# Patient Record
Sex: MALE | Race: WHITE | Employment: OTHER | URBAN - METROPOLITAN AREA
[De-identification: names, ages, dates, MRNs, and addresses within clinical notes are randomized per-mention and may not be internally consistent; named-entity substitution may affect disease eponyms.]

---

## 2017-01-04 ENCOUNTER — TELEPHONE (OUTPATIENT)
Dept: CARDIOLOGY CLINIC | Age: 77
End: 2017-01-04

## 2017-01-10 ENCOUNTER — OFFICE VISIT (OUTPATIENT)
Dept: ORTHOPEDIC SURGERY | Age: 77
End: 2017-01-10

## 2017-01-10 DIAGNOSIS — M17.12 PRIMARY OSTEOARTHRITIS OF LEFT KNEE: Primary | ICD-10-CM

## 2017-01-10 PROCEDURE — 99024 POSTOP FOLLOW-UP VISIT: CPT | Performed by: ORTHOPAEDIC SURGERY

## 2017-01-10 PROCEDURE — 20610 DRAIN/INJ JOINT/BURSA W/O US: CPT | Performed by: ORTHOPAEDIC SURGERY

## 2017-01-13 RX ORDER — CANAGLIFLOZIN 100 MG/1
TABLET, FILM COATED ORAL
Qty: 90 TABLET | Refills: 0 | Status: SHIPPED | OUTPATIENT
Start: 2017-01-13 | End: 2017-07-03 | Stop reason: SDUPTHER

## 2017-01-13 RX ORDER — HYDROCHLOROTHIAZIDE 12.5 MG/1
TABLET ORAL
Qty: 90 TABLET | Refills: 0 | Status: SHIPPED | OUTPATIENT
Start: 2017-01-13 | End: 2017-02-16 | Stop reason: DRUGHIGH

## 2017-01-13 RX ORDER — INSULIN GLARGINE 100 [IU]/ML
INJECTION, SOLUTION SUBCUTANEOUS
Qty: 50 ML | Refills: 0 | Status: SHIPPED | OUTPATIENT
Start: 2017-01-13 | End: 2017-04-14 | Stop reason: SDUPTHER

## 2017-01-17 ENCOUNTER — HOSPITAL ENCOUNTER (OUTPATIENT)
Dept: CARDIOLOGY | Facility: CLINIC | Age: 77
Discharge: HOME | End: 2017-01-17
Attending: INTERNAL MEDICINE | Admitting: INTERNAL MEDICINE

## 2017-01-17 LAB
LV EF: 55 %
LVEF MODALITY: NORMAL

## 2017-01-23 ENCOUNTER — HOSPITAL ENCOUNTER (OUTPATIENT)
Dept: OTHER | Age: 77
Discharge: HOME | End: 2017-01-23
Attending: INTERNAL MEDICINE | Admitting: INTERNAL MEDICINE

## 2017-01-24 ENCOUNTER — HOSPITAL ENCOUNTER (OUTPATIENT)
Dept: CT IMAGING | Age: 77
Discharge: HOME | End: 2017-01-24
Attending: INTERNAL MEDICINE | Admitting: INTERNAL MEDICINE

## 2017-01-24 ENCOUNTER — OFFICE VISIT (OUTPATIENT)
Dept: ORTHOPEDIC SURGERY | Age: 77
End: 2017-01-24

## 2017-01-24 DIAGNOSIS — B19.20 VIRAL HEPATITIS C WITHOUT HEPATIC COMA: ICD-10-CM

## 2017-01-24 DIAGNOSIS — B19.20 UNSPECIFIED VIRAL HEPATITIS C WITHOUT HEPATIC COMA: ICD-10-CM

## 2017-01-24 DIAGNOSIS — M17.12 PRIMARY OSTEOARTHRITIS OF LEFT KNEE: Primary | ICD-10-CM

## 2017-01-24 LAB
A/G RATIO: 0.7 (ref 1.1–2.2)
ALBUMIN SERPL-MCNC: 3.4 G/DL (ref 3.4–5)
ALP BLD-CCNC: 86 U/L (ref 40–129)
ALT SERPL-CCNC: 19 U/L (ref 10–40)
ANION GAP SERPL CALCULATED.3IONS-SCNC: 15 MMOL/L (ref 3–16)
AST SERPL-CCNC: 24 U/L (ref 15–37)
BASOPHILS ABSOLUTE: 0 K/UL (ref 0–0.2)
BASOPHILS RELATIVE PERCENT: 0.4 %
BILIRUB SERPL-MCNC: 0.3 MG/DL (ref 0–1)
BUN BLDV-MCNC: 28 MG/DL (ref 7–20)
CALCIUM SERPL-MCNC: 9.7 MG/DL (ref 8.3–10.6)
CHLORIDE BLD-SCNC: 96 MMOL/L (ref 99–110)
CO2: 29 MMOL/L (ref 21–32)
CREAT SERPL-MCNC: 1.4 MG/DL (ref 0.8–1.3)
EOSINOPHILS ABSOLUTE: 0.5 K/UL (ref 0–0.6)
EOSINOPHILS RELATIVE PERCENT: 6 %
GFR AFRICAN AMERICAN: 60
GFR NON-AFRICAN AMERICAN: 50
GLOBULIN: 4.6 G/DL
GLUCOSE BLD-MCNC: 155 MG/DL (ref 70–99)
HCT VFR BLD CALC: 41.9 % (ref 40.5–52.5)
HEMOGLOBIN: 13.4 G/DL (ref 13.5–17.5)
LYMPHOCYTES ABSOLUTE: 2.6 K/UL (ref 1–5.1)
LYMPHOCYTES RELATIVE PERCENT: 33.6 %
MCH RBC QN AUTO: 28.4 PG (ref 26–34)
MCHC RBC AUTO-ENTMCNC: 32.1 G/DL (ref 31–36)
MCV RBC AUTO: 88.7 FL (ref 80–100)
MONOCYTES ABSOLUTE: 0.6 K/UL (ref 0–1.3)
MONOCYTES RELATIVE PERCENT: 8 %
NEUTROPHILS ABSOLUTE: 4 K/UL (ref 1.7–7.7)
NEUTROPHILS RELATIVE PERCENT: 52 %
PDW BLD-RTO: 14.2 % (ref 12.4–15.4)
PLATELET # BLD: 330 K/UL (ref 135–450)
PMV BLD AUTO: 7.5 FL (ref 5–10.5)
POTASSIUM SERPL-SCNC: 4.2 MMOL/L (ref 3.5–5.1)
RBC # BLD: 4.73 M/UL (ref 4.2–5.9)
SODIUM BLD-SCNC: 140 MMOL/L (ref 136–145)
TOTAL PROTEIN: 8 G/DL (ref 6.4–8.2)
WBC # BLD: 7.8 K/UL (ref 4–11)

## 2017-01-24 PROCEDURE — 1036F TOBACCO NON-USER: CPT | Performed by: ORTHOPAEDIC SURGERY

## 2017-01-24 PROCEDURE — 20610 DRAIN/INJ JOINT/BURSA W/O US: CPT | Performed by: ORTHOPAEDIC SURGERY

## 2017-01-25 LAB — AFP-TUMOR MARKER: 2 NG/ML (ref 0–9)

## 2017-01-26 ENCOUNTER — TELEPHONE (OUTPATIENT)
Dept: CARDIOLOGY CLINIC | Age: 77
End: 2017-01-26

## 2017-01-27 DIAGNOSIS — N18.30 TYPE 2 DIABETES MELLITUS WITH STAGE 3 CHRONIC KIDNEY DISEASE, WITHOUT LONG-TERM CURRENT USE OF INSULIN (HCC): Primary | ICD-10-CM

## 2017-01-27 DIAGNOSIS — E11.22 TYPE 2 DIABETES MELLITUS WITH STAGE 3 CHRONIC KIDNEY DISEASE, WITHOUT LONG-TERM CURRENT USE OF INSULIN (HCC): Primary | ICD-10-CM

## 2017-01-31 ENCOUNTER — OFFICE VISIT (OUTPATIENT)
Dept: ORTHOPEDIC SURGERY | Age: 77
End: 2017-01-31

## 2017-01-31 DIAGNOSIS — M17.12 PRIMARY OSTEOARTHRITIS OF LEFT KNEE: Primary | ICD-10-CM

## 2017-01-31 PROCEDURE — 20610 DRAIN/INJ JOINT/BURSA W/O US: CPT | Performed by: ORTHOPAEDIC SURGERY

## 2017-01-31 PROCEDURE — 1036F TOBACCO NON-USER: CPT | Performed by: ORTHOPAEDIC SURGERY

## 2017-01-31 RX ORDER — HYDROCODONE BITARTRATE AND ACETAMINOPHEN 5; 325 MG/1; MG/1
1 TABLET ORAL EVERY 4 HOURS PRN
Qty: 40 TABLET | Refills: 0 | Status: CANCELLED | OUTPATIENT
Start: 2017-01-31 | End: 2017-02-07

## 2017-01-31 RX ORDER — HYDROCODONE BITARTRATE AND ACETAMINOPHEN 5; 325 MG/1; MG/1
1 TABLET ORAL EVERY 4 HOURS PRN
Qty: 40 TABLET | Refills: 0 | Status: SHIPPED | OUTPATIENT
Start: 2017-01-31 | End: 2017-07-07 | Stop reason: ALTCHOICE

## 2017-02-15 ENCOUNTER — TELEPHONE (OUTPATIENT)
Dept: INTERNAL MEDICINE CLINIC | Age: 77
End: 2017-02-15

## 2017-02-16 ENCOUNTER — OFFICE VISIT (OUTPATIENT)
Dept: INTERNAL MEDICINE CLINIC | Age: 77
End: 2017-02-16

## 2017-02-16 VITALS
BODY MASS INDEX: 30.8 KG/M2 | SYSTOLIC BLOOD PRESSURE: 120 MMHG | HEART RATE: 84 BPM | DIASTOLIC BLOOD PRESSURE: 70 MMHG | HEIGHT: 71 IN | WEIGHT: 220 LBS | RESPIRATION RATE: 14 BRPM

## 2017-02-16 DIAGNOSIS — R42 DIZZINESS: Primary | ICD-10-CM

## 2017-02-16 PROCEDURE — 99213 OFFICE O/P EST LOW 20 MIN: CPT | Performed by: NURSE PRACTITIONER

## 2017-02-16 PROCEDURE — 1036F TOBACCO NON-USER: CPT | Performed by: NURSE PRACTITIONER

## 2017-02-16 PROCEDURE — 4040F PNEUMOC VAC/ADMIN/RCVD: CPT | Performed by: NURSE PRACTITIONER

## 2017-02-16 PROCEDURE — 1123F ACP DISCUSS/DSCN MKR DOCD: CPT | Performed by: NURSE PRACTITIONER

## 2017-02-16 PROCEDURE — 3017F COLORECTAL CA SCREEN DOC REV: CPT | Performed by: NURSE PRACTITIONER

## 2017-02-16 PROCEDURE — G8417 CALC BMI ABV UP PARAM F/U: HCPCS | Performed by: NURSE PRACTITIONER

## 2017-02-16 PROCEDURE — G8484 FLU IMMUNIZE NO ADMIN: HCPCS | Performed by: NURSE PRACTITIONER

## 2017-02-16 PROCEDURE — G8427 DOCREV CUR MEDS BY ELIG CLIN: HCPCS | Performed by: NURSE PRACTITIONER

## 2017-02-16 ASSESSMENT — ENCOUNTER SYMPTOMS
VOMITING: 0
VISUAL CHANGE: 0
NAUSEA: 1
ABDOMINAL PAIN: 0

## 2017-02-28 ENCOUNTER — HOSPITAL ENCOUNTER (OUTPATIENT)
Dept: ULTRASOUND IMAGING | Age: 77
Discharge: HOME | End: 2017-02-28
Attending: INTERNAL MEDICINE | Admitting: INTERNAL MEDICINE

## 2017-02-28 DIAGNOSIS — N18.30 CHRONIC KIDNEY DISEASE, STAGE III (MODERATE) (HCC): ICD-10-CM

## 2017-02-28 LAB
A/G RATIO: 0.9 (ref 1.1–2.2)
ALBUMIN SERPL-MCNC: 3.8 G/DL (ref 3.4–5)
ALP BLD-CCNC: 101 U/L (ref 40–129)
ALT SERPL-CCNC: 36 U/L (ref 10–40)
ANION GAP SERPL CALCULATED.3IONS-SCNC: 13 MMOL/L (ref 3–16)
AST SERPL-CCNC: 33 U/L (ref 15–37)
BACTERIA: ABNORMAL /HPF
BASOPHILS ABSOLUTE: 0 K/UL (ref 0–0.2)
BASOPHILS RELATIVE PERCENT: 0.5 %
BILIRUB SERPL-MCNC: 0.5 MG/DL (ref 0–1)
BILIRUBIN URINE: NEGATIVE
BLOOD, URINE: ABNORMAL
BUN BLDV-MCNC: 30 MG/DL (ref 7–20)
CALCIUM SERPL-MCNC: 9.3 MG/DL (ref 8.3–10.6)
CHLORIDE BLD-SCNC: 102 MMOL/L (ref 99–110)
CLARITY: CLEAR
CO2: 29 MMOL/L (ref 21–32)
COLOR: YELLOW
CREAT SERPL-MCNC: 1.1 MG/DL (ref 0.8–1.3)
CREATININE URINE: 16.4 MG/DL (ref 39–259)
EOSINOPHILS ABSOLUTE: 0.7 K/UL (ref 0–0.6)
EOSINOPHILS RELATIVE PERCENT: 8.5 %
EPITHELIAL CELLS, UA: ABNORMAL /HPF
GFR AFRICAN AMERICAN: >60
GFR NON-AFRICAN AMERICAN: >60
GLOBULIN: 4.1 G/DL
GLUCOSE BLD-MCNC: 68 MG/DL (ref 70–99)
GLUCOSE URINE: NEGATIVE MG/DL
HCT VFR BLD CALC: 43.4 % (ref 40.5–52.5)
HEMOGLOBIN: 13.7 G/DL (ref 13.5–17.5)
IRON SATURATION: 29 % (ref 20–50)
IRON: 94 UG/DL (ref 59–158)
KETONES, URINE: NEGATIVE MG/DL
LEUKOCYTE ESTERASE, URINE: ABNORMAL
LYMPHOCYTES ABSOLUTE: 3.2 K/UL (ref 1–5.1)
LYMPHOCYTES RELATIVE PERCENT: 39 %
MCH RBC QN AUTO: 28 PG (ref 26–34)
MCHC RBC AUTO-ENTMCNC: 31.6 G/DL (ref 31–36)
MCV RBC AUTO: 88.7 FL (ref 80–100)
MICROALBUMIN UR-MCNC: 9.5 MG/DL
MICROALBUMIN/CREAT UR-RTO: 579.3 MG/G (ref 0–30)
MICROSCOPIC EXAMINATION: YES
MONOCYTES ABSOLUTE: 0.7 K/UL (ref 0–1.3)
MONOCYTES RELATIVE PERCENT: 8.2 %
NEUTROPHILS ABSOLUTE: 3.6 K/UL (ref 1.7–7.7)
NEUTROPHILS RELATIVE PERCENT: 43.8 %
NITRITE, URINE: NEGATIVE
PARATHYROID HORMONE INTACT: 52.6 PG/ML (ref 14–72)
PDW BLD-RTO: 15.4 % (ref 12.4–15.4)
PH UA: 6
PHOSPHORUS: 3 MG/DL (ref 2.5–4.9)
PLATELET # BLD: 240 K/UL (ref 135–450)
PMV BLD AUTO: 8 FL (ref 5–10.5)
POTASSIUM SERPL-SCNC: 5.2 MMOL/L (ref 3.5–5.1)
PROTEIN PROTEIN: 0.02 G/DL
PROTEIN PROTEIN: 18 MG/DL
PROTEIN UA: NEGATIVE MG/DL
RBC # BLD: 4.89 M/UL (ref 4.2–5.9)
RBC UA: ABNORMAL /HPF (ref 0–2)
SODIUM BLD-SCNC: 144 MMOL/L (ref 136–145)
SPECIFIC GRAVITY UA: <=1.005
TOTAL IRON BINDING CAPACITY: 329 UG/DL (ref 260–445)
TOTAL PROTEIN: 7.9 G/DL (ref 6.4–8.2)
UROBILINOGEN, URINE: 0.2 E.U./DL
VITAMIN D 25-HYDROXY: 20.7 NG/ML
WBC # BLD: 8.1 K/UL (ref 4–11)
WBC UA: ABNORMAL /HPF (ref 0–5)

## 2017-03-01 LAB
ALBUMIN SERPL-MCNC: 3.7 G/DL (ref 3.1–4.9)
ALPHA-1-GLOBULIN: 0.3 G/DL (ref 0.2–0.4)
ALPHA-2-GLOBULIN: 0.9 G/DL (ref 0.4–1.1)
BETA GLOBULIN: 1.1 G/DL (ref 0.9–1.6)
GAMMA GLOBULIN: 1.9 G/DL (ref 0.6–1.8)
SPE/IFE INTERPRETATION: NORMAL

## 2017-03-03 LAB
KAPPA, FREE LIGHT CHAINS, SERUM: 113.43 MG/L (ref 3.3–19.4)
KAPPA/LAMBDA RATIO: 2.84 (ref 0.26–1.65)
KAPPA/LAMBDA TEST COMMENT: ABNORMAL
LAMBDA, FREE LIGHT CHAINS, SERUM: 39.94 MG/L (ref 5.71–26.3)
URINE ELECTROPHORESIS INTERP: NORMAL

## 2017-03-10 RX ORDER — PIOGLITAZONEHYDROCHLORIDE 30 MG/1
TABLET ORAL
Qty: 90 TABLET | Refills: 0 | Status: SHIPPED | OUTPATIENT
Start: 2017-03-10 | End: 2017-06-08 | Stop reason: SDUPTHER

## 2017-03-28 ENCOUNTER — OFFICE VISIT (OUTPATIENT)
Dept: CARDIOLOGY CLINIC | Age: 77
End: 2017-03-28

## 2017-03-28 VITALS
DIASTOLIC BLOOD PRESSURE: 72 MMHG | BODY MASS INDEX: 32.2 KG/M2 | WEIGHT: 230 LBS | HEART RATE: 67 BPM | HEIGHT: 71 IN | SYSTOLIC BLOOD PRESSURE: 124 MMHG

## 2017-03-28 DIAGNOSIS — I48.0 PAF (PAROXYSMAL ATRIAL FIBRILLATION) (HCC): Primary | ICD-10-CM

## 2017-03-28 DIAGNOSIS — I10 ESSENTIAL HYPERTENSION: ICD-10-CM

## 2017-03-28 PROCEDURE — G8484 FLU IMMUNIZE NO ADMIN: HCPCS | Performed by: NURSE PRACTITIONER

## 2017-03-28 PROCEDURE — G8417 CALC BMI ABV UP PARAM F/U: HCPCS | Performed by: NURSE PRACTITIONER

## 2017-03-28 PROCEDURE — 4040F PNEUMOC VAC/ADMIN/RCVD: CPT | Performed by: NURSE PRACTITIONER

## 2017-03-28 PROCEDURE — 99213 OFFICE O/P EST LOW 20 MIN: CPT | Performed by: NURSE PRACTITIONER

## 2017-03-28 PROCEDURE — 1036F TOBACCO NON-USER: CPT | Performed by: NURSE PRACTITIONER

## 2017-03-28 PROCEDURE — 93000 ELECTROCARDIOGRAM COMPLETE: CPT | Performed by: NURSE PRACTITIONER

## 2017-03-28 PROCEDURE — 3017F COLORECTAL CA SCREEN DOC REV: CPT | Performed by: NURSE PRACTITIONER

## 2017-03-28 PROCEDURE — G8427 DOCREV CUR MEDS BY ELIG CLIN: HCPCS | Performed by: NURSE PRACTITIONER

## 2017-03-28 PROCEDURE — 1123F ACP DISCUSS/DSCN MKR DOCD: CPT | Performed by: NURSE PRACTITIONER

## 2017-04-04 ENCOUNTER — OFFICE VISIT (OUTPATIENT)
Dept: INTERNAL MEDICINE CLINIC | Age: 77
End: 2017-04-04

## 2017-04-04 VITALS
BODY MASS INDEX: 32.34 KG/M2 | HEART RATE: 70 BPM | WEIGHT: 231 LBS | HEIGHT: 71 IN | SYSTOLIC BLOOD PRESSURE: 120 MMHG | DIASTOLIC BLOOD PRESSURE: 80 MMHG | RESPIRATION RATE: 14 BRPM

## 2017-04-04 DIAGNOSIS — E11.3293 TYPE 2 DIABETES MELLITUS WITH BOTH EYES AFFECTED BY MILD NONPROLIFERATIVE RETINOPATHY WITHOUT MACULAR EDEMA, WITH LONG-TERM CURRENT USE OF INSULIN (HCC): ICD-10-CM

## 2017-04-04 DIAGNOSIS — E11.22 TYPE 2 DIABETES MELLITUS WITH STAGE 3 CHRONIC KIDNEY DISEASE, WITHOUT LONG-TERM CURRENT USE OF INSULIN (HCC): Primary | ICD-10-CM

## 2017-04-04 DIAGNOSIS — E11.69 HYPERLIPIDEMIA ASSOCIATED WITH TYPE 2 DIABETES MELLITUS (HCC): ICD-10-CM

## 2017-04-04 DIAGNOSIS — K21.9 GASTROESOPHAGEAL REFLUX DISEASE WITHOUT ESOPHAGITIS: ICD-10-CM

## 2017-04-04 DIAGNOSIS — I48.0 PAF (PAROXYSMAL ATRIAL FIBRILLATION) (HCC): ICD-10-CM

## 2017-04-04 DIAGNOSIS — E11.22 TYPE 2 DIABETES MELLITUS WITH STAGE 3 CHRONIC KIDNEY DISEASE, WITHOUT LONG-TERM CURRENT USE OF INSULIN (HCC): ICD-10-CM

## 2017-04-04 DIAGNOSIS — E03.9 ACQUIRED HYPOTHYROIDISM: ICD-10-CM

## 2017-04-04 DIAGNOSIS — B18.2 CHRONIC HEPATITIS C WITHOUT HEPATIC COMA (HCC): ICD-10-CM

## 2017-04-04 DIAGNOSIS — E78.5 HYPERLIPIDEMIA ASSOCIATED WITH TYPE 2 DIABETES MELLITUS (HCC): ICD-10-CM

## 2017-04-04 DIAGNOSIS — I10 ESSENTIAL HYPERTENSION: ICD-10-CM

## 2017-04-04 DIAGNOSIS — Z79.4 TYPE 2 DIABETES MELLITUS WITH BOTH EYES AFFECTED BY MILD NONPROLIFERATIVE RETINOPATHY WITHOUT MACULAR EDEMA, WITH LONG-TERM CURRENT USE OF INSULIN (HCC): ICD-10-CM

## 2017-04-04 DIAGNOSIS — G25.0 BENIGN ESSENTIAL TREMOR: Chronic | ICD-10-CM

## 2017-04-04 DIAGNOSIS — N18.30 TYPE 2 DIABETES MELLITUS WITH STAGE 3 CHRONIC KIDNEY DISEASE, WITHOUT LONG-TERM CURRENT USE OF INSULIN (HCC): Primary | ICD-10-CM

## 2017-04-04 DIAGNOSIS — N18.30 TYPE 2 DIABETES MELLITUS WITH STAGE 3 CHRONIC KIDNEY DISEASE, WITHOUT LONG-TERM CURRENT USE OF INSULIN (HCC): ICD-10-CM

## 2017-04-04 PROCEDURE — 4040F PNEUMOC VAC/ADMIN/RCVD: CPT | Performed by: INTERNAL MEDICINE

## 2017-04-04 PROCEDURE — 1036F TOBACCO NON-USER: CPT | Performed by: INTERNAL MEDICINE

## 2017-04-04 PROCEDURE — G8428 CUR MEDS NOT DOCUMENT: HCPCS | Performed by: INTERNAL MEDICINE

## 2017-04-04 PROCEDURE — G8417 CALC BMI ABV UP PARAM F/U: HCPCS | Performed by: INTERNAL MEDICINE

## 2017-04-04 PROCEDURE — 3045F PR MOST RECENT HEMOGLOBIN A1C LEVEL 7.0-9.0%: CPT | Performed by: INTERNAL MEDICINE

## 2017-04-04 PROCEDURE — 99214 OFFICE O/P EST MOD 30 MIN: CPT | Performed by: INTERNAL MEDICINE

## 2017-04-04 PROCEDURE — 1123F ACP DISCUSS/DSCN MKR DOCD: CPT | Performed by: INTERNAL MEDICINE

## 2017-04-04 PROCEDURE — 3017F COLORECTAL CA SCREEN DOC REV: CPT | Performed by: INTERNAL MEDICINE

## 2017-04-04 ASSESSMENT — ENCOUNTER SYMPTOMS
VOMITING: 0
ABDOMINAL PAIN: 0
RHINORRHEA: 0
WHEEZING: 0
SHORTNESS OF BREATH: 0
NAUSEA: 0
BACK PAIN: 0

## 2017-04-05 LAB
ESTIMATED AVERAGE GLUCOSE: 162.8 MG/DL
HBA1C MFR BLD: 7.3 %

## 2017-04-07 RX ORDER — LEVOTHYROXINE SODIUM 0.1 MG/1
TABLET ORAL
Qty: 90 TABLET | Refills: 0 | Status: SHIPPED | OUTPATIENT
Start: 2017-04-07 | End: 2017-07-06 | Stop reason: SDUPTHER

## 2017-04-12 ENCOUNTER — HOSPITAL ENCOUNTER (OUTPATIENT)
Dept: OTHER | Age: 77
Discharge: HOME | End: 2017-04-12
Attending: INTERNAL MEDICINE | Admitting: INTERNAL MEDICINE

## 2017-04-12 LAB
ALBUMIN SERPL-MCNC: 3.8 G/DL (ref 3.4–5)
ANION GAP SERPL CALCULATED.3IONS-SCNC: 16 MMOL/L (ref 3–16)
BUN BLDV-MCNC: 32 MG/DL (ref 7–20)
CALCIUM SERPL-MCNC: 9.4 MG/DL (ref 8.3–10.6)
CHLORIDE BLD-SCNC: 105 MMOL/L (ref 99–110)
CO2: 28 MMOL/L (ref 21–32)
CREAT SERPL-MCNC: 1 MG/DL (ref 0.8–1.3)
CREATININE URINE: 68.7 MG/DL (ref 39–259)
GFR AFRICAN AMERICAN: >60
GFR NON-AFRICAN AMERICAN: >60
GLUCOSE BLD-MCNC: 61 MG/DL (ref 70–99)
PHOSPHORUS: 3.3 MG/DL (ref 2.5–4.9)
POTASSIUM SERPL-SCNC: 5.2 MMOL/L (ref 3.5–5.1)
PROTEIN PROTEIN: 46 MG/DL
SODIUM BLD-SCNC: 149 MMOL/L (ref 136–145)
VITAMIN D 25-HYDROXY: 25.2 NG/ML

## 2017-04-14 RX ORDER — INSULIN GLARGINE 100 [IU]/ML
INJECTION, SOLUTION SUBCUTANEOUS
Qty: 50 ML | Refills: 0 | Status: SHIPPED | OUTPATIENT
Start: 2017-04-14 | End: 2017-07-13 | Stop reason: SDUPTHER

## 2017-04-14 RX ORDER — HYDROCHLOROTHIAZIDE 12.5 MG/1
TABLET ORAL
Qty: 90 TABLET | Refills: 0 | Status: SHIPPED | OUTPATIENT
Start: 2017-04-14 | End: 2017-09-15 | Stop reason: SDUPTHER

## 2017-04-18 ENCOUNTER — TELEPHONE (OUTPATIENT)
Dept: INTERNAL MEDICINE CLINIC | Age: 77
End: 2017-04-18

## 2017-04-18 ENCOUNTER — OFFICE VISIT (OUTPATIENT)
Dept: ORTHOPEDIC SURGERY | Age: 77
End: 2017-04-18

## 2017-04-18 VITALS — WEIGHT: 231.04 LBS | BODY MASS INDEX: 32.35 KG/M2 | HEIGHT: 71 IN

## 2017-04-18 DIAGNOSIS — M17.12 PRIMARY OSTEOARTHRITIS OF LEFT KNEE: Primary | ICD-10-CM

## 2017-04-18 PROCEDURE — 1123F ACP DISCUSS/DSCN MKR DOCD: CPT | Performed by: ORTHOPAEDIC SURGERY

## 2017-04-18 PROCEDURE — 1036F TOBACCO NON-USER: CPT | Performed by: ORTHOPAEDIC SURGERY

## 2017-04-18 PROCEDURE — 73560 X-RAY EXAM OF KNEE 1 OR 2: CPT | Performed by: ORTHOPAEDIC SURGERY

## 2017-04-18 PROCEDURE — G8417 CALC BMI ABV UP PARAM F/U: HCPCS | Performed by: ORTHOPAEDIC SURGERY

## 2017-04-18 PROCEDURE — G8427 DOCREV CUR MEDS BY ELIG CLIN: HCPCS | Performed by: ORTHOPAEDIC SURGERY

## 2017-04-18 PROCEDURE — 3017F COLORECTAL CA SCREEN DOC REV: CPT | Performed by: ORTHOPAEDIC SURGERY

## 2017-04-18 PROCEDURE — 99213 OFFICE O/P EST LOW 20 MIN: CPT | Performed by: ORTHOPAEDIC SURGERY

## 2017-04-18 PROCEDURE — 4040F PNEUMOC VAC/ADMIN/RCVD: CPT | Performed by: ORTHOPAEDIC SURGERY

## 2017-04-20 RX ORDER — DILTIAZEM HYDROCHLORIDE 120 MG/1
120 CAPSULE, COATED, EXTENDED RELEASE ORAL DAILY
Qty: 30 CAPSULE | Refills: 11 | Status: SHIPPED | OUTPATIENT
Start: 2017-04-20 | End: 2018-04-20 | Stop reason: SDUPTHER

## 2017-05-01 PROBLEM — D47.2 MGUS (MONOCLONAL GAMMOPATHY OF UNKNOWN SIGNIFICANCE): Status: ACTIVE | Noted: 2017-05-01

## 2017-05-01 PROBLEM — N28.9 KIDNEY LESION: Status: ACTIVE | Noted: 2017-05-01

## 2017-05-16 ENCOUNTER — OFFICE VISIT (OUTPATIENT)
Dept: ORTHOPEDIC SURGERY | Age: 77
End: 2017-05-16

## 2017-05-16 VITALS — HEIGHT: 70 IN | WEIGHT: 246.69 LBS | BODY MASS INDEX: 35.32 KG/M2

## 2017-05-16 DIAGNOSIS — M17.12 PRIMARY OSTEOARTHRITIS OF LEFT KNEE: Primary | ICD-10-CM

## 2017-05-16 PROCEDURE — G8417 CALC BMI ABV UP PARAM F/U: HCPCS | Performed by: ORTHOPAEDIC SURGERY

## 2017-05-16 PROCEDURE — 4040F PNEUMOC VAC/ADMIN/RCVD: CPT | Performed by: ORTHOPAEDIC SURGERY

## 2017-05-16 PROCEDURE — 99213 OFFICE O/P EST LOW 20 MIN: CPT | Performed by: ORTHOPAEDIC SURGERY

## 2017-05-16 PROCEDURE — 1123F ACP DISCUSS/DSCN MKR DOCD: CPT | Performed by: ORTHOPAEDIC SURGERY

## 2017-05-16 PROCEDURE — G8427 DOCREV CUR MEDS BY ELIG CLIN: HCPCS | Performed by: ORTHOPAEDIC SURGERY

## 2017-05-16 PROCEDURE — 3017F COLORECTAL CA SCREEN DOC REV: CPT | Performed by: ORTHOPAEDIC SURGERY

## 2017-05-16 PROCEDURE — 1036F TOBACCO NON-USER: CPT | Performed by: ORTHOPAEDIC SURGERY

## 2017-05-17 ENCOUNTER — HOSPITAL ENCOUNTER (OUTPATIENT)
Dept: OTHER | Age: 77
Discharge: HOME | End: 2017-05-17
Attending: INTERNAL MEDICINE | Admitting: INTERNAL MEDICINE

## 2017-05-17 LAB
ANION GAP SERPL CALCULATED.3IONS-SCNC: 13 MMOL/L (ref 3–16)
BUN BLDV-MCNC: 36 MG/DL (ref 7–20)
CALCIUM SERPL-MCNC: 8.8 MG/DL (ref 8.3–10.6)
CHLORIDE BLD-SCNC: 105 MMOL/L (ref 99–110)
CO2: 27 MMOL/L (ref 21–32)
CREAT SERPL-MCNC: 1.2 MG/DL (ref 0.8–1.3)
GFR AFRICAN AMERICAN: >60
GFR NON-AFRICAN AMERICAN: 59
GLUCOSE BLD-MCNC: 104 MG/DL (ref 70–99)
POTASSIUM SERPL-SCNC: 4.8 MMOL/L (ref 3.5–5.1)
SODIUM BLD-SCNC: 145 MMOL/L (ref 136–145)

## 2017-06-08 RX ORDER — PIOGLITAZONEHYDROCHLORIDE 30 MG/1
TABLET ORAL
Qty: 90 TABLET | Refills: 0 | Status: SHIPPED | OUTPATIENT
Start: 2017-06-08 | End: 2017-09-06 | Stop reason: SDUPTHER

## 2017-06-14 ENCOUNTER — HOSPITAL ENCOUNTER (OUTPATIENT)
Dept: OTHER | Age: 77
Discharge: HOME | End: 2017-06-14
Attending: ORTHOPAEDIC SURGERY | Admitting: ORTHOPAEDIC SURGERY

## 2017-06-14 DIAGNOSIS — M17.12 PRIMARY OSTEOARTHRITIS OF LEFT KNEE: ICD-10-CM

## 2017-06-14 LAB
ABO/RH: NORMAL
ANION GAP SERPL CALCULATED.3IONS-SCNC: 14 MMOL/L (ref 3–16)
ANTIBODY SCREEN: NORMAL
APTT: 37.2 SEC (ref 21–31.8)
BUN BLDV-MCNC: 35 MG/DL (ref 7–20)
CALCIUM SERPL-MCNC: 8.9 MG/DL (ref 8.3–10.6)
CHLORIDE BLD-SCNC: 105 MMOL/L (ref 99–110)
CO2: 27 MMOL/L (ref 21–32)
CREAT SERPL-MCNC: 1.2 MG/DL (ref 0.8–1.3)
EKG ATRIAL RATE: 63 BPM
EKG DIAGNOSIS: NORMAL
EKG P AXIS: 21 DEGREES
EKG P-R INTERVAL: 202 MS
EKG Q-T INTERVAL: 404 MS
EKG QRS DURATION: 88 MS
EKG QTC CALCULATION (BAZETT): 413 MS
EKG R AXIS: 60 DEGREES
EKG T AXIS: 64 DEGREES
EKG VENTRICULAR RATE: 63 BPM
GFR AFRICAN AMERICAN: >60
GFR NON-AFRICAN AMERICAN: 59
GLUCOSE BLD-MCNC: 64 MG/DL (ref 70–99)
HCT VFR BLD CALC: 41.6 % (ref 40.5–52.5)
HEMOGLOBIN: 13.1 G/DL (ref 13.5–17.5)
INR BLD: 1.19 (ref 0.85–1.15)
MCH RBC QN AUTO: 28.3 PG (ref 26–34)
MCHC RBC AUTO-ENTMCNC: 31.4 G/DL (ref 31–36)
MCV RBC AUTO: 90 FL (ref 80–100)
PDW BLD-RTO: 15 % (ref 12.4–15.4)
PLATELET # BLD: 231 K/UL (ref 135–450)
PMV BLD AUTO: 7.8 FL (ref 5–10.5)
POTASSIUM SERPL-SCNC: 4.6 MMOL/L (ref 3.5–5.1)
PROTHROMBIN TIME: 13.4 SEC (ref 9.6–13)
RBC # BLD: 4.62 M/UL (ref 4.2–5.9)
SODIUM BLD-SCNC: 146 MMOL/L (ref 136–145)
WBC # BLD: 5.9 K/UL (ref 4–11)

## 2017-06-14 PROCEDURE — 93010 ELECTROCARDIOGRAM REPORT: CPT | Performed by: INTERNAL MEDICINE

## 2017-06-15 ENCOUNTER — TELEPHONE (OUTPATIENT)
Dept: ORTHOPEDIC SURGERY | Age: 77
End: 2017-06-15

## 2017-06-15 ENCOUNTER — TELEPHONE (OUTPATIENT)
Dept: CARDIOLOGY CLINIC | Age: 77
End: 2017-06-15

## 2017-06-15 RX ORDER — TAMSULOSIN HYDROCHLORIDE 0.4 MG/1
CAPSULE ORAL
Qty: 90 CAPSULE | Refills: 0 | Status: SHIPPED | OUTPATIENT
Start: 2017-06-15 | End: 2017-09-15 | Stop reason: SDUPTHER

## 2017-06-16 LAB
ORGANISM: ABNORMAL
URINE CULTURE, ROUTINE: ABNORMAL

## 2017-06-27 ENCOUNTER — TELEPHONE (OUTPATIENT)
Dept: ORTHOPEDIC SURGERY | Age: 77
End: 2017-06-27

## 2017-07-03 ENCOUNTER — TELEPHONE (OUTPATIENT)
Dept: INTERNAL MEDICINE CLINIC | Age: 77
End: 2017-07-03

## 2017-07-03 RX ORDER — CANAGLIFLOZIN 100 MG/1
TABLET, FILM COATED ORAL
Qty: 90 TABLET | Refills: 0 | Status: SHIPPED | OUTPATIENT
Start: 2017-07-03 | End: 2017-07-03 | Stop reason: SDUPTHER

## 2017-07-06 RX ORDER — LEVOTHYROXINE SODIUM 0.1 MG/1
TABLET ORAL
Qty: 90 TABLET | Refills: 0 | Status: SHIPPED | OUTPATIENT
Start: 2017-07-06 | End: 2017-10-05 | Stop reason: SDUPTHER

## 2017-07-07 ENCOUNTER — OFFICE VISIT (OUTPATIENT)
Dept: INTERNAL MEDICINE CLINIC | Age: 77
End: 2017-07-07

## 2017-07-07 VITALS
SYSTOLIC BLOOD PRESSURE: 110 MMHG | HEART RATE: 78 BPM | WEIGHT: 247 LBS | HEIGHT: 71 IN | BODY MASS INDEX: 34.58 KG/M2 | DIASTOLIC BLOOD PRESSURE: 60 MMHG

## 2017-07-07 DIAGNOSIS — N18.30 TYPE 2 DIABETES MELLITUS WITH STAGE 3 CHRONIC KIDNEY DISEASE, WITHOUT LONG-TERM CURRENT USE OF INSULIN (HCC): ICD-10-CM

## 2017-07-07 DIAGNOSIS — B96.89 UTI DUE TO KLEBSIELLA SPECIES: ICD-10-CM

## 2017-07-07 DIAGNOSIS — Z01.818 PREOP EXAMINATION: Primary | ICD-10-CM

## 2017-07-07 DIAGNOSIS — M17.12 PRIMARY OSTEOARTHRITIS OF LEFT KNEE: ICD-10-CM

## 2017-07-07 DIAGNOSIS — E11.69 HYPERLIPIDEMIA ASSOCIATED WITH TYPE 2 DIABETES MELLITUS (HCC): ICD-10-CM

## 2017-07-07 DIAGNOSIS — I48.0 PAF (PAROXYSMAL ATRIAL FIBRILLATION) (HCC): ICD-10-CM

## 2017-07-07 DIAGNOSIS — B18.2 CHRONIC HEPATITIS C WITHOUT HEPATIC COMA (HCC): ICD-10-CM

## 2017-07-07 DIAGNOSIS — N18.3 CKD (CHRONIC KIDNEY DISEASE), STAGE 3 (MODERATE): ICD-10-CM

## 2017-07-07 DIAGNOSIS — N39.0 UTI DUE TO KLEBSIELLA SPECIES: ICD-10-CM

## 2017-07-07 DIAGNOSIS — E11.22 TYPE 2 DIABETES MELLITUS WITH STAGE 3 CHRONIC KIDNEY DISEASE, WITHOUT LONG-TERM CURRENT USE OF INSULIN (HCC): ICD-10-CM

## 2017-07-07 DIAGNOSIS — D47.2 MGUS (MONOCLONAL GAMMOPATHY OF UNKNOWN SIGNIFICANCE): ICD-10-CM

## 2017-07-07 DIAGNOSIS — E78.5 HYPERLIPIDEMIA ASSOCIATED WITH TYPE 2 DIABETES MELLITUS (HCC): ICD-10-CM

## 2017-07-07 DIAGNOSIS — E03.9 ACQUIRED HYPOTHYROIDISM: ICD-10-CM

## 2017-07-07 PROCEDURE — 3046F HEMOGLOBIN A1C LEVEL >9.0%: CPT | Performed by: PHYSICIAN ASSISTANT

## 2017-07-07 PROCEDURE — G8417 CALC BMI ABV UP PARAM F/U: HCPCS | Performed by: PHYSICIAN ASSISTANT

## 2017-07-07 PROCEDURE — 3017F COLORECTAL CA SCREEN DOC REV: CPT | Performed by: PHYSICIAN ASSISTANT

## 2017-07-07 PROCEDURE — G8428 CUR MEDS NOT DOCUMENT: HCPCS | Performed by: PHYSICIAN ASSISTANT

## 2017-07-07 PROCEDURE — 1123F ACP DISCUSS/DSCN MKR DOCD: CPT | Performed by: PHYSICIAN ASSISTANT

## 2017-07-07 PROCEDURE — 1036F TOBACCO NON-USER: CPT | Performed by: PHYSICIAN ASSISTANT

## 2017-07-07 PROCEDURE — 4040F PNEUMOC VAC/ADMIN/RCVD: CPT | Performed by: PHYSICIAN ASSISTANT

## 2017-07-07 PROCEDURE — 99214 OFFICE O/P EST MOD 30 MIN: CPT | Performed by: PHYSICIAN ASSISTANT

## 2017-07-07 RX ORDER — CIPROFLOXACIN 250 MG/1
250 TABLET, FILM COATED ORAL 2 TIMES DAILY
Qty: 10 TABLET | Refills: 0 | Status: SHIPPED | OUTPATIENT
Start: 2017-07-07 | End: 2017-07-12

## 2017-07-14 ENCOUNTER — TELEPHONE (OUTPATIENT)
Dept: ORTHOPEDIC SURGERY | Age: 77
End: 2017-07-14

## 2017-07-17 PROBLEM — Z96.652 STATUS POST TOTAL LEFT KNEE REPLACEMENT: Status: ACTIVE | Noted: 2017-07-17

## 2017-07-17 LAB
GLUCOSE BLD-MCNC: 143 MG/DL (ref 70–99)
GLUCOSE BLD-MCNC: 158 MG/DL (ref 70–99)
GLUCOSE BLD-MCNC: 50 MG/DL (ref 70–99)
GLUCOSE BLD-MCNC: 64 MG/DL (ref 70–99)
GLUCOSE BLD-MCNC: 91 MG/DL (ref 70–99)
PERFORMED ON: ABNORMAL
PERFORMED ON: NORMAL

## 2017-07-18 LAB
GLUCOSE BLD-MCNC: 103 MG/DL (ref 70–99)
GLUCOSE BLD-MCNC: 130 MG/DL (ref 70–99)
GLUCOSE BLD-MCNC: 138 MG/DL (ref 70–99)
GLUCOSE BLD-MCNC: 146 MG/DL (ref 70–99)
GLUCOSE BLD-MCNC: 98 MG/DL (ref 70–99)
PERFORMED ON: ABNORMAL
PERFORMED ON: NORMAL

## 2017-07-19 LAB
GLUCOSE BLD-MCNC: 129 MG/DL (ref 70–99)
GLUCOSE BLD-MCNC: 185 MG/DL (ref 70–99)
PERFORMED ON: ABNORMAL
PERFORMED ON: ABNORMAL

## 2017-07-21 ENCOUNTER — CARE COORDINATION (OUTPATIENT)
Dept: CASE MANAGEMENT | Age: 77
End: 2017-07-21

## 2017-07-21 ENCOUNTER — TELEPHONE (OUTPATIENT)
Dept: PHARMACY | Facility: CLINIC | Age: 77
End: 2017-07-21

## 2017-07-21 ENCOUNTER — TELEPHONE (OUTPATIENT)
Dept: ORTHOPEDIC SURGERY | Age: 77
End: 2017-07-21

## 2017-07-21 RX ORDER — LANOLIN ALCOHOL/MO/W.PET/CERES
1000 CREAM (GRAM) TOPICAL DAILY
COMMUNITY
End: 2022-06-13

## 2017-07-21 RX ORDER — CHLORAL HYDRATE 500 MG
1000 CAPSULE ORAL DAILY
COMMUNITY
End: 2018-06-27

## 2017-07-27 ENCOUNTER — TELEPHONE (OUTPATIENT)
Dept: INTERNAL MEDICINE CLINIC | Age: 77
End: 2017-07-27

## 2017-07-27 ENCOUNTER — CARE COORDINATION (OUTPATIENT)
Dept: CASE MANAGEMENT | Age: 77
End: 2017-07-27

## 2017-08-08 ENCOUNTER — CARE COORDINATION (OUTPATIENT)
Dept: CASE MANAGEMENT | Age: 77
End: 2017-08-08

## 2017-08-08 ENCOUNTER — OFFICE VISIT (OUTPATIENT)
Dept: ORTHOPEDIC SURGERY | Age: 77
End: 2017-08-08

## 2017-08-08 DIAGNOSIS — M17.12 PRIMARY OSTEOARTHRITIS OF LEFT KNEE: Primary | ICD-10-CM

## 2017-08-08 DIAGNOSIS — Z96.652 STATUS POST TOTAL LEFT KNEE REPLACEMENT: ICD-10-CM

## 2017-08-08 PROCEDURE — 99024 POSTOP FOLLOW-UP VISIT: CPT | Performed by: ORTHOPAEDIC SURGERY

## 2017-08-08 PROCEDURE — 73560 X-RAY EXAM OF KNEE 1 OR 2: CPT | Performed by: ORTHOPAEDIC SURGERY

## 2017-08-16 ENCOUNTER — HOSPITAL ENCOUNTER (OUTPATIENT)
Dept: PHYSICAL THERAPY | Age: 77
Discharge: HOME | End: 2017-08-31
Admitting: ORTHOPAEDIC SURGERY

## 2017-08-22 ENCOUNTER — OFFICE VISIT (OUTPATIENT)
Dept: INTERNAL MEDICINE CLINIC | Age: 77
End: 2017-08-22

## 2017-08-22 VITALS
HEIGHT: 71 IN | DIASTOLIC BLOOD PRESSURE: 80 MMHG | RESPIRATION RATE: 14 BRPM | SYSTOLIC BLOOD PRESSURE: 130 MMHG | BODY MASS INDEX: 32.06 KG/M2 | WEIGHT: 229 LBS | HEART RATE: 70 BPM

## 2017-08-22 DIAGNOSIS — G25.0 BENIGN ESSENTIAL TREMOR: Chronic | ICD-10-CM

## 2017-08-22 DIAGNOSIS — Z79.4 TYPE 2 DIABETES MELLITUS WITH BOTH EYES AFFECTED BY MILD NONPROLIFERATIVE RETINOPATHY WITHOUT MACULAR EDEMA, WITH LONG-TERM CURRENT USE OF INSULIN (HCC): ICD-10-CM

## 2017-08-22 DIAGNOSIS — I10 ESSENTIAL HYPERTENSION: ICD-10-CM

## 2017-08-22 DIAGNOSIS — E11.3293 TYPE 2 DIABETES MELLITUS WITH BOTH EYES AFFECTED BY MILD NONPROLIFERATIVE RETINOPATHY WITHOUT MACULAR EDEMA, WITH LONG-TERM CURRENT USE OF INSULIN (HCC): ICD-10-CM

## 2017-08-22 DIAGNOSIS — B18.2 CHRONIC HEPATITIS C WITHOUT HEPATIC COMA (HCC): ICD-10-CM

## 2017-08-22 DIAGNOSIS — E11.69 HYPERLIPIDEMIA ASSOCIATED WITH TYPE 2 DIABETES MELLITUS (HCC): ICD-10-CM

## 2017-08-22 DIAGNOSIS — E03.9 ACQUIRED HYPOTHYROIDISM: ICD-10-CM

## 2017-08-22 DIAGNOSIS — E11.22 TYPE 2 DIABETES MELLITUS WITH STAGE 3 CHRONIC KIDNEY DISEASE, WITHOUT LONG-TERM CURRENT USE OF INSULIN (HCC): Primary | ICD-10-CM

## 2017-08-22 DIAGNOSIS — N18.30 TYPE 2 DIABETES MELLITUS WITH STAGE 3 CHRONIC KIDNEY DISEASE, WITHOUT LONG-TERM CURRENT USE OF INSULIN (HCC): Primary | ICD-10-CM

## 2017-08-22 DIAGNOSIS — K21.9 GASTROESOPHAGEAL REFLUX DISEASE WITHOUT ESOPHAGITIS: ICD-10-CM

## 2017-08-22 DIAGNOSIS — E78.5 HYPERLIPIDEMIA ASSOCIATED WITH TYPE 2 DIABETES MELLITUS (HCC): ICD-10-CM

## 2017-08-22 PROCEDURE — G8427 DOCREV CUR MEDS BY ELIG CLIN: HCPCS | Performed by: INTERNAL MEDICINE

## 2017-08-22 PROCEDURE — 3017F COLORECTAL CA SCREEN DOC REV: CPT | Performed by: INTERNAL MEDICINE

## 2017-08-22 PROCEDURE — 99214 OFFICE O/P EST MOD 30 MIN: CPT | Performed by: INTERNAL MEDICINE

## 2017-08-22 PROCEDURE — 4040F PNEUMOC VAC/ADMIN/RCVD: CPT | Performed by: INTERNAL MEDICINE

## 2017-08-22 PROCEDURE — G8417 CALC BMI ABV UP PARAM F/U: HCPCS | Performed by: INTERNAL MEDICINE

## 2017-08-22 PROCEDURE — 1123F ACP DISCUSS/DSCN MKR DOCD: CPT | Performed by: INTERNAL MEDICINE

## 2017-08-22 PROCEDURE — 3046F HEMOGLOBIN A1C LEVEL >9.0%: CPT | Performed by: INTERNAL MEDICINE

## 2017-08-22 PROCEDURE — 4004F PT TOBACCO SCREEN RCVD TLK: CPT | Performed by: INTERNAL MEDICINE

## 2017-08-22 ASSESSMENT — ENCOUNTER SYMPTOMS
WHEEZING: 0
RHINORRHEA: 0
SHORTNESS OF BREATH: 0
ABDOMINAL PAIN: 0
VOMITING: 0
NAUSEA: 0
BACK PAIN: 0

## 2017-08-23 ENCOUNTER — TELEPHONE (OUTPATIENT)
Dept: INTERNAL MEDICINE CLINIC | Age: 77
End: 2017-08-23

## 2017-08-24 ENCOUNTER — TELEPHONE (OUTPATIENT)
Dept: INTERNAL MEDICINE CLINIC | Age: 77
End: 2017-08-24

## 2017-08-24 DIAGNOSIS — E03.9 ACQUIRED HYPOTHYROIDISM: ICD-10-CM

## 2017-08-24 DIAGNOSIS — E11.22 TYPE 2 DIABETES MELLITUS WITH STAGE 3 CHRONIC KIDNEY DISEASE, WITHOUT LONG-TERM CURRENT USE OF INSULIN (HCC): ICD-10-CM

## 2017-08-24 DIAGNOSIS — N18.30 TYPE 2 DIABETES MELLITUS WITH STAGE 3 CHRONIC KIDNEY DISEASE, WITHOUT LONG-TERM CURRENT USE OF INSULIN (HCC): ICD-10-CM

## 2017-08-24 LAB
A/G RATIO: 1 (ref 1.1–2.2)
ALBUMIN SERPL-MCNC: 3.7 G/DL (ref 3.4–5)
ALP BLD-CCNC: 100 U/L (ref 40–129)
ALT SERPL-CCNC: 29 U/L (ref 10–40)
ANION GAP SERPL CALCULATED.3IONS-SCNC: 16 MMOL/L (ref 3–16)
AST SERPL-CCNC: 33 U/L (ref 15–37)
BASOPHILS ABSOLUTE: 0 K/UL (ref 0–0.2)
BASOPHILS RELATIVE PERCENT: 0.5 %
BILIRUB SERPL-MCNC: 0.5 MG/DL (ref 0–1)
BUN BLDV-MCNC: 29 MG/DL (ref 7–20)
CALCIUM SERPL-MCNC: 9.7 MG/DL (ref 8.3–10.6)
CHLORIDE BLD-SCNC: 102 MMOL/L (ref 99–110)
CHOLESTEROL, TOTAL: 118 MG/DL (ref 0–199)
CO2: 28 MMOL/L (ref 21–32)
CREAT SERPL-MCNC: 1.2 MG/DL (ref 0.8–1.3)
EOSINOPHILS ABSOLUTE: 0.3 K/UL (ref 0–0.6)
EOSINOPHILS RELATIVE PERCENT: 5.6 %
GFR AFRICAN AMERICAN: >60
GFR NON-AFRICAN AMERICAN: 59
GLOBULIN: 3.8 G/DL
GLUCOSE BLD-MCNC: 105 MG/DL (ref 70–99)
HCT VFR BLD CALC: 37 % (ref 40.5–52.5)
HDLC SERPL-MCNC: 54 MG/DL (ref 40–60)
HEMOGLOBIN: 11.9 G/DL (ref 13.5–17.5)
LDL CHOLESTEROL CALCULATED: 48 MG/DL
LYMPHOCYTES ABSOLUTE: 1.7 K/UL (ref 1–5.1)
LYMPHOCYTES RELATIVE PERCENT: 28.1 %
MCH RBC QN AUTO: 28.9 PG (ref 26–34)
MCHC RBC AUTO-ENTMCNC: 32.1 G/DL (ref 31–36)
MCV RBC AUTO: 90 FL (ref 80–100)
MONOCYTES ABSOLUTE: 0.5 K/UL (ref 0–1.3)
MONOCYTES RELATIVE PERCENT: 8.6 %
NEUTROPHILS ABSOLUTE: 3.5 K/UL (ref 1.7–7.7)
NEUTROPHILS RELATIVE PERCENT: 57.2 %
PDW BLD-RTO: 15.7 % (ref 12.4–15.4)
PLATELET # BLD: 311 K/UL (ref 135–450)
PMV BLD AUTO: 7.8 FL (ref 5–10.5)
POTASSIUM SERPL-SCNC: 5.6 MMOL/L (ref 3.5–5.1)
RBC # BLD: 4.11 M/UL (ref 4.2–5.9)
SODIUM BLD-SCNC: 146 MMOL/L (ref 136–145)
TOTAL PROTEIN: 7.5 G/DL (ref 6.4–8.2)
TRIGL SERPL-MCNC: 82 MG/DL (ref 0–150)
TSH SERPL DL<=0.05 MIU/L-ACNC: 1.16 UIU/ML (ref 0.27–4.2)
VLDLC SERPL CALC-MCNC: 16 MG/DL
WBC # BLD: 6.1 K/UL (ref 4–11)

## 2017-08-25 ENCOUNTER — HOSPITAL ENCOUNTER (OUTPATIENT)
Dept: PHYSICAL THERAPY | Age: 77
Discharge: HOME | End: 2017-08-25
Admitting: ORTHOPAEDIC SURGERY

## 2017-08-25 LAB
ESTIMATED AVERAGE GLUCOSE: 159.9 MG/DL
HBA1C MFR BLD: 7.2 %

## 2017-08-29 ENCOUNTER — OFFICE VISIT (OUTPATIENT)
Dept: ORTHOPEDIC SURGERY | Age: 77
End: 2017-08-29

## 2017-08-29 ENCOUNTER — HOSPITAL ENCOUNTER (OUTPATIENT)
Dept: PHYSICAL THERAPY | Age: 77
Discharge: HOME | End: 2017-08-29
Admitting: ORTHOPAEDIC SURGERY

## 2017-08-29 VITALS — WEIGHT: 229.06 LBS | BODY MASS INDEX: 32.07 KG/M2 | HEIGHT: 71 IN

## 2017-08-29 DIAGNOSIS — Z96.652 STATUS POST TOTAL LEFT KNEE REPLACEMENT: Primary | ICD-10-CM

## 2017-08-29 DIAGNOSIS — M17.12 PRIMARY OSTEOARTHRITIS OF LEFT KNEE: ICD-10-CM

## 2017-08-29 PROCEDURE — 99024 POSTOP FOLLOW-UP VISIT: CPT | Performed by: ORTHOPAEDIC SURGERY

## 2017-08-31 ENCOUNTER — HOSPITAL ENCOUNTER (OUTPATIENT)
Dept: OTHER | Age: 77
Discharge: HOME | End: 2017-08-31
Attending: INTERNAL MEDICINE | Admitting: INTERNAL MEDICINE

## 2017-08-31 LAB
CREATININE URINE: 126.8 MG/DL (ref 39–259)
PROTEIN PROTEIN: 31 MG/DL

## 2017-09-01 LAB
ALBUMIN SERPL-MCNC: 3.6 G/DL (ref 3.4–5)
ANION GAP SERPL CALCULATED.3IONS-SCNC: 13 MMOL/L (ref 3–16)
BUN BLDV-MCNC: 33 MG/DL (ref 7–20)
CALCIUM SERPL-MCNC: 9.6 MG/DL (ref 8.3–10.6)
CHLORIDE BLD-SCNC: 101 MMOL/L (ref 99–110)
CO2: 29 MMOL/L (ref 21–32)
CREAT SERPL-MCNC: 1.3 MG/DL (ref 0.8–1.3)
GFR AFRICAN AMERICAN: >60
GFR NON-AFRICAN AMERICAN: 54
GLUCOSE BLD-MCNC: 168 MG/DL (ref 70–99)
PHOSPHORUS: 4.2 MG/DL (ref 2.5–4.9)
POTASSIUM SERPL-SCNC: 5.2 MMOL/L (ref 3.5–5.1)
SODIUM BLD-SCNC: 143 MMOL/L (ref 136–145)
VITAMIN D 25-HYDROXY: 26.9 NG/ML

## 2017-09-05 ENCOUNTER — HOSPITAL ENCOUNTER (OUTPATIENT)
Dept: PHYSICAL THERAPY | Age: 77
Discharge: HOME | End: 2017-09-05
Admitting: ORTHOPAEDIC SURGERY

## 2017-09-06 RX ORDER — PIOGLITAZONEHYDROCHLORIDE 30 MG/1
TABLET ORAL
Qty: 90 TABLET | Refills: 0 | Status: SHIPPED | OUTPATIENT
Start: 2017-09-06 | End: 2017-12-05 | Stop reason: SDUPTHER

## 2017-09-12 ENCOUNTER — CARE COORDINATION (OUTPATIENT)
Dept: CASE MANAGEMENT | Age: 77
End: 2017-09-12

## 2017-09-12 ENCOUNTER — HOSPITAL ENCOUNTER (OUTPATIENT)
Dept: PHYSICAL THERAPY | Age: 77
Discharge: HOME | End: 2017-09-12
Admitting: ORTHOPAEDIC SURGERY

## 2017-09-13 ENCOUNTER — HOSPITAL ENCOUNTER (OUTPATIENT)
Dept: PHYSICAL THERAPY | Age: 77
Discharge: HOME | End: 2017-09-13
Admitting: ORTHOPAEDIC SURGERY

## 2017-09-15 RX ORDER — TAMSULOSIN HYDROCHLORIDE 0.4 MG/1
CAPSULE ORAL
Qty: 90 CAPSULE | Refills: 0 | Status: SHIPPED | OUTPATIENT
Start: 2017-09-15 | End: 2017-11-27 | Stop reason: SDUPTHER

## 2017-09-15 RX ORDER — HYDROCHLOROTHIAZIDE 12.5 MG/1
TABLET ORAL
Qty: 90 TABLET | Refills: 0 | Status: SHIPPED | OUTPATIENT
Start: 2017-09-15 | End: 2018-04-06 | Stop reason: SDUPTHER

## 2017-09-19 ENCOUNTER — HOSPITAL ENCOUNTER (OUTPATIENT)
Dept: PHYSICAL THERAPY | Age: 77
Discharge: HOME | End: 2017-09-19
Admitting: ORTHOPAEDIC SURGERY

## 2017-09-26 ENCOUNTER — CARE COORDINATION (OUTPATIENT)
Dept: CASE MANAGEMENT | Age: 77
End: 2017-09-26

## 2017-10-03 ENCOUNTER — OFFICE VISIT (OUTPATIENT)
Dept: CARDIOLOGY CLINIC | Age: 77
End: 2017-10-03

## 2017-10-03 VITALS
DIASTOLIC BLOOD PRESSURE: 76 MMHG | HEIGHT: 70 IN | HEART RATE: 70 BPM | BODY MASS INDEX: 32.93 KG/M2 | WEIGHT: 230 LBS | SYSTOLIC BLOOD PRESSURE: 122 MMHG

## 2017-10-03 DIAGNOSIS — I48.0 PAF (PAROXYSMAL ATRIAL FIBRILLATION) (HCC): Primary | ICD-10-CM

## 2017-10-03 DIAGNOSIS — I10 ESSENTIAL HYPERTENSION: ICD-10-CM

## 2017-10-03 PROCEDURE — G8484 FLU IMMUNIZE NO ADMIN: HCPCS | Performed by: NURSE PRACTITIONER

## 2017-10-03 PROCEDURE — G8417 CALC BMI ABV UP PARAM F/U: HCPCS | Performed by: NURSE PRACTITIONER

## 2017-10-03 PROCEDURE — 1036F TOBACCO NON-USER: CPT | Performed by: NURSE PRACTITIONER

## 2017-10-03 PROCEDURE — 99213 OFFICE O/P EST LOW 20 MIN: CPT | Performed by: NURSE PRACTITIONER

## 2017-10-03 PROCEDURE — G8427 DOCREV CUR MEDS BY ELIG CLIN: HCPCS | Performed by: NURSE PRACTITIONER

## 2017-10-03 PROCEDURE — 93000 ELECTROCARDIOGRAM COMPLETE: CPT | Performed by: NURSE PRACTITIONER

## 2017-10-03 PROCEDURE — 3017F COLORECTAL CA SCREEN DOC REV: CPT | Performed by: NURSE PRACTITIONER

## 2017-10-03 PROCEDURE — 4040F PNEUMOC VAC/ADMIN/RCVD: CPT | Performed by: NURSE PRACTITIONER

## 2017-10-03 PROCEDURE — 1123F ACP DISCUSS/DSCN MKR DOCD: CPT | Performed by: NURSE PRACTITIONER

## 2017-10-03 NOTE — MR AVS SNAPSHOT
your BMI, the greater your risk of heart disease, high blood pressure, type 2 diabetes, stroke, gallstones, arthritis, sleep apnea, and certain cancers. BMI is not perfect. It may overestimate body fat in athletes and people who are more muscular. Even a small weight loss (between 5 and 10 percent of your current weight) by decreasing your calorie intake and becoming more physically active will help lower your risk of developing or worsening diseases associated with obesity. Learn more at: [employer]          Instructions    1. No changes   2.  Follow up in 6 months            Medications and Orders      Your Current Medications Are              tamsulosin (FLOMAX) 0.4 MG capsule TAKE ONE CAPSULE BY MOUTH DAILY    hydrochlorothiazide (HYDRODIURIL) 12.5 MG tablet TAKE 1 TABLET DAILY    pioglitazone (ACTOS) 30 MG tablet TAKE 1 TABLET DAILY    vitamin B-12 (CYANOCOBALAMIN) 1000 MCG tablet Take 1,000 mcg by mouth daily    Omega-3 Fatty Acids (FISH OIL) 1000 MG CAPS Take 1,000 mg by mouth daily    apixaban (ELIQUIS) 5 MG TABS tablet Take 1 tablet by mouth 2 times daily    LANTUS 100 UNIT/ML injection vial INJECT 50 UNITS UNDER THE SKIN AT NIGHT    levothyroxine (SYNTHROID) 100 MCG tablet TAKE 1 TABLET DAILY    canagliflozin (INVOKANA) 100 MG TABS tablet TAKE 1 TABLET DAILY    diltiazem (DILTIAZEM CD) 120 MG extended release capsule Take 1 capsule by mouth daily    rosuvastatin (CRESTOR) 10 MG tablet Take 10 mg by mouth daily    B-D INS SYR ULTRAFINE 1CC/30G 30G X 1/2\" 1 ML MISC USE WITH LANTUS NIGHTLY    TRUETEST TEST strip TEST DAILY      Allergies           No Known Allergies      We Ordered/Performed the following           EKG 12 lead          Result Summary for EKG 12 lead      Result Information     Status          Final result (Resulted: 10/3/2017)           10/3/2017 10:03 AM      Scans on Order [id number]            ECG on 10/3/2017 10:02 AM : ECG Report

## 2017-10-03 NOTE — PROGRESS NOTES
Negative. Respiratory: Negative. Cardiovascular: see HPI  Gastrointestinal: Negative. Genitourinary: Negative. Musculoskeletal: chronic BLE swelling (LLE worse than RLE due to OA per patient)  Skin: Negative. Neurological: Negative. Hematological: Negative. Psychiatric/Behavioral: Negative. PHYSICAL EXAM:    Physical Examination:    /76  Pulse 70  Ht 5' 10\" (1.778 m)  Wt 230 lb (104.3 kg)  BMI 33 kg/m2     Constitutional and general appearance: alert, cooperative, no distress and appears stated age  [de-identified]: PERRL, no cervical lymphadenopathy. No masses palpable. Normal oral mucosa  Respiratory:  · Normal excursion and expansion without use of accessory muscles  · Resp auscultation: Normal breath sounds without dullness or wheezing  Cardiovascular:  · The apical impulse is not displaced  · Heart tones are crisp and normal. Regular S1 and S2.  · Jugular venous pulsation Normal  · The carotid upstroke is normal in amplitude and contour without delay or bruit  · Peripheral pulses are symmetrical and full   Abdomen:  · No masses or tenderness  · Bowel sounds present  Extremities:  ·  No cyanosis or clubbing  ·  3+ LLE edema, 2+ RLE edema  ·  Skin: warm and dry  Neurological:  · Alert and oriented  · Moves all extremities well  · No abnormalities of mood, affect, memory, mentation, or behavior are noted    DATA:    ECG 10/3/2017:  SR with PACs HR 70    Echo 1/17/2017:  Normal left ventricle systolic function with an estimated ejection fraction   of 55%.   No regional wall motion abnormalities are seen.   Normal left ventricle diastolic filing pressure.   Mild pulmonic regurgitation. CARDIOLOGY LABS:   CBC: No results for input(s): WBC, HGB, HCT, PLT in the last 72 hours. BMP: No results for input(s): NA, K, CO2, BUN, CREATININE, LABGLOM, GLUCOSE in the last 72 hours. PT/INR: No results for input(s): PROTIME, INR in the last 72 hours. APTT:No results for input(s):  APTT in the last 72

## 2017-10-04 LAB
CATARACTS: NEGATIVE
DIABETIC RETINOPATHY: NORMAL
GLAUCOMA: NEGATIVE
INTRAOCULAR PRESSURE EYE: NORMAL
VISUAL ACUITY DISTANCE LEFT EYE: NORMAL
VISUAL ACUITY DISTANCE RIGHT EYE: NORMAL

## 2017-10-05 RX ORDER — LEVOTHYROXINE SODIUM 0.1 MG/1
TABLET ORAL
Qty: 90 TABLET | Refills: 0 | Status: SHIPPED | OUTPATIENT
Start: 2017-10-05 | End: 2018-01-03 | Stop reason: SDUPTHER

## 2017-10-06 NOTE — COMMUNICATION BODY
Medications:    Prior to Admission medications    Medication Sig Start Date End Date Taking? Authorizing Provider   tamsulosin (FLOMAX) 0.4 MG capsule TAKE ONE CAPSULE BY MOUTH DAILY 9/15/17   Niko Sandhu MD   hydrochlorothiazide (HYDRODIURIL) 12.5 MG tablet TAKE 1 TABLET DAILY 9/15/17   Olga Monroe MD   pioglitazone (ACTOS) 30 MG tablet TAKE 1 TABLET DAILY 9/6/17   Niko Sandhu MD   vitamin B-12 (CYANOCOBALAMIN) 1000 MCG tablet Take 1,000 mcg by mouth daily    Historical Provider, MD   Omega-3 Fatty Acids (FISH OIL) 1000 MG CAPS Take 1,000 mg by mouth daily    Historical Provider, MD   apixaban (ELIQUIS) 5 MG TABS tablet Take 1 tablet by mouth 2 times daily 7/20/17   Omid Julian CNP   LANTUS 100 UNIT/ML injection vial INJECT 50 UNITS UNDER THE SKIN AT NIGHT  Patient taking differently: INJECT 45 UNITS UNDER THE SKIN AT NIGHT 7/13/17   Niko Sandhu MD   levothyroxine (SYNTHROID) 100 MCG tablet TAKE 1 TABLET DAILY 7/6/17   Niko Edson, MD   canagliflozin (INVOKANA) 100 MG TABS tablet TAKE 1 TABLET DAILY 7/3/17   [facility] Edson, MD   diltiazem (DILTIAZEM CD) 120 MG extended release capsule Take 1 capsule by mouth daily 4/20/17   Omid Julian CNP   rosuvastatin (CRESTOR) 10 MG tablet Take 10 mg by mouth daily    Historical Provider, MD HARDING INS SYR ULTRAFINE 1CC/30G 30G X 1/2\" 1 ML MISC USE WITH LANTUS NIGHTLY 6/20/16   Niko Sandhu MD   TRUETEST TEST strip TEST DAILY 8/28/13   Niko Sandhu MD       Allergies:  Review of patient's allergies indicates no known allergies. Social History:   reports that he has quit smoking. He has a 15.00 pack-year smoking history. He quit smokeless tobacco use about 40 years ago. He reports that he drinks alcohol. He reports that he does not use illicit drugs. Family History: family history is not on file. Review of Systems   Constitutional: Negative. HENT: Negative.     Eyes:

## 2017-10-13 ENCOUNTER — CARE COORDINATION (OUTPATIENT)
Dept: CASE MANAGEMENT | Age: 77
End: 2017-10-13

## 2017-10-13 NOTE — CARE COORDINATION
[facility] ortho bundle    Patient notified final call. He denies needs/concerns. States he and his knee are doing great. Call was brief because his wife had another call on the line.      Chandler Mcnulty, RN  Care Transition Coordinator  [phone] cell    Follow up appointments:    Future Appointments  Date Time Provider Hussein Moss   11/10/2017 10:00 AM SCHEDULE, [facility]   11/17/2017 10:45 AM Caron Davis MD [facility]   11/20/2017 3:50 PM Mendoza Johns MD [facility]   4/6/2018 12:00 PM Cyrus Moreno MD [facility]

## 2017-10-16 ENCOUNTER — CARE COORDINATION (OUTPATIENT)
Dept: CARE COORDINATION | Age: 77
End: 2017-10-16

## 2017-10-16 NOTE — CARE COORDINATION
ACC spoke with Elissa Valladares for follow up. He is doing well. Back to driving and being active inside and outside since knee surgery. 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 No needs for care coordination at this time. 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     Lab Results   Component Value Date    LABA1C 7.2 08/24/2017    LABA1C 7.3 04/04/2017    LABA1C 7.6 12/09/2016     Lab Results   Component Value Date    .9 08/24/2017    .8 04/04/2017    .4 12/09/2016

## 2017-10-30 ENCOUNTER — OFFICE VISIT (OUTPATIENT)
Dept: ORTHOPEDIC SURGERY | Age: 77
End: 2017-10-30

## 2017-10-30 ENCOUNTER — OFFICE VISIT (OUTPATIENT)
Dept: INTERNAL MEDICINE CLINIC | Age: 77
End: 2017-10-30

## 2017-10-30 ENCOUNTER — HOSPITAL ENCOUNTER (OUTPATIENT)
Dept: OTHER | Age: 77
Discharge: HOME | End: 2017-10-30
Attending: INTERNAL MEDICINE | Admitting: INTERNAL MEDICINE

## 2017-10-30 VITALS
HEART RATE: 80 BPM | RESPIRATION RATE: 14 BRPM | DIASTOLIC BLOOD PRESSURE: 80 MMHG | SYSTOLIC BLOOD PRESSURE: 130 MMHG | HEIGHT: 71 IN

## 2017-10-30 VITALS — BODY MASS INDEX: 32.07 KG/M2 | WEIGHT: 229.06 LBS | HEIGHT: 71 IN

## 2017-10-30 DIAGNOSIS — S52.515A CLOSED NONDISPLACED FRACTURE OF STYLOID PROCESS OF LEFT RADIUS, INITIAL ENCOUNTER: Primary | ICD-10-CM

## 2017-10-30 DIAGNOSIS — M25.532 LEFT WRIST PAIN: ICD-10-CM

## 2017-10-30 DIAGNOSIS — S22.31XA CLOSED FRACTURE OF ONE RIB OF RIGHT SIDE, INITIAL ENCOUNTER: ICD-10-CM

## 2017-10-30 DIAGNOSIS — S22.22XA CLOSED FRACTURE OF BODY OF STERNUM, INITIAL ENCOUNTER: ICD-10-CM
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
## 2017-10-30 PROCEDURE — 99214 OFFICE O/P EST MOD 30 MIN: CPT | Performed by: INTERNAL MEDICINE

## 2017-10-30 PROCEDURE — G8427 DOCREV CUR MEDS BY ELIG CLIN: HCPCS | Performed by: ORTHOPAEDIC SURGERY

## 2017-10-30 PROCEDURE — G8428 CUR MEDS NOT DOCUMENT: HCPCS | Performed by: INTERNAL MEDICINE

## 2017-10-30 PROCEDURE — G8417 CALC BMI ABV UP PARAM F/U: HCPCS | Performed by: ORTHOPAEDIC SURGERY

## 2017-10-30 PROCEDURE — 1123F ACP DISCUSS/DSCN MKR DOCD: CPT | Performed by: INTERNAL MEDICINE

## 2017-10-30 PROCEDURE — G8417 CALC BMI ABV UP PARAM F/U: HCPCS | Performed by: INTERNAL MEDICINE

## 2017-10-30 PROCEDURE — 1036F TOBACCO NON-USER: CPT | Performed by: INTERNAL MEDICINE

## 2017-10-30 PROCEDURE — G8484 FLU IMMUNIZE NO ADMIN: HCPCS | Performed by: ORTHOPAEDIC SURGERY

## 2017-10-30 PROCEDURE — 3017F COLORECTAL CA SCREEN DOC REV: CPT | Performed by: ORTHOPAEDIC SURGERY

## 2017-10-30 PROCEDURE — 99213 OFFICE O/P EST LOW 20 MIN: CPT | Performed by: ORTHOPAEDIC SURGERY

## 2017-10-30 PROCEDURE — 3017F COLORECTAL CA SCREEN DOC REV: CPT | Performed by: INTERNAL MEDICINE

## 2017-10-30 PROCEDURE — G8484 FLU IMMUNIZE NO ADMIN: HCPCS | Performed by: INTERNAL MEDICINE

## 2017-10-30 PROCEDURE — 1036F TOBACCO NON-USER: CPT | Performed by: ORTHOPAEDIC SURGERY

## 2017-10-30 PROCEDURE — 29075 APPL CST ELBW FNGR SHORT ARM: CPT | Performed by: ORTHOPAEDIC SURGERY

## 2017-10-30 PROCEDURE — L3670 SO ACRO/CLAV CAN WEB PRE OTS: HCPCS | Performed by: ORTHOPAEDIC SURGERY

## 2017-10-30 PROCEDURE — 4040F PNEUMOC VAC/ADMIN/RCVD: CPT | Performed by: ORTHOPAEDIC SURGERY

## 2017-10-30 PROCEDURE — 4040F PNEUMOC VAC/ADMIN/RCVD: CPT | Performed by: INTERNAL MEDICINE

## 2017-10-30 PROCEDURE — 1123F ACP DISCUSS/DSCN MKR DOCD: CPT | Performed by: ORTHOPAEDIC SURGERY

## 2017-10-30 RX ORDER — HYDROCODONE BITARTRATE AND ACETAMINOPHEN 5; 325 MG/1; MG/1
1 TABLET ORAL EVERY 6 HOURS PRN
Qty: 30 TABLET | Refills: 0 | Status: SHIPPED | OUTPATIENT
Start: 2017-10-30 | End: 2018-06-27
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
## 2017-10-30 NOTE — PATIENT INSTRUCTIONS
Patient Education        Broken Arm: Care Instructions  Your Care Instructions  Fractures can range from a small, hairline crack, to a bone or bones broken into two or more pieces. Your treatment depends on how bad the break is. Your doctor may have put your arm in a splint or cast to allow it to heal or to keep it stable until you see another doctor. It may take weeks or months for your arm to heal. You can help your arm heal with some care at home. You heal best when you take good care of yourself. Eat a variety of healthy foods, and don't smoke. You may have had a sedative to help you relax. You may be unsteady after having sedation. It can take a few hours for the medicine's effects to wear off. Common side effects of sedation include nausea, vomiting, and feeling sleepy or tired. The doctor has checked you carefully, but problems can develop later. If you notice any problems or new symptoms, get medical treatment right away. Follow-up care is a key part of your treatment and safety. Be sure to make and go to all appointments, and call your doctor if you are having problems. It's also a good idea to know your test results and keep a list of the medicines you take. How can you care for yourself at home? · If the doctor gave you a sedative:  ¨ For 24 hours, don't do anything that requires attention to detail. It takes time for the medicine's effects to completely wear off. ¨ For your safety, do not drive or operate any machinery that could be dangerous. Wait until the medicine wears off and you can think clearly and react easily. · Put ice or a cold pack on your arm for 10 to 20 minutes at a time. Try to do this every 1 to 2 hours for the next 3 days (when you are awake). Put a thin cloth between the ice and your cast or splint. Keep the cast or splint dry. · Follow the cast care instructions your doctor gives you. If you have a splint, do not take it off unless your doctor tells you to.   · Be safe Care Instructions  Your Care Instructions    Your wrist can break, or fracture, during sports, a fall, or other accidents. The break may happen when your wrist is hit or is used to protect you in a fall. Fractures can range from a small, hairline crack, to a bone or bones broken into two or more pieces. Your treatment depends on how bad the break is. Your doctor may have put your wrist in a cast or splint. This will help keep your wrist stable until your follow-up appointment. It may take weeks or months for your wrist to heal. You can help it heal with care at home. You heal best when you take good care of yourself. Eat a variety of healthy foods, and don't smoke. Follow-up care is a key part of your treatment and safety. Be sure to make and go to all appointments, and call your doctor if you are having problems. It's also a good idea to know your test results and keep a list of the medicines you take. How can you care for yourself at home? · Put ice or a cold pack on your wrist for 10 to 20 minutes at a time. Try to do this every 1 to 2 hours for the next 3 days (when you are awake). Put a thin cloth between the ice and your cast or splint. Keep your cast or splint dry. · Follow the splint or cast care instructions your doctor gives you. If you have a splint, do not take it off unless your doctor tells you to. Be careful not to put the splint on too tight. · Be safe with medicines. Take pain medicines exactly as directed. ¨ If the doctor gave you a prescription medicine for pain, take it as prescribed. ¨ If you are not taking a prescription pain medicine, ask your doctor if you can take an over-the-counter medicine. · Prop up your wrist on pillows when you sit or lie down in the first few days after the injury. Keep your wrist higher than the level of your heart. This will help reduce swelling.   · Move your fingers often to reduce swelling and stiffness, but do not use that hand to grab or carry anything. · Follow instructions for exercises to keep your arm strong. When should you call for help? Call your doctor now or seek immediate medical care if:  · You have increased or severe pain. · Your cast or splint feels too tight. · You cannot move your fingers. · You have tingling, weakness, or numbness in your hand and fingers. · Your hand and fingers are cool or pale or change color. · You have a lot of swelling near your cast or splint. · The skin under your cast or splint is burning or stinging. Watch closely for changes in your health, and be sure to contact your doctor if:  · You do not get better as expected. Where can you learn more? Go to [email or web address] org and sign in to your [employer] account. Enter [id number] in the [employer] box to learn more about \"Broken Wrist: Care Instructions. \"     If you do not have an account, please click on the \"Sign Up Now\" link. Current as of: March 21, 2017  Content Version: 11.3  © 4589-1426 [employer]. Care instructions adapted under license by [facility] ([facility]). If you have questions about a medical condition or this instruction, always ask your healthcare professional. [address] any warranty or liability for your use of this information.

## 2017-11-08 ENCOUNTER — OFFICE VISIT (OUTPATIENT)
Dept: ORTHOPEDIC SURGERY | Age: 77
End: 2017-11-08

## 2017-11-08 VITALS — HEIGHT: 71 IN | BODY MASS INDEX: 32.07 KG/M2 | WEIGHT: 229.06 LBS

## 2017-11-08 DIAGNOSIS — M25.561 ACUTE PAIN OF RIGHT KNEE: ICD-10-CM

## 2017-11-08 DIAGNOSIS — S82.001A CLOSED DISPLACED FRACTURE OF RIGHT PATELLA, UNSPECIFIED FRACTURE MORPHOLOGY, INITIAL ENCOUNTER: Primary | ICD-10-CM

## 2017-11-08 PROCEDURE — 73560 X-RAY EXAM OF KNEE 1 OR 2: CPT | Performed by: ORTHOPAEDIC SURGERY

## 2017-11-08 PROCEDURE — 1123F ACP DISCUSS/DSCN MKR DOCD: CPT | Performed by: ORTHOPAEDIC SURGERY

## 2017-11-08 PROCEDURE — L1832 KO ADJ JNT POS R SUP PRE CST: HCPCS | Performed by: ORTHOPAEDIC SURGERY

## 2017-11-08 PROCEDURE — 4040F PNEUMOC VAC/ADMIN/RCVD: CPT | Performed by: ORTHOPAEDIC SURGERY

## 2017-11-08 PROCEDURE — 27520 TREAT KNEECAP FRACTURE: CPT | Performed by: ORTHOPAEDIC SURGERY

## 2017-11-08 PROCEDURE — 99213 OFFICE O/P EST LOW 20 MIN: CPT | Performed by: ORTHOPAEDIC SURGERY

## 2017-11-08 PROCEDURE — 1036F TOBACCO NON-USER: CPT | Performed by: ORTHOPAEDIC SURGERY

## 2017-11-08 PROCEDURE — G8417 CALC BMI ABV UP PARAM F/U: HCPCS | Performed by: ORTHOPAEDIC SURGERY

## 2017-11-08 PROCEDURE — 3017F COLORECTAL CA SCREEN DOC REV: CPT | Performed by: ORTHOPAEDIC SURGERY

## 2017-11-08 PROCEDURE — G8484 FLU IMMUNIZE NO ADMIN: HCPCS | Performed by: ORTHOPAEDIC SURGERY

## 2017-11-08 PROCEDURE — G8427 DOCREV CUR MEDS BY ELIG CLIN: HCPCS | Performed by: ORTHOPAEDIC SURGERY
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
## 2017-11-20 ENCOUNTER — OFFICE VISIT (OUTPATIENT)
Dept: INTERNAL MEDICINE CLINIC | Age: 77
End: 2017-11-20

## 2017-11-20 VITALS
RESPIRATION RATE: 14 BRPM | HEART RATE: 80 BPM | SYSTOLIC BLOOD PRESSURE: 120 MMHG | BODY MASS INDEX: 30.66 KG/M2 | DIASTOLIC BLOOD PRESSURE: 80 MMHG | HEIGHT: 71 IN | WEIGHT: 219 LBS

## 2017-11-20 DIAGNOSIS — Z12.11 SCREEN FOR COLON CANCER: ICD-10-CM

## 2017-11-20 DIAGNOSIS — E03.9 ACQUIRED HYPOTHYROIDISM: ICD-10-CM

## 2017-11-20 DIAGNOSIS — I48.0 PAF (PAROXYSMAL ATRIAL FIBRILLATION) (HCC): ICD-10-CM

## 2017-11-20 DIAGNOSIS — N18.30 TYPE 2 DIABETES MELLITUS WITH STAGE 3 CHRONIC KIDNEY DISEASE, WITHOUT LONG-TERM CURRENT USE OF INSULIN (HCC): Primary | ICD-10-CM

## 2017-11-20 DIAGNOSIS — E11.22 TYPE 2 DIABETES MELLITUS WITH STAGE 3 CHRONIC KIDNEY DISEASE, WITHOUT LONG-TERM CURRENT USE OF INSULIN (HCC): Primary | ICD-10-CM

## 2017-11-20 DIAGNOSIS — B18.2 CHRONIC HEPATITIS C WITHOUT HEPATIC COMA (HCC): ICD-10-CM

## 2017-11-20 DIAGNOSIS — E78.5 HYPERLIPIDEMIA ASSOCIATED WITH TYPE 2 DIABETES MELLITUS (HCC): ICD-10-CM

## 2017-11-20 DIAGNOSIS — Z79.4 TYPE 2 DIABETES MELLITUS WITH BOTH EYES AFFECTED BY MILD NONPROLIFERATIVE RETINOPATHY WITHOUT MACULAR EDEMA, WITH LONG-TERM CURRENT USE OF INSULIN (HCC): ICD-10-CM

## 2017-11-20 DIAGNOSIS — E11.3293 TYPE 2 DIABETES MELLITUS WITH BOTH EYES AFFECTED BY MILD NONPROLIFERATIVE RETINOPATHY WITHOUT MACULAR EDEMA, WITH LONG-TERM CURRENT USE OF INSULIN (HCC): ICD-10-CM

## 2017-11-20 DIAGNOSIS — E11.69 HYPERLIPIDEMIA ASSOCIATED WITH TYPE 2 DIABETES MELLITUS (HCC): ICD-10-CM

## 2017-11-20 PROCEDURE — 1036F TOBACCO NON-USER: CPT | Performed by: INTERNAL MEDICINE

## 2017-11-20 PROCEDURE — G8428 CUR MEDS NOT DOCUMENT: HCPCS | Performed by: INTERNAL MEDICINE

## 2017-11-20 PROCEDURE — 4040F PNEUMOC VAC/ADMIN/RCVD: CPT | Performed by: INTERNAL MEDICINE

## 2017-11-20 PROCEDURE — 3017F COLORECTAL CA SCREEN DOC REV: CPT | Performed by: INTERNAL MEDICINE

## 2017-11-20 PROCEDURE — G8484 FLU IMMUNIZE NO ADMIN: HCPCS | Performed by: INTERNAL MEDICINE

## 2017-11-20 PROCEDURE — 3045F PR MOST RECENT HEMOGLOBIN A1C LEVEL 7.0-9.0%: CPT | Performed by: INTERNAL MEDICINE

## 2017-11-20 PROCEDURE — G8417 CALC BMI ABV UP PARAM F/U: HCPCS | Performed by: INTERNAL MEDICINE

## 2017-11-20 PROCEDURE — 1123F ACP DISCUSS/DSCN MKR DOCD: CPT | Performed by: INTERNAL MEDICINE

## 2017-11-20 PROCEDURE — 99214 OFFICE O/P EST MOD 30 MIN: CPT | Performed by: INTERNAL MEDICINE

## 2017-11-20 ASSESSMENT — ENCOUNTER SYMPTOMS
NAUSEA: 0
SHORTNESS OF BREATH: 0
RHINORRHEA: 0
BACK PAIN: 0
WHEEZING: 0
ABDOMINAL PAIN: 0
VOMITING: 0

## 2017-11-20 NOTE — PROGRESS NOTES
atrial fibrillation) ([facility] [address]     6. Chronic hepatitis C without hepatic coma (HCC)     7. Screen for colon cancer  COLONOSCOPY W/ OR W/O BIOPSY          Plan:      Check labs. Dm: controlled. Stressed diet and exercise. On Lantus and Novolog. Hypertension: at goal.  He is on Prinivil and HCTZ. Tremor: unchanged. Not on any medication. GERD: no symptoms. He is not on any medication. Hyperlipidemia: at goal.  Hypothyroidism:  Patient's hypothyroidism is stable on replacement therapy. A fib: he is in NSR. He has seen cardiology. On Eliquis. Discussed use, benefit, and side effects of prescribed medications. Barriers to medication compliance addressed. All patient questions answered. Pt voiced understanding. Decrease calorie intake. Exercise,weight loss recommended. The current medical regimen is effective;  continue present plan and medications. See orders.

## 2017-11-22 ENCOUNTER — OFFICE VISIT (OUTPATIENT)
Dept: ORTHOPEDIC SURGERY | Age: 77
End: 2017-11-22

## 2017-11-22 DIAGNOSIS — S82.001D CLOSED DISPLACED FRACTURE OF RIGHT PATELLA WITH ROUTINE HEALING, UNSPECIFIED FRACTURE MORPHOLOGY, SUBSEQUENT ENCOUNTER: Primary | ICD-10-CM

## 2017-11-22 DIAGNOSIS — M25.561 ACUTE PAIN OF RIGHT KNEE: ICD-10-CM

## 2017-11-22 PROCEDURE — 99024 POSTOP FOLLOW-UP VISIT: CPT | Performed by: ORTHOPAEDIC SURGERY

## 2017-11-22 PROCEDURE — 73560 X-RAY EXAM OF KNEE 1 OR 2: CPT | Performed by: ORTHOPAEDIC SURGERY

## 2017-11-27 ENCOUNTER — OFFICE VISIT (OUTPATIENT)
Dept: ORTHOPEDIC SURGERY | Age: 77
End: 2017-11-27

## 2017-11-27 VITALS
HEIGHT: 71 IN | DIASTOLIC BLOOD PRESSURE: 68 MMHG | SYSTOLIC BLOOD PRESSURE: 105 MMHG | WEIGHT: 218.92 LBS | BODY MASS INDEX: 30.65 KG/M2 | HEART RATE: 79 BPM

## 2017-11-27 DIAGNOSIS — S52.515A CLOSED NONDISPLACED FRACTURE OF STYLOID PROCESS OF LEFT RADIUS, INITIAL ENCOUNTER: Primary | ICD-10-CM

## 2017-11-27 PROCEDURE — 99213 OFFICE O/P EST LOW 20 MIN: CPT | Performed by: ORTHOPAEDIC SURGERY

## 2017-11-27 PROCEDURE — 4040F PNEUMOC VAC/ADMIN/RCVD: CPT | Performed by: ORTHOPAEDIC SURGERY

## 2017-11-27 PROCEDURE — 73110 X-RAY EXAM OF WRIST: CPT | Performed by: ORTHOPAEDIC SURGERY

## 2017-11-27 PROCEDURE — 1036F TOBACCO NON-USER: CPT | Performed by: ORTHOPAEDIC SURGERY

## 2017-11-27 PROCEDURE — G8427 DOCREV CUR MEDS BY ELIG CLIN: HCPCS | Performed by: ORTHOPAEDIC SURGERY

## 2017-11-27 PROCEDURE — 1123F ACP DISCUSS/DSCN MKR DOCD: CPT | Performed by: ORTHOPAEDIC SURGERY

## 2017-11-27 PROCEDURE — 3017F COLORECTAL CA SCREEN DOC REV: CPT | Performed by: ORTHOPAEDIC SURGERY

## 2017-11-27 PROCEDURE — L3908 WHO COCK-UP NONMOLDE PRE OTS: HCPCS | Performed by: ORTHOPAEDIC SURGERY

## 2017-11-27 PROCEDURE — G8417 CALC BMI ABV UP PARAM F/U: HCPCS | Performed by: ORTHOPAEDIC SURGERY

## 2017-11-27 PROCEDURE — G8484 FLU IMMUNIZE NO ADMIN: HCPCS | Performed by: ORTHOPAEDIC SURGERY

## 2017-11-27 RX ORDER — TAMSULOSIN HYDROCHLORIDE 0.4 MG/1
CAPSULE ORAL
Qty: 90 CAPSULE | Refills: 0 | Status: SHIPPED | OUTPATIENT
Start: 2017-11-27 | End: 2018-03-06 | Stop reason: SDUPTHER

## 2017-11-27 NOTE — TELEPHONE ENCOUNTER
----- Message from Shahram Brooks MD sent at 11/27/2017  3:45 PM EST -----  Contact: pt wife- [phone]  Just Flomax  ----- Message -----  From: Roger Laura  Sent: 11/27/2017   3:30 PM  To: Shahram Brooks MD    Just flomax no saw palmetto?  ----- Message -----  From: Shahram Brooks MD  Sent: 11/27/2017   1:25 PM  To: Smith Kitchen    Then ok to start Flomax 0.4 mg daily. Have the patient check if he has Tamsulosin which is the generic name for Flomax before you call it in  ----- Message -----  From: Roger Laura  Sent: 11/27/2017   1:13 PM  To: Shahram Brooks MD    Pt doesn't see any flomax at his house that he is taking. Please advise  ----- Message -----  From: Shahram Brooks MD  Sent: 11/27/2017  10:27 AM  To: Roger Laura     I realized he is already on Flomax.  He can take OTC Saw palmetto with it and see it helps  If it does not see dr Marge Goldstein  ----- Message -----  From: Stella Moses  Sent: 11/27/2017   9:39 AM  To: Shahram Brooks MD    He was seen on 11-20-17-said he discussed with you his urinating issues and thought you were calling him in something for it but the pharmacy did not have anything yet-nivia in [facility] at [phone]

## 2017-11-27 NOTE — TELEPHONE ENCOUNTER
----- Message from Leonela Santiago MD sent at 11/27/2017  3:45 PM EST -----  Contact: pt wife- [phone]  Just Flomax  ----- Message -----  From: Aidan Ring  Sent: 11/27/2017   3:30 PM  To: Leonela Santiago MD    Just flomax no saw palmetto?  ----- Message -----  From: Leonela Santiago MD  Sent: 11/27/2017   1:25 PM  To: Berry Tono Imtiaz    Then ok to start Flomax 0.4 mg daily. Have the patient check if he has Tamsulosin which is the generic name for Flomax before you call it in  ----- Message -----  From: Aidan Ring  Sent: 11/27/2017   1:13 PM  To: Leonela Santiago MD    Pt doesn't see any flomax at his house that he is taking. Please advise  ----- Message -----  From: Leonela Santiago MD  Sent: 11/27/2017  10:27 AM  To: Aidan Ring     I realized he is already on Flomax.  He can take OTC Saw palmetto with it and see it helps  If it does not see dr Von Smith  ----- Message -----  From: Riley Moses  Sent: 11/27/2017   9:39 AM  To: Leonela Santiago MD    He was seen on 11-20-17-said he discussed with you his urinating issues and thought you were calling him in something for it but the pharmacy did not have anything yet-nivia in [facility] at [phone]-IW

## 2017-12-05 RX ORDER — PIOGLITAZONEHYDROCHLORIDE 30 MG/1
TABLET ORAL
Qty: 90 TABLET | Refills: 0 | Status: SHIPPED | OUTPATIENT
Start: 2017-12-05 | End: 2018-03-05 | Stop reason: SDUPTHER

## 2017-12-11 ENCOUNTER — TELEPHONE (OUTPATIENT)
Dept: ORTHOPEDIC SURGERY | Age: 77
End: 2017-12-11

## 2017-12-11 NOTE — TELEPHONE ENCOUNTER
Working on a request for YOMAIRA Knowthena / billing records for 10/27/17 to present for Roman Long. Waiting for billing statement from [facility].

## 2017-12-13 ENCOUNTER — OFFICE VISIT (OUTPATIENT)
Dept: ORTHOPEDIC SURGERY | Age: 77
End: 2017-12-13

## 2017-12-13 DIAGNOSIS — S82.001D CLOSED DISPLACED FRACTURE OF RIGHT PATELLA WITH ROUTINE HEALING, UNSPECIFIED FRACTURE MORPHOLOGY, SUBSEQUENT ENCOUNTER: Primary | ICD-10-CM

## 2017-12-13 PROCEDURE — 99024 POSTOP FOLLOW-UP VISIT: CPT | Performed by: ORTHOPAEDIC SURGERY

## 2017-12-13 PROCEDURE — 73560 X-RAY EXAM OF KNEE 1 OR 2: CPT | Performed by: ORTHOPAEDIC SURGERY

## 2017-12-13 NOTE — PROGRESS NOTES
FOLLOW-UP VISIT      The patient returns for follow-up s/p right patella fracture    Date of injury  11/3/17    Exam    X-rays lateral view of his right knee reveals the fracture to have some opposition, but basically is angulated and gapped anteriorly. He still maintains function is doing fairly well. He does have x-rays 2 views of the right knee which reveals maintained position. At this time.     Plan    Continue observation and return in 4 weeks for x-ray right knee    Follow-up Appointment    4 weeks

## 2017-12-26 RX ORDER — CANAGLIFLOZIN 100 MG/1
TABLET, FILM COATED ORAL
Qty: 90 TABLET | Refills: 0 | Status: SHIPPED | OUTPATIENT
Start: 2017-12-26 | End: 2018-03-26 | Stop reason: SDUPTHER

## 2017-12-29 ENCOUNTER — OFFICE VISIT (OUTPATIENT)
Dept: ORTHOPEDIC SURGERY | Age: 77
End: 2017-12-29

## 2017-12-29 VITALS
DIASTOLIC BLOOD PRESSURE: 80 MMHG | HEART RATE: 85 BPM | HEIGHT: 71 IN | WEIGHT: 218.92 LBS | BODY MASS INDEX: 30.65 KG/M2 | SYSTOLIC BLOOD PRESSURE: 130 MMHG

## 2017-12-29 DIAGNOSIS — S52.515D CLOSED NONDISPLACED FRACTURE OF STYLOID PROCESS OF LEFT RADIUS WITH ROUTINE HEALING, SUBSEQUENT ENCOUNTER: Primary | ICD-10-CM

## 2017-12-29 PROCEDURE — 4040F PNEUMOC VAC/ADMIN/RCVD: CPT | Performed by: ORTHOPAEDIC SURGERY

## 2017-12-29 PROCEDURE — 99212 OFFICE O/P EST SF 10 MIN: CPT | Performed by: ORTHOPAEDIC SURGERY

## 2017-12-29 PROCEDURE — 73110 X-RAY EXAM OF WRIST: CPT | Performed by: ORTHOPAEDIC SURGERY

## 2017-12-29 PROCEDURE — 3017F COLORECTAL CA SCREEN DOC REV: CPT | Performed by: ORTHOPAEDIC SURGERY

## 2017-12-29 PROCEDURE — G8484 FLU IMMUNIZE NO ADMIN: HCPCS | Performed by: ORTHOPAEDIC SURGERY

## 2017-12-29 PROCEDURE — 1123F ACP DISCUSS/DSCN MKR DOCD: CPT | Performed by: ORTHOPAEDIC SURGERY

## 2017-12-29 PROCEDURE — 1036F TOBACCO NON-USER: CPT | Performed by: ORTHOPAEDIC SURGERY

## 2017-12-29 PROCEDURE — G8427 DOCREV CUR MEDS BY ELIG CLIN: HCPCS | Performed by: ORTHOPAEDIC SURGERY

## 2017-12-29 PROCEDURE — G8417 CALC BMI ABV UP PARAM F/U: HCPCS | Performed by: ORTHOPAEDIC SURGERY

## 2018-01-03 ENCOUNTER — OFFICE VISIT (OUTPATIENT)
Dept: ORTHOPEDIC SURGERY | Age: 78
End: 2018-01-03

## 2018-01-03 VITALS — BODY MASS INDEX: 30.65 KG/M2 | HEIGHT: 71 IN | WEIGHT: 218.92 LBS

## 2018-01-03 DIAGNOSIS — M54.5 LOW BACK PAIN, UNSPECIFIED BACK PAIN LATERALITY, UNSPECIFIED CHRONICITY, WITH SCIATICA PRESENCE UNSPECIFIED: Primary | ICD-10-CM

## 2018-01-03 DIAGNOSIS — S39.012A STRAIN OF LUMBAR REGION, INITIAL ENCOUNTER: ICD-10-CM

## 2018-01-03 PROCEDURE — 99203 OFFICE O/P NEW LOW 30 MIN: CPT | Performed by: PHYSICAL MEDICINE & REHABILITATION

## 2018-01-03 PROCEDURE — 1123F ACP DISCUSS/DSCN MKR DOCD: CPT | Performed by: PHYSICAL MEDICINE & REHABILITATION

## 2018-01-03 PROCEDURE — G8484 FLU IMMUNIZE NO ADMIN: HCPCS | Performed by: PHYSICAL MEDICINE & REHABILITATION

## 2018-01-03 PROCEDURE — G8427 DOCREV CUR MEDS BY ELIG CLIN: HCPCS | Performed by: PHYSICAL MEDICINE & REHABILITATION

## 2018-01-03 PROCEDURE — 4040F PNEUMOC VAC/ADMIN/RCVD: CPT | Performed by: PHYSICAL MEDICINE & REHABILITATION

## 2018-01-03 PROCEDURE — 1036F TOBACCO NON-USER: CPT | Performed by: PHYSICAL MEDICINE & REHABILITATION

## 2018-01-03 PROCEDURE — G8417 CALC BMI ABV UP PARAM F/U: HCPCS | Performed by: PHYSICAL MEDICINE & REHABILITATION

## 2018-01-03 PROCEDURE — 3017F COLORECTAL CA SCREEN DOC REV: CPT | Performed by: PHYSICAL MEDICINE & REHABILITATION

## 2018-01-03 RX ORDER — LEVOTHYROXINE SODIUM 0.1 MG/1
TABLET ORAL
Qty: 90 TABLET | Refills: 0 | Status: SHIPPED | OUTPATIENT
Start: 2018-01-03 | End: 2018-04-03 | Stop reason: SDUPTHER

## 2018-01-23 ENCOUNTER — OFFICE VISIT (OUTPATIENT)
Dept: ORTHOPEDIC SURGERY | Age: 78
End: 2018-01-23

## 2018-01-23 VITALS — WEIGHT: 218 LBS | BODY MASS INDEX: 31.21 KG/M2 | HEIGHT: 70 IN

## 2018-01-23 DIAGNOSIS — S82.001G CLOSED DISPLACED FRACTURE OF RIGHT PATELLA WITH DELAYED HEALING, UNSPECIFIED FRACTURE MORPHOLOGY, SUBSEQUENT ENCOUNTER: Primary | ICD-10-CM

## 2018-01-23 DIAGNOSIS — M25.561 RIGHT KNEE PAIN, UNSPECIFIED CHRONICITY: ICD-10-CM

## 2018-01-23 PROCEDURE — 73560 X-RAY EXAM OF KNEE 1 OR 2: CPT | Performed by: ORTHOPAEDIC SURGERY

## 2018-01-23 PROCEDURE — 99024 POSTOP FOLLOW-UP VISIT: CPT | Performed by: ORTHOPAEDIC SURGERY

## 2018-01-23 NOTE — PROGRESS NOTES
FOLLOW-UP VISIT      The patient returns for follow-up s/p right patella fracture    Date of injury  11/3/17    Exam    X-rays  2 views of right knee show healing of the patella    Plan    Continue observation     Follow-up Appointment      Prn

## 2018-02-26 ENCOUNTER — OFFICE VISIT (OUTPATIENT)
Dept: INTERNAL MEDICINE CLINIC | Age: 78
End: 2018-02-26

## 2018-02-26 VITALS
DIASTOLIC BLOOD PRESSURE: 70 MMHG | SYSTOLIC BLOOD PRESSURE: 106 MMHG | BODY MASS INDEX: 34.44 KG/M2 | RESPIRATION RATE: 14 BRPM | WEIGHT: 246 LBS | HEART RATE: 80 BPM | HEIGHT: 71 IN

## 2018-02-26 DIAGNOSIS — Z79.4 TYPE 2 DIABETES MELLITUS WITH BOTH EYES AFFECTED BY MILD NONPROLIFERATIVE RETINOPATHY WITHOUT MACULAR EDEMA, WITH LONG-TERM CURRENT USE OF INSULIN (HCC): ICD-10-CM

## 2018-02-26 DIAGNOSIS — E11.22 TYPE 2 DIABETES MELLITUS WITH STAGE 3 CHRONIC KIDNEY DISEASE, WITHOUT LONG-TERM CURRENT USE OF INSULIN (HCC): ICD-10-CM

## 2018-02-26 DIAGNOSIS — E03.9 ACQUIRED HYPOTHYROIDISM: ICD-10-CM

## 2018-02-26 DIAGNOSIS — Z79.4 TYPE 2 DIABETES MELLITUS WITH BOTH EYES AFFECTED BY MILD NONPROLIFERATIVE RETINOPATHY WITHOUT MACULAR EDEMA, WITH LONG-TERM CURRENT USE OF INSULIN (HCC): Primary | ICD-10-CM

## 2018-02-26 DIAGNOSIS — K21.9 GASTROESOPHAGEAL REFLUX DISEASE WITHOUT ESOPHAGITIS: ICD-10-CM

## 2018-02-26 DIAGNOSIS — E11.22 TYPE 2 DIABETES MELLITUS WITH STAGE 3 CHRONIC KIDNEY DISEASE, WITH LONG-TERM CURRENT USE OF INSULIN (HCC): ICD-10-CM

## 2018-02-26 DIAGNOSIS — E11.69 HYPERLIPIDEMIA ASSOCIATED WITH TYPE 2 DIABETES MELLITUS (HCC): ICD-10-CM

## 2018-02-26 DIAGNOSIS — G25.0 BENIGN ESSENTIAL TREMOR: Chronic | ICD-10-CM

## 2018-02-26 DIAGNOSIS — Z79.4 TYPE 2 DIABETES MELLITUS WITH STAGE 3 CHRONIC KIDNEY DISEASE, WITH LONG-TERM CURRENT USE OF INSULIN (HCC): ICD-10-CM

## 2018-02-26 DIAGNOSIS — E78.5 HYPERLIPIDEMIA ASSOCIATED WITH TYPE 2 DIABETES MELLITUS (HCC): ICD-10-CM

## 2018-02-26 DIAGNOSIS — D47.2 MGUS (MONOCLONAL GAMMOPATHY OF UNKNOWN SIGNIFICANCE): ICD-10-CM

## 2018-02-26 DIAGNOSIS — E11.3293 TYPE 2 DIABETES MELLITUS WITH BOTH EYES AFFECTED BY MILD NONPROLIFERATIVE RETINOPATHY WITHOUT MACULAR EDEMA, WITH LONG-TERM CURRENT USE OF INSULIN (HCC): ICD-10-CM

## 2018-02-26 DIAGNOSIS — N18.30 TYPE 2 DIABETES MELLITUS WITH STAGE 3 CHRONIC KIDNEY DISEASE, WITHOUT LONG-TERM CURRENT USE OF INSULIN (HCC): ICD-10-CM

## 2018-02-26 DIAGNOSIS — N18.30 TYPE 2 DIABETES MELLITUS WITH STAGE 3 CHRONIC KIDNEY DISEASE, WITH LONG-TERM CURRENT USE OF INSULIN (HCC): ICD-10-CM

## 2018-02-26 DIAGNOSIS — B18.2 CHRONIC HEPATITIS C WITHOUT HEPATIC COMA (HCC): ICD-10-CM

## 2018-02-26 DIAGNOSIS — I48.0 PAF (PAROXYSMAL ATRIAL FIBRILLATION) (HCC): ICD-10-CM

## 2018-02-26 DIAGNOSIS — E11.3293 TYPE 2 DIABETES MELLITUS WITH BOTH EYES AFFECTED BY MILD NONPROLIFERATIVE RETINOPATHY WITHOUT MACULAR EDEMA, WITH LONG-TERM CURRENT USE OF INSULIN (HCC): Primary | ICD-10-CM

## 2018-02-26 DIAGNOSIS — I10 ESSENTIAL HYPERTENSION: ICD-10-CM

## 2018-02-26 PROBLEM — M25.561 RIGHT KNEE PAIN: Status: RESOLVED | Noted: 2017-11-08 | Resolved: 2018-02-26

## 2018-02-26 PROBLEM — S52.515A CLOSED NONDISPLACED FRACTURE OF STYLOID PROCESS OF LEFT RADIUS: Status: RESOLVED | Noted: 2017-10-30 | Resolved: 2018-02-26

## 2018-02-26 PROBLEM — S82.001A CLOSED DISPLACED FRACTURE OF RIGHT PATELLA: Status: RESOLVED | Noted: 2017-11-08 | Resolved: 2018-02-26

## 2018-02-26 PROBLEM — Z96.652 STATUS POST TOTAL LEFT KNEE REPLACEMENT: Status: RESOLVED | Noted: 2017-07-17 | Resolved: 2018-02-26

## 2018-02-26 PROCEDURE — G8484 FLU IMMUNIZE NO ADMIN: HCPCS | Performed by: INTERNAL MEDICINE

## 2018-02-26 PROCEDURE — 1123F ACP DISCUSS/DSCN MKR DOCD: CPT | Performed by: INTERNAL MEDICINE

## 2018-02-26 PROCEDURE — 3046F HEMOGLOBIN A1C LEVEL >9.0%: CPT | Performed by: INTERNAL MEDICINE

## 2018-02-26 PROCEDURE — 99214 OFFICE O/P EST MOD 30 MIN: CPT | Performed by: INTERNAL MEDICINE

## 2018-02-26 PROCEDURE — 3017F COLORECTAL CA SCREEN DOC REV: CPT | Performed by: INTERNAL MEDICINE

## 2018-02-26 PROCEDURE — G8428 CUR MEDS NOT DOCUMENT: HCPCS | Performed by: INTERNAL MEDICINE

## 2018-02-26 PROCEDURE — 1036F TOBACCO NON-USER: CPT | Performed by: INTERNAL MEDICINE

## 2018-02-26 PROCEDURE — 4040F PNEUMOC VAC/ADMIN/RCVD: CPT | Performed by: INTERNAL MEDICINE

## 2018-02-26 PROCEDURE — G8417 CALC BMI ABV UP PARAM F/U: HCPCS | Performed by: INTERNAL MEDICINE

## 2018-02-26 ASSESSMENT — ENCOUNTER SYMPTOMS
SHORTNESS OF BREATH: 0
ABDOMINAL PAIN: 0
WHEEZING: 0
BACK PAIN: 0
RHINORRHEA: 0
VOMITING: 0
NAUSEA: 0

## 2018-02-26 NOTE — PATIENT INSTRUCTIONS
Patient Self-Management Goal for Health Maintenance  Goal: I will schedule a yearly preventative care visit. Barriers: none  Plan for overcoming my barriers: N/A  Confidence: 10/10  Anticipated Goal Completion Date: 5/26/2018    Patient Education        Diabetes and Preventing Falls: Care Instructions  Your Care Instructions    If you are an older adult who has diabetes, you may have a higher risk of falling. Complications of diabetes-such as nerve damage, foot problems, and reduced vision-may increase your risk of a fall. Some of your medicines also may add to your risk. By making your home safer, you can lower your risk of falling. Doing things to prevent diabetes complications may also help to lower your risk. You can make your home safer with a few simple measures. Follow-up care is a key part of your treatment and safety. Be sure to make and go to all appointments, and call your doctor if you are having problems. It's also a good idea to know your test results and keep a list of the medicines you take. How can you care for yourself at home? Taking care of yourself  · Keep your blood sugar at a target level (which you set with your doctor). · Exercise regularly to improve your strength, muscle tone, and balance. Walk if you can. Swimming may be a good choice if you cannot walk easily. · Have your vision checked as often as your doctor recommends. It is usually once a year or more often if you have eye problems. · Know the side effects of the medicines you take. Ask your doctor or pharmacist whether the medicines you take can affect your balance. Sleeping pills or sedatives can affect your balance. · Limit the amount of alcohol you drink. Alcohol can impair your balance and other senses. · Have your doctor check your feet during each visit. If you have a foot problem, see your doctor. Preventing falls at home  · Remove raised doorway thresholds, throw rugs, and clutter.  Repair loose carpet or raised areas in the floor. · Move furniture and electrical cords to keep them out of walking paths. · Use nonskid floor wax, and wipe up spills right away, especially on ceramic tile floors. · If you use a walker or cane, put rubber tips on it. If you use crutches, clean the bottoms of them regularly with an abrasive pad, such as steel wool. · Keep your house well lit, especially [employer], and outside walkways. Use night-lights in areas such as hallways and bathrooms. Add extra light switches or use remote switches (such as switches that go on or off when you clap your hands) to make it easier to turn lights on if you have to get up during the night. · Install sturdy handrails on stairways. Put grab bars near your shower, bathtub, and toilet. · Store household items on low shelves so that you do not have to climb or reach high. Or use a reaching device that you can get at a medical supply store. If you have to climb for something, use a step stool with handrails, or ask someone to get it for you. · Keep a cordless phone and a flashlight with new batteries by your bed. If possible, put a phone in each of the main rooms of your house, or carry a cell phone in case you fall and cannot reach a phone. Or you can wear a device around your neck or wrist. You push a button that sends a signal for help. · Wear low-heeled shoes that fit well and give your feet good support. Use footwear with nonskid soles. Check the heels and soles of your shoes for wear. Repair or replace worn heels or soles. · Do not wear socks without shoes on wood floors. · Walk on the grass when the sidewalks are slippery. If you live in an area that gets snow and ice in the winter, sprinkle salt on slippery steps and sidewalks. Where can you learn more? Go to https://ney.[employer]ing[employer]. org and sign in to your [employer] account.  Enter I019 in the [employer] box to learn more about \"Diabetes and Preventing Falls: Care Instructions. \"     If you do not have an account, please click on the \"Sign Up Now\" link. Current as of: May 12, 2017  Content Version: 11.5  © 0260-0917 Healthwise, Incorporated. Care instructions adapted under license by [facility] ([facility]). If you have questions about a medical condition or this instruction, always ask your healthcare professional. [address] any warranty or liability for your use of this information.

## 2018-02-26 NOTE — PROGRESS NOTES
Chronic Disease Visit Information    BP Readings from Last 3 Encounters:   12/29/17 130/80   11/27/17 105/68   11/20/17 120/80          Hemoglobin A1C (%)   Date Value   08/24/2017 7.2   04/04/2017 7.3   12/09/2016 7.6     Microscopic Examination (no units)   Date Value   02/28/2017 YES     Microalbumin, Random Urine (mg/dL)   Date Value   02/28/2017 9.50 (H)     LDL Calculated (mg/dL)   Date Value   08/24/2017 48     HDL (mg/dL)   Date Value   08/24/2017 54     BUN (mg/dL)   Date Value   10/27/2017 38 (H)     CREATININE (mg/dL)   Date Value   10/27/2017 1.1     Glucose (mg/dL)   Date Value   10/27/2017 183 (H)   02/03/2012 108 (H)            Have you changed or started any medications since your last visit including any over-the-counter medicines, vitamins, or herbal medicines? no   Are you having any side effects from any of your medications? -  no  Have you stopped taking any of your medications? Is so, why? -  no    Have you seen any other physician or provider since your last visit? No  Have you had any other diagnostic tests since your last visit? No  Have you been seen in the emergency room and/or had an admission to a hospital since we last saw you? No  Have you had your annual diabetic retinal (eye) exam? No  Have you had your routine dental cleaning in the past 6 months? No-dentures    Have you activated your [employer] account? If not, what are your barriers?  No:      Patient Care Team:  Miguel Ayers MD as PCP - Bethany Devlin MD as PCP - Hematology/Oncology (Hematology and Oncology)  Miguel Ayers MD as PCP - S Attributed Provider  Maria Luisa Montana MD (Orthopedic Surgery)  MD Kane as Referring Physician (Gastroenterology)         Medical History Review  Past Medical, Family, and Social History reviewed and does not contribute to the patient presenting condition    Health Maintenance   Topic Date Due    DTaP/Tdap/Td vaccine (1 - Tdap) 12/24/1961    Shingles Vaccine (1 of 2 - 2 Dose Series) 02/08/2010    Colon Cancer Screen FIT/FOBT  11/01/2012    Diabetic foot exam  08/26/2017    Flu vaccine (1) 09/01/2017    A1C test (Diabetic or Prediabetic)  08/24/2018    Lipid screen  08/24/2018    TSH testing  08/24/2018    Diabetic retinal exam  10/04/2018    Potassium monitoring  10/27/2018    Creatinine monitoring  10/27/2018    Pneumococcal low/med risk  Completed    AAA screen  Completed

## 2018-02-26 NOTE — PROGRESS NOTES
drip and rhinorrhea. Respiratory: Negative for shortness of breath and wheezing. Cardiovascular: Positive for leg swelling. Negative for chest pain and palpitations. Gastrointestinal: Negative for abdominal pain, nausea and vomiting. Genitourinary: Negative for difficulty urinating, frequency and hematuria. Musculoskeletal: Negative for back pain and joint swelling. Skin: Negative for rash. Neurological: Positive for tremors. Negative for light-headedness. Psychiatric/Behavioral: Negative for sleep disturbance. Current Outpatient Prescriptions on File Prior to Visit   Medication Sig Dispense Refill    levothyroxine (SYNTHROID) 100 MCG tablet TAKE 1 TABLET DAILY 90 tablet 0    INVOKANA 100 MG TABS tablet TAKE 1 TABLET DAILY 90 tablet 0    pioglitazone (ACTOS) 30 MG tablet TAKE 1 TABLET DAILY 90 tablet 0    tamsulosin (FLOMAX) 0.4 MG capsule TAKE ONE CAPSULE BY MOUTH DAILY 90 capsule 0    apixaban (ELIQUIS) 5 MG TABS tablet Take 1 tablet by mouth 2 times daily 60 tablet 3    HYDROcodone-acetaminophen (NORCO) 5-325 MG per tablet Take 1 tablet by mouth every 6 hours as needed for Pain . 30 tablet 0    hydrochlorothiazide (HYDRODIURIL) 12.5 MG tablet TAKE 1 TABLET DAILY 90 tablet 0    vitamin B-12 (CYANOCOBALAMIN) 1000 MCG tablet Take 1,000 mcg by mouth daily      Omega-3 Fatty Acids (FISH OIL) 1000 MG CAPS Take 1,000 mg by mouth daily      LANTUS 100 UNIT/ML injection vial INJECT 50 UNITS UNDER THE SKIN AT NIGHT (Patient taking differently: INJECT 45 UNITS UNDER THE SKIN AT NIGHT) 50 mL 2    diltiazem (DILTIAZEM CD) 120 MG extended release capsule Take 1 capsule by mouth daily 30 capsule 11    rosuvastatin (CRESTOR) 10 MG tablet Take 10 mg by mouth daily      B-D INS SYR ULTRAFINE 1CC/30G 30G X 1/2\" 1 ML MISC USE WITH LANTUS NIGHTLY 90 each 11    TRUETEST TEST strip TEST DAILY 100 strip 3     No current facility-administered medications on file prior to visit.         There are no changes to past medical history, family history, social history or review of systems(except as noted in the history section) since prior note (all reviewed with patient). /70 (Site: Right Arm, Position: Sitting, Cuff Size: Medium Adult)   Pulse 80   Resp 14   Ht 5' 11\" (1.803 m)   Wt 246 lb (111.6 kg)   BMI 34.31 kg/m²        Objective:   Physical Exam   Constitutional: He is oriented to person, place, and time. He appears well-developed and well-nourished. HENT:   Head: Normocephalic. Eyes: Conjunctivae and EOM are normal. Pupils are equal, round, and reactive to light. Neck: Trachea normal and normal range of motion. Neck supple. No JVD present. Carotid bruit is not present. No thyroid mass and no thyromegaly present. Cardiovascular: Normal rate and normal heart sounds. An irregularly irregular rhythm present. Exam reveals no gallop. No murmur heard. Pulmonary/Chest: Effort normal and breath sounds normal. No respiratory distress. He has no wheezes. He has no rales. Abdominal: Soft. Bowel sounds are normal. He exhibits no distension and no mass. There is no splenomegaly or hepatomegaly. There is no tenderness. Musculoskeletal: He exhibits edema. Lymphadenopathy:     He has no cervical adenopathy. Neurological: He is alert and oriented to person, place, and time. He displays tremor (right upper extremity at rest). Skin: Skin is warm and dry. No rash noted. Psychiatric: He has a normal mood and affect. His behavior is normal. Judgment and thought content normal.       Assessment:      1. Type 2 diabetes mellitus with both eyes affected by mild nonproliferative retinopathy without macular edema, with long-term current use of insulin ([address]     2. PAF (paroxysmal atrial fibrillation) ([address]     3. MGUS (monoclonal gammopathy of unknown significance)     4. Essential hypertension     5. Gastroesophageal reflux disease without esophagitis     6. Benign essential tremor     7.  Chronic

## 2018-02-27 LAB
A/G RATIO: 1.1 (ref 1.1–2.2)
ALBUMIN SERPL-MCNC: 4 G/DL (ref 3.4–5)
ALP BLD-CCNC: 112 U/L (ref 40–129)
ALT SERPL-CCNC: 50 U/L (ref 10–40)
ANION GAP SERPL CALCULATED.3IONS-SCNC: 15 MMOL/L (ref 3–16)
AST SERPL-CCNC: 50 U/L (ref 15–37)
BASOPHILS ABSOLUTE: 0 K/UL (ref 0–0.2)
BASOPHILS RELATIVE PERCENT: 0.7 %
BILIRUB SERPL-MCNC: 0.4 MG/DL (ref 0–1)
BUN BLDV-MCNC: 38 MG/DL (ref 7–20)
CALCIUM SERPL-MCNC: 9.3 MG/DL (ref 8.3–10.6)
CHLORIDE BLD-SCNC: 104 MMOL/L (ref 99–110)
CO2: 29 MMOL/L (ref 21–32)
CREAT SERPL-MCNC: 1.8 MG/DL (ref 0.8–1.3)
EOSINOPHILS ABSOLUTE: 0.4 K/UL (ref 0–0.6)
EOSINOPHILS RELATIVE PERCENT: 8.3 %
ESTIMATED AVERAGE GLUCOSE: 154.2 MG/DL
GFR AFRICAN AMERICAN: 45
GFR NON-AFRICAN AMERICAN: 37
GLOBULIN: 3.7 G/DL
GLUCOSE BLD-MCNC: 169 MG/DL (ref 70–99)
HBA1C MFR BLD: 7 %
HCT VFR BLD CALC: 39.7 % (ref 40.5–52.5)
HEMOGLOBIN: 12.9 G/DL (ref 13.5–17.5)
LYMPHOCYTES ABSOLUTE: 1.5 K/UL (ref 1–5.1)
LYMPHOCYTES RELATIVE PERCENT: 30.8 %
MCH RBC QN AUTO: 28.6 PG (ref 26–34)
MCHC RBC AUTO-ENTMCNC: 32.6 G/DL (ref 31–36)
MCV RBC AUTO: 87.7 FL (ref 80–100)
MONOCYTES ABSOLUTE: 0.5 K/UL (ref 0–1.3)
MONOCYTES RELATIVE PERCENT: 10.4 %
NEUTROPHILS ABSOLUTE: 2.4 K/UL (ref 1.7–7.7)
NEUTROPHILS RELATIVE PERCENT: 49.8 %
PDW BLD-RTO: 16.4 % (ref 12.4–15.4)
PLATELET # BLD: 212 K/UL (ref 135–450)
PMV BLD AUTO: 7.7 FL (ref 5–10.5)
POTASSIUM SERPL-SCNC: 5.5 MMOL/L (ref 3.5–5.1)
RBC # BLD: 4.52 M/UL (ref 4.2–5.9)
SODIUM BLD-SCNC: 148 MMOL/L (ref 136–145)
TOTAL PROTEIN: 7.7 G/DL (ref 6.4–8.2)
TSH SERPL DL<=0.05 MIU/L-ACNC: 1.94 UIU/ML (ref 0.27–4.2)
WBC # BLD: 4.8 K/UL (ref 4–11)

## 2018-03-05 RX ORDER — PIOGLITAZONEHYDROCHLORIDE 30 MG/1
TABLET ORAL
Qty: 90 TABLET | Refills: 0 | Status: SHIPPED | OUTPATIENT
Start: 2018-03-05 | End: 2018-06-03 | Stop reason: SDUPTHER

## 2018-03-06 RX ORDER — TAMSULOSIN HYDROCHLORIDE 0.4 MG/1
CAPSULE ORAL
Qty: 90 CAPSULE | Refills: 0 | Status: SHIPPED | OUTPATIENT
Start: 2018-03-06 | End: 2018-06-04 | Stop reason: SDUPTHER

## 2018-03-08 ENCOUNTER — HOSPITAL ENCOUNTER (OUTPATIENT)
Dept: OTHER | Age: 78
Discharge: HOME | End: 2018-03-08
Attending: INTERNAL MEDICINE | Admitting: INTERNAL MEDICINE

## 2018-03-08 LAB
ALBUMIN SERPL-MCNC: 3.8 G/DL (ref 3.4–5)
ANION GAP SERPL CALCULATED.3IONS-SCNC: 14 MMOL/L (ref 3–16)
BUN BLDV-MCNC: 39 MG/DL (ref 7–20)
CALCIUM SERPL-MCNC: 8.9 MG/DL (ref 8.3–10.6)
CHLORIDE BLD-SCNC: 101 MMOL/L (ref 99–110)
CO2: 29 MMOL/L (ref 21–32)
CREAT SERPL-MCNC: 1.3 MG/DL (ref 0.8–1.3)
GFR AFRICAN AMERICAN: >60
GFR NON-AFRICAN AMERICAN: 54
GLUCOSE BLD-MCNC: 180 MG/DL (ref 70–99)
PHOSPHORUS: 4.2 MG/DL (ref 2.5–4.9)
POTASSIUM SERPL-SCNC: 5.4 MMOL/L (ref 3.5–5.1)
SODIUM BLD-SCNC: 144 MMOL/L (ref 136–145)

## 2018-03-26 RX ORDER — CANAGLIFLOZIN 100 MG/1
TABLET, FILM COATED ORAL
Qty: 90 TABLET | Refills: 0 | Status: SHIPPED | OUTPATIENT
Start: 2018-03-26 | End: 2018-06-25 | Stop reason: SDUPTHER

## 2018-03-27 ENCOUNTER — HOSPITAL ENCOUNTER (OUTPATIENT)
Dept: OTHER | Age: 78
Discharge: HOME | End: 2018-03-27
Attending: INTERNAL MEDICINE | Admitting: INTERNAL MEDICINE

## 2018-03-27 LAB
ALBUMIN SERPL-MCNC: 3.8 G/DL (ref 3.4–5)
ANION GAP SERPL CALCULATED.3IONS-SCNC: 13 MMOL/L (ref 3–16)
BUN BLDV-MCNC: 39 MG/DL (ref 7–20)
CALCIUM SERPL-MCNC: 9.1 MG/DL (ref 8.3–10.6)
CHLORIDE BLD-SCNC: 102 MMOL/L (ref 99–110)
CO2: 28 MMOL/L (ref 21–32)
CREAT SERPL-MCNC: 1.3 MG/DL (ref 0.8–1.3)
GFR AFRICAN AMERICAN: >60
GFR NON-AFRICAN AMERICAN: 54
GLUCOSE BLD-MCNC: 71 MG/DL (ref 70–99)
PHOSPHORUS: 3.7 MG/DL (ref 2.5–4.9)
POTASSIUM SERPL-SCNC: 5.3 MMOL/L (ref 3.5–5.1)
SODIUM BLD-SCNC: 143 MMOL/L (ref 136–145)

## 2018-04-03 RX ORDER — LEVOTHYROXINE SODIUM 0.1 MG/1
TABLET ORAL
Qty: 90 TABLET | Refills: 0 | Status: SHIPPED | OUTPATIENT
Start: 2018-04-03 | End: 2018-07-02 | Stop reason: SDUPTHER

## 2018-04-06 ENCOUNTER — OFFICE VISIT (OUTPATIENT)
Dept: CARDIOLOGY CLINIC | Age: 78
End: 2018-04-06

## 2018-04-06 VITALS
BODY MASS INDEX: 33.6 KG/M2 | SYSTOLIC BLOOD PRESSURE: 132 MMHG | HEIGHT: 71 IN | WEIGHT: 240 LBS | DIASTOLIC BLOOD PRESSURE: 60 MMHG | HEART RATE: 77 BPM

## 2018-04-06 DIAGNOSIS — I48.0 PAF (PAROXYSMAL ATRIAL FIBRILLATION) (HCC): Primary | ICD-10-CM

## 2018-04-06 PROCEDURE — G8417 CALC BMI ABV UP PARAM F/U: HCPCS | Performed by: INTERNAL MEDICINE

## 2018-04-06 PROCEDURE — G8427 DOCREV CUR MEDS BY ELIG CLIN: HCPCS | Performed by: INTERNAL MEDICINE

## 2018-04-06 PROCEDURE — 3017F COLORECTAL CA SCREEN DOC REV: CPT | Performed by: INTERNAL MEDICINE

## 2018-04-06 PROCEDURE — 1036F TOBACCO NON-USER: CPT | Performed by: INTERNAL MEDICINE

## 2018-04-06 PROCEDURE — 93000 ELECTROCARDIOGRAM COMPLETE: CPT | Performed by: INTERNAL MEDICINE

## 2018-04-06 PROCEDURE — 4040F PNEUMOC VAC/ADMIN/RCVD: CPT | Performed by: INTERNAL MEDICINE

## 2018-04-06 PROCEDURE — 1123F ACP DISCUSS/DSCN MKR DOCD: CPT | Performed by: INTERNAL MEDICINE

## 2018-04-06 PROCEDURE — 99213 OFFICE O/P EST LOW 20 MIN: CPT | Performed by: INTERNAL MEDICINE

## 2018-04-06 RX ORDER — HYDROCHLOROTHIAZIDE 12.5 MG/1
TABLET ORAL
Qty: 90 TABLET | Refills: 0 | Status: SHIPPED | OUTPATIENT
Start: 2018-04-06 | End: 2018-07-05 | Stop reason: SDUPTHER

## 2018-04-09 RX ORDER — INSULIN GLARGINE 100 [IU]/ML
INJECTION, SOLUTION SUBCUTANEOUS
Qty: 50 ML | Refills: 0 | Status: SHIPPED | OUTPATIENT
Start: 2018-04-09 | End: 2018-07-08 | Stop reason: SDUPTHER

## 2018-04-19 ENCOUNTER — TELEPHONE (OUTPATIENT)
Dept: INTERNAL MEDICINE CLINIC | Age: 78
End: 2018-04-19

## 2018-06-04 RX ORDER — APIXABAN 5 MG/1
TABLET, FILM COATED ORAL
Qty: 60 TABLET | Refills: 0 | Status: SHIPPED | OUTPATIENT
Start: 2018-06-04 | End: 2018-07-03 | Stop reason: SDUPTHER

## 2018-06-04 RX ORDER — PIOGLITAZONEHYDROCHLORIDE 30 MG/1
TABLET ORAL
Qty: 90 TABLET | Refills: 0 | Status: SHIPPED | OUTPATIENT
Start: 2018-06-04 | End: 2018-09-02 | Stop reason: SDUPTHER

## 2018-06-04 RX ORDER — TAMSULOSIN HYDROCHLORIDE 0.4 MG/1
CAPSULE ORAL
Qty: 90 CAPSULE | Refills: 0 | Status: SHIPPED | OUTPATIENT
Start: 2018-06-04 | End: 2018-08-29 | Stop reason: SDUPTHER

## 2018-06-14 ENCOUNTER — HOSPITAL ENCOUNTER (EMERGENCY)
Dept: HOSPITAL 100 - ED | Age: 78
Discharge: HOME | End: 2018-06-14
Payer: MEDICARE

## 2018-06-14 VITALS
OXYGEN SATURATION: 94 % | HEART RATE: 43 BPM | SYSTOLIC BLOOD PRESSURE: 137 MMHG | TEMPERATURE: 98.7 F | RESPIRATION RATE: 17 BRPM | DIASTOLIC BLOOD PRESSURE: 73 MMHG

## 2018-06-14 VITALS — BODY MASS INDEX: 32.2 KG/M2

## 2018-06-14 VITALS — HEART RATE: 75 BPM

## 2018-06-14 DIAGNOSIS — E11.9: ICD-10-CM

## 2018-06-14 DIAGNOSIS — I25.10: ICD-10-CM

## 2018-06-14 DIAGNOSIS — Z79.4: ICD-10-CM

## 2018-06-14 DIAGNOSIS — Z86.79: ICD-10-CM

## 2018-06-14 DIAGNOSIS — Z76.0: Primary | ICD-10-CM

## 2018-06-14 DIAGNOSIS — Z86.39: ICD-10-CM

## 2018-06-14 PROCEDURE — 96372 THER/PROPH/DIAG INJ SC/IM: CPT

## 2018-06-14 PROCEDURE — 99282 EMERGENCY DEPT VISIT SF MDM: CPT

## 2018-06-14 PROCEDURE — 82962 GLUCOSE BLOOD TEST: CPT

## 2018-06-25 RX ORDER — CANAGLIFLOZIN 100 MG/1
TABLET, FILM COATED ORAL
Qty: 90 TABLET | Refills: 0 | Status: SHIPPED | OUTPATIENT
Start: 2018-06-25 | End: 2018-09-23 | Stop reason: SDUPTHER

## 2018-06-27 ENCOUNTER — OFFICE VISIT (OUTPATIENT)
Dept: INTERNAL MEDICINE CLINIC | Age: 78
End: 2018-06-27

## 2018-06-27 VITALS
DIASTOLIC BLOOD PRESSURE: 80 MMHG | HEART RATE: 80 BPM | BODY MASS INDEX: 33.46 KG/M2 | SYSTOLIC BLOOD PRESSURE: 130 MMHG | HEIGHT: 71 IN | RESPIRATION RATE: 14 BRPM | WEIGHT: 239 LBS

## 2018-06-27 DIAGNOSIS — E11.22 TYPE 2 DIABETES MELLITUS WITH STAGE 3 CHRONIC KIDNEY DISEASE, WITHOUT LONG-TERM CURRENT USE OF INSULIN (HCC): Primary | ICD-10-CM

## 2018-06-27 DIAGNOSIS — N18.30 TYPE 2 DIABETES MELLITUS WITH STAGE 3 CHRONIC KIDNEY DISEASE, WITHOUT LONG-TERM CURRENT USE OF INSULIN (HCC): ICD-10-CM

## 2018-06-27 DIAGNOSIS — Z12.11 SCREEN FOR COLON CANCER: ICD-10-CM

## 2018-06-27 DIAGNOSIS — N18.30 TYPE 2 DIABETES MELLITUS WITH STAGE 3 CHRONIC KIDNEY DISEASE, WITHOUT LONG-TERM CURRENT USE OF INSULIN (HCC): Primary | ICD-10-CM

## 2018-06-27 DIAGNOSIS — Z00.00 ROUTINE GENERAL MEDICAL EXAMINATION AT A HEALTH CARE FACILITY: ICD-10-CM

## 2018-06-27 DIAGNOSIS — E11.22 TYPE 2 DIABETES MELLITUS WITH STAGE 3 CHRONIC KIDNEY DISEASE, WITHOUT LONG-TERM CURRENT USE OF INSULIN (HCC): ICD-10-CM

## 2018-06-27 LAB
ANION GAP SERPL CALCULATED.3IONS-SCNC: 12 MMOL/L (ref 3–16)
BUN BLDV-MCNC: 37 MG/DL (ref 7–20)
CALCIUM SERPL-MCNC: 9.1 MG/DL (ref 8.3–10.6)
CHLORIDE BLD-SCNC: 101 MMOL/L (ref 99–110)
CO2: 30 MMOL/L (ref 21–32)
CREAT SERPL-MCNC: 1.3 MG/DL (ref 0.8–1.3)
GFR AFRICAN AMERICAN: >60
GFR NON-AFRICAN AMERICAN: 54
GLUCOSE BLD-MCNC: 64 MG/DL (ref 70–99)
POTASSIUM SERPL-SCNC: 5.5 MMOL/L (ref 3.5–5.1)
SODIUM BLD-SCNC: 143 MMOL/L (ref 136–145)

## 2018-06-27 PROCEDURE — G0438 PPPS, INITIAL VISIT: HCPCS | Performed by: INTERNAL MEDICINE

## 2018-06-27 PROCEDURE — 3045F PR MOST RECENT HEMOGLOBIN A1C LEVEL 7.0-9.0%: CPT | Performed by: INTERNAL MEDICINE

## 2018-06-27 PROCEDURE — 4040F PNEUMOC VAC/ADMIN/RCVD: CPT | Performed by: INTERNAL MEDICINE

## 2018-06-27 ASSESSMENT — LIFESTYLE VARIABLES
HOW OFTEN DO YOU HAVE A DRINK CONTAINING ALCOHOL: 1
AUDIT-C TOTAL SCORE: 1
HOW OFTEN DO YOU HAVE SIX OR MORE DRINKS ON ONE OCCASION: 0
HOW MANY STANDARD DRINKS CONTAINING ALCOHOL DO YOU HAVE ON A TYPICAL DAY: 0

## 2018-06-27 ASSESSMENT — ANXIETY QUESTIONNAIRES: GAD7 TOTAL SCORE: 0

## 2018-06-27 ASSESSMENT — PATIENT HEALTH QUESTIONNAIRE - PHQ9: SUM OF ALL RESPONSES TO PHQ QUESTIONS 1-9: 0

## 2018-06-28 ENCOUNTER — TELEPHONE (OUTPATIENT)
Dept: INTERNAL MEDICINE CLINIC | Age: 78
End: 2018-06-28

## 2018-06-28 DIAGNOSIS — E87.5 HIGH POTASSIUM: Primary | ICD-10-CM

## 2018-06-28 LAB
ESTIMATED AVERAGE GLUCOSE: 134.1 MG/DL
HBA1C MFR BLD: 6.3 %

## 2018-07-02 RX ORDER — LEVOTHYROXINE SODIUM 0.1 MG/1
TABLET ORAL
Qty: 90 TABLET | Refills: 0 | Status: SHIPPED | OUTPATIENT
Start: 2018-07-02 | End: 2018-12-28 | Stop reason: SDUPTHER

## 2018-07-03 RX ORDER — APIXABAN 5 MG/1
TABLET, FILM COATED ORAL
Qty: 60 TABLET | Refills: 2 | Status: SHIPPED | OUTPATIENT
Start: 2018-07-03 | End: 2018-10-12 | Stop reason: SDUPTHER

## 2018-07-05 RX ORDER — HYDROCHLOROTHIAZIDE 12.5 MG/1
TABLET ORAL
Qty: 90 TABLET | Refills: 0 | Status: SHIPPED | OUTPATIENT
Start: 2018-07-05 | End: 2018-10-03 | Stop reason: SDUPTHER

## 2018-07-09 DIAGNOSIS — E87.5 HIGH POTASSIUM: Primary | ICD-10-CM

## 2018-07-09 RX ORDER — INSULIN GLARGINE 100 [IU]/ML
INJECTION, SOLUTION SUBCUTANEOUS
Qty: 50 ML | Refills: 0 | Status: SHIPPED | OUTPATIENT
Start: 2018-07-09 | End: 2018-10-07 | Stop reason: SDUPTHER

## 2018-07-13 ENCOUNTER — HOSPITAL ENCOUNTER (OUTPATIENT)
Dept: OTHER | Age: 78
Discharge: HOME | End: 2018-07-14
Attending: INTERNAL MEDICINE | Admitting: INTERNAL MEDICINE

## 2018-07-13 DIAGNOSIS — E87.5 HIGH POTASSIUM: ICD-10-CM

## 2018-07-13 LAB
ALBUMIN SERPL-MCNC: 3.8 G/DL (ref 3.4–5)
ANION GAP SERPL CALCULATED.3IONS-SCNC: 16 MMOL/L (ref 3–16)
BUN BLDV-MCNC: 44 MG/DL (ref 7–20)
CALCIUM SERPL-MCNC: 9.5 MG/DL (ref 8.3–10.6)
CHLORIDE BLD-SCNC: 98 MMOL/L (ref 99–110)
CO2: 27 MMOL/L (ref 21–32)
CREAT SERPL-MCNC: 1.4 MG/DL (ref 0.8–1.3)
GFR AFRICAN AMERICAN: 60
GFR NON-AFRICAN AMERICAN: 49
GLUCOSE BLD-MCNC: 141 MG/DL (ref 70–99)
PHOSPHORUS: 3.5 MG/DL (ref 2.5–4.9)
POTASSIUM SERPL-SCNC: 3.7 MMOL/L (ref 3.5–5.1)
SODIUM BLD-SCNC: 141 MMOL/L (ref 136–145)

## 2018-08-29 RX ORDER — TAMSULOSIN HYDROCHLORIDE 0.4 MG/1
CAPSULE ORAL
Qty: 90 CAPSULE | Refills: 0 | Status: SHIPPED | OUTPATIENT
Start: 2018-08-29 | End: 2018-11-30 | Stop reason: SDUPTHER

## 2018-09-04 RX ORDER — PIOGLITAZONEHYDROCHLORIDE 30 MG/1
TABLET ORAL
Qty: 90 TABLET | Refills: 0 | Status: SHIPPED | OUTPATIENT
Start: 2018-09-04 | End: 2018-11-30 | Stop reason: SDUPTHER

## 2018-09-15 ENCOUNTER — HOSPITAL ENCOUNTER (EMERGENCY)
Age: 78
Discharge: HOME | End: 2018-09-15
Attending: EMERGENCY MEDICINE
Payer: MEDICARE

## 2018-09-15 VITALS
HEIGHT: 71 IN | TEMPERATURE: 98.6 F | RESPIRATION RATE: 15 BRPM | SYSTOLIC BLOOD PRESSURE: 137 MMHG | BODY MASS INDEX: 35 KG/M2 | DIASTOLIC BLOOD PRESSURE: 60 MMHG | WEIGHT: 250 LBS | HEART RATE: 93 BPM | OXYGEN SATURATION: 95 %

## 2018-09-15 DIAGNOSIS — J30.2 SEASONAL ALLERGIC RHINITIS, UNSPECIFIED TRIGGER: ICD-10-CM

## 2018-09-15 DIAGNOSIS — E16.2 HYPOGLYCEMIA: Primary | ICD-10-CM

## 2018-09-15 LAB
A/G RATIO: 1 (ref 1.1–2.2)
ALBUMIN SERPL-MCNC: 3.9 G/DL (ref 3.4–5)
ALP BLD-CCNC: 98 U/L (ref 40–129)
ALT SERPL-CCNC: 36 U/L (ref 10–40)
ANION GAP SERPL CALCULATED.3IONS-SCNC: 11 MMOL/L (ref 3–16)
AST SERPL-CCNC: 40 U/L (ref 15–37)
BASOPHILS ABSOLUTE: 0 K/UL (ref 0–0.2)
BASOPHILS RELATIVE PERCENT: 0.6 %
BILIRUB SERPL-MCNC: 0.3 MG/DL (ref 0–1)
BUN BLDV-MCNC: 35 MG/DL (ref 7–20)
CALCIUM SERPL-MCNC: 9.3 MG/DL (ref 8.3–10.6)
CHLORIDE BLD-SCNC: 101 MMOL/L (ref 99–110)
CO2: 31 MMOL/L (ref 21–32)
CREAT SERPL-MCNC: 1.4 MG/DL (ref 0.8–1.3)
EOSINOPHILS ABSOLUTE: 0.5 K/UL (ref 0–0.6)
EOSINOPHILS RELATIVE PERCENT: 7 %
GFR AFRICAN AMERICAN: 60
GFR NON-AFRICAN AMERICAN: 49
GLOBULIN: 3.8 G/DL
GLUCOSE BLD-MCNC: 56 MG/DL
GLUCOSE BLD-MCNC: 56 MG/DL (ref 70–99)
GLUCOSE BLD-MCNC: 59 MG/DL (ref 70–99)
GLUCOSE BLD-MCNC: 93 MG/DL (ref 70–99)
HCT VFR BLD CALC: 40.9 % (ref 40.5–52.5)
HEMOGLOBIN: 13.5 G/DL (ref 13.5–17.5)
LYMPHOCYTES ABSOLUTE: 2 K/UL (ref 1–5.1)
LYMPHOCYTES RELATIVE PERCENT: 29.6 %
MCH RBC QN AUTO: 28.8 PG (ref 26–34)
MCHC RBC AUTO-ENTMCNC: 32.9 G/DL (ref 31–36)
MCV RBC AUTO: 87.4 FL (ref 80–100)
MONOCYTES ABSOLUTE: 0.7 K/UL (ref 0–1.3)
MONOCYTES RELATIVE PERCENT: 10.5 %
NEUTROPHILS ABSOLUTE: 3.5 K/UL (ref 1.7–7.7)
NEUTROPHILS RELATIVE PERCENT: 52.3 %
PDW BLD-RTO: 14.9 % (ref 12.4–15.4)
PERFORMED ON: ABNORMAL
PERFORMED ON: NORMAL
PLATELET # BLD: 217 K/UL (ref 135–450)
PMV BLD AUTO: 7 FL (ref 5–10.5)
POTASSIUM SERPL-SCNC: 4.4 MMOL/L (ref 3.5–5.1)
PRO-BNP: 141 PG/ML (ref 0–449)
RBC # BLD: 4.68 M/UL (ref 4.2–5.9)
SODIUM BLD-SCNC: 143 MMOL/L (ref 136–145)
TOTAL PROTEIN: 7.7 G/DL (ref 6.4–8.2)
WBC # BLD: 6.7 K/UL (ref 4–11)

## 2018-09-15 PROCEDURE — 99284 EMERGENCY DEPT VISIT MOD MDM: CPT

## 2018-09-15 PROCEDURE — 80053 COMPREHEN METABOLIC PANEL: CPT

## 2018-09-15 PROCEDURE — 83880 ASSAY OF NATRIURETIC PEPTIDE: CPT

## 2018-09-15 PROCEDURE — 85025 COMPLETE CBC W/AUTO DIFF WBC: CPT

## 2018-09-15 RX ORDER — FLUTICASONE PROPIONATE 50 MCG
1 SPRAY, SUSPENSION (ML) NASAL DAILY
Qty: 1 BOTTLE | Refills: 0 | Status: SHIPPED | OUTPATIENT
Start: 2018-09-15 | End: 2020-01-30

## 2018-09-15 NOTE — ED NOTES
FSBS obtained via Accu-Check at 1050 with a reading of 93. PAPO Goel and Dr. Pauly Parada advised, both acknowledged.      Dairl Splinter  09/15/18 1054

## 2018-09-15 NOTE — ED PROVIDER NOTES
sore throat or ear pain   Respiratory:  Denies cough or shortness of breath   Cardiovascular:  Denies chest pain, palpitations or swelling   GI:  Denies abdominal pain, nausea, vomiting, or diarrhea   Musculoskeletal:  Denies back pain   Skin:  Denies rash   Neurologic:  Denies headache, focal weakness or sensory changes   Endocrine:  Denies polyuria or polydypsia   Lymphatic:  Denies swollen glands   Psychiatric:  Denies depression, suicidal ideation or homicidal ideation   All systems negative except as marked. Positives and Pertinent negatives as per HPI. Except as noted above in the ROS, all other systems were reviewed and negative.        PAST MEDICAL HISTORY     Past Medical History:   Diagnosis Date    A-fib 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)     Esophageal reflux     Hyperlipidemia     Hyperlipidemia associated with type 2 diabetes mellitus ([facility] [address] 10/23/2015    Hypertension     Hypertrophy of prostate without urinary obstruction and other lower urinary tract symptoms (LUTS) 11/1/2011    Hypothyroidism     Insufficiency fracture of tibia 9/6/2016    Osteoarthrosis, not specified whether generalized/localized, lower leg     Pain in joint, lower leg     Right knee pain 11/8/2017    Status post total left knee replacement 7/17/2017    Type 2 diabetes mellitus 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 edema ([facility] [address] 10/23/2015    Type II or unspecified type diabetes mellitus without mention of complication, not stated as uncontrolled     Unspecified viral hepatitis C without hepatic coma          SURGICAL HISTORY       Past Surgical History:   Procedure Laterality Date    CHOLECYSTECTOMY, LAPAROSCOPIC      COLONOSCOPY  12/14/07    ESOPHAGUS SURGERY      esophagus endoscopy    EYE SURGERY      JOINT REPLACEMENT Left 07/17/2017    LEFT TOTAL KNEE REPLACEMENT      KNEE ARTHROSCOPY      OTHER SURGICAL HISTORY Left 11/14/2016    LEFT KNEE DIRECT VIDEO ARTHROSCOPY, MEDIAL MENISECTOMY, CHONDROPLASTY, INTERNAL FIXATION OF MEDIAL TIBIAL INSUFFICIENCY FRACTURE WITH BONE SUBSTITUTE         CURRENT MEDICATIONS       Previous Medications    B-D INS SYR ULTRAFINE 1CC/30G 30G X 1/2\" 1 ML MISC    USE WITH LANTUS NIGHTLY    CARTIA  MG EXTENDED RELEASE CAPSULE    TAKE ONE CAPSULE BY MOUTH DAILY    ELIQUIS 5 MG TABS TABLET    TAKE ONE TABLET BY MOUTH TWICE A DAY    HYDROCHLOROTHIAZIDE (HYDRODIURIL) 12.5 MG TABLET    TAKE 1 TABLET DAILY    INVOKANA 100 MG TABS TABLET    TAKE 1 TABLET DAILY    LANTUS 100 UNIT/ML INJECTION VIAL    INJECT 50 UNITS UNDER THE SKIN AT NIGHT    LEVOTHYROXINE (SYNTHROID) 100 MCG TABLET    TAKE 1 TABLET DAILY    PIOGLITAZONE (ACTOS) 30 MG TABLET    TAKE 1 TABLET DAILY    ROSUVASTATIN (CRESTOR) 10 MG TABLET    Take 10 mg by mouth daily    TAMSULOSIN (FLOMAX) 0.4 MG CAPSULE    TAKE ONE CAPSULE BY MOUTH DAILY    TRUETEST TEST STRIP    TEST DAILY    VITAMIN B-12 (CYANOCOBALAMIN) 1000 MCG TABLET    Take 1,000 mcg by mouth daily       ALLERGIES     Patient has no known allergies.     FAMILY HISTORY       Family History   Problem Relation Age of Onset    Cancer Sister           SOCIAL HISTORY       Social History     Social History    Marital status:      Spouse name: N/A    Number of children: N/A    Years of education: N/A     Occupational History    [address]      Social History Main Topics    Smoking status: Former Smoker     Packs/day: 0.50     Years: 30.00    Smokeless tobacco: Former User     Quit date: 10/3/1977    Alcohol use Yes      Comment: social    Drug use: No    Sexual activity: Not Asked     Other Topics Concern    None     Social History Narrative    None       SCREENINGS    [place] Coma Scale  Eye Opening: Spontaneous  Best Verbal Response: Oriented  Best Motor Response: Obeys commands  [place] Coma Scale Score: 15        PHYSICAL EXAM    (up to 7 for level 4, 8 or more for level 5)     ED Triage Vitals [09/15/18 0951]   BP Temp Temp Source Pulse Resp SpO2 Height Weight   137/75 98.4 °F (36.9 °C) Oral 98 23 95 % 5' 11\" (1.803 m) 250 lb (113.4 kg)           PHYSICAL EXAM    VITAL SIGNS: /89   Pulse 96   Temp 98.4 °F (36.9 °C) (Oral)   Resp 20   Ht 5' 11\" (1.803 m)   Wt 250 lb (113.4 kg)   SpO2 99%   BMI 34.87 kg/m²    Constitutional:  Well developed, Well nourished, No acute distress, Non-toxic appearance. HENT:  Normocephalic, Atraumatic, Bilateral external ears normal, Oropharynx moist, No oral exudates, Nose normal.   Neck: Normal range of motion, No tenderness, Supple, No stridor. Eyes:   Conjunctiva normal, No discharge. Respiratory:  Normal breath sounds, No respiratory distress, No wheezing, No chest tenderness. Cardiovascular:  Normal heart rate, Normal rhythm, No murmurs, No rubs, No gallops. GI:  Bowel sounds normal, Soft, No tenderness, No masses, No pulsatile masses. Musculoskeletal:  Intact distal pulses, chronic nonpitting edema, No tenderness, No cyanosis, No clubbing. Good range of motion in all major joints. No tenderness to palpation or major deformities noted. Back: No tenderness. Integument:  Warm, Dry, No erythema, No rash. Lymphatic:  No lymphadenopathy noted. Neurologic:  Alert & oriented x 3, Normal motor function, Normal sensory function, No focal deficits noted.    Psychiatric:  Affect normal, Judgment normal, Mood normal.       DIAGNOSTIC RESULTS   LABS:    Results for orders placed or performed during the hospital encounter of 09/15/18   CBC auto differential   Result Value Ref Range    WBC 6.7 4.0 - 11.0 K/uL    RBC 4.68 4.20 - 5.90 M/uL    Hemoglobin 13.5 13.5 - 17.5 g/dL    Hematocrit 40.9 40.5 - 52.5 %    MCV 87.4 80.0 - 100.0 fL    MCH 28.8 26.0 - 34.0 pg    MCHC 32.9 31.0 - 36.0 g/dL    RDW 14.9 12.4 - 15.4 %    Platelets 331 586 - 782 K/uL    MPV 7.0 5.0 - 10.5 fL    Neutrophils % 52.3 %    Lymphocytes % 29.6 %    Monocytes % 10.5 %    Eosinophils % 7.0 %    Basophils % 0.6 %    Neutrophils # 3.5 1.7 - 7.7 K/uL    Lymphocytes # 2.0 1.0 - 5.1 K/uL    Monocytes # 0.7 0.0 - 1.3 K/uL    Eosinophils # 0.5 0.0 - 0.6 K/uL    Basophils # 0.0 0.0 - 0.2 K/uL   Comprehensive metabolic panel   Result Value Ref Range    Sodium 143 136 - 145 mmol/L    Potassium 4.4 3.5 - 5.1 mmol/L    Chloride 101 99 - 110 mmol/L    CO2 31 21 - 32 mmol/L    Anion Gap 11 3 - 16    Glucose 59 (L) 70 - 99 mg/dL    BUN 35 (H) 7 - 20 mg/dL    CREATININE 1.4 (H) 0.8 - 1.3 mg/dL    GFR Non- 49 (A) >60    GFR  60 (A) >60    Calcium 9.3 8.3 - 10.6 mg/dL    Total Protein 7.7 6.4 - 8.2 g/dL    Alb 3.9 3.4 - 5.0 g/dL    Albumin/Globulin Ratio 1.0 (L) 1.1 - 2.2    Total Bilirubin 0.3 0.0 - 1.0 mg/dL    Alkaline Phosphatase 98 40 - 129 U/L    ALT 36 10 - 40 U/L    AST 40 (H) 15 - 37 U/L    Globulin 3.8 g/dL   Brain Natriuretic Peptide   Result Value Ref Range    Pro- 0 - 449 pg/mL   POCT Glucose   Result Value Ref Range    POC Glucose 56 (L) 70 - 99 mg/dl    Performed on ACCU-CHEK    POCT glucose   Result Value Ref Range    Glucose 56 mg/dL   POCT Glucose   Result Value Ref Range    POC Glucose 93 70 - 99 mg/dl    Performed on ACCU-CHEK        All other labs were within normal range or not returned as of this dictation. Interpretation per the Radiologist below, if available at the time of this note:    No orders to display       No results found.       PROCEDURES   Unless otherwise noted below, none     Procedures    CRITICAL CARE TIME   N/A    CONSULTS:  None    EMERGENCY DEPARTMENT COURSE and DIFFERENTIAL DIAGNOSIS/MDM:   Vitals:    Vitals:    09/15/18 0951 09/15/18 0952 09/15/18 1002 09/15/18 1032   BP: 137/75 137/75 139/63 127/89   Pulse: 98 89 93 96 Resp: 23 23 20 20   Temp: 98.4 °F (36.9 °C)      TempSrc: Oral      SpO2: 95% 94% 96% 99%   Weight: 250 lb (113.4 kg)      Height: 5' 11\" (1.803 m)          rTe Ramirez is a 78 y.o. male who presented to the Emergency Department with Hypoglycemia and allergic rhinitis. Patient's blood sugar improved and his mentation improved. Patient is not feeling like his blood sugars low any longer. Patient was able to eat here in the department. Patient will follow-up with primary care. Patient was told to be careful with his insulin and not eating well. Patient has candy at the bedside to eat. Patient's seasonal allergies causing him some rhinitis we treated with Flonase. Again, patient is not short of breath. His description but states it's more nasal congestion. Patient will follow-up with primary care. Patient was asked to return the emergency with worsening pain. Patient did voice understanding of the above follow-up and return instructions. Patient was given the following medications:  Medications - No data to display    The patient tolerated their visit well. The patient and / or the family were informed of the results of any tests, a time was given to answer questions. FINAL IMPRESSION      1. Hypoglycemia    2.  Seasonal allergic rhinitis, unspecified trigger          DISPOSITION/PLAN   DISPOSITION Decision To Discharge 09/15/2018 10:59:47 AM      PATIENT REFERRED TO:  Siobhan Brito MD  [facility] [address], [address] [facility]  [phone]    Call in 1 day        DISCHARGE MEDICATIONS:  New Prescriptions    FLUTICASONE (FLONASE) 50 MCG/ACT NASAL SPRAY    1 spray by Nasal route daily       DISCONTINUED MEDICATIONS:  Discontinued Medications    No medications on file              (Please note that portions of this note were completed with a voice recognition program.  Efforts were made to edit the dictations but occasionally words are mis-transcribed.)    Ena Lamas MD (electronically signed)              Eunice Henry MD  09/15/18 6234

## 2018-09-24 RX ORDER — CANAGLIFLOZIN 100 MG/1
TABLET, FILM COATED ORAL
Qty: 90 TABLET | Refills: 0 | Status: SHIPPED | OUTPATIENT
Start: 2018-09-24 | End: 2018-12-23 | Stop reason: SDUPTHER

## 2018-10-03 ENCOUNTER — OFFICE VISIT (OUTPATIENT)
Dept: INTERNAL MEDICINE CLINIC | Age: 78
End: 2018-10-03

## 2018-10-03 VITALS
BODY MASS INDEX: 33.88 KG/M2 | RESPIRATION RATE: 14 BRPM | DIASTOLIC BLOOD PRESSURE: 70 MMHG | WEIGHT: 242 LBS | SYSTOLIC BLOOD PRESSURE: 100 MMHG | HEIGHT: 71 IN | HEART RATE: 80 BPM

## 2018-10-03 DIAGNOSIS — D47.2 MGUS (MONOCLONAL GAMMOPATHY OF UNKNOWN SIGNIFICANCE): ICD-10-CM

## 2018-10-03 DIAGNOSIS — K21.9 GASTROESOPHAGEAL REFLUX DISEASE WITHOUT ESOPHAGITIS: ICD-10-CM

## 2018-10-03 DIAGNOSIS — E03.9 ACQUIRED HYPOTHYROIDISM: ICD-10-CM

## 2018-10-03 DIAGNOSIS — I10 ESSENTIAL HYPERTENSION: ICD-10-CM

## 2018-10-03 DIAGNOSIS — E11.3293 TYPE 2 DIABETES MELLITUS WITH BOTH EYES AFFECTED BY MILD NONPROLIFERATIVE RETINOPATHY WITHOUT MACULAR EDEMA, WITH LONG-TERM CURRENT USE OF INSULIN (HCC): ICD-10-CM

## 2018-10-03 DIAGNOSIS — E78.5 HYPERLIPIDEMIA ASSOCIATED WITH TYPE 2 DIABETES MELLITUS (HCC): ICD-10-CM

## 2018-10-03 DIAGNOSIS — I48.0 PAF (PAROXYSMAL ATRIAL FIBRILLATION) (HCC): ICD-10-CM

## 2018-10-03 DIAGNOSIS — E11.69 HYPERLIPIDEMIA ASSOCIATED WITH TYPE 2 DIABETES MELLITUS (HCC): ICD-10-CM

## 2018-10-03 DIAGNOSIS — B18.2 CHRONIC HEPATITIS C WITHOUT HEPATIC COMA (HCC): ICD-10-CM

## 2018-10-03 DIAGNOSIS — Z79.4 TYPE 2 DIABETES MELLITUS WITH BOTH EYES AFFECTED BY MILD NONPROLIFERATIVE RETINOPATHY WITHOUT MACULAR EDEMA, WITH LONG-TERM CURRENT USE OF INSULIN (HCC): ICD-10-CM

## 2018-10-03 DIAGNOSIS — N18.30 TYPE 2 DIABETES MELLITUS WITH STAGE 3 CHRONIC KIDNEY DISEASE, WITHOUT LONG-TERM CURRENT USE OF INSULIN (HCC): Primary | ICD-10-CM

## 2018-10-03 DIAGNOSIS — E11.22 TYPE 2 DIABETES MELLITUS WITH STAGE 3 CHRONIC KIDNEY DISEASE, WITHOUT LONG-TERM CURRENT USE OF INSULIN (HCC): ICD-10-CM

## 2018-10-03 DIAGNOSIS — Z23 NEED FOR INFLUENZA VACCINATION: ICD-10-CM

## 2018-10-03 DIAGNOSIS — N18.30 TYPE 2 DIABETES MELLITUS WITH STAGE 3 CHRONIC KIDNEY DISEASE, WITHOUT LONG-TERM CURRENT USE OF INSULIN (HCC): ICD-10-CM

## 2018-10-03 DIAGNOSIS — E11.22 TYPE 2 DIABETES MELLITUS WITH STAGE 3 CHRONIC KIDNEY DISEASE, WITHOUT LONG-TERM CURRENT USE OF INSULIN (HCC): Primary | ICD-10-CM

## 2018-10-03 DIAGNOSIS — G25.0 BENIGN ESSENTIAL TREMOR: Chronic | ICD-10-CM

## 2018-10-03 LAB
BILIRUBIN, POC: NORMAL
BLOOD URINE, POC: NORMAL
CHOLESTEROL, TOTAL: 166 MG/DL (ref 0–199)
CLARITY, POC: NORMAL
COLOR, POC: NORMAL
CREATININE URINE: 109 MG/DL (ref 39–259)
GLUCOSE URINE, POC: NORMAL
HDLC SERPL-MCNC: 57 MG/DL (ref 40–60)
KETONES, POC: NORMAL
LDL CHOLESTEROL CALCULATED: 92 MG/DL
LEUKOCYTE EST, POC: NORMAL
MICROALBUMIN UR-MCNC: 13.4 MG/DL
MICROALBUMIN/CREAT UR-RTO: 122.9 MG/G (ref 0–30)
NITRITE, POC: NORMAL
PH, POC: NORMAL
PROTEIN, POC: NORMAL
SPECIFIC GRAVITY, POC: NORMAL
TRIGL SERPL-MCNC: 83 MG/DL (ref 0–150)
TSH SERPL DL<=0.05 MIU/L-ACNC: 1.11 UIU/ML (ref 0.27–4.2)
UROBILINOGEN, POC: NORMAL
VLDLC SERPL CALC-MCNC: 17 MG/DL

## 2018-10-03 PROCEDURE — G0008 ADMIN INFLUENZA VIRUS VAC: HCPCS | Performed by: INTERNAL MEDICINE

## 2018-10-03 PROCEDURE — 1036F TOBACCO NON-USER: CPT | Performed by: INTERNAL MEDICINE

## 2018-10-03 PROCEDURE — 81002 URINALYSIS NONAUTO W/O SCOPE: CPT | Performed by: INTERNAL MEDICINE

## 2018-10-03 PROCEDURE — 3044F HG A1C LEVEL LT 7.0%: CPT | Performed by: INTERNAL MEDICINE

## 2018-10-03 PROCEDURE — 1123F ACP DISCUSS/DSCN MKR DOCD: CPT | Performed by: INTERNAL MEDICINE

## 2018-10-03 PROCEDURE — 2022F DILAT RTA XM EVC RTNOPTHY: CPT | Performed by: INTERNAL MEDICINE

## 2018-10-03 PROCEDURE — 1101F PT FALLS ASSESS-DOCD LE1/YR: CPT | Performed by: INTERNAL MEDICINE

## 2018-10-03 PROCEDURE — 4040F PNEUMOC VAC/ADMIN/RCVD: CPT | Performed by: INTERNAL MEDICINE

## 2018-10-03 PROCEDURE — 3017F COLORECTAL CA SCREEN DOC REV: CPT | Performed by: INTERNAL MEDICINE

## 2018-10-03 PROCEDURE — 99214 OFFICE O/P EST MOD 30 MIN: CPT | Performed by: INTERNAL MEDICINE

## 2018-10-03 PROCEDURE — G8482 FLU IMMUNIZE ORDER/ADMIN: HCPCS | Performed by: INTERNAL MEDICINE

## 2018-10-03 PROCEDURE — G8428 CUR MEDS NOT DOCUMENT: HCPCS | Performed by: INTERNAL MEDICINE

## 2018-10-03 PROCEDURE — G8417 CALC BMI ABV UP PARAM F/U: HCPCS | Performed by: INTERNAL MEDICINE

## 2018-10-03 PROCEDURE — 90662 IIV NO PRSV INCREASED AG IM: CPT | Performed by: INTERNAL MEDICINE

## 2018-10-03 RX ORDER — HYDROCHLOROTHIAZIDE 12.5 MG/1
TABLET ORAL
Qty: 90 TABLET | Refills: 0 | Status: SHIPPED | OUTPATIENT
Start: 2018-10-03 | End: 2019-01-01 | Stop reason: SDUPTHER

## 2018-10-03 ASSESSMENT — ENCOUNTER SYMPTOMS
WHEEZING: 0
NAUSEA: 0
RHINORRHEA: 0
BACK PAIN: 0
ABDOMINAL PAIN: 0
VOMITING: 0
SHORTNESS OF BREATH: 0

## 2018-10-03 NOTE — PROGRESS NOTES
Right Upper Arm, Position: Sitting, Cuff Size: Medium Adult)   Pulse 80   Resp 14   Ht 5' 11\" (1.803 m)   Wt 242 lb (109.8 kg)   BMI 33.75 kg/m²        Objective:   Physical Exam   Constitutional: He is oriented to person, place, and time. He appears well-developed and well-nourished. HENT:   Head: Normocephalic. Eyes: Pupils are equal, round, and reactive to light. Conjunctivae and EOM are normal.   Neck: Trachea normal and normal range of motion. Neck supple. No JVD present. Carotid bruit is not present. No thyroid mass and no thyromegaly present. Cardiovascular: Normal rate and normal heart sounds. An irregularly irregular rhythm present. Exam reveals no gallop. No murmur heard. Pulmonary/Chest: Effort normal and breath sounds normal. No respiratory distress. He has no wheezes. He has no rales. Abdominal: Soft. Bowel sounds are normal. He exhibits no distension and no mass. There is no splenomegaly or hepatomegaly. There is no tenderness. Musculoskeletal: He exhibits edema. Lymphadenopathy:     He has no cervical adenopathy. Neurological: He is alert and oriented to person, place, and time. He displays tremor (right upper extremity at rest). Skin: Skin is warm and dry. No rash noted. Psychiatric: He has a normal mood and affect. His behavior is normal. Judgment and thought content normal.       Assessment:       Diagnosis Orders   1. Type 2 diabetes mellitus with stage 3 chronic kidney disease, without long-term current use of insulin (HCC)  Hemoglobin A1C    Lipid Panel    Microalbumin / Creatinine Urine Ratio    POCT Urinalysis no Micro   2. Chronic hepatitis C without hepatic coma ([address]     3. Hyperlipidemia associated with type 2 diabetes mellitus ([address]     4. PAF (paroxysmal atrial fibrillation) ([address]     5. Benign essential tremor     6. Essential hypertension     7. Gastroesophageal reflux disease without esophagitis     8. Acquired hypothyroidism  TSH without Reflex   9.  Type 2

## 2018-10-04 LAB
ESTIMATED AVERAGE GLUCOSE: 137 MG/DL
HBA1C MFR BLD: 6.4 %

## 2018-10-08 RX ORDER — INSULIN GLARGINE 100 [IU]/ML
INJECTION, SOLUTION SUBCUTANEOUS
Qty: 50 ML | Refills: 0 | Status: SHIPPED | OUTPATIENT
Start: 2018-10-08 | End: 2019-01-07 | Stop reason: SDUPTHER

## 2018-10-09 ENCOUNTER — OFFICE VISIT (OUTPATIENT)
Dept: CARDIOLOGY CLINIC | Age: 78
End: 2018-10-09
Payer: MEDICARE

## 2018-10-09 VITALS
HEART RATE: 77 BPM | WEIGHT: 245 LBS | OXYGEN SATURATION: 98 % | DIASTOLIC BLOOD PRESSURE: 70 MMHG | HEIGHT: 71 IN | SYSTOLIC BLOOD PRESSURE: 130 MMHG | BODY MASS INDEX: 34.3 KG/M2

## 2018-10-09 DIAGNOSIS — I49.1 PREMATURE ATRIAL CONTRACTION: ICD-10-CM

## 2018-10-09 DIAGNOSIS — I48.0 PAF (PAROXYSMAL ATRIAL FIBRILLATION) (HCC): Primary | ICD-10-CM

## 2018-10-09 PROCEDURE — 3017F COLORECTAL CA SCREEN DOC REV: CPT | Performed by: NURSE PRACTITIONER

## 2018-10-09 PROCEDURE — G8482 FLU IMMUNIZE ORDER/ADMIN: HCPCS | Performed by: NURSE PRACTITIONER

## 2018-10-09 PROCEDURE — 93000 ELECTROCARDIOGRAM COMPLETE: CPT | Performed by: NURSE PRACTITIONER

## 2018-10-09 PROCEDURE — 1036F TOBACCO NON-USER: CPT | Performed by: NURSE PRACTITIONER

## 2018-10-09 PROCEDURE — 1101F PT FALLS ASSESS-DOCD LE1/YR: CPT | Performed by: NURSE PRACTITIONER

## 2018-10-09 PROCEDURE — 99213 OFFICE O/P EST LOW 20 MIN: CPT | Performed by: NURSE PRACTITIONER

## 2018-10-09 PROCEDURE — 4040F PNEUMOC VAC/ADMIN/RCVD: CPT | Performed by: NURSE PRACTITIONER

## 2018-10-09 PROCEDURE — G8417 CALC BMI ABV UP PARAM F/U: HCPCS | Performed by: NURSE PRACTITIONER

## 2018-10-09 PROCEDURE — 1123F ACP DISCUSS/DSCN MKR DOCD: CPT | Performed by: NURSE PRACTITIONER

## 2018-10-09 PROCEDURE — G8427 DOCREV CUR MEDS BY ELIG CLIN: HCPCS | Performed by: NURSE PRACTITIONER

## 2018-10-09 NOTE — LETTER
[address] [place] [address] Cardiology - [address] [place], [place] [id number]  Phone: [phone]  Fax: [phone] [facility], APRN - SWE        October 10, 2018     Sanjay Vinson, [address] [facility], [address] [place] [id number]    Patient: Radha Quiñones  MR Number: [id number]  YOB: 1940  Date of Visit: 10/9/2018    Dear Dr. Sanjay Vinson:     Below are the relevant portions of my assessment and plan of care. [address]   Electrophysiology  Note              Date:  October 9, 2018  Patient name: Radha Quiñones  YOB: 1940    Primary Care physician: Sanjay Vinson MD    HISTORY OF PRESENT ILLNESS: The patient is a 78 y.o.  male with a history of paroxysmal atrial fibrillation, HTN, DM, CKD, hypothyroidism, Hepatitis C, tremors, and HLD. He was seen by his PCP in 12/2016 and atrial fibrillation was incidentally found. He was started on Eliquis and Cardizem. An echo on 1/17/2017 was normal and his EF was 55%. At his visit in 3/2017 he was in sinus rhythm and was unaware of the conversion. Has remained in sinus at subsequent visits. Today he is being seen for atrial fibrillation. His EKG shows sinus rhythm with PACs, HR 68. He is feeling well and denies chest pain, palpitations, shortness of breath, and dizziness. Complains of chronic lower extremity swelling. Past Medical History:   has a past medical history of A-fib ([address] Chronic hepatitis C without hepatic coma ([address] Closed displaced fracture of right patella; Closed nondisplaced fracture of styloid process of left radius; 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 ([address] Esophageal reflux; Hyperlipidemia; Hyperlipidemia associated with type 2 diabetes mellitus ([address] Hypertension; Hypertrophy of prostate without urinary obstruction and other lower urinary tract symptoms (LUTS); Hypothyroidism;  Insufficiency fracture of tibia; Osteoarthrosis, not specified whether

## 2018-10-09 NOTE — PROGRESS NOTES
in the last 72 hours. PT/INR: No results for input(s): PROTIME, INR in the last 72 hours. APTT:No results for input(s): APTT in the last 72 hours. FASTING LIPID PANEL:  Lab Results   Component Value Date    HDL 57 10/03/2018    LDLCALC 92 10/03/2018    TRIG 83 10/03/2018     LIVER PROFILE:No results for input(s): AST, ALT, ALB in the last 72 hours. Assessment:   1. Asymptomatic paroxysmal atrial fibrillation: incidentally detected by PCP in 12/2016   -[id number] score 4 (HTN, age, DM)  2. Premature atrial contractions: known history, asymptomatic  3. HTN: controlled  4. DM  5. CKD: managed by nephrology  6. Hepatitis C  7. Hypothyroidism  8. Osteoarthritis with chronic swelling of BLE (left worse than right)  9. History of anemia: followed by hematology    Plan:   1. Continue Eliquis and Cardizem  2. One year follow up recommended. Patient is requesting to follow up on a PRN basis and have Dr. Emely Francis manage his afib.     Karen Wallace, [address]  [phone]

## 2018-10-10 NOTE — COMMUNICATION BODY
in the last 72 hours. PT/INR: No results for input(s): PROTIME, INR in the last 72 hours. APTT:No results for input(s): APTT in the last 72 hours. FASTING LIPID PANEL:  Lab Results   Component Value Date    HDL 57 10/03/2018    LDLCALC 92 10/03/2018    TRIG 83 10/03/2018     LIVER PROFILE:No results for input(s): AST, ALT, ALB in the last 72 hours. Assessment:   1. Asymptomatic paroxysmal atrial fibrillation: incidentally detected by PCP in 12/2016   -[id number] score 4 (HTN, age, DM)  2. Premature atrial contractions: known history, asymptomatic  3. HTN: controlled  4. DM  5. CKD: managed by nephrology  6. Hepatitis C  7. Hypothyroidism  8. Osteoarthritis with chronic swelling of BLE (left worse than right)  9. History of anemia: followed by hematology    Plan:   1. Continue Eliquis and Cardizem  2. One year follow up recommended. Patient is requesting to follow up on a PRN basis and have Dr. Wilfrido Huddleston manage his afib.     Sheryle Jeans, [address]  [phone]

## 2018-10-12 RX ORDER — APIXABAN 5 MG/1
TABLET, FILM COATED ORAL
Qty: 60 TABLET | Refills: 1 | Status: SHIPPED | OUTPATIENT
Start: 2018-10-12 | End: 2018-12-19 | Stop reason: SDUPTHER

## 2018-11-30 RX ORDER — PIOGLITAZONEHYDROCHLORIDE 30 MG/1
TABLET ORAL
Qty: 90 TABLET | Refills: 0 | Status: SHIPPED | OUTPATIENT
Start: 2018-11-30 | End: 2018-12-03

## 2018-11-30 RX ORDER — TAMSULOSIN HYDROCHLORIDE 0.4 MG/1
CAPSULE ORAL
Qty: 90 CAPSULE | Refills: 0 | Status: SHIPPED | OUTPATIENT
Start: 2018-11-30 | End: 2019-03-02 | Stop reason: SDUPTHER

## 2018-12-03 RX ORDER — PIOGLITAZONEHYDROCHLORIDE 30 MG/1
TABLET ORAL
Qty: 90 TABLET | Refills: 0 | Status: SHIPPED | OUTPATIENT
Start: 2018-12-03 | End: 2019-03-03 | Stop reason: SDUPTHER

## 2018-12-19 RX ORDER — APIXABAN 5 MG/1
TABLET, FILM COATED ORAL
Qty: 60 TABLET | Refills: 1 | Status: SHIPPED | OUTPATIENT
Start: 2018-12-19 | End: 2019-02-25 | Stop reason: SDUPTHER

## 2018-12-24 RX ORDER — CANAGLIFLOZIN 100 MG/1
TABLET, FILM COATED ORAL
Qty: 90 TABLET | Refills: 0 | Status: SHIPPED | OUTPATIENT
Start: 2018-12-24 | End: 2019-03-24 | Stop reason: SDUPTHER

## 2019-01-02 RX ORDER — HYDROCHLOROTHIAZIDE 12.5 MG/1
TABLET ORAL
Qty: 90 TABLET | Refills: 0 | Status: SHIPPED | OUTPATIENT
Start: 2019-01-02 | End: 2019-04-02 | Stop reason: SDUPTHER

## 2019-01-02 RX ORDER — LEVOTHYROXINE SODIUM 0.1 MG/1
TABLET ORAL
Qty: 30 TABLET | Refills: 0 | Status: SHIPPED | OUTPATIENT
Start: 2019-01-02 | End: 2019-06-24

## 2019-01-05 ENCOUNTER — HOSPITAL ENCOUNTER (OUTPATIENT)
Age: 79
Discharge: HOME | End: 2019-01-05
Payer: MEDICARE

## 2019-01-05 LAB
BILIRUBIN URINE: NEGATIVE
BLOOD, URINE: NEGATIVE
CLARITY: CLEAR
COLOR: YELLOW
CREATININE URINE: 71.6 MG/DL (ref 39–259)
EPITHELIAL CELLS, UA: ABNORMAL /HPF
GLUCOSE URINE: >=1000 MG/DL
HCT VFR BLD CALC: 42.2 % (ref 40.5–52.5)
HEMOGLOBIN: 13.7 G/DL (ref 13.5–17.5)
KETONES, URINE: NEGATIVE MG/DL
LEUKOCYTE ESTERASE, URINE: NEGATIVE
MCH RBC QN AUTO: 28.5 PG (ref 26–34)
MCHC RBC AUTO-ENTMCNC: 32.4 G/DL (ref 31–36)
MCV RBC AUTO: 88 FL (ref 80–100)
MICROALBUMIN UR-MCNC: 12.5 MG/DL
MICROALBUMIN/CREAT UR-RTO: 174.6 MG/G (ref 0–30)
MICROSCOPIC EXAMINATION: ABNORMAL
NITRITE, URINE: NEGATIVE
PDW BLD-RTO: 14.7 % (ref 12.4–15.4)
PH UA: 5.5
PLATELET # BLD: 223 K/UL (ref 135–450)
PMV BLD AUTO: 7.6 FL (ref 5–10.5)
PROTEIN UA: NEGATIVE MG/DL
RBC # BLD: 4.79 M/UL (ref 4.2–5.9)
RBC UA: ABNORMAL /HPF (ref 0–2)
SPECIFIC GRAVITY UA: 1.01
UROBILINOGEN, URINE: 0.2 E.U./DL
WBC # BLD: 6.5 K/UL (ref 4–11)
WBC UA: ABNORMAL /HPF (ref 0–5)

## 2019-01-05 PROCEDURE — 85027 COMPLETE CBC AUTOMATED: CPT

## 2019-01-05 PROCEDURE — 80069 RENAL FUNCTION PANEL: CPT

## 2019-01-05 PROCEDURE — 82306 VITAMIN D 25 HYDROXY: CPT

## 2019-01-05 PROCEDURE — 82043 UR ALBUMIN QUANTITATIVE: CPT

## 2019-01-05 PROCEDURE — 82570 ASSAY OF URINE CREATININE: CPT

## 2019-01-05 PROCEDURE — 83970 ASSAY OF PARATHORMONE: CPT

## 2019-01-05 PROCEDURE — 81001 URINALYSIS AUTO W/SCOPE: CPT

## 2019-01-05 PROCEDURE — 36415 COLL VENOUS BLD VENIPUNCTURE: CPT

## 2019-01-06 LAB
ALBUMIN SERPL-MCNC: 3.8 G/DL (ref 3.4–5)
ANION GAP SERPL CALCULATED.3IONS-SCNC: 13 MMOL/L (ref 3–16)
BUN BLDV-MCNC: 28 MG/DL (ref 7–20)
CALCIUM SERPL-MCNC: 9.2 MG/DL (ref 8.3–10.6)
CHLORIDE BLD-SCNC: 100 MMOL/L (ref 99–110)
CO2: 29 MMOL/L (ref 21–32)
CREAT SERPL-MCNC: 1.4 MG/DL (ref 0.8–1.3)
GFR AFRICAN AMERICAN: 60
GFR NON-AFRICAN AMERICAN: 49
GLUCOSE BLD-MCNC: 108 MG/DL (ref 70–99)
PARATHYROID HORMONE INTACT: 68.3 PG/ML (ref 14–72)
PHOSPHORUS: 3.5 MG/DL (ref 2.5–4.9)
POTASSIUM SERPL-SCNC: 4.8 MMOL/L (ref 3.5–5.1)
SODIUM BLD-SCNC: 142 MMOL/L (ref 136–145)
VITAMIN D 25-HYDROXY: 35.3 NG/ML

## 2019-01-07 RX ORDER — INSULIN GLARGINE 100 [IU]/ML
INJECTION, SOLUTION SUBCUTANEOUS
Qty: 50 ML | Refills: 0 | Status: SHIPPED | OUTPATIENT
Start: 2019-01-07 | End: 2019-10-19 | Stop reason: SDUPTHER

## 2019-02-06 ENCOUNTER — OFFICE VISIT (OUTPATIENT)
Dept: INTERNAL MEDICINE CLINIC | Age: 79
End: 2019-02-06

## 2019-02-06 VITALS
RESPIRATION RATE: 14 BRPM | HEART RATE: 70 BPM | SYSTOLIC BLOOD PRESSURE: 120 MMHG | HEIGHT: 71 IN | DIASTOLIC BLOOD PRESSURE: 80 MMHG | BODY MASS INDEX: 35.14 KG/M2 | WEIGHT: 251 LBS

## 2019-02-06 DIAGNOSIS — E03.9 ACQUIRED HYPOTHYROIDISM: ICD-10-CM

## 2019-02-06 DIAGNOSIS — K21.9 GASTROESOPHAGEAL REFLUX DISEASE WITHOUT ESOPHAGITIS: ICD-10-CM

## 2019-02-06 DIAGNOSIS — E11.22 TYPE 2 DIABETES MELLITUS WITH STAGE 3 CHRONIC KIDNEY DISEASE, WITHOUT LONG-TERM CURRENT USE OF INSULIN (HCC): Primary | ICD-10-CM

## 2019-02-06 DIAGNOSIS — E11.22 TYPE 2 DIABETES MELLITUS WITH STAGE 3 CHRONIC KIDNEY DISEASE, WITHOUT LONG-TERM CURRENT USE OF INSULIN (HCC): ICD-10-CM

## 2019-02-06 DIAGNOSIS — N18.30 TYPE 2 DIABETES MELLITUS WITH STAGE 3 CHRONIC KIDNEY DISEASE, WITHOUT LONG-TERM CURRENT USE OF INSULIN (HCC): Primary | ICD-10-CM

## 2019-02-06 DIAGNOSIS — B18.2 CHRONIC HEPATITIS C WITHOUT HEPATIC COMA (HCC): ICD-10-CM

## 2019-02-06 DIAGNOSIS — I48.0 PAF (PAROXYSMAL ATRIAL FIBRILLATION) (HCC): ICD-10-CM

## 2019-02-06 DIAGNOSIS — E11.69 HYPERLIPIDEMIA ASSOCIATED WITH TYPE 2 DIABETES MELLITUS (HCC): ICD-10-CM

## 2019-02-06 DIAGNOSIS — Z79.4 TYPE 2 DIABETES MELLITUS WITH BOTH EYES AFFECTED BY MILD NONPROLIFERATIVE RETINOPATHY WITHOUT MACULAR EDEMA, WITH LONG-TERM CURRENT USE OF INSULIN (HCC): ICD-10-CM

## 2019-02-06 DIAGNOSIS — E78.5 HYPERLIPIDEMIA ASSOCIATED WITH TYPE 2 DIABETES MELLITUS (HCC): ICD-10-CM

## 2019-02-06 DIAGNOSIS — I10 ESSENTIAL HYPERTENSION: ICD-10-CM

## 2019-02-06 DIAGNOSIS — D47.2 MGUS (MONOCLONAL GAMMOPATHY OF UNKNOWN SIGNIFICANCE): ICD-10-CM

## 2019-02-06 DIAGNOSIS — N18.30 TYPE 2 DIABETES MELLITUS WITH STAGE 3 CHRONIC KIDNEY DISEASE, WITHOUT LONG-TERM CURRENT USE OF INSULIN (HCC): ICD-10-CM

## 2019-02-06 DIAGNOSIS — G25.0 BENIGN ESSENTIAL TREMOR: Chronic | ICD-10-CM

## 2019-02-06 DIAGNOSIS — E11.3293 TYPE 2 DIABETES MELLITUS WITH BOTH EYES AFFECTED BY MILD NONPROLIFERATIVE RETINOPATHY WITHOUT MACULAR EDEMA, WITH LONG-TERM CURRENT USE OF INSULIN (HCC): ICD-10-CM

## 2019-02-06 PROCEDURE — 4040F PNEUMOC VAC/ADMIN/RCVD: CPT | Performed by: INTERNAL MEDICINE

## 2019-02-06 PROCEDURE — G8482 FLU IMMUNIZE ORDER/ADMIN: HCPCS | Performed by: INTERNAL MEDICINE

## 2019-02-06 PROCEDURE — G8417 CALC BMI ABV UP PARAM F/U: HCPCS | Performed by: INTERNAL MEDICINE

## 2019-02-06 PROCEDURE — 1123F ACP DISCUSS/DSCN MKR DOCD: CPT | Performed by: INTERNAL MEDICINE

## 2019-02-06 PROCEDURE — 99214 OFFICE O/P EST MOD 30 MIN: CPT | Performed by: INTERNAL MEDICINE

## 2019-02-06 PROCEDURE — G8427 DOCREV CUR MEDS BY ELIG CLIN: HCPCS | Performed by: INTERNAL MEDICINE

## 2019-02-06 PROCEDURE — 1101F PT FALLS ASSESS-DOCD LE1/YR: CPT | Performed by: INTERNAL MEDICINE

## 2019-02-06 PROCEDURE — 1036F TOBACCO NON-USER: CPT | Performed by: INTERNAL MEDICINE

## 2019-02-06 ASSESSMENT — ENCOUNTER SYMPTOMS
RHINORRHEA: 0
NAUSEA: 0
ABDOMINAL PAIN: 0
WHEEZING: 0
VOMITING: 0
BACK PAIN: 0
SHORTNESS OF BREATH: 0

## 2019-02-07 LAB
ESTIMATED AVERAGE GLUCOSE: 157.1 MG/DL
HBA1C MFR BLD: 7.1 %

## 2019-02-25 RX ORDER — APIXABAN 5 MG/1
TABLET, FILM COATED ORAL
Qty: 60 TABLET | Refills: 3 | Status: SHIPPED | OUTPATIENT
Start: 2019-02-25 | End: 2019-04-10 | Stop reason: SDUPTHER

## 2019-02-27 ENCOUNTER — HOSPITAL ENCOUNTER (OUTPATIENT)
Dept: GENERAL RADIOLOGY | Age: 79
Discharge: HOME | End: 2019-02-27
Payer: MEDICARE

## 2019-02-27 ENCOUNTER — TELEPHONE (OUTPATIENT)
Dept: INTERNAL MEDICINE CLINIC | Age: 79
End: 2019-02-27

## 2019-02-27 ENCOUNTER — OFFICE VISIT (OUTPATIENT)
Dept: INTERNAL MEDICINE CLINIC | Age: 79
End: 2019-02-27

## 2019-02-27 ENCOUNTER — HOSPITAL ENCOUNTER (OUTPATIENT)
Age: 79
Discharge: HOME | End: 2019-02-27
Payer: MEDICARE

## 2019-02-27 VITALS
HEART RATE: 92 BPM | DIASTOLIC BLOOD PRESSURE: 64 MMHG | TEMPERATURE: 99.1 F | WEIGHT: 248 LBS | BODY MASS INDEX: 34.72 KG/M2 | HEIGHT: 71 IN | SYSTOLIC BLOOD PRESSURE: 92 MMHG | OXYGEN SATURATION: 94 % | RESPIRATION RATE: 16 BRPM

## 2019-02-27 DIAGNOSIS — R05.9 COUGH: ICD-10-CM

## 2019-02-27 DIAGNOSIS — R68.89 FLU-LIKE SYMPTOMS: Primary | ICD-10-CM

## 2019-02-27 DIAGNOSIS — E11.8 TYPE 2 DIABETES MELLITUS WITH COMPLICATION, WITHOUT LONG-TERM CURRENT USE OF INSULIN (HCC): ICD-10-CM

## 2019-02-27 DIAGNOSIS — I95.9 HYPOTENSION, UNSPECIFIED HYPOTENSION TYPE: ICD-10-CM

## 2019-02-27 LAB
ANION GAP SERPL CALCULATED.3IONS-SCNC: 10 MMOL/L (ref 3–16)
BASOPHILS ABSOLUTE: 0 K/UL (ref 0–0.2)
BASOPHILS RELATIVE PERCENT: 0.4 %
BUN BLDV-MCNC: 29 MG/DL (ref 7–20)
CALCIUM SERPL-MCNC: 9.1 MG/DL (ref 8.3–10.6)
CHLORIDE BLD-SCNC: 99 MMOL/L (ref 99–110)
CO2: 29 MMOL/L (ref 21–32)
CREAT SERPL-MCNC: 1.6 MG/DL (ref 0.8–1.3)
EOSINOPHILS ABSOLUTE: 0.2 K/UL (ref 0–0.6)
EOSINOPHILS RELATIVE PERCENT: 4.5 %
GFR AFRICAN AMERICAN: 51
GFR NON-AFRICAN AMERICAN: 42
GLUCOSE BLD-MCNC: 94 MG/DL (ref 70–99)
HCT VFR BLD CALC: 40.6 % (ref 40.5–52.5)
HEMOGLOBIN: 13.2 G/DL (ref 13.5–17.5)
LYMPHOCYTES ABSOLUTE: 1 K/UL (ref 1–5.1)
LYMPHOCYTES RELATIVE PERCENT: 22.7 %
MCH RBC QN AUTO: 28.5 PG (ref 26–34)
MCHC RBC AUTO-ENTMCNC: 32.4 G/DL (ref 31–36)
MCV RBC AUTO: 87.9 FL (ref 80–100)
MONOCYTES ABSOLUTE: 0.6 K/UL (ref 0–1.3)
MONOCYTES RELATIVE PERCENT: 13 %
NEUTROPHILS ABSOLUTE: 2.7 K/UL (ref 1.7–7.7)
NEUTROPHILS RELATIVE PERCENT: 59.4 %
PDW BLD-RTO: 14.9 % (ref 12.4–15.4)
PLATELET # BLD: 195 K/UL (ref 135–450)
PMV BLD AUTO: 7.3 FL (ref 5–10.5)
POTASSIUM SERPL-SCNC: 4.1 MMOL/L (ref 3.5–5.1)
PROCALCITONIN: 0.08 NG/ML (ref 0–0.15)
RAPID INFLUENZA  B AGN: NEGATIVE
RAPID INFLUENZA A AGN: POSITIVE
RBC # BLD: 4.63 M/UL (ref 4.2–5.9)
S PYO AG THROAT QL: NEGATIVE
SODIUM BLD-SCNC: 138 MMOL/L (ref 136–145)
WBC # BLD: 4.5 K/UL (ref 4–11)

## 2019-02-27 PROCEDURE — 87081 CULTURE SCREEN ONLY: CPT

## 2019-02-27 PROCEDURE — 87804 INFLUENZA ASSAY W/OPTIC: CPT

## 2019-02-27 PROCEDURE — 99214 OFFICE O/P EST MOD 30 MIN: CPT | Performed by: PHYSICIAN ASSISTANT

## 2019-02-27 PROCEDURE — 36415 COLL VENOUS BLD VENIPUNCTURE: CPT

## 2019-02-27 PROCEDURE — 84145 PROCALCITONIN (PCT): CPT

## 2019-02-27 PROCEDURE — 87880 STREP A ASSAY W/OPTIC: CPT

## 2019-02-27 PROCEDURE — 71046 X-RAY EXAM CHEST 2 VIEWS: CPT

## 2019-02-27 PROCEDURE — 80048 BASIC METABOLIC PNL TOTAL CA: CPT

## 2019-02-27 PROCEDURE — 85025 COMPLETE CBC W/AUTO DIFF WBC: CPT

## 2019-02-27 RX ORDER — OSELTAMIVIR PHOSPHATE 30 MG/1
30 CAPSULE ORAL 2 TIMES DAILY
Qty: 10 CAPSULE | Refills: 0 | Status: SHIPPED | OUTPATIENT
Start: 2019-02-27 | End: 2019-03-04

## 2019-02-27 ASSESSMENT — ENCOUNTER SYMPTOMS
VOMITING: 0
SORE THROAT: 1
TROUBLE SWALLOWING: 0
COUGH: 1
WHEEZING: 0
RHINORRHEA: 1
ABDOMINAL PAIN: 0
SINUS PRESSURE: 1
VOICE CHANGE: 0
SINUS PAIN: 1
NAUSEA: 0
CHEST TIGHTNESS: 0
SHORTNESS OF BREATH: 1

## 2019-03-01 LAB — S PYO THROAT QL CULT: NORMAL

## 2019-03-04 RX ORDER — TAMSULOSIN HYDROCHLORIDE 0.4 MG/1
CAPSULE ORAL
Qty: 90 CAPSULE | Refills: 0 | Status: SHIPPED | OUTPATIENT
Start: 2019-03-04 | End: 2019-04-10 | Stop reason: SDUPTHER

## 2019-03-04 RX ORDER — PIOGLITAZONEHYDROCHLORIDE 30 MG/1
TABLET ORAL
Qty: 90 TABLET | Refills: 0 | Status: SHIPPED | OUTPATIENT
Start: 2019-03-04 | End: 2019-06-02 | Stop reason: SDUPTHER

## 2019-03-25 RX ORDER — LEVOTHYROXINE SODIUM 0.1 MG/1
TABLET ORAL
Qty: 90 TABLET | Refills: 0 | Status: SHIPPED | OUTPATIENT
Start: 2019-03-25 | End: 2019-06-23 | Stop reason: SDUPTHER

## 2019-03-25 RX ORDER — CANAGLIFLOZIN 100 MG/1
TABLET, FILM COATED ORAL
Qty: 90 TABLET | Refills: 0 | Status: SHIPPED | OUTPATIENT
Start: 2019-03-25 | End: 2019-06-23 | Stop reason: SDUPTHER

## 2019-04-02 RX ORDER — HYDROCHLOROTHIAZIDE 12.5 MG/1
TABLET ORAL
Qty: 90 TABLET | Refills: 0 | Status: SHIPPED | OUTPATIENT
Start: 2019-04-02 | End: 2019-07-01 | Stop reason: SDUPTHER

## 2019-04-10 RX ORDER — DILTIAZEM HYDROCHLORIDE 120 MG/1
CAPSULE, COATED, EXTENDED RELEASE ORAL
Qty: 90 CAPSULE | Refills: 0 | Status: SHIPPED | OUTPATIENT
Start: 2019-04-10 | End: 2019-06-21 | Stop reason: SDUPTHER

## 2019-04-10 RX ORDER — TAMSULOSIN HYDROCHLORIDE 0.4 MG/1
CAPSULE ORAL
Qty: 90 CAPSULE | Refills: 0 | Status: SHIPPED | OUTPATIENT
Start: 2019-04-10 | End: 2019-06-21 | Stop reason: SDUPTHER

## 2019-05-01 ENCOUNTER — OFFICE VISIT (OUTPATIENT)
Dept: INTERNAL MEDICINE CLINIC | Age: 79
End: 2019-05-01

## 2019-05-01 VITALS
WEIGHT: 250 LBS | DIASTOLIC BLOOD PRESSURE: 70 MMHG | BODY MASS INDEX: 35 KG/M2 | RESPIRATION RATE: 14 BRPM | HEIGHT: 71 IN | HEART RATE: 70 BPM | SYSTOLIC BLOOD PRESSURE: 128 MMHG

## 2019-05-01 DIAGNOSIS — E78.5 HYPERLIPIDEMIA ASSOCIATED WITH TYPE 2 DIABETES MELLITUS (HCC): ICD-10-CM

## 2019-05-01 DIAGNOSIS — G25.0 BENIGN ESSENTIAL TREMOR: ICD-10-CM

## 2019-05-01 DIAGNOSIS — E03.9 ACQUIRED HYPOTHYROIDISM: ICD-10-CM

## 2019-05-01 DIAGNOSIS — N18.30 TYPE 2 DIABETES MELLITUS WITH STAGE 3 CHRONIC KIDNEY DISEASE, WITHOUT LONG-TERM CURRENT USE OF INSULIN (HCC): Primary | ICD-10-CM

## 2019-05-01 DIAGNOSIS — E11.22 TYPE 2 DIABETES MELLITUS WITH STAGE 3 CHRONIC KIDNEY DISEASE, WITHOUT LONG-TERM CURRENT USE OF INSULIN (HCC): ICD-10-CM

## 2019-05-01 DIAGNOSIS — K21.9 GASTROESOPHAGEAL REFLUX DISEASE WITHOUT ESOPHAGITIS: ICD-10-CM

## 2019-05-01 DIAGNOSIS — E11.69 HYPERLIPIDEMIA ASSOCIATED WITH TYPE 2 DIABETES MELLITUS (HCC): ICD-10-CM

## 2019-05-01 DIAGNOSIS — E11.22 TYPE 2 DIABETES MELLITUS WITH STAGE 3 CHRONIC KIDNEY DISEASE, WITHOUT LONG-TERM CURRENT USE OF INSULIN (HCC): Primary | ICD-10-CM

## 2019-05-01 DIAGNOSIS — I48.0 PAF (PAROXYSMAL ATRIAL FIBRILLATION) (HCC): ICD-10-CM

## 2019-05-01 DIAGNOSIS — N18.30 TYPE 2 DIABETES MELLITUS WITH STAGE 3 CHRONIC KIDNEY DISEASE, WITHOUT LONG-TERM CURRENT USE OF INSULIN (HCC): ICD-10-CM

## 2019-05-01 DIAGNOSIS — D47.2 MGUS (MONOCLONAL GAMMOPATHY OF UNKNOWN SIGNIFICANCE): ICD-10-CM

## 2019-05-01 DIAGNOSIS — B18.2 CHRONIC HEPATITIS C WITHOUT HEPATIC COMA (HCC): ICD-10-CM

## 2019-05-01 DIAGNOSIS — I10 ESSENTIAL HYPERTENSION: ICD-10-CM

## 2019-05-01 LAB
ANION GAP SERPL CALCULATED.3IONS-SCNC: 12 MMOL/L (ref 3–16)
BUN BLDV-MCNC: 35 MG/DL (ref 7–20)
CALCIUM SERPL-MCNC: 9.4 MG/DL (ref 8.3–10.6)
CHLORIDE BLD-SCNC: 102 MMOL/L (ref 99–110)
CO2: 31 MMOL/L (ref 21–32)
CREAT SERPL-MCNC: 1.7 MG/DL (ref 0.8–1.3)
GFR AFRICAN AMERICAN: 48
GFR NON-AFRICAN AMERICAN: 39
GLUCOSE BLD-MCNC: 61 MG/DL (ref 70–99)
POTASSIUM SERPL-SCNC: 4.9 MMOL/L (ref 3.5–5.1)
SODIUM BLD-SCNC: 145 MMOL/L (ref 136–145)
TSH SERPL DL<=0.05 MIU/L-ACNC: 1.21 UIU/ML (ref 0.27–4.2)

## 2019-05-01 PROCEDURE — G8417 CALC BMI ABV UP PARAM F/U: HCPCS | Performed by: INTERNAL MEDICINE

## 2019-05-01 PROCEDURE — 99214 OFFICE O/P EST MOD 30 MIN: CPT | Performed by: INTERNAL MEDICINE

## 2019-05-01 PROCEDURE — 1123F ACP DISCUSS/DSCN MKR DOCD: CPT | Performed by: INTERNAL MEDICINE

## 2019-05-01 PROCEDURE — 4040F PNEUMOC VAC/ADMIN/RCVD: CPT | Performed by: INTERNAL MEDICINE

## 2019-05-01 PROCEDURE — G8427 DOCREV CUR MEDS BY ELIG CLIN: HCPCS | Performed by: INTERNAL MEDICINE

## 2019-05-01 PROCEDURE — 1036F TOBACCO NON-USER: CPT | Performed by: INTERNAL MEDICINE

## 2019-05-01 ASSESSMENT — ENCOUNTER SYMPTOMS
WHEEZING: 0
NAUSEA: 0
BACK PAIN: 0
RHINORRHEA: 0
SHORTNESS OF BREATH: 0
VOMITING: 0
ABDOMINAL PAIN: 0

## 2019-05-01 NOTE — PROGRESS NOTES
Subjective:      Patient ID: Brittnee Jo is a 70 y.o. male. HPI  Patient is here for follow up diabetes, hypertension, hyperlipidemia, GERD, OA and hepatitis C. Diabetes Mellitus Type 2: Current symptoms/problems include none. Medication compliance:  compliant most of the time  Diabetic diet compliance:  compliant most of the time,  Weight trend: increasing-up by 6 lbs  Current exercise: walks 3 time(s) per week    Home blood sugar records: fasting range:  mg/dl  Any episodes of hypoglycemia? none  Eye exam current (within one year): yes   reports that he has quit smoking. He has a 15.00 pack-year smoking history. He quit smokeless tobacco use about 41 years ago. Daily Aspirin? No  Known diabetic complications: nephropathy and retinopathy    Hypertension:  Blood pressure typically runs normal outside of the office. He is adherent to a low sodium diet. Patient denies chest pain, dry cough, fatigue. Antihypertensive medication side effects: no medication side effects noted. Use of agents associated with hypertension: none. Hyperlipidemia:  No new myalgias or GI upset on atorvastatin (Lipitor). Lab Results   Component Value Date    LABA1C 7.1 02/06/2019    LABA1C 6.4 10/03/2018    LABA1C 6.3 06/27/2018     Lab Results   Component Value Date    LABMICR Not Indicated 01/05/2019    LABMICR 12.50 (H) 01/05/2019    CREATININE 1.6 (H) 02/27/2019     Lab Results   Component Value Date    ALT 36 09/15/2018    AST 40 (H) 09/15/2018     No components found for: CHLPL  Lab Results   Component Value Date    TRIG 83 10/03/2018     Lab Results   Component Value Date    HDL 57 10/03/2018     Lab Results   Component Value Date    LDLCALC 92 10/03/2018     . No heartburn. He has hypothyroidism. It is stable. No constipation or diarrhea. He has CKD from DM. He has diabetic retinopathy. Eyes stable. Review of Systems   Constitutional: Negative for activity change and appetite change.    HENT: Negative for postnasal drip and rhinorrhea. Respiratory: Negative for shortness of breath and wheezing. Cardiovascular: Positive for leg swelling. Negative for chest pain and palpitations. Gastrointestinal: Negative for abdominal pain, nausea and vomiting. Genitourinary: Negative for difficulty urinating, frequency and hematuria. Musculoskeletal: Negative for back pain and joint swelling. Skin: Negative for rash. Neurological: Positive for tremors. Negative for light-headedness. Psychiatric/Behavioral: Negative for sleep disturbance. Current Outpatient Medications on File Prior to Visit   Medication Sig Dispense Refill    tamsulosin (FLOMAX) 0.4 MG capsule TAKE ONE CAPSULE BY MOUTH DAILY 90 capsule 0    diltiazem (CARTIA XT) 120 MG extended release capsule TAKE ONE CAPSULE BY MOUTH DAILY 90 capsule 0    apixaban (ELIQUIS) 5 MG TABS tablet TAKE ONE TABLET BY MOUTH TWICE A DAY 90 tablet 0    hydrochlorothiazide (HYDRODIURIL) 12.5 MG tablet TAKE 1 TABLET DAILY 90 tablet 0    INVOKANA 100 MG TABS tablet TAKE 1 TABLET DAILY 90 tablet 0    levothyroxine (SYNTHROID) 100 MCG tablet TAKE 1 TABLET DAILY 90 tablet 0    pioglitazone (ACTOS) 30 MG tablet TAKE 1 TABLET DAILY 90 tablet 0    LANTUS 100 UNIT/ML injection vial INJECT 50 UNITS UNDER THE SKIN AT NIGHT 50 mL 0    levothyroxine (SYNTHROID) 100 MCG tablet TAKE 1 TABLET DAILY 30 tablet 0    fluticasone (FLONASE) 50 MCG/ACT nasal spray 1 spray by Nasal route daily 1 Bottle 0    vitamin B-12 (CYANOCOBALAMIN) 1000 MCG tablet Take 1,000 mcg by mouth daily      rosuvastatin (CRESTOR) 10 MG tablet Take 10 mg by mouth daily      B-D INS SYR ULTRAFINE 1CC/30G 30G X 1/2\" 1 ML MISC USE WITH LANTUS NIGHTLY 90 each 11    TRUETEST TEST strip TEST DAILY 100 strip 3     No current facility-administered medications on file prior to visit.         There are no changes to past medical history, family history, social history or review of systems(except as noted in the history section) since prior note (all reviewed with patient). /70 (Site: Right Upper Arm, Position: Sitting, Cuff Size: Medium Adult)   Pulse 70 Comment: Irregular  Resp 14   Ht 5' 11\" (1.803 m)   Wt 250 lb (113.4 kg)   BMI 34.87 kg/m²        Objective:   Physical Exam   Constitutional: He is oriented to person, place, and time. He appears well-developed and well-nourished. HENT:   Head: Normocephalic. Eyes: Pupils are equal, round, and reactive to light. Conjunctivae and EOM are normal.   Neck: Trachea normal and normal range of motion. Neck supple. No JVD present. Carotid bruit is not present. No thyroid mass and no thyromegaly present. Cardiovascular: Normal rate and normal heart sounds. An irregularly irregular rhythm present. Exam reveals no gallop. No murmur heard. Pulmonary/Chest: Effort normal and breath sounds normal. No respiratory distress. He has no wheezes. He has no rales. Abdominal: Soft. Bowel sounds are normal. He exhibits no distension and no mass. There is no splenomegaly or hepatomegaly. There is no tenderness. Musculoskeletal: He exhibits edema. Lymphadenopathy:     He has no cervical adenopathy. Neurological: He is alert and oriented to person, place, and time. He displays tremor (right upper extremity at rest). Skin: Skin is warm and dry. No rash noted. Psychiatric: He has a normal mood and affect. His behavior is normal. Judgment and thought content normal.       Assessment:       Diagnosis Orders   1. Type 2 diabetes mellitus with stage 3 chronic kidney disease, without long-term current use of insulin (HCC)  Basic Metabolic Panel    Hemoglobin A1C   2. Hyperlipidemia associated with type 2 diabetes mellitus ([facility]     3. Benign essential tremor     4. Gastroesophageal reflux disease without esophagitis     5. MGUS (monoclonal gammopathy of unknown significance)     6. Essential hypertension     7.  PAF (paroxysmal atrial fibrillation) ([address]

## 2019-05-02 LAB
ESTIMATED AVERAGE GLUCOSE: 151.3 MG/DL
HBA1C MFR BLD: 6.9 %

## 2019-06-03 RX ORDER — PIOGLITAZONEHYDROCHLORIDE 30 MG/1
TABLET ORAL
Qty: 90 TABLET | Refills: 0 | Status: SHIPPED | OUTPATIENT
Start: 2019-06-03 | End: 2019-09-01 | Stop reason: SDUPTHER

## 2019-06-21 RX ORDER — DILTIAZEM HYDROCHLORIDE 120 MG/1
CAPSULE, COATED, EXTENDED RELEASE ORAL
Qty: 90 CAPSULE | Refills: 0 | Status: SHIPPED | OUTPATIENT
Start: 2019-06-21 | End: 2019-09-19 | Stop reason: SDUPTHER

## 2019-06-21 RX ORDER — TAMSULOSIN HYDROCHLORIDE 0.4 MG/1
CAPSULE ORAL
Qty: 90 CAPSULE | Refills: 0 | Status: SHIPPED | OUTPATIENT
Start: 2019-06-21 | End: 2019-08-23 | Stop reason: SDUPTHER

## 2019-06-24 RX ORDER — CANAGLIFLOZIN 100 MG/1
TABLET, FILM COATED ORAL
Qty: 90 TABLET | Refills: 0 | Status: SHIPPED | OUTPATIENT
Start: 2019-06-24 | End: 2019-09-22 | Stop reason: SDUPTHER

## 2019-06-24 RX ORDER — LEVOTHYROXINE SODIUM 0.1 MG/1
TABLET ORAL
Qty: 90 TABLET | Refills: 0 | Status: SHIPPED | OUTPATIENT
Start: 2019-06-24 | End: 2019-09-22 | Stop reason: SDUPTHER

## 2019-07-01 RX ORDER — HYDROCHLOROTHIAZIDE 12.5 MG/1
TABLET ORAL
Qty: 90 TABLET | Refills: 0 | Status: SHIPPED | OUTPATIENT
Start: 2019-07-01 | End: 2019-09-29 | Stop reason: SDUPTHER

## 2019-07-03 ENCOUNTER — HOSPITAL ENCOUNTER (OUTPATIENT)
Age: 79
Discharge: HOME | End: 2019-07-03
Payer: MEDICARE

## 2019-07-03 LAB
ALBUMIN SERPL-MCNC: 3.8 G/DL (ref 3.4–5)
ANION GAP SERPL CALCULATED.3IONS-SCNC: 16 MMOL/L (ref 3–16)
BUN BLDV-MCNC: 40 MG/DL (ref 7–20)
CALCIUM SERPL-MCNC: 9.7 MG/DL (ref 8.3–10.6)
CHLORIDE BLD-SCNC: 103 MMOL/L (ref 99–110)
CO2: 28 MMOL/L (ref 21–32)
CREAT SERPL-MCNC: 1.5 MG/DL (ref 0.8–1.3)
CREATININE URINE: 81.9 MG/DL (ref 39–259)
GFR AFRICAN AMERICAN: 55
GFR NON-AFRICAN AMERICAN: 45
GLUCOSE BLD-MCNC: 123 MG/DL (ref 70–99)
PARATHYROID HORMONE INTACT: 46.3 PG/ML (ref 14–72)
PHOSPHORUS: 3.7 MG/DL (ref 2.5–4.9)
POTASSIUM SERPL-SCNC: 4.5 MMOL/L (ref 3.5–5.1)
PROTEIN PROTEIN: 23 MG/DL
PROTEIN/CREAT RATIO: 0.3 MG/DL
SODIUM BLD-SCNC: 147 MMOL/L (ref 136–145)
VITAMIN D 25-HYDROXY: 48.7 NG/ML

## 2019-07-03 PROCEDURE — 80069 RENAL FUNCTION PANEL: CPT

## 2019-07-03 PROCEDURE — 84156 ASSAY OF PROTEIN URINE: CPT

## 2019-07-03 PROCEDURE — 82306 VITAMIN D 25 HYDROXY: CPT

## 2019-07-03 PROCEDURE — 83970 ASSAY OF PARATHORMONE: CPT

## 2019-07-03 PROCEDURE — 36415 COLL VENOUS BLD VENIPUNCTURE: CPT

## 2019-07-03 PROCEDURE — 82570 ASSAY OF URINE CREATININE: CPT

## 2019-07-12 ENCOUNTER — TELEPHONE (OUTPATIENT)
Dept: INTERNAL MEDICINE CLINIC | Age: 79
End: 2019-07-12

## 2019-07-12 RX ORDER — AMOXICILLIN 500 MG/1
500 CAPSULE ORAL 3 TIMES DAILY
Qty: 21 CAPSULE | Refills: 0 | Status: SHIPPED | OUTPATIENT
Start: 2019-07-12 | End: 2019-07-19

## 2019-07-12 NOTE — TELEPHONE ENCOUNTER
----- Message from Gamaliel ORESTES Moses sent at 7/12/2019  8:11 AM EDT -----  Contact: [phone]  He needs a temp. supply of his eloquis to hold him over until his mail order gets in - [facility] at [phone] appt- 5-1-19-next appt- 8-21-19-

## 2019-07-12 NOTE — TELEPHONE ENCOUNTER
----- Message from Chelly Jorgensen MD sent at 7/12/2019  2:05 PM EDT -----  Contact: Wife  Amoxicillin 500 mg po tid # 21  ----- Message -----  From: Shannon Angela  Sent: 7/12/2019   9:51 AM  To: Chelly Jorgensen MD    Symptoms include patient on the right side of his eye and head, and left nostril is stuffy.   ----- Message -----  From: Chelly Jorgensen MD  Sent: 7/12/2019   8:45 AM  To: Afsaneh Daniels    What symptoms is he having  ----- Message -----  From: Maria Guadalupe Oliver  Sent: 7/12/2019   8:09 AM  To: Chelly Jorgensen MD    Duglas Ferrer says he has a sinus infection wants something called in to [employer].  Thanks [facility]

## 2019-08-21 ENCOUNTER — OFFICE VISIT (OUTPATIENT)
Dept: INTERNAL MEDICINE CLINIC | Age: 79
End: 2019-08-21

## 2019-08-21 VITALS
WEIGHT: 244 LBS | BODY MASS INDEX: 34.16 KG/M2 | SYSTOLIC BLOOD PRESSURE: 128 MMHG | HEART RATE: 70 BPM | DIASTOLIC BLOOD PRESSURE: 80 MMHG | HEIGHT: 71 IN | RESPIRATION RATE: 14 BRPM

## 2019-08-21 DIAGNOSIS — Z00.00 ROUTINE GENERAL MEDICAL EXAMINATION AT A HEALTH CARE FACILITY: ICD-10-CM

## 2019-08-21 DIAGNOSIS — Z71.89 ACP (ADVANCE CARE PLANNING): ICD-10-CM

## 2019-08-21 PROCEDURE — 99497 ADVNCD CARE PLAN 30 MIN: CPT | Performed by: INTERNAL MEDICINE

## 2019-08-21 PROCEDURE — 4040F PNEUMOC VAC/ADMIN/RCVD: CPT | Performed by: INTERNAL MEDICINE

## 2019-08-21 PROCEDURE — 1123F ACP DISCUSS/DSCN MKR DOCD: CPT | Performed by: INTERNAL MEDICINE

## 2019-08-21 PROCEDURE — G0439 PPPS, SUBSEQ VISIT: HCPCS | Performed by: INTERNAL MEDICINE

## 2019-08-21 ASSESSMENT — LIFESTYLE VARIABLES
HOW OFTEN DO YOU HAVE SIX OR MORE DRINKS ON ONE OCCASION: 0
HOW MANY STANDARD DRINKS CONTAINING ALCOHOL DO YOU HAVE ON A TYPICAL DAY: 0
AUDIT-C TOTAL SCORE: 1
HOW OFTEN DO YOU HAVE A DRINK CONTAINING ALCOHOL: 1

## 2019-08-21 ASSESSMENT — PATIENT HEALTH QUESTIONNAIRE - PHQ9
SUM OF ALL RESPONSES TO PHQ QUESTIONS 1-9: 0
SUM OF ALL RESPONSES TO PHQ QUESTIONS 1-9: 0

## 2019-08-21 NOTE — PROGRESS NOTES
following everyday activities? Eating, dressing, grooming, bathing, toileting, or walking/balance?: None  In the past 7 days, did you need help from others to take care of any of the following? Laundry, housekeeping, banking/finances, shopping, telephone use, food preparation, transportation, or taking medications?: (!) Housekeeping  ADL Interventions:  · Patient declines any further evaluation/treatment for this issue    Personalized Preventive Plan   Current Health Maintenance Status  Immunization History   Administered Date(s) Administered    Influenza 08/27/2012    Influenza A ([id number]) Vaccine IM 01/11/2010    Influenza Virus Vaccine 09/28/2011, 10/03/2014, 10/23/2015    Influenza Whole 10/01/2010    Influenza, High Dose (Fluzone 65 yrs and older) 10/24/2016, 10/03/2018    Pneumococcal Conjugate 13-valent ([id number]) 10/03/2014    Pneumococcal Polysaccharide ([id number]) 10/08/2009        Health Maintenance   Topic Date Due    Hepatitis A vaccine (1 of 2 - Risk 2-dose series) 12/24/1941    Hepatitis B Vaccine (1 of 3 - Risk 3-dose series) 12/24/1961    DTaP/Tdap/Td vaccine (1 - Tdap) 12/24/1961    Shingles Vaccine (1 of 2) 12/24/1992    Annual Wellness Visit (AWV)  06/27/2019    Flu vaccine (1) 09/01/2019    TSH testing  05/01/2020    Potassium monitoring  07/03/2020    Creatinine monitoring  07/03/2020    Pneumococcal 65+ years Vaccine  Completed     Recommendations for Preventive Services Due: see orders and patient instructions/AVS.  . Recommended screening schedule for the next 5-10 years is provided to the patient in written form: see Patient Instructions/AVS.    There are no diagnoses linked to this encounter. Advance Care Planning   Advanced Care Planning: Discussed the patients choices for care and treatment in case of a health event that adversely affects decision-making abilities. Also discussed the patients long-term treatment options.  Reviewed with the patient the [facility] of  Living Will Declaration forms  Reviewed the process of designating a competent adult as an Agent (or -in-fact) that could take make health care decisions for the patient if incompetent. Patient was asked to complete the declaration forms, either acknowledge the forms by a public notary or an eligible witness and provide a signed copy to the practice office.   Time spent (minutes): 3

## 2019-08-21 NOTE — PATIENT INSTRUCTIONS
Advance Directives: Care Instructions  Your Care Instructions  An advance directive is a legal way to state your wishes at the end of your life. It tells your family and your doctor what to do if you can no longer say what you want. There are two main types of advance directives. You can change them any time that your wishes change. · A living will tells your family and your doctor your wishes about life support and other treatment. · A durable power of  for health care lets you name a person to make treatment decisions for you when you can't speak for yourself. This person is called a health care agent. If you do not have an advance directive, decisions about your medical care may be made by a doctor or a  who doesn't know you. It may help to think of an advance directive as a gift to the people who care for you. If you have one, they won't have to make tough decisions by themselves. Follow-up care is a key part of your treatment and safety. Be sure to make and go to all appointments, and call your doctor if you are having problems. It's also a good idea to know your test results and keep a list of the medicines you take. How can you care for yourself at home? · Discuss your wishes with your loved ones and your doctor. This way, there are no surprises. · Many states have a unique form. Or you might use a universal form that has been approved by many states. This kind of form can sometimes be completed and stored online. Your electronic copy will then be available wherever you have a connection to the Internet. In most cases, doctors will respect your wishes even if you have a form from a different state. · You don't need a  to do an advance directive. But you may want to get legal advice. · Think about these questions when you prepare an advance directive:  ? Who do you want to make decisions about your medical care if you are not able to?  Many people choose a family member or you to get the form notarized. This means that a person called a  watches you sign the form and then he or she signs the form. Some states also require that two or more witnesses sign the form. Be sure to tell your family members and doctors who your health care agent is. Keep your forms in a safe place. But make sure that your loved ones know where the forms are. This could be in your desk where you keep other important papers. Make sure your doctor has a copy of your forms. Where can you learn more? Go to https://chpepiceweb.Nextance. org and sign in to your [employer] account. Enter [id number] in the [employer] box to learn more about \"Learning About Durable Power of  for Health Care. \"     If you do not have an account, please click on the \"Sign Up Now\" link. Current as of: April 1, 2019  Content Version: 12.1  © 0624-6616 [employer]. Care instructions adapted under license by [facility] ([facility]). If you have questions about a medical condition or this instruction, always ask your healthcare professional. [place] [address] any warranty or liability for your use of this information. Learning About Living [place]  What is a living will? A living will is a legal form you use to write down the kind of care you want at the end of your life. It is used by the health professionals who will treat you if you aren't able to decide for yourself. If you put your wishes in writing, your loved ones and others will know what kind of care you want. They won't need to guess. This can ease your mind and be helpful to others. A living will is not the same as an estate or property will. An estate will explains what you want to happen with your money and property after you die. Is a living will a legal document? A living will is a legal document. Each state has its own laws about living bone.  If you move to another state, make sure that your living will is on your own, your heart stops, or you're unable to swallow. · What things would you still want to be able to do after you receive life-support methods? Would you want to be able to walk? To speak? To eat on your own? To live without the help of machines? · If you have a choice, where do you want to be cared for? In your home? At a hospital or nursing home? · Do you want certain Adventist practices performed if you become very ill? · If you have a choice at the end of your life, where would you prefer to die? At home? In a hospital or nursing home? Somewhere else? · Would you prefer to be buried or cremated? · Do you want your organs to be donated after you die? What should you do with your living will? · Make sure that your family members and your health care agent have copies of your living will. · Give your doctor a copy of your living will to keep in your medical record. If you have more than one doctor, make sure that each one has a copy. · You may want to put a copy of your living will where it can be easily found. Where can you learn more? Go to https://Encore HQpeOneCloud Labseweb.Bee Shield. org and sign in to your [employer] account. Enter O085 in the [employer] box to learn more about \"Learning About Living Laban Cancer. \"     If you do not have an account, please click on the \"Sign Up Now\" link. Current as of: April 1, 2019  Content Version: 12.1  © 8933-1004 Healthwise[employer] Incorporated. Care instructions adapted under license by [facility] ([facility]). If you have questions about a medical condition or this instruction, always ask your healthcare professional. [place] [address] any warranty or liability for your use of this information. Personalized Preventive Plan for Andi Chow - 8/21/2019  Medicare offers a range of preventive health benefits. Some of the tests and screenings are paid in full while other may be subject to a deductible, co-insurance, and/or copay.     Some of these benefits include a comprehensive review of your medical history including lifestyle, illnesses that may run in your family, and various assessments and screenings as appropriate. After reviewing your medical record and screening and assessments performed today your provider may have ordered immunizations, labs, imaging, and/or referrals for you. A list of these orders (if applicable) as well as your Preventive Care list are included within your After Visit Summary for your review. Other Preventive Recommendations:    · A preventive eye exam performed by an eye specialist is recommended every 1-2 years to screen for glaucoma; cataracts, macular degeneration, and other eye disorders. · A preventive dental visit is recommended every 6 months. · Try to get at least 150 minutes of exercise per week or 10,000 steps per day on a pedometer . · Order or download the FREE \"Exercise & Physical Activity: Your Everyday Guide\" from [employer]. Call 1-584.860.6397 or search [employer] online. · You need 4207-1991 mg of calcium and 6097-8292 IU of vitamin D per day. It is possible to meet your calcium requirement with diet alone, but a vitamin D supplement is usually necessary to meet this goal.  · When exposed to the sun, use a sunscreen that protects against both UVA and UVB radiation with an SPF of 30 or greater. Reapply every 2 to 3 hours or after sweating, drying off with a towel, or swimming. · Always wear a seat belt when traveling in a car. Always wear a helmet when riding a bicycle or motorcycle. Keep Your Memory [place]on [employer]ines       Many factors can affect your ability to remembera hectic lifestyle, aging, stress, chronic disease, and certain medicines. But, there are steps you can take to sharpen your mind and help preserve your memory. Challenge Your Brain   Regularly challenging your mind may help keeps it in top shape.  Good mental exercises include:   Crossword

## 2019-08-23 ENCOUNTER — TELEPHONE (OUTPATIENT)
Dept: INTERNAL MEDICINE CLINIC | Age: 79
End: 2019-08-23

## 2019-08-23 RX ORDER — TAMSULOSIN HYDROCHLORIDE 0.4 MG/1
CAPSULE ORAL
Qty: 90 CAPSULE | Refills: 0 | Status: SHIPPED | OUTPATIENT
Start: 2019-08-23 | End: 2019-09-19

## 2019-08-23 NOTE — TELEPHONE ENCOUNTER
----- Message from Rober Gillis sent at 8/23/2019  2:57 PM EDT -----      ----- Message -----  From: Wanda Weaver MD  Sent: 8/23/2019   2:33 PM EDT  To: Love Sampson    He can take OTC Mucinex  ----- Message -----  From: Love Sampson  Sent: 8/22/2019   1:49 PM EDT  To: Wanda Weaver MD    Pt said you were suppose to send something to the pharmacy for him at his appt. For congestion. Please advise. Carol Tanner. Sima Morataya

## 2019-08-27 ENCOUNTER — CLINICAL DOCUMENTATION (OUTPATIENT)
Dept: SPIRITUAL SERVICES | Age: 79
End: 2019-08-27

## 2019-09-03 RX ORDER — PIOGLITAZONEHYDROCHLORIDE 30 MG/1
TABLET ORAL
Qty: 90 TABLET | Refills: 0 | Status: SHIPPED | OUTPATIENT
Start: 2019-09-03 | End: 2019-12-02 | Stop reason: SDUPTHER

## 2019-09-19 RX ORDER — DILTIAZEM HYDROCHLORIDE 120 MG/1
CAPSULE, COATED, EXTENDED RELEASE ORAL
Qty: 90 CAPSULE | Refills: 0 | Status: SHIPPED | OUTPATIENT
Start: 2019-09-19 | End: 2019-12-18

## 2019-09-19 RX ORDER — TAMSULOSIN HYDROCHLORIDE 0.4 MG/1
CAPSULE ORAL
Qty: 90 CAPSULE | Refills: 0 | Status: SHIPPED | OUTPATIENT
Start: 2019-09-19 | End: 2019-12-18

## 2019-09-23 RX ORDER — CANAGLIFLOZIN 100 MG/1
TABLET, FILM COATED ORAL
Qty: 90 TABLET | Refills: 0 | Status: SHIPPED | OUTPATIENT
Start: 2019-09-23 | End: 2019-12-23

## 2019-09-23 RX ORDER — LEVOTHYROXINE SODIUM 0.1 MG/1
TABLET ORAL
Qty: 90 TABLET | Refills: 0 | Status: SHIPPED | OUTPATIENT
Start: 2019-09-23 | End: 2019-12-23

## 2019-09-30 RX ORDER — HYDROCHLOROTHIAZIDE 12.5 MG/1
TABLET ORAL
Qty: 90 TABLET | Refills: 0 | Status: SHIPPED | OUTPATIENT
Start: 2019-09-30 | End: 2019-12-30

## 2019-10-21 RX ORDER — INSULIN GLARGINE 100 [IU]/ML
INJECTION, SOLUTION SUBCUTANEOUS
Qty: 50 ML | Refills: 2 | Status: SHIPPED | OUTPATIENT
Start: 2019-10-21 | End: 2020-08-18

## 2019-11-20 RX ORDER — BLOOD-GLUCOSE METER
EACH MISCELLANEOUS
Qty: 1 DEVICE | Refills: 0 | Status: SHIPPED | OUTPATIENT
Start: 2019-11-20

## 2019-11-20 RX ORDER — BLOOD-GLUCOSE METER
EACH MISCELLANEOUS
Qty: 200 EACH | Refills: 3 | Status: SHIPPED | OUTPATIENT
Start: 2019-11-20

## 2019-12-02 RX ORDER — PIOGLITAZONEHYDROCHLORIDE 30 MG/1
TABLET ORAL
Qty: 90 TABLET | Refills: 0 | Status: SHIPPED | OUTPATIENT
Start: 2019-12-02 | End: 2020-03-02

## 2019-12-13 ENCOUNTER — OFFICE VISIT (OUTPATIENT)
Dept: INTERNAL MEDICINE CLINIC | Age: 79
End: 2019-12-13

## 2019-12-13 VITALS
HEART RATE: 70 BPM | BODY MASS INDEX: 36.26 KG/M2 | RESPIRATION RATE: 14 BRPM | DIASTOLIC BLOOD PRESSURE: 70 MMHG | HEIGHT: 71 IN | SYSTOLIC BLOOD PRESSURE: 110 MMHG | WEIGHT: 259 LBS

## 2019-12-13 DIAGNOSIS — Z79.4 TYPE 2 DIABETES MELLITUS WITH BOTH EYES AFFECTED BY MILD NONPROLIFERATIVE RETINOPATHY WITHOUT MACULAR EDEMA, WITH LONG-TERM CURRENT USE OF INSULIN (HCC): ICD-10-CM

## 2019-12-13 DIAGNOSIS — E11.22 TYPE 2 DIABETES MELLITUS WITH STAGE 3 CHRONIC KIDNEY DISEASE, WITHOUT LONG-TERM CURRENT USE OF INSULIN (HCC): ICD-10-CM

## 2019-12-13 DIAGNOSIS — N18.30 TYPE 2 DIABETES MELLITUS WITH STAGE 3 CHRONIC KIDNEY DISEASE, WITHOUT LONG-TERM CURRENT USE OF INSULIN (HCC): ICD-10-CM

## 2019-12-13 DIAGNOSIS — B18.2 CHRONIC HEPATITIS C WITHOUT HEPATIC COMA (HCC): ICD-10-CM

## 2019-12-13 DIAGNOSIS — E11.69 HYPERLIPIDEMIA ASSOCIATED WITH TYPE 2 DIABETES MELLITUS (HCC): ICD-10-CM

## 2019-12-13 DIAGNOSIS — E78.5 HYPERLIPIDEMIA ASSOCIATED WITH TYPE 2 DIABETES MELLITUS (HCC): ICD-10-CM

## 2019-12-13 DIAGNOSIS — I48.0 PAF (PAROXYSMAL ATRIAL FIBRILLATION) (HCC): ICD-10-CM

## 2019-12-13 DIAGNOSIS — E03.9 ACQUIRED HYPOTHYROIDISM: ICD-10-CM

## 2019-12-13 DIAGNOSIS — R09.81 NASAL CONGESTION: ICD-10-CM

## 2019-12-13 DIAGNOSIS — E11.22 TYPE 2 DIABETES MELLITUS WITH STAGE 3 CHRONIC KIDNEY DISEASE, WITHOUT LONG-TERM CURRENT USE OF INSULIN (HCC): Primary | ICD-10-CM

## 2019-12-13 DIAGNOSIS — N18.30 TYPE 2 DIABETES MELLITUS WITH STAGE 3 CHRONIC KIDNEY DISEASE, WITHOUT LONG-TERM CURRENT USE OF INSULIN (HCC): Primary | ICD-10-CM

## 2019-12-13 DIAGNOSIS — E11.3293 TYPE 2 DIABETES MELLITUS WITH BOTH EYES AFFECTED BY MILD NONPROLIFERATIVE RETINOPATHY WITHOUT MACULAR EDEMA, WITH LONG-TERM CURRENT USE OF INSULIN (HCC): ICD-10-CM

## 2019-12-13 DIAGNOSIS — D47.2 MGUS (MONOCLONAL GAMMOPATHY OF UNKNOWN SIGNIFICANCE): ICD-10-CM

## 2019-12-13 LAB
A/G RATIO: 0.9 (ref 1.1–2.2)
ALBUMIN SERPL-MCNC: 3.5 G/DL (ref 3.4–5)
ALP BLD-CCNC: 75 U/L (ref 40–129)
ALT SERPL-CCNC: 33 U/L (ref 10–40)
ANION GAP SERPL CALCULATED.3IONS-SCNC: 10 MMOL/L (ref 3–16)
AST SERPL-CCNC: 39 U/L (ref 15–37)
BASOPHILS ABSOLUTE: 0 K/UL (ref 0–0.2)
BASOPHILS RELATIVE PERCENT: 0.7 %
BILIRUB SERPL-MCNC: 0.3 MG/DL (ref 0–1)
BUN BLDV-MCNC: 29 MG/DL (ref 7–20)
CALCIUM SERPL-MCNC: 9.6 MG/DL (ref 8.3–10.6)
CHLORIDE BLD-SCNC: 104 MMOL/L (ref 99–110)
CHOLESTEROL, TOTAL: 140 MG/DL (ref 0–199)
CO2: 29 MMOL/L (ref 21–32)
CREAT SERPL-MCNC: 1.4 MG/DL (ref 0.8–1.3)
EOSINOPHILS ABSOLUTE: 0.5 K/UL (ref 0–0.6)
EOSINOPHILS RELATIVE PERCENT: 8.6 %
GFR AFRICAN AMERICAN: 59
GFR NON-AFRICAN AMERICAN: 49
GLOBULIN: 3.8 G/DL
GLUCOSE BLD-MCNC: 81 MG/DL (ref 70–99)
HCT VFR BLD CALC: 38.4 % (ref 40.5–52.5)
HDLC SERPL-MCNC: 53 MG/DL (ref 40–60)
HEMOGLOBIN: 12.8 G/DL (ref 13.5–17.5)
LDL CHOLESTEROL CALCULATED: 65 MG/DL
LYMPHOCYTES ABSOLUTE: 1.4 K/UL (ref 1–5.1)
LYMPHOCYTES RELATIVE PERCENT: 24.5 %
MCH RBC QN AUTO: 30.2 PG (ref 26–34)
MCHC RBC AUTO-ENTMCNC: 33.2 G/DL (ref 31–36)
MCV RBC AUTO: 90.9 FL (ref 80–100)
MONOCYTES ABSOLUTE: 0.5 K/UL (ref 0–1.3)
MONOCYTES RELATIVE PERCENT: 9.2 %
NEUTROPHILS ABSOLUTE: 3.2 K/UL (ref 1.7–7.7)
NEUTROPHILS RELATIVE PERCENT: 57 %
PDW BLD-RTO: 14.9 % (ref 12.4–15.4)
PLATELET # BLD: 232 K/UL (ref 135–450)
PMV BLD AUTO: 7.4 FL (ref 5–10.5)
POTASSIUM SERPL-SCNC: 5.6 MMOL/L (ref 3.5–5.1)
RBC # BLD: 4.23 M/UL (ref 4.2–5.9)
SODIUM BLD-SCNC: 143 MMOL/L (ref 136–145)
TOTAL PROTEIN: 7.3 G/DL (ref 6.4–8.2)
TRIGL SERPL-MCNC: 110 MG/DL (ref 0–150)
VLDLC SERPL CALC-MCNC: 22 MG/DL
WBC # BLD: 5.5 K/UL (ref 4–11)

## 2019-12-13 PROCEDURE — G8484 FLU IMMUNIZE NO ADMIN: HCPCS | Performed by: INTERNAL MEDICINE

## 2019-12-13 PROCEDURE — G8417 CALC BMI ABV UP PARAM F/U: HCPCS | Performed by: INTERNAL MEDICINE

## 2019-12-13 PROCEDURE — G8427 DOCREV CUR MEDS BY ELIG CLIN: HCPCS | Performed by: INTERNAL MEDICINE

## 2019-12-13 PROCEDURE — 1036F TOBACCO NON-USER: CPT | Performed by: INTERNAL MEDICINE

## 2019-12-13 PROCEDURE — 1123F ACP DISCUSS/DSCN MKR DOCD: CPT | Performed by: INTERNAL MEDICINE

## 2019-12-13 PROCEDURE — 99214 OFFICE O/P EST MOD 30 MIN: CPT | Performed by: INTERNAL MEDICINE

## 2019-12-13 PROCEDURE — 4040F PNEUMOC VAC/ADMIN/RCVD: CPT | Performed by: INTERNAL MEDICINE

## 2019-12-13 ASSESSMENT — ENCOUNTER SYMPTOMS
NAUSEA: 0
RHINORRHEA: 0
WHEEZING: 0
VOMITING: 0
BACK PAIN: 0
ABDOMINAL PAIN: 0
SHORTNESS OF BREATH: 0

## 2019-12-14 LAB
ESTIMATED AVERAGE GLUCOSE: 134.1 MG/DL
HBA1C MFR BLD: 6.3 %

## 2019-12-18 RX ORDER — DILTIAZEM HYDROCHLORIDE 120 MG/1
CAPSULE, COATED, EXTENDED RELEASE ORAL
Qty: 90 CAPSULE | Refills: 4 | Status: SHIPPED | OUTPATIENT
Start: 2019-12-18 | End: 2021-03-12

## 2019-12-18 RX ORDER — TAMSULOSIN HYDROCHLORIDE 0.4 MG/1
CAPSULE ORAL
Qty: 90 CAPSULE | Refills: 4 | Status: SHIPPED | OUTPATIENT
Start: 2019-12-18 | End: 2021-03-12

## 2019-12-23 RX ORDER — CANAGLIFLOZIN 100 MG/1
TABLET, FILM COATED ORAL
Qty: 90 TABLET | Refills: 0 | Status: SHIPPED | OUTPATIENT
Start: 2019-12-23 | End: 2020-03-23

## 2019-12-23 RX ORDER — LEVOTHYROXINE SODIUM 0.1 MG/1
TABLET ORAL
Qty: 90 TABLET | Refills: 0 | Status: SHIPPED | OUTPATIENT
Start: 2019-12-23 | End: 2020-03-23

## 2020-01-02 ENCOUNTER — OFFICE VISIT (OUTPATIENT)
Dept: ENT CLINIC | Age: 80
End: 2020-01-02
Payer: MEDICARE

## 2020-01-02 VITALS
BODY MASS INDEX: 35.9 KG/M2 | HEART RATE: 86 BPM | DIASTOLIC BLOOD PRESSURE: 69 MMHG | HEIGHT: 71 IN | SYSTOLIC BLOOD PRESSURE: 121 MMHG | WEIGHT: 256.4 LBS | TEMPERATURE: 97.2 F

## 2020-01-02 PROCEDURE — 31231 NASAL ENDOSCOPY DX: CPT | Performed by: OTOLARYNGOLOGY

## 2020-01-02 PROCEDURE — 1036F TOBACCO NON-USER: CPT | Performed by: OTOLARYNGOLOGY

## 2020-01-02 PROCEDURE — 99204 OFFICE O/P NEW MOD 45 MIN: CPT | Performed by: OTOLARYNGOLOGY

## 2020-01-02 PROCEDURE — 4040F PNEUMOC VAC/ADMIN/RCVD: CPT | Performed by: OTOLARYNGOLOGY

## 2020-01-02 PROCEDURE — G8427 DOCREV CUR MEDS BY ELIG CLIN: HCPCS | Performed by: OTOLARYNGOLOGY

## 2020-01-02 PROCEDURE — G8417 CALC BMI ABV UP PARAM F/U: HCPCS | Performed by: OTOLARYNGOLOGY

## 2020-01-02 PROCEDURE — 1123F ACP DISCUSS/DSCN MKR DOCD: CPT | Performed by: OTOLARYNGOLOGY

## 2020-01-02 PROCEDURE — G8484 FLU IMMUNIZE NO ADMIN: HCPCS | Performed by: OTOLARYNGOLOGY

## 2020-01-02 RX ORDER — FLUTICASONE PROPIONATE 50 MCG
1 SPRAY, SUSPENSION (ML) NASAL 2 TIMES DAILY
Qty: 15.8 G | Refills: 1 | Status: SHIPPED | OUTPATIENT
Start: 2020-01-02 | End: 2020-03-31 | Stop reason: SDUPTHER

## 2020-01-02 NOTE — PROGRESS NOTES
From: LIBBY Riley  To: Eufemia Lynne  Sent: 2/21/2019 2:08 PM CST  Subject: NEWS    Hi Eufemia,  So, the HIV test gomez recommended is different in the [facility] vs [facility] system. I will order the correct HIV, she would also like you to have Hepatitis B testing done also. I have a call into the [facility] to find out which Hepatitis B is going to be the most accurate. I will e-mail you when your labs are in.  Gomez also states this HIV test is false negative!  Take care,  Teresita     Fracture of nasal bone     Headache     Hearing loss     Hyperlipidemia     Hyperlipidemia associated with type 2 diabetes mellitus ([facility] [address] 10/23/2015    Hypertension     Hypertrophy of prostate without urinary obstruction and other lower urinary tract symptoms (LUTS) 2011    Hypothyroidism     Insufficiency fracture of tibia 2016    Osteoarthrosis, not specified whether generalized/localized, lower leg     Pain in joint, lower leg     Right knee pain 2017    Status post total left knee replacement 2017    Tinnitus     Type 2 diabetes mellitus 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 edema ([facility] [address] 10/23/2015    Type II or unspecified type diabetes mellitus without mention of complication, not stated as uncontrolled     Unspecified viral hepatitis C without hepatic coma        Past Surgical History     Past Surgical History:   Procedure Laterality Date    CHOLECYSTECTOMY, LAPAROSCOPIC      COLONOSCOPY  07    ESOPHAGUS SURGERY      esophagus endoscopy    EYE SURGERY      JOINT REPLACEMENT Left 2017    LEFT TOTAL KNEE REPLACEMENT      KNEE ARTHROSCOPY      OTHER SURGICAL HISTORY Left 2016    LEFT KNEE DIRECT VIDEO ARTHROSCOPY, MEDIAL MENISECTOMY, CHONDROPLASTY, INTERNAL FIXATION OF MEDIAL TIBIAL INSUFFICIENCY FRACTURE WITH BONE SUBSTITUTE    TONSILLECTOMY         Family History     Family History   Problem Relation Age of Onset    Cancer Mother     Kidney Disease Brother     Diabetes type 2  Brother     Heart Disease Brother     Breast Cancer Sister        Social History     Social History     Tobacco Use    Smoking status: Former Smoker     Packs/day: 0.50     Years: 30.00     Pack years: 15.00     Last attempt to quit: 1990     Years since quittin.0    Smokeless tobacco: Former User     Quit date: 10/3/1977   Substance Use Topics    Alcohol use: Yes     Comment: social    Drug use: No        Allergies     No Known Allergies    Medications     Current Outpatient Medications   Medication Sig Dispense Refill    fluticasone (FLONASE) 50 MCG/ACT nasal spray 1 spray by Each Nostril route 2 times daily 15.8 g 1    hydrochlorothiazide (HYDRODIURIL) 12.5 MG tablet TAKE 1 TABLET DAILY 90 tablet 0    levothyroxine (SYNTHROID) 100 MCG tablet TAKE 1 TABLET DAILY 90 tablet 0    INVOKANA 100 MG TABS tablet TAKE 1 TABLET DAILY 90 tablet 0    tamsulosin (FLOMAX) 0.4 MG capsule TAKE 1 CAPSULE DAILY 90 capsule 4    diltiazem (CARTIA XT) 120 MG extended release capsule TAKE 1 CAPSULE DAILY 90 capsule 4    pioglitazone (ACTOS) 30 MG tablet TAKE 1 TABLET DAILY 90 tablet 0    Blood Glucose Monitoring Suppl ("[employer]ACE BLOOD GLUCOSE MONITOR) PARAS TEST TWICE DAILY. DX: E11.9 1 Device 0    blood glucose test strips (EMBRACE BLOOD GLUCOSE TEST) strip TEST TWICE DAILY. DX: E11.9 200 each 3    EMBRACE LANCETS ULTRA THIN 30G MISC TEST TWICE DAILY. DX: E11.9 200 each 3    apixaban (ELIQUIS) 5 MG TABS tablet TAKE 1 TABLET TWICE A DAY (THIS IS THE CORRECT DOSE 7/12/19) 180 tablet 0    LANTUS 100 UNIT/ML injection vial INJECT 50 UNITS UNDER THE SKIN AT NIGHT 50 mL 2    vitamin B-12 (CYANOCOBALAMIN) 1000 MCG tablet Take 1,000 mcg by mouth daily      rosuvastatin (CRESTOR) 10 MG tablet Take 10 mg by mouth daily      B-D INS SYR ULTRAFINE 1CC/30G 30G X 1/2\" 1 ML MISC USE WITH LANTUS NIGHTLY 90 each 11    TRUETEST TEST strip TEST DAILY 100 strip 3    fluticasone (FLONASE) 50 MCG/ACT nasal spray 1 spray by Nasal route daily 1 Bottle 0     No current facility-administered medications for this visit.         Review of Systems     REVIEW OF SYSTEMS  The following systems were reviewed and revealed the following in addition to any already discussed in the HPI:    CONSTITUTIONAL: No weight loss, no fever, no night sweats, no chills  EYES: no vision changes, no blurry vision  EARS: no changes in hearing, no otalgia  NOSE: no epistaxis, no purulent rhinorrhea  RESPIRATORY: no difficulty breathing, no shortness of breath  CV: no chest pain, no peripheral vascular disease  HEME: No coagulation disorder, no bleeding disorder  NEURO: No TIA or stroke-like symptoms  SKIN: No new rashes in the head and neck, no recent skin cancers  MOUTH: No new ulcers, no recent teeth infections  GASTROINTESTINAL: No diarrhea, stomach pain  PSYCH: No anxiety, no depression    PhysicalExam     Vitals:    01/02/20 1348   BP: 121/69   Site: Right Upper Arm   Position: Sitting   Cuff Size: Medium Adult   Pulse: 86   Temp: 97.2 °F (36.2 °C)   TempSrc: Oral   Weight: 256 lb 6.4 oz (116.3 kg)   Height: 5' 11\" (1.803 m)       PHYSICAL EXAM  /69 (Site: Right Upper Arm, Position: Sitting, Cuff Size: Medium Adult)   Pulse 86   Temp 97.2 °F (36.2 °C) (Oral)   Ht 5' 11\" (1.803 m)   Wt 256 lb 6.4 oz (116.3 kg)   BMI 35.76 kg/m²     GENERAL: No Acute Distress, Alert and Oriented, no hoarseness  EYES: EOMI, Anti-icteric  NOSE: On anterior rhinoscopy there is no epistaxis, nasal mucosa within normal limits, no purulent drainage - large nasal polyp on the left side between the inferior turbinate and septum, obscuring a view of the middle turbinate  EARS: Normal external appearance; on portable otomicroscopy:  -Right ear: External auditory canal without stenosis, tympanic membrane clear, no middle ear effusions or retractions  -Left ear: External auditory canal without stenosis, tympanic membrane clear, no middle ear effusions or retractions  -Tuning fork testing: With a 512-Hz tuning fork the Mcadams is midline, The Rinne on the right ear the is air>bone conduction, on the left ear air>bone conduction  FACE: 1/6 House-Brackmann Scale, symmetric, sensation equal bilaterally  ORAL CAVITY: No masses or lesions palpated, uvula is midline, moist mucous membranes, 0+ tonsils, absent dentition  -Dental mirror exam: Base of tongue with no masses, uvula anatomically normal   NECK: Normal range of motion, no thyromegaly, trachea is midline, no lymphadenopathy, no neck masses, no crepitus  CHEST: Normal respiratory effort, no retractions, breathing comfortably  SKIN: No rashes, normal appearing skin, no evidence of skin lesions/tumors  NEURO: CN 2, 3, 4, 5, 6, 7, 11, 12 intact bilaterally       Procedure     Nasal Endoscopy    Pre OP: Left nasal polyposis  Post OP: Same  Procedure: Nasal endoscopy    After obtaining verbal consent from the patient 2% lidocaine with afrin was sprayed into the nasal cavities. After allowing a time for anesthesia, a 0 and 45-degree rigid nasal endoscopes was used to visualize the nasal passages. The septum, inferior, and middle turbinates were examined. The middle meatus, and sphenoethmoid recess was examined bilaterally. Cultures were not obtained. There were no complications. Pertinent positives included: There was not edema and purulence at the right middle meatus. Polyps were identified in the left nasal passage. Masses were not identified. Tolerated well without complication. I attest that I was present for and did the entire procedure myself. Assessment and Plan     1. Nasal septal deviation  Left mid-septal deviation extending into the middle meatus  -If surgical management of nasal polyposis planned, will discuss septoplasty if necessary for exposure of the middle meatus  -Else, observation/conservative management for now    2. Rhinorrhea  Likely due to nasal polyposis  -Will treat with flonase    3. Nasal obstruction  Patient with left mid-septal deviation and significant nasal polyposis  -As above, will start trial of flonase    4. Nasal polyposis  Patient with nasal polyposis extending into the middle meatus; the middle meatus is obstructed, and the superior limit of the polyps could not be determined. We opted not to biopsy them at this time, and will start a trial of flonase.  If the polyps do not regress, we will discuss surgical management with the patient and obtain imaging prior to any operative procedure.  - fluticasone (FLONASE) 50 MCG/ACT nasal spray; 1 spray by Each Nostril route 2 times daily  Dispense: 15.8 g; Refill: 1    Return in about 4 weeks (around 1/30/2020). Sidney Campos MD  [address] [place], [place] [address]  Department of Otolaryngology/Head and Neck Surgery  1/2/20    I have performed a head and neck physical exam personally or was physically present during the key or critical portions of the service. Medical Decision Making: The following items were considered in medical decision making:  Independent review of images  Review / order clinical lab tests  Review / order radiology tests  Decision to obtain old records  Review and summation of old records as accessed through [facility] (a summary of my findings in these old records: none reviewed)     Portions of this note were dictated using Dragon.  There may be linguistic errors secondary to the use of this program.

## 2020-01-09 ENCOUNTER — HOSPITAL ENCOUNTER (OUTPATIENT)
Age: 80
Discharge: HOME | End: 2020-01-09
Payer: MEDICARE

## 2020-01-09 LAB
ALBUMIN SERPL-MCNC: 4 G/DL (ref 3.4–5)
ANION GAP SERPL CALCULATED.3IONS-SCNC: 15 MMOL/L (ref 3–16)
BACTERIA: ABNORMAL /HPF
BILIRUBIN URINE: NEGATIVE
BLOOD, URINE: NEGATIVE
BUN BLDV-MCNC: 33 MG/DL (ref 7–20)
CALCIUM SERPL-MCNC: 9.6 MG/DL (ref 8.3–10.6)
CHLORIDE BLD-SCNC: 99 MMOL/L (ref 99–110)
CLARITY: CLEAR
CO2: 28 MMOL/L (ref 21–32)
COLOR: YELLOW
CREAT SERPL-MCNC: 1.4 MG/DL (ref 0.8–1.3)
CREATININE URINE: 81.9 MG/DL (ref 39–259)
EPITHELIAL CELLS, UA: ABNORMAL /HPF
GFR AFRICAN AMERICAN: 59
GFR NON-AFRICAN AMERICAN: 49
GLUCOSE BLD-MCNC: 184 MG/DL (ref 70–99)
GLUCOSE URINE: >=1000 MG/DL
HCT VFR BLD CALC: 40.8 % (ref 40.5–52.5)
HEMOGLOBIN: 13.4 G/DL (ref 13.5–17.5)
KETONES, URINE: NEGATIVE MG/DL
LEUKOCYTE ESTERASE, URINE: NEGATIVE
MCH RBC QN AUTO: 29.6 PG (ref 26–34)
MCHC RBC AUTO-ENTMCNC: 32.9 G/DL (ref 31–36)
MCV RBC AUTO: 90 FL (ref 80–100)
MICROSCOPIC EXAMINATION: YES
NITRITE, URINE: NEGATIVE
PDW BLD-RTO: 14.1 % (ref 12.4–15.4)
PH UA: 6 (ref 5–8)
PHOSPHORUS: 3.3 MG/DL (ref 2.5–4.9)
PLATELET # BLD: 200 K/UL (ref 135–450)
PMV BLD AUTO: 8 FL (ref 5–10.5)
POTASSIUM SERPL-SCNC: 4.7 MMOL/L (ref 3.5–5.1)
PROTEIN PROTEIN: 36 MG/DL
PROTEIN UA: ABNORMAL MG/DL
PROTEIN/CREAT RATIO: 0.4 MG/DL
RBC # BLD: 4.54 M/UL (ref 4.2–5.9)
RBC UA: ABNORMAL /HPF (ref 0–2)
SODIUM BLD-SCNC: 142 MMOL/L (ref 136–145)
SPECIFIC GRAVITY UA: 1.01 (ref 1–1.03)
URINE TYPE: ABNORMAL
UROBILINOGEN, URINE: 0.2 E.U./DL
WBC # BLD: 6.2 K/UL (ref 4–11)
WBC UA: ABNORMAL /HPF (ref 0–5)

## 2020-01-09 PROCEDURE — 81001 URINALYSIS AUTO W/SCOPE: CPT

## 2020-01-09 PROCEDURE — 82570 ASSAY OF URINE CREATININE: CPT

## 2020-01-09 PROCEDURE — 85027 COMPLETE CBC AUTOMATED: CPT

## 2020-01-09 PROCEDURE — 84156 ASSAY OF PROTEIN URINE: CPT

## 2020-01-09 PROCEDURE — 36415 COLL VENOUS BLD VENIPUNCTURE: CPT

## 2020-01-09 PROCEDURE — 80069 RENAL FUNCTION PANEL: CPT

## 2020-01-30 ENCOUNTER — OFFICE VISIT (OUTPATIENT)
Dept: ENT CLINIC | Age: 80
End: 2020-01-30
Payer: MEDICARE

## 2020-01-30 VITALS
DIASTOLIC BLOOD PRESSURE: 61 MMHG | HEART RATE: 74 BPM | WEIGHT: 248 LBS | BODY MASS INDEX: 34.72 KG/M2 | SYSTOLIC BLOOD PRESSURE: 107 MMHG | TEMPERATURE: 97.5 F | HEIGHT: 71 IN

## 2020-01-30 PROBLEM — J33.9 NASAL POLYPOSIS: Status: ACTIVE | Noted: 2020-01-30

## 2020-01-30 PROCEDURE — 31231 NASAL ENDOSCOPY DX: CPT | Performed by: OTOLARYNGOLOGY

## 2020-01-30 PROCEDURE — G8417 CALC BMI ABV UP PARAM F/U: HCPCS | Performed by: OTOLARYNGOLOGY

## 2020-01-30 PROCEDURE — 1036F TOBACCO NON-USER: CPT | Performed by: OTOLARYNGOLOGY

## 2020-01-30 PROCEDURE — 99213 OFFICE O/P EST LOW 20 MIN: CPT | Performed by: OTOLARYNGOLOGY

## 2020-01-30 PROCEDURE — 1123F ACP DISCUSS/DSCN MKR DOCD: CPT | Performed by: OTOLARYNGOLOGY

## 2020-01-30 PROCEDURE — 4040F PNEUMOC VAC/ADMIN/RCVD: CPT | Performed by: OTOLARYNGOLOGY

## 2020-01-30 PROCEDURE — G8427 DOCREV CUR MEDS BY ELIG CLIN: HCPCS | Performed by: OTOLARYNGOLOGY

## 2020-01-30 PROCEDURE — G8484 FLU IMMUNIZE NO ADMIN: HCPCS | Performed by: OTOLARYNGOLOGY

## 2020-01-30 RX ORDER — BUDESONIDE 0.5 MG/2ML
INHALANT ORAL
Qty: 60 AMPULE | Refills: 3 | Status: SHIPPED | OUTPATIENT
Start: 2020-01-30 | End: 2020-09-09 | Stop reason: SDUPTHER

## 2020-01-30 RX ORDER — MONTELUKAST SODIUM 10 MG/1
10 TABLET ORAL DAILY
Qty: 30 TABLET | Refills: 3 | Status: SHIPPED | OUTPATIENT
Start: 2020-01-30 | End: 2020-06-05

## 2020-01-30 RX ORDER — SODIUM CHLORIDE/SODIUM BICARB
1 PACKET (EA) NASAL 2 TIMES DAILY
Qty: 1 EACH | Refills: 1 | Status: SHIPPED | OUTPATIENT
Start: 2020-01-30 | End: 2020-09-09 | Stop reason: SDUPTHER

## 2020-01-30 NOTE — PROGRESS NOTES
Right knee pain 2017    Status post total left knee replacement 2017    Tinnitus     Type 2 diabetes mellitus 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 edema ([facility] [address] 10/23/2015    Type II or unspecified type diabetes mellitus without mention of complication, not stated as uncontrolled     Unspecified viral hepatitis C without hepatic coma        Past Surgical History     Past Surgical History:   Procedure Laterality Date    CHOLECYSTECTOMY, LAPAROSCOPIC      COLONOSCOPY  07    ESOPHAGUS SURGERY      esophagus endoscopy    EYE SURGERY      JOINT REPLACEMENT Left 2017    LEFT TOTAL KNEE REPLACEMENT      KNEE ARTHROSCOPY      OTHER SURGICAL HISTORY Left 2016    LEFT KNEE DIRECT VIDEO ARTHROSCOPY, MEDIAL MENISECTOMY, CHONDROPLASTY, INTERNAL FIXATION OF MEDIAL TIBIAL INSUFFICIENCY FRACTURE WITH BONE SUBSTITUTE    TONSILLECTOMY         Family History     Family History   Problem Relation Age of Onset    Cancer Mother     Kidney Disease Brother         on dialasis    Diabetes type 2  Brother     Heart Disease Brother     Breast Cancer Sister        Social History     Social History     Tobacco Use    Smoking status: Former Smoker     Packs/day: 0.50     Years: 30.00     Pack years: 15.00     Last attempt to quit: 1990     Years since quittin.0    Smokeless tobacco: Former User     Quit date: 10/3/1977   Substance Use Topics    Alcohol use: Yes     Comment: social    Drug use: No        Allergies     No Known Allergies    Medications     Current Outpatient Medications   Medication Sig Dispense Refill    budesonide (PULMICORT) 0.5 MG/2ML nebulizer suspension Use 1 ampule (2cc) in saline irrigations twice daily 60 ampule 3    montelukast (SINGULAIR) 10 MG tablet Take 1 tablet by mouth daily 30 tablet 3    Hypertonic Nasal Wash (SINUS RINSE) PACK 1 Bottle by Nasal route 2 times daily 1 each 1    fluticasone (FLONASE) 50 MCG/ACT nasal spray 1 spray by Each Nostril route 2 times daily 15.8 g 1    hydrochlorothiazide (HYDRODIURIL) 12.5 MG tablet TAKE 1 TABLET DAILY 90 tablet 0    levothyroxine (SYNTHROID) 100 MCG tablet TAKE 1 TABLET DAILY 90 tablet 0    INVOKANA 100 MG TABS tablet TAKE 1 TABLET DAILY 90 tablet 0    tamsulosin (FLOMAX) 0.4 MG capsule TAKE 1 CAPSULE DAILY 90 capsule 4    diltiazem (CARTIA XT) 120 MG extended release capsule TAKE 1 CAPSULE DAILY 90 capsule 4    pioglitazone (ACTOS) 30 MG tablet TAKE 1 TABLET DAILY 90 tablet 0    Blood Glucose Monitoring Suppl (EMBRACE BLOOD GLUCOSE MONITOR) PARAS TEST TWICE DAILY. DX: E11.9 1 Device 0    blood glucose test strips (EMBRACE BLOOD GLUCOSE TEST) strip TEST TWICE DAILY. DX: E11.9 200 each 3    EMBRACE LANCETS ULTRA THIN 30G MISC TEST TWICE DAILY. DX: E11.9 200 each 3    apixaban (ELIQUIS) 5 MG TABS tablet TAKE 1 TABLET TWICE A DAY (THIS IS THE CORRECT DOSE 7/12/19) 180 tablet 0    LANTUS 100 UNIT/ML injection vial INJECT 50 UNITS UNDER THE SKIN AT NIGHT 50 mL 2    vitamin B-12 (CYANOCOBALAMIN) 1000 MCG tablet Take 1,000 mcg by mouth daily      rosuvastatin (CRESTOR) 10 MG tablet Take 10 mg by mouth daily      B-D INS SYR ULTRAFINE 1CC/30G 30G X 1/2\" 1 ML MISC USE WITH LANTUS NIGHTLY 90 each 11    TRUETEST TEST strip TEST DAILY 100 strip 3     No current facility-administered medications for this visit.         Review of Systems     REVIEW OF SYSTEMS  The following systems were reviewed and revealed the following in addition to any already discussed in the HPI:    CONSTITUTIONAL: No weight loss, no fever, no night sweats, no chills  EYES: no vision changes, no blurry vision  EARS: no changes in hearing, no otalgia  NOSE: no epistaxis, no purulent rhinorrhea  RESPIRATORY: no difficulty breathing, no shortness of breath  CV: no chest pain, no peripheral vascular disease  HEME: No coagulation disorder, no bleeding disorder  NEURO: No TIA or stroke-like symptoms  SKIN: No new rashes in the head and neck, no recent skin cancers  MOUTH: No new ulcers, no recent teeth infections  GASTROINTESTINAL: No diarrhea, stomach pain  PSYCH: No anxiety, no depression    PhysicalExam     Vitals:    01/30/20 1540   BP: 107/61   Site: Right Upper Arm   Position: Sitting   Cuff Size: Medium Adult   Pulse: 74   Temp: 97.5 °F (36.4 °C)   TempSrc: Oral   Weight: 248 lb (112.5 kg)   Height: 5' 11\" (1.803 m)       PHYSICAL EXAM  /61 (Site: Right Upper Arm, Position: Sitting, Cuff Size: Medium Adult)   Pulse 74   Temp 97.5 °F (36.4 °C) (Oral)   Ht 5' 11\" (1.803 m)   Wt 248 lb (112.5 kg)   BMI 34.59 kg/m²     GENERAL: No Acute Distress, Alert and Oriented, no hoarseness  EYES: EOMI, Anti-icteric  NOSE: On anterior rhinoscopy there is no epistaxis, nasal mucosa within normal limits, no purulent drainage - large nasal polyp on the left side between the inferior turbinate and septum, obscuring a view of the middle turbinate  EARS: Normal external appearance; on portable otomicroscopy:  -Right ear: External auditory canal without stenosis, tympanic membrane clear, no middle ear effusions or retractions  -Left ear: External auditory canal without stenosis, tympanic membrane clear, no middle ear effusions or retractions  -Tuning fork testing: With a 512-Hz tuning fork the Mcadams is midline, The Rinne on the right ear the is air>bone conduction, on the left ear air>bone conduction  FACE: 1/6 House-Brackmann Scale, symmetric, sensation equal bilaterally  ORAL CAVITY: No masses or lesions palpated, uvula is midline, moist mucous membranes, 0+ tonsils, absent dentition  -Dental mirror exam: Base of tongue with no masses, uvula anatomically normal   NECK: Normal range of motion, no thyromegaly, trachea is midline, no lymphadenopathy, no neck masses, no crepitus  CHEST: Normal respiratory effort, no retractions, breathing comfortably  SKIN: No rashes, normal appearing skin, no evidence of skin lesions/tumors  NEURO: CN 2, 3, 4, 5, 6, 7, 11, 12 intact bilaterally     Procedure     Nasal Endoscopy    Pre OP: Left nasal polyposis  Post OP: Same  Procedure: Nasal endoscopy     After obtaining verbal consent from the patient 2% lidocaine with afrin was sprayed into the nasal cavities. After allowing a time for anesthesia, a 0-degree rigid nasal endoscopes was used to visualize the nasal passages. The septum, inferior, and middle turbinates were examined. The middle meatus, and sphenoethmoid recess was examined bilaterally. Cultures were not obtained. There were no complications.      Pertinent positives included: There was not edema and purulence at the right middle meatus, although a right septal spur was noted. Polyps were identified in the left nasal passage - . Masses were not identified.      Tolerated well without complication. I attest that I was present for and did the entire procedure myself. Assessment and Plan     1. Nasal polyposis  Patient with multiple nasal polyps of the left nasal passage - minimal response to flonase, appear to be coming from both frontal sinus recess and left osteomeatal complex. Will escalate therapy - patient to start monteleukast, budesonide rinses. Will follow up in 4 weeks; given history of HTN, DM2 (last HbA1c 6.3) with CKD stage III, would be wary of starting oral steroids at this time. Will consider this as the next step, along with imaging   - budesonide (PULMICORT) 0.5 MG/2ML nebulizer suspension; Use 1 ampule (2cc) in saline irrigations twice daily  Dispense: 60 ampule; Refill: 3  - montelukast (SINGULAIR) 10 MG tablet; Take 1 tablet by mouth daily  Dispense: 30 tablet; Refill: 3  - Hypertonic Nasal Wash (SINUS RINSE) PACK; 1 Bottle by Nasal route 2 times daily  Dispense: 1 each; Refill: 1    Return in about 4 weeks (around 2/27/2020).       Osman Zepeda MD  [address] [facility],[address]  Department of Otolaryngology/Head and Neck Surgery  1/30/20    I have performed

## 2020-02-27 ENCOUNTER — OFFICE VISIT (OUTPATIENT)
Dept: ENT CLINIC | Age: 80
End: 2020-02-27
Payer: MEDICARE

## 2020-02-27 VITALS
BODY MASS INDEX: 34.52 KG/M2 | DIASTOLIC BLOOD PRESSURE: 61 MMHG | HEIGHT: 71 IN | SYSTOLIC BLOOD PRESSURE: 125 MMHG | WEIGHT: 246.6 LBS | TEMPERATURE: 97.8 F | HEART RATE: 77 BPM

## 2020-02-27 PROCEDURE — G8427 DOCREV CUR MEDS BY ELIG CLIN: HCPCS | Performed by: OTOLARYNGOLOGY

## 2020-02-27 PROCEDURE — 99213 OFFICE O/P EST LOW 20 MIN: CPT | Performed by: OTOLARYNGOLOGY

## 2020-02-27 PROCEDURE — 1123F ACP DISCUSS/DSCN MKR DOCD: CPT | Performed by: OTOLARYNGOLOGY

## 2020-02-27 PROCEDURE — 31231 NASAL ENDOSCOPY DX: CPT | Performed by: OTOLARYNGOLOGY

## 2020-02-27 PROCEDURE — G8484 FLU IMMUNIZE NO ADMIN: HCPCS | Performed by: OTOLARYNGOLOGY

## 2020-02-27 PROCEDURE — 4040F PNEUMOC VAC/ADMIN/RCVD: CPT | Performed by: OTOLARYNGOLOGY

## 2020-02-27 PROCEDURE — 1036F TOBACCO NON-USER: CPT | Performed by: OTOLARYNGOLOGY

## 2020-02-27 PROCEDURE — G8417 CALC BMI ABV UP PARAM F/U: HCPCS | Performed by: OTOLARYNGOLOGY

## 2020-02-27 NOTE — PROGRESS NOTES
generalized/localized, lower leg     Pain in joint, lower leg     Right knee pain 2017    Status post total left knee replacement 2017    Tinnitus     Type 2 diabetes mellitus 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 edema (HCC) 10/23/2015    Type II or unspecified type diabetes mellitus without mention of complication, not stated as uncontrolled     Unspecified viral hepatitis C without hepatic coma        Past Surgical History     Past Surgical History:   Procedure Laterality Date    CHOLECYSTECTOMY, LAPAROSCOPIC      COLONOSCOPY  07    ESOPHAGUS SURGERY      esophagus endoscopy    EYE SURGERY      JOINT REPLACEMENT Left 2017    LEFT TOTAL KNEE REPLACEMENT      KNEE ARTHROSCOPY      OTHER SURGICAL HISTORY Left 2016    LEFT KNEE DIRECT VIDEO ARTHROSCOPY, MEDIAL MENISECTOMY, CHONDROPLASTY, INTERNAL FIXATION OF MEDIAL TIBIAL INSUFFICIENCY FRACTURE WITH BONE SUBSTITUTE    TONSILLECTOMY         Family History     Family History   Problem Relation Age of Onset    Cancer Mother     Kidney Disease Brother         on dialasis    Diabetes type 2  Brother     Heart Disease Brother     Breast Cancer Sister        Social History     Social History     Tobacco Use    Smoking status: Former Smoker     Packs/day: 0.50     Years: 30.00     Pack years: 15.00     Last attempt to quit: 1990     Years since quittin.1    Smokeless tobacco: Former User     Quit date: 10/3/1977   Substance Use Topics    Alcohol use: Yes     Comment: social    Drug use: No        Allergies     No Known Allergies    Medications     Current Outpatient Medications   Medication Sig Dispense Refill    apixaban (ELIQUIS) 5 MG TABS tablet TAKE 1 TABLET TWICE A  tablet 0    budesonide (PULMICORT) 0.5 MG/2ML nebulizer suspension Use 1 ampule (2cc) in saline irrigations twice daily 60 ampule 3    montelukast (SINGULAIR) 10 MG tablet Take 1 tablet by mouth daily 30 tablet 3 symptoms  SKIN: No new rashes in the head and neck, no recent skin cancers  MOUTH: No new ulcers, no recent teeth infections  GASTROINTESTINAL: No diarrhea, stomach pain  PSYCH: No anxiety, no depression    PhysicalExam     Vitals:    02/27/20 1531   BP: 125/61   Site: Right Upper Arm   Position: Sitting   Cuff Size: Medium Adult   Pulse: 77   Temp: 97.8 °F (36.6 °C)   TempSrc: Oral   Weight: 246 lb 9.6 oz (111.9 kg)   Height: 5' 11\" (1.803 m)       PHYSICAL EXAM  /61 (Site: Right Upper Arm, Position: Sitting, Cuff Size: Medium Adult)   Pulse 77   Temp 97.8 °F (36.6 °C) (Oral)   Ht 5' 11\" (1.803 m)   Wt 246 lb 9.6 oz (111.9 kg)   BMI 34.39 kg/m²     GENERAL: No Acute Distress, Alert and Oriented, no hoarseness  EYES: EOMI, Anti-icteric  NOSE: On anterior rhinoscopy there is no epistaxis, nasal mucosa within normal limits, no purulent drainage - large nasal polyp on the left side between the inferior turbinate and septum, obscuring a view of the middle turbinate  EARS: Normal external appearance; on portable otomicroscopy:  -Right ear: External auditory canal without stenosis, tympanic membrane clear, no middle ear effusions or retractions  -Left ear: External auditory canal without stenosis, tympanic membrane clear, no middle ear effusions or retractions  -Tuning fork testing: With a 512-Hz tuning fork the Mcadams is midline, The Rinne on the right ear the is air>bone conduction, on the left ear air>bone conduction  FACE: 1/6 House-Brackmann Scale, symmetric, sensation equal bilaterally  ORAL CAVITY: No masses or lesions palpated, uvula is midline, moist mucous membranes, 0+ tonsils, absent dentition  -Dental mirror exam: Base of tongue with no masses, uvula anatomically normal   NECK: Normal range of motion, no thyromegaly, trachea is midline, no lymphadenopathy, no neck masses, no crepitus  CHEST: Normal respiratory effort, no retractions, breathing comfortably  SKIN: No rashes, normal appearing skin, no evidence of skin lesions/tumors  NEURO: CN 2, 3, 4, 5, 6, 7, 11, 12 intact bilaterally     Procedure     Nasal Endoscopy    Pre OP: Left nasal polyposis  Post OP: Same  Procedure: Nasal endoscopy     After obtaining verbal consent from the patient 2% lidocaine with afrin was sprayed into the nasal cavities. After allowing a time for anesthesia, a 0-degree and a 45-degree rigid nasal endoscopes was used to visualize the nasal passages. The septum, inferior, and middle turbinates were examined. The middle meatus, and sphenoethmoid recess was examined bilaterally. Cultures were not obtained. There were no complications.      Pertinent positives included: There was not edema and purulence at the right middle meatus, although a right septal spur was noted. Polyps were identified in the left nasal passage -marked improvement since prior visit- large polyp extending from the middle meatus into the nasal passage; appears to have bifurcation of the left middle turbinate. Superior the frontal sinus outflow tract was visualized. . Masses were not identified.      Tolerated well without complication. I attest that I was present for and did the entire procedure myself. PREVIOUS IMAGES (FROM PRIOR VISIT)                    Assessment and Plan     1. Nasal polyposis  Patient with multiple nasal polyps of the left nasal passage -improvement on Flonase, budesonide rinses, Singulair. Appears to be a single nasal polyp originating from the middle meatus, no polyposis noted on the right side. Given this finding, will plan for biopsy-will obtain CT sinus the office apixaban prior to the procedure (we have routed the chart to his primary care provider with question)  -Continue budesonide rinses, Singulair, Flonase  -CT imaging ordered  -Return to ENT clinic in 4 weeks with plan for biopsy    Return in about 4 weeks (around 3/26/2020).     Lucas Dinh MD  [address] [facility],[address]  Department of Otolaryngology/Head and Neck Surgery  2/27/20    I have performed a head and neck physical exam personally or was physically present during the key or critical portions of the service. Medical Decision Making: The following items were considered in medical decision making:  Independent review of images  Review / order clinical lab tests  Review / order radiology tests  Decision to obtain old records  Review and summation of old records as accessed through [facility] (a summary of my findings in these old records: none)     Portions of this note were dictated using Dragon.  There may be linguistic errors secondary to the use of this program.

## 2020-02-29 ENCOUNTER — HOSPITAL ENCOUNTER (OUTPATIENT)
Dept: CT IMAGING | Age: 80
Discharge: HOME | End: 2020-02-29
Payer: MEDICARE

## 2020-02-29 PROCEDURE — 70486 CT MAXILLOFACIAL W/O DYE: CPT

## 2020-03-02 RX ORDER — PIOGLITAZONEHYDROCHLORIDE 30 MG/1
TABLET ORAL
Qty: 90 TABLET | Refills: 0 | Status: SHIPPED | OUTPATIENT
Start: 2020-03-02 | End: 2020-06-01

## 2020-03-23 RX ORDER — LEVOTHYROXINE SODIUM 0.1 MG/1
TABLET ORAL
Qty: 90 TABLET | Refills: 0 | Status: SHIPPED | OUTPATIENT
Start: 2020-03-23 | End: 2020-06-22

## 2020-03-23 RX ORDER — CANAGLIFLOZIN 100 MG/1
TABLET, FILM COATED ORAL
Qty: 90 TABLET | Refills: 0 | Status: SHIPPED | OUTPATIENT
Start: 2020-03-23 | End: 2020-06-22

## 2020-03-31 RX ORDER — FLUTICASONE PROPIONATE 50 MCG
SPRAY, SUSPENSION (ML) NASAL
Qty: 1 BOTTLE | Refills: 0 | Status: SHIPPED | OUTPATIENT
Start: 2020-03-31 | End: 2020-06-05

## 2020-05-05 ENCOUNTER — HOSPITAL ENCOUNTER (OUTPATIENT)
Age: 80
Discharge: HOME | End: 2020-05-05
Payer: MEDICARE

## 2020-05-05 ENCOUNTER — OFFICE VISIT (OUTPATIENT)
Dept: INTERNAL MEDICINE CLINIC | Age: 80
End: 2020-05-05

## 2020-05-05 VITALS
DIASTOLIC BLOOD PRESSURE: 80 MMHG | WEIGHT: 256 LBS | HEIGHT: 71 IN | RESPIRATION RATE: 14 BRPM | SYSTOLIC BLOOD PRESSURE: 110 MMHG | BODY MASS INDEX: 35.84 KG/M2 | HEART RATE: 70 BPM

## 2020-05-05 LAB
ANION GAP SERPL CALCULATED.3IONS-SCNC: 12 MMOL/L (ref 3–16)
BUN BLDV-MCNC: 32 MG/DL (ref 7–20)
CALCIUM SERPL-MCNC: 9.4 MG/DL (ref 8.3–10.6)
CHLORIDE BLD-SCNC: 104 MMOL/L (ref 99–110)
CO2: 28 MMOL/L (ref 21–32)
CREAT SERPL-MCNC: 1.2 MG/DL (ref 0.8–1.3)
GFR AFRICAN AMERICAN: >60
GFR NON-AFRICAN AMERICAN: 59
GLUCOSE BLD-MCNC: 134 MG/DL (ref 70–99)
POTASSIUM SERPL-SCNC: 5 MMOL/L (ref 3.5–5.1)
SODIUM BLD-SCNC: 144 MMOL/L (ref 136–145)

## 2020-05-05 PROCEDURE — 80048 BASIC METABOLIC PNL TOTAL CA: CPT

## 2020-05-05 PROCEDURE — 4040F PNEUMOC VAC/ADMIN/RCVD: CPT | Performed by: INTERNAL MEDICINE

## 2020-05-05 PROCEDURE — 1036F TOBACCO NON-USER: CPT | Performed by: INTERNAL MEDICINE

## 2020-05-05 PROCEDURE — G8417 CALC BMI ABV UP PARAM F/U: HCPCS | Performed by: INTERNAL MEDICINE

## 2020-05-05 PROCEDURE — 1123F ACP DISCUSS/DSCN MKR DOCD: CPT | Performed by: INTERNAL MEDICINE

## 2020-05-05 PROCEDURE — G8427 DOCREV CUR MEDS BY ELIG CLIN: HCPCS | Performed by: INTERNAL MEDICINE

## 2020-05-05 PROCEDURE — 36415 COLL VENOUS BLD VENIPUNCTURE: CPT

## 2020-05-05 PROCEDURE — 83036 HEMOGLOBIN GLYCOSYLATED A1C: CPT

## 2020-05-05 PROCEDURE — 99214 OFFICE O/P EST MOD 30 MIN: CPT | Performed by: INTERNAL MEDICINE

## 2020-05-05 ASSESSMENT — ENCOUNTER SYMPTOMS
NAUSEA: 0
RHINORRHEA: 0
WHEEZING: 0
SHORTNESS OF BREATH: 0
BACK PAIN: 0
VOMITING: 0
ABDOMINAL PAIN: 0

## 2020-05-05 NOTE — PROGRESS NOTES
better. He still has a residual biopsy. He is to have this biopsied. Review of Systems   Constitutional: Negative for activity change and appetite change. HENT: Negative for postnasal drip and rhinorrhea. Respiratory: Negative for shortness of breath and wheezing. Cardiovascular: Positive for leg swelling. Negative for chest pain and palpitations. Gastrointestinal: Negative for abdominal pain, nausea and vomiting. Genitourinary: Negative for difficulty urinating, frequency and hematuria. Musculoskeletal: Negative for back pain and joint swelling. Skin: Negative for rash. Neurological: Positive for tremors. Negative for light-headedness. Psychiatric/Behavioral: Negative for sleep disturbance.        Current Outpatient Medications:     apixaban (ELIQUIS) 5 MG TABS tablet, TAKE 1 TABLET TWICE A DAY, Disp: 180 tablet, Rfl: 0    fluticasone (FLONASE) 50 MCG/ACT nasal spray, SPRAY ONE SPRAY IN EACH NOSTRIL TWICE DAILY, Disp: 1 Bottle, Rfl: 0    hydrochlorothiazide (HYDRODIURIL) 12.5 MG tablet, TAKE 1 TABLET DAILY, Disp: 90 tablet, Rfl: 0    INVOKANA 100 MG TABS tablet, TAKE 1 TABLET DAILY, Disp: 90 tablet, Rfl: 0    levothyroxine (SYNTHROID) 100 MCG tablet, TAKE 1 TABLET DAILY, Disp: 90 tablet, Rfl: 0    pioglitazone (ACTOS) 30 MG tablet, TAKE 1 TABLET DAILY, Disp: 90 tablet, Rfl: 0    budesonide (PULMICORT) 0.5 MG/2ML nebulizer suspension, Use 1 ampule (2cc) in saline irrigations twice daily, Disp: 60 ampule, Rfl: 3    montelukast (SINGULAIR) 10 MG tablet, Take 1 tablet by mouth daily, Disp: 30 tablet, Rfl: 3    Hypertonic Nasal Wash (SINUS RINSE) PACK, 1 Bottle by Nasal route 2 times daily, Disp: 1 each, Rfl: 1    tamsulosin (FLOMAX) 0.4 MG capsule, TAKE 1 CAPSULE DAILY, Disp: 90 capsule, Rfl: 4    diltiazem (CARTIA XT) 120 MG extended release capsule, TAKE 1 CAPSULE DAILY, Disp: 90 capsule, Rfl: 4    Blood Glucose Monitoring Suppl ([employer] BLOOD GLUCOSE MONITOR) PARAS, TEST TWICE

## 2020-05-06 LAB
ESTIMATED AVERAGE GLUCOSE: 154.2 MG/DL
HBA1C MFR BLD: 7 %

## 2020-05-13 ENCOUNTER — TELEPHONE (OUTPATIENT)
Dept: INTERNAL MEDICINE CLINIC | Age: 80
End: 2020-05-13

## 2020-05-13 ENCOUNTER — OFFICE VISIT (OUTPATIENT)
Dept: ENT CLINIC | Age: 80
End: 2020-05-13
Payer: MEDICARE

## 2020-05-13 VITALS
HEART RATE: 80 BPM | WEIGHT: 254.8 LBS | DIASTOLIC BLOOD PRESSURE: 61 MMHG | HEIGHT: 71 IN | TEMPERATURE: 97.3 F | SYSTOLIC BLOOD PRESSURE: 133 MMHG | BODY MASS INDEX: 35.67 KG/M2

## 2020-05-13 PROCEDURE — 99213 OFFICE O/P EST LOW 20 MIN: CPT | Performed by: OTOLARYNGOLOGY

## 2020-05-13 PROCEDURE — 1036F TOBACCO NON-USER: CPT | Performed by: OTOLARYNGOLOGY

## 2020-05-13 PROCEDURE — 1123F ACP DISCUSS/DSCN MKR DOCD: CPT | Performed by: OTOLARYNGOLOGY

## 2020-05-13 PROCEDURE — 4040F PNEUMOC VAC/ADMIN/RCVD: CPT | Performed by: OTOLARYNGOLOGY

## 2020-05-13 PROCEDURE — G8417 CALC BMI ABV UP PARAM F/U: HCPCS | Performed by: OTOLARYNGOLOGY

## 2020-05-13 PROCEDURE — G8427 DOCREV CUR MEDS BY ELIG CLIN: HCPCS | Performed by: OTOLARYNGOLOGY

## 2020-05-13 NOTE — PROGRESS NOTES
[facility] [address] [facility] FOLLOW UP VISIT      Patient Name: [address] [place] [address] Record Number:  [id number]  Primary Care Physician:  Ricky Olson MD    ChiefComplaint     Chief Complaint   Patient presents with    Follow-up     Has been breathing \"fine\" sleeping at night with out any troubles \"not clogged up or anything\"       History of Present Illness     Juan Hinojosa is an 70 y.o. male previously seen for nasal obstruction/congestion for the past 7 months with significant burden of left-sided sinonasal polyposis. On rigid nasal endoscopy multiple polyps of the left nasal passage were noted - the right side appeared clear apart from a right septal spur extending into the middle meatus. Interval History: At last visit we noted the patient on budesonide rinses and montelukast (oral), he is also on fluticasone twice daily. He states marked improvement in nasal airflow through the left nasal passage-can lie in right lateral decubitus without significant decline in nasal airflow. Continued intermittent clear rhinorrhea, no purulence. No facial pain/pressure.     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)     Esophageal reflux     Fracture of nasal bone     Headache     Hearing loss     Hyperlipidemia     Hyperlipidemia associated with type 2 diabetes mellitus ([facility] [address] 10/23/2015    Hypertension     Hypertrophy of prostate without urinary obstruction and other lower urinary tract symptoms (LUTS) 11/1/2011    Hypothyroidism     Insufficiency fracture of tibia 9/6/2016    Osteoarthrosis, not specified whether generalized/localized, lower leg     infection/inflammation, numbness to the maxillary tissues or dentition, CSF leak, orbital complications (diplopia, blurry vision)  -Anesthesia carries its own complications which will need to be discussed with the anesthesiology team on the day of surgery  -Patient will need to obtain PCP clearance prior to surgery, will need to be off anticoagulants    The patient was counseled at length about the risks of dave Covid-19 during their perioperative period and any recovery window from their procedure. The patient was made aware that dave Covid-19  may worsen their prognosis for recovering from their procedure  and lend to a higher morbidity and/or mortality risk. All material risks, benefits, and reasonable alternatives including postponing the procedure were discussed. The patient does wish to proceed with the procedure at this time. -CT sinus obtained without bony erosion -we will use image guidance during surgery  -Continue budesonide rinses, Singulair, Flonase    Return for postoperative evaluation    Sima Olguin MD  [facility]  Department of Otolaryngology/Head and Neck Surgery  5/13/20    I have performed a head and neck physical exam personally or was physically present during the key or critical portions of the service. Medical Decision Making: The following items were considered in medical decision making:  Independent review of images  Review / order clinical lab tests  Review / order radiology tests  Decision to obtain old records  Review and summation of old records as accessed through [facility] (a summary of my findings in these old records: none)     Portions of this note were dictated using Dragon.  There may be linguistic errors secondary to the use of this program.

## 2020-05-14 ENCOUNTER — TELEPHONE (OUTPATIENT)
Dept: ENT CLINIC | Age: 80
End: 2020-05-14

## 2020-05-27 ENCOUNTER — OFFICE VISIT (OUTPATIENT)
Dept: INTERNAL MEDICINE CLINIC | Age: 80
End: 2020-05-27

## 2020-05-27 VITALS
RESPIRATION RATE: 12 BRPM | WEIGHT: 252 LBS | BODY MASS INDEX: 36.08 KG/M2 | SYSTOLIC BLOOD PRESSURE: 128 MMHG | HEIGHT: 70 IN | HEART RATE: 70 BPM | DIASTOLIC BLOOD PRESSURE: 70 MMHG

## 2020-05-27 PROCEDURE — 3051F HG A1C>EQUAL 7.0%<8.0%: CPT | Performed by: INTERNAL MEDICINE

## 2020-05-27 PROCEDURE — 1123F ACP DISCUSS/DSCN MKR DOCD: CPT | Performed by: INTERNAL MEDICINE

## 2020-05-27 PROCEDURE — 99214 OFFICE O/P EST MOD 30 MIN: CPT | Performed by: INTERNAL MEDICINE

## 2020-05-27 PROCEDURE — 4040F PNEUMOC VAC/ADMIN/RCVD: CPT | Performed by: INTERNAL MEDICINE

## 2020-05-27 PROCEDURE — G8417 CALC BMI ABV UP PARAM F/U: HCPCS | Performed by: INTERNAL MEDICINE

## 2020-05-27 PROCEDURE — G8428 CUR MEDS NOT DOCUMENT: HCPCS | Performed by: INTERNAL MEDICINE

## 2020-05-27 PROCEDURE — 1036F TOBACCO NON-USER: CPT | Performed by: INTERNAL MEDICINE

## 2020-05-27 NOTE — PROGRESS NOTES
Subjective:      Isiah Ocampo is a 70 y.o. male who presents to the office today for a preoperative consultation at the request of surgeon Dr Scar Santos who plans on performing removal of large left nasal polyp. Planned anesthesia is IV sedation.        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)     Esophageal reflux     Fracture of nasal bone     Headache     Hearing loss     Hyperlipidemia     Hyperlipidemia associated with type 2 diabetes mellitus ([address] 10/23/2015    Hypertension     Hypertrophy of prostate without urinary obstruction and other lower urinary tract symptoms (LUTS) 11/1/2011    Hypothyroidism     Insufficiency fracture of tibia 9/6/2016    Osteoarthrosis, not specified whether generalized/localized, lower leg     Pain in joint, lower leg     Right knee pain 11/8/2017    Status post total left knee replacement 7/17/2017    Tinnitus     Type 2 diabetes mellitus 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 edema ([address] 10/23/2015    Type II or unspecified type diabetes mellitus without mention of complication, not stated as uncontrolled     Unspecified viral hepatitis C without hepatic coma      Past Surgical History:   Procedure Laterality Date    CHOLECYSTECTOMY, LAPAROSCOPIC      COLONOSCOPY  12/14/07    ESOPHAGUS SURGERY      esophagus endoscopy    EYE SURGERY      JOINT REPLACEMENT Left 07/17/2017    LEFT TOTAL KNEE REPLACEMENT      KNEE ARTHROSCOPY      OTHER SURGICAL HISTORY Left 11/14/2016    LEFT KNEE DIRECT VIDEO ARTHROSCOPY, MEDIAL MENISECTOMY, CHONDROPLASTY, INTERNAL FIXATION OF MEDIAL TIBIAL INSUFFICIENCY FRACTURE WITH BONE SUBSTITUTE    TONSILLECTOMY       Family

## 2020-06-01 RX ORDER — PIOGLITAZONEHYDROCHLORIDE 30 MG/1
TABLET ORAL
Qty: 90 TABLET | Refills: 0 | Status: SHIPPED | OUTPATIENT
Start: 2020-06-01 | End: 2020-08-31

## 2020-06-03 ENCOUNTER — PROCEDURE VISIT (OUTPATIENT)
Dept: ENT CLINIC | Age: 80
End: 2020-06-03
Payer: MEDICARE

## 2020-06-03 VITALS
HEIGHT: 71 IN | TEMPERATURE: 97 F | DIASTOLIC BLOOD PRESSURE: 71 MMHG | HEART RATE: 63 BPM | BODY MASS INDEX: 35.73 KG/M2 | SYSTOLIC BLOOD PRESSURE: 131 MMHG | WEIGHT: 255.2 LBS

## 2020-06-03 PROCEDURE — 31237 NSL/SINS NDSC SURG BX POLYPC: CPT | Performed by: OTOLARYNGOLOGY

## 2020-06-04 ENCOUNTER — TELEPHONE (OUTPATIENT)
Dept: ENT CLINIC | Age: 80
End: 2020-06-04

## 2020-06-04 NOTE — TELEPHONE ENCOUNTER
Attempted to call patient x2, could not leave voicemail. if the patient or a member of his family calls back, please notify them that he is to continue taking his medications (nasal sprays) I will re- prescribe these.

## 2020-06-05 RX ORDER — MONTELUKAST SODIUM 10 MG/1
TABLET ORAL
Qty: 30 TABLET | Refills: 2 | Status: SHIPPED | OUTPATIENT
Start: 2020-06-05 | End: 2020-09-09 | Stop reason: ALTCHOICE

## 2020-06-05 RX ORDER — FLUTICASONE PROPIONATE 50 MCG
SPRAY, SUSPENSION (ML) NASAL
Qty: 1 BOTTLE | Refills: 0 | Status: SHIPPED | OUTPATIENT
Start: 2020-06-05 | End: 2020-08-05

## 2020-06-08 ENCOUNTER — TELEPHONE (OUTPATIENT)
Dept: ENT CLINIC | Age: 80
End: 2020-06-08

## 2020-06-29 RX ORDER — HYDROCHLOROTHIAZIDE 12.5 MG/1
TABLET ORAL
Qty: 90 TABLET | Refills: 3 | Status: SHIPPED | OUTPATIENT
Start: 2020-06-29 | End: 2021-06-01

## 2020-07-02 ENCOUNTER — TELEPHONE (OUTPATIENT)
Dept: ENT CLINIC | Age: 80
End: 2020-07-02

## 2020-07-02 NOTE — TELEPHONE ENCOUNTER
The patient's Wife called and was wondering why they haven't been called to schedule surgery yet. She recalls them getting a call about the results of the Biopsy but that they weren't sure if they were going to do surgery to remove the nasal polyps. If you could be call back the patient at [phone] sometime in the next week that would be great.

## 2020-07-06 NOTE — TELEPHONE ENCOUNTER
Reached patient at home - outlined results of left nasal polyp biopsy (no malignancy) - he states he is doing well from a nasal breathing/sleep perspective, he has been instructed to make a follow up appointment for 09/2020 for further evaluation and will call the clinic at [phone].

## 2020-07-24 ENCOUNTER — TELEPHONE (OUTPATIENT)
Dept: INTERNAL MEDICINE CLINIC | Age: 80
End: 2020-07-24

## 2020-07-24 NOTE — TELEPHONE ENCOUNTER
----- Message from Janna Rosas MD sent at 7/24/2020  1:40 PM EDT -----  Contact: [phone]  It could be just vertigo. Is hard to say. If symptoms worsen have him get checked to the ER. You are also go to the [facility] clinic or an urgent care today.  ----- Message -----  From: Erendira Giraldo  Sent: 7/24/2020   1:32 PM EDT  To: Janna Rosas MD    Pt called stating that from a sitting position he has to get up slowly and carefully because he feels lightheaded and dizzy when he gets up. He has an appt. 8/17/20.  What do you suggest?     [phone]

## 2020-08-04 ENCOUNTER — TELEPHONE (OUTPATIENT)
Dept: ENT CLINIC | Age: 80
End: 2020-08-04

## 2020-08-04 NOTE — TELEPHONE ENCOUNTER
Patient wants to know if Dr Eb Mchugh would like him to keep using the flonase spray, if so he will need a refill sent to [address] [place] [address] in [facility]. You can call patient at [phone].

## 2020-08-05 RX ORDER — FLUTICASONE PROPIONATE 50 MCG
1 SPRAY, SUSPENSION (ML) NASAL 2 TIMES DAILY
Qty: 1 BOTTLE | Refills: 2 | Status: SHIPPED | OUTPATIENT
Start: 2020-08-05 | End: 2020-09-09 | Stop reason: SDUPTHER

## 2020-08-17 ENCOUNTER — OFFICE VISIT (OUTPATIENT)
Dept: INTERNAL MEDICINE CLINIC | Age: 80
End: 2020-08-17

## 2020-08-17 VITALS
DIASTOLIC BLOOD PRESSURE: 70 MMHG | HEIGHT: 71 IN | WEIGHT: 247 LBS | SYSTOLIC BLOOD PRESSURE: 110 MMHG | RESPIRATION RATE: 12 BRPM | HEART RATE: 80 BPM | BODY MASS INDEX: 34.58 KG/M2

## 2020-08-17 DIAGNOSIS — E03.9 ACQUIRED HYPOTHYROIDISM: ICD-10-CM

## 2020-08-17 DIAGNOSIS — E11.3293 TYPE 2 DIABETES MELLITUS WITH BOTH EYES AFFECTED BY MILD NONPROLIFERATIVE RETINOPATHY WITHOUT MACULAR EDEMA, WITH LONG-TERM CURRENT USE OF INSULIN (HCC): ICD-10-CM

## 2020-08-17 DIAGNOSIS — Z79.4 TYPE 2 DIABETES MELLITUS WITH BOTH EYES AFFECTED BY MILD NONPROLIFERATIVE RETINOPATHY WITHOUT MACULAR EDEMA, WITH LONG-TERM CURRENT USE OF INSULIN (HCC): ICD-10-CM

## 2020-08-17 PROBLEM — E66.01 MORBIDLY OBESE (HCC): Status: ACTIVE | Noted: 2020-08-17

## 2020-08-17 LAB
BASOPHILS ABSOLUTE: 0 K/UL (ref 0–0.2)
BASOPHILS RELATIVE PERCENT: 0.6 %
EOSINOPHILS ABSOLUTE: 0.4 K/UL (ref 0–0.6)
EOSINOPHILS RELATIVE PERCENT: 8.2 %
HCT VFR BLD CALC: 41.8 % (ref 40.5–52.5)
HEMOGLOBIN: 13.3 G/DL (ref 13.5–17.5)
LYMPHOCYTES ABSOLUTE: 1.6 K/UL (ref 1–5.1)
LYMPHOCYTES RELATIVE PERCENT: 30.5 %
MCH RBC QN AUTO: 28.6 PG (ref 26–34)
MCHC RBC AUTO-ENTMCNC: 31.9 G/DL (ref 31–36)
MCV RBC AUTO: 89.9 FL (ref 80–100)
MONOCYTES ABSOLUTE: 0.4 K/UL (ref 0–1.3)
MONOCYTES RELATIVE PERCENT: 7.8 %
NEUTROPHILS ABSOLUTE: 2.8 K/UL (ref 1.7–7.7)
NEUTROPHILS RELATIVE PERCENT: 52.9 %
PDW BLD-RTO: 16.6 % (ref 12.4–15.4)
PLATELET # BLD: 239 K/UL (ref 135–450)
PMV BLD AUTO: 7.7 FL (ref 5–10.5)
RBC # BLD: 4.65 M/UL (ref 4.2–5.9)
WBC # BLD: 5.2 K/UL (ref 4–11)

## 2020-08-17 PROCEDURE — 3051F HG A1C>EQUAL 7.0%<8.0%: CPT | Performed by: INTERNAL MEDICINE

## 2020-08-17 PROCEDURE — 1036F TOBACCO NON-USER: CPT | Performed by: INTERNAL MEDICINE

## 2020-08-17 PROCEDURE — 99214 OFFICE O/P EST MOD 30 MIN: CPT | Performed by: INTERNAL MEDICINE

## 2020-08-17 PROCEDURE — G8417 CALC BMI ABV UP PARAM F/U: HCPCS | Performed by: INTERNAL MEDICINE

## 2020-08-17 PROCEDURE — 4040F PNEUMOC VAC/ADMIN/RCVD: CPT | Performed by: INTERNAL MEDICINE

## 2020-08-17 PROCEDURE — 1123F ACP DISCUSS/DSCN MKR DOCD: CPT | Performed by: INTERNAL MEDICINE

## 2020-08-17 PROCEDURE — G8427 DOCREV CUR MEDS BY ELIG CLIN: HCPCS | Performed by: INTERNAL MEDICINE

## 2020-08-17 ASSESSMENT — ENCOUNTER SYMPTOMS
VOMITING: 0
NAUSEA: 0
BACK PAIN: 0
RHINORRHEA: 0
WHEEZING: 0
SHORTNESS OF BREATH: 0
ABDOMINAL PAIN: 0

## 2020-08-17 NOTE — PROGRESS NOTES
Subjective:      Patient ID: Cj Soliz is a 70 y.o. male. HPI  Patient is here for follow up diabetes, hypertension, hyperlipidemia, GERD, OA and hepatitis C. Diabetes Mellitus Type 2: Current symptoms/problems include none. Medication compliance:  compliant most of the time  Diabetic diet compliance:  compliant most of the time,  Weight trend: increasing-up by 6 lbs  Current exercise: walks 3 time(s) per week    Home blood sugar records: fasting range:  mg/dl  Any episodes of hypoglycemia? none  Eye exam current (within one year): yes   reports that he quit smoking about 30 years ago. He has a 15.00 pack-year smoking history. He quit smokeless tobacco use about 42 years ago. Daily Aspirin? No  Known diabetic complications: nephropathy and retinopathy    Hypertension:  Blood pressure typically runs normal outside of the office. He is adherent to a low sodium diet. Patient denies chest pain, dry cough, fatigue. Antihypertensive medication side effects: no medication side effects noted. Use of agents associated with hypertension: none. Hyperlipidemia:  No new myalgias or GI upset on atorvastatin (Lipitor). Lab Results   Component Value Date    LABA1C 7.0 05/05/2020    LABA1C 6.3 12/13/2019    LABA1C 6.9 05/01/2019     Lab Results   Component Value Date    LABMICR YES 01/09/2020    CREATININE 1.2 05/05/2020     Lab Results   Component Value Date    ALT 33 12/13/2019    AST 39 (H) 12/13/2019     No components found for: CHLPL  Lab Results   Component Value Date    TRIG 110 12/13/2019     Lab Results   Component Value Date    HDL 53 12/13/2019     Lab Results   Component Value Date    LDLCALC 65 12/13/2019     . No heartburn. He has hypothyroidism. It is stable. No constipation or diarrhea. He has CKD from DM. He has diabetic retinopathy. Eyes stable. He had sinus polyp removed. He is breathing better.       Review of Systems   Constitutional: Negative for activity change 200 each, Rfl: 3    EMBRACE LANCETS ULTRA THIN 30G MISC, TEST TWICE DAILY. DX: E11.9, Disp: 200 each, Rfl: 3    LANTUS 100 UNIT/ML injection vial, INJECT 50 UNITS UNDER THE SKIN AT NIGHT, Disp: 50 mL, Rfl: 2    vitamin B-12 (CYANOCOBALAMIN) 1000 MCG tablet, Take 1,000 mcg by mouth daily, Disp: , Rfl:     rosuvastatin (CRESTOR) 10 MG tablet, Take 10 mg by mouth daily, Disp: , Rfl:     B-D INS SYR ULTRAFINE 1CC/30G 30G X 1/2\" 1 ML MISC, USE WITH LANTUS NIGHTLY, Disp: 90 each, Rfl: 11    TRUETEST TEST strip, TEST DAILY, Disp: 100 strip, Rfl: 3        There are no changes to past medical history, family history, social history or review of systems(except as noted in the history section) since prior note (all reviewed with patient). /70 (Site: Right Upper Arm, Position: Sitting, Cuff Size: Medium Adult)   Pulse 80   Resp 12   Ht 5' 11\" (1.803 m)   Wt 247 lb (112 kg)   BMI 34.45 kg/m²        Objective:   Physical Exam   Constitutional: He is oriented to person, place, and time. He appears well-developed and well-nourished. HENT:   Head: Normocephalic. Eyes: Pupils are equal, round, and reactive to light. Conjunctivae and EOM are normal.   Neck: Trachea normal and normal range of motion. Neck supple. No JVD present. Carotid bruit is not present. No thyroid mass and no thyromegaly present. Cardiovascular: Normal rate and normal heart sounds. An irregularly irregular rhythm present. Exam reveals no gallop. No murmur heard. Pulmonary/Chest: Effort normal and breath sounds normal. No respiratory distress. He has no wheezes. He has no rales. Abdominal: Soft. Bowel sounds are normal. He exhibits no distension and no mass. There is no splenomegaly or hepatomegaly. There is no abdominal tenderness. Musculoskeletal:         General: Edema present. Lymphadenopathy:     He has no cervical adenopathy. Neurological: He is alert and oriented to person, place, and time.  He displays tremor (right upper extremity at rest). Skin: Skin is warm and dry. No rash noted. Psychiatric: He has a normal mood and affect. His behavior is normal. Judgment and thought content normal.       Assessment:       Diagnosis Orders   1. Type 2 diabetes mellitus with both eyes affected by mild nonproliferative retinopathy without macular edema, with long-term current use of insulin (HCC)  Hemoglobin A1C    CBC Auto Differential    Comprehensive Metabolic Panel   2. Morbidly obese ([address]     3. PAF (paroxysmal atrial fibrillation) ([facility] [address]     4. MGUS (monoclonal gammopathy of unknown significance)     5. Acquired hypothyroidism  TSH without Reflex   6. Chronic hepatitis C without hepatic coma (HCC)     7. Essential hypertension     8. Type 2 diabetes mellitus with stage 3 chronic kidney disease, without long-term current use of insulin ([facility] [address]     9. Benign essential tremor            Plan:      Check labs. DM type 2: controlled. Stressed diet and exercise. On Lantus and Novolog. Check labs. Nasal polyposis. Improved. Hypertension: at goal.  He is on Prinivil and HCTZ. Tremor: unchanged. Not on any medication. Hyperlipidemia: at goal.  Hypothyroidism:  Patient's hypothyroidism is stable on replacement therapy. Check TSH. A fib:  He has seen cardiology. On Eliquis. Morbid Obesity  - Body mass index is 34.45 kg/m². - Complicating assessment and treatment. Placing patient at risk for multiple co-morbidities as well as early death and contributing to the patient's presentation.   - Counseled on weight loss. Discussed use, benefit, and side effects of prescribed medications. Barriers to medication compliance addressed. All patient questions answered. Pt voiced understanding. Decrease calorie intake. Exercise,weight loss recommended. The current medical regimen is effective;  continue present plan and medications. See orders.

## 2020-08-18 LAB
A/G RATIO: 0.9 (ref 1.1–2.2)
ALBUMIN SERPL-MCNC: 3.5 G/DL (ref 3.4–5)
ALP BLD-CCNC: 104 U/L (ref 40–129)
ALT SERPL-CCNC: 33 U/L (ref 10–40)
ANION GAP SERPL CALCULATED.3IONS-SCNC: 14 MMOL/L (ref 3–16)
AST SERPL-CCNC: 41 U/L (ref 15–37)
BILIRUB SERPL-MCNC: 0.3 MG/DL (ref 0–1)
BUN BLDV-MCNC: 38 MG/DL (ref 7–20)
CALCIUM SERPL-MCNC: 9.5 MG/DL (ref 8.3–10.6)
CHLORIDE BLD-SCNC: 102 MMOL/L (ref 99–110)
CO2: 27 MMOL/L (ref 21–32)
CREAT SERPL-MCNC: 1.5 MG/DL (ref 0.8–1.3)
ESTIMATED AVERAGE GLUCOSE: 142.7 MG/DL
GFR AFRICAN AMERICAN: 55
GFR NON-AFRICAN AMERICAN: 45
GLOBULIN: 4.1 G/DL
GLUCOSE BLD-MCNC: 208 MG/DL (ref 70–99)
HBA1C MFR BLD: 6.6 %
POTASSIUM SERPL-SCNC: 5 MMOL/L (ref 3.5–5.1)
SODIUM BLD-SCNC: 143 MMOL/L (ref 136–145)
TOTAL PROTEIN: 7.6 G/DL (ref 6.4–8.2)
TSH SERPL DL<=0.05 MIU/L-ACNC: 0.3 UIU/ML (ref 0.27–4.2)

## 2020-08-18 RX ORDER — INSULIN GLARGINE 100 [IU]/ML
INJECTION, SOLUTION SUBCUTANEOUS
Qty: 50 ML | Refills: 0 | Status: SHIPPED | OUTPATIENT
Start: 2020-08-18 | End: 2020-11-16

## 2020-08-31 ENCOUNTER — TELEPHONE (OUTPATIENT)
Dept: INTERNAL MEDICINE CLINIC | Age: 80
End: 2020-08-31

## 2020-08-31 RX ORDER — PIOGLITAZONEHYDROCHLORIDE 30 MG/1
TABLET ORAL
Qty: 90 TABLET | Refills: 0 | Status: SHIPPED | OUTPATIENT
Start: 2020-08-31 | End: 2020-11-30

## 2020-08-31 NOTE — TELEPHONE ENCOUNTER
----- Message from Prakash Gomez MD sent at 8/31/2020  1:02 PM EDT -----  Contact: Joaquin [phone]  See message for spouse  ----- Message -----  From: Dilia Ureña  Sent: 8/31/2020  12:22 PM EDT  To: Prakash Gomez MD    Spouse called to find out if he is able to come in and get his shingles vaccine. Please advise.      Joaquin [phone]

## 2020-09-04 ENCOUNTER — TELEPHONE (OUTPATIENT)
Dept: ENT CLINIC | Age: 80
End: 2020-09-04

## 2020-09-04 NOTE — TELEPHONE ENCOUNTER
Patient wants to know if they should keep using the following medications:  Flonase-Nasal spray   Singulair-Tablet    Please call back patient at [phone].

## 2020-09-08 NOTE — TELEPHONE ENCOUNTER
Called patient and reached spouse - asked her to have Mr. Micha Walker make a follow up clinic visit to evaluate his nasal passages to determine if any polyp was present.  In the meantime he is to continue the fluticasone and monteleukast

## 2020-09-09 ENCOUNTER — OFFICE VISIT (OUTPATIENT)
Dept: ENT CLINIC | Age: 80
End: 2020-09-09
Payer: MEDICARE

## 2020-09-09 VITALS
WEIGHT: 249 LBS | HEART RATE: 68 BPM | HEIGHT: 71 IN | TEMPERATURE: 98.2 F | DIASTOLIC BLOOD PRESSURE: 69 MMHG | SYSTOLIC BLOOD PRESSURE: 117 MMHG | BODY MASS INDEX: 34.86 KG/M2

## 2020-09-09 PROCEDURE — 31231 NASAL ENDOSCOPY DX: CPT | Performed by: OTOLARYNGOLOGY

## 2020-09-09 PROCEDURE — 1123F ACP DISCUSS/DSCN MKR DOCD: CPT | Performed by: OTOLARYNGOLOGY

## 2020-09-09 PROCEDURE — G8427 DOCREV CUR MEDS BY ELIG CLIN: HCPCS | Performed by: OTOLARYNGOLOGY

## 2020-09-09 PROCEDURE — 1036F TOBACCO NON-USER: CPT | Performed by: OTOLARYNGOLOGY

## 2020-09-09 PROCEDURE — 4040F PNEUMOC VAC/ADMIN/RCVD: CPT | Performed by: OTOLARYNGOLOGY

## 2020-09-09 PROCEDURE — 99213 OFFICE O/P EST LOW 20 MIN: CPT | Performed by: OTOLARYNGOLOGY

## 2020-09-09 PROCEDURE — G8417 CALC BMI ABV UP PARAM F/U: HCPCS | Performed by: OTOLARYNGOLOGY

## 2020-09-09 RX ORDER — FLUTICASONE PROPIONATE 50 MCG
1 SPRAY, SUSPENSION (ML) NASAL 2 TIMES DAILY
Qty: 1 BOTTLE | Refills: 2 | Status: SHIPPED | OUTPATIENT
Start: 2020-09-09 | End: 2020-12-09 | Stop reason: SDUPTHER

## 2020-09-09 RX ORDER — SODIUM CHLORIDE/SODIUM BICARB
1 PACKET (EA) NASAL 2 TIMES DAILY
Qty: 1 EACH | Refills: 1 | Status: SHIPPED | OUTPATIENT
Start: 2020-09-09 | End: 2022-06-13

## 2020-09-09 RX ORDER — BUDESONIDE 0.5 MG/2ML
INHALANT ORAL
Qty: 60 AMPULE | Refills: 3 | Status: SHIPPED | OUTPATIENT
Start: 2020-09-09 | End: 2022-06-27 | Stop reason: ALTCHOICE

## 2020-09-09 NOTE — PATIENT INSTRUCTIONS
-Continue sinus rinses with steroid (budesonide) twice daily  -Continue flonase twice daily  -May stop singulair (monteleukast)

## 2020-09-09 NOTE — PROGRESS NOTES
pain 2017    Status post total left knee replacement 2017    Tinnitus     Type 2 diabetes mellitus 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 edema (HCC) 10/23/2015    Type II or unspecified type diabetes mellitus without mention of complication, not stated as uncontrolled     Unspecified viral hepatitis C without hepatic coma        Past Surgical History     Past Surgical History:   Procedure Laterality Date    CHOLECYSTECTOMY, LAPAROSCOPIC      COLONOSCOPY  07    ESOPHAGUS SURGERY      esophagus endoscopy    EYE SURGERY      JOINT REPLACEMENT Left 2017    LEFT TOTAL KNEE REPLACEMENT      KNEE ARTHROSCOPY      OTHER SURGICAL HISTORY Left 2016    LEFT KNEE DIRECT VIDEO ARTHROSCOPY, MEDIAL MENISECTOMY, CHONDROPLASTY, INTERNAL FIXATION OF MEDIAL TIBIAL INSUFFICIENCY FRACTURE WITH BONE SUBSTITUTE    TONSILLECTOMY         Family History     Family History   Problem Relation Age of Onset    Cancer Mother     Kidney Disease Brother         on dialasis    Diabetes type 2  Brother     Heart Disease Brother     Breast Cancer Sister        Social History     Social History     Tobacco Use    Smoking status: Former Smoker     Packs/day: 0.50     Years: 30.00     Pack years: 15.00     Last attempt to quit: 1990     Years since quittin.7    Smokeless tobacco: Former User     Quit date: 10/3/1977   Substance Use Topics    Alcohol use: Yes     Comment: social    Drug use: No        Allergies     No Known Allergies    Medications     Current Outpatient Medications   Medication Sig Dispense Refill    pioglitazone (ACTOS) 30 MG tablet TAKE 1 TABLET DAILY 90 tablet 0    LANTUS 100 UNIT/ML injection vial INJECT 50 UNITS UNDER THE SKIN AT NIGHT 50 mL 0    fluticasone (FLONASE) 50 MCG/ACT nasal spray 1 spray by Each Nostril route 2 times daily 1 Bottle 2    apixaban (ELIQUIS) 5 MG TABS tablet TAKE 1 TABLET TWICE A  tablet 0    hydrochlorothiazide (HYDRODIURIL) 12.5 MG tablet TAKE 1 TABLET DAILY 90 tablet 3    levothyroxine (SYNTHROID) 100 MCG tablet TAKE 1 TABLET DAILY 90 tablet 0    INVOKANA 100 MG TABS tablet TAKE 1 TABLET DAILY 90 tablet 0    montelukast (SINGULAIR) 10 MG tablet TAKE ONE TABLET BY MOUTH DAILY 30 tablet 2    budesonide (PULMICORT) 0.5 MG/2ML nebulizer suspension Use 1 ampule (2cc) in saline irrigations twice daily 60 ampule 3    Hypertonic Nasal Wash (SINUS RINSE) PACK 1 Bottle by Nasal route 2 times daily 1 each 1    tamsulosin (FLOMAX) 0.4 MG capsule TAKE 1 CAPSULE DAILY 90 capsule 4    diltiazem (CARTIA XT) 120 MG extended release capsule TAKE 1 CAPSULE DAILY 90 capsule 4    Blood Glucose Monitoring Suppl ([employer] BLOOD GLUCOSE MONITOR) PARAS TEST TWICE DAILY. DX: E11.9 1 Device 0    blood glucose test strips ([employer] BLOOD GLUCOSE TEST) strip TEST TWICE DAILY. DX: E11.9 200 each 3    EMBRACE LANCETS ULTRA THIN 30G MISC TEST TWICE DAILY. DX: E11.9 200 each 3    vitamin B-12 (CYANOCOBALAMIN) 1000 MCG tablet Take 1,000 mcg by mouth daily      rosuvastatin (CRESTOR) 10 MG tablet Take 10 mg by mouth daily      B-D INS SYR ULTRAFINE 1CC/30G 30G X 1/2\" 1 ML MISC USE WITH LANTUS NIGHTLY 90 each 11    TRUETEST TEST strip TEST DAILY 100 strip 3     No current facility-administered medications for this visit.         Review of Systems     REVIEW OF SYSTEMS  The following systems were reviewed and revealed the following in addition to any already discussed in the HPI:    CONSTITUTIONAL: No weight loss, no fever, no night sweats, no chills  EYES: no vision changes, no blurry vision  EARS: no changes in hearing, no otalgia  NOSE: no epistaxis, no purulent rhinorrhea  RESPIRATORY: no difficulty breathing, no shortness of breath  CV: no chest pain, no peripheral vascular disease  HEME: No coagulation disorder, no bleeding disorder  NEURO: No TIA or stroke-like symptoms  SKIN: No new rashes in the head and neck, no recent skin cancers  MOUTH: No new ulcers, no recent teeth infections  GASTROINTESTINAL: No diarrhea, stomach pain  PSYCH: No anxiety, no depression    PhysicalExam     Vitals:    09/09/20 0946   BP: 117/69   Site: Left Upper Arm   Position: Sitting   Cuff Size: Large Adult   Pulse: 68   Temp: 98.2 °F (36.8 °C)   TempSrc: Infrared   Weight: 249 lb (112.9 kg)   Height: 5' 11\" (1.803 m)       PHYSICAL EXAM  /69 (Site: Left Upper Arm, Position: Sitting, Cuff Size: Large Adult)   Pulse 68   Temp 98.2 °F (36.8 °C) (Infrared)   Ht 5' 11\" (1.803 m)   Wt 249 lb (112.9 kg)   BMI 34.73 kg/m²     GENERAL: No Acute Distress, Alert and Oriented, no hoarseness  EYES: EOMI, Anti-icteric  NOSE: On anterior rhinoscopy there is no epistaxis, nasal mucosa within normal limits, no purulent drainage - good nasal airflow bilaterally  EARS: Normal external appearance bilaterally (hearing aids in place)  FACE: 1/6 House-Brackmann Scale, symmetric, sensation equal bilaterally  ORAL CAVITY: No masses or lesions palpated, uvula is midline, moist mucous membranes, 0+ tonsils, absent dentition  NECK: Normal range of motion, no thyromegaly, trachea is midline, no lymphadenopathy, no neck masses, no crepitus  CHEST: Normal respiratory effort, no retractions, breathing comfortably. SKIN: No rashes, normal appearing skin, no evidence of skin lesions/tumors  NEURO: CN 2, 3, 4, 5, 6, 7, 11, 12 intact bilaterally     Procedure     Nasal Endoscopy    Pre OP: Left nasal polyposis  Post OP: Same  Procedure: Nasal endoscopy     After obtaining verbal consent from the patient 2% lidocaine with afrin was sprayed into the nasal cavities. After allowing a time for anesthesia, a 0-degree nasal endoscope was used to visualize the nasal passages. The septum, inferior, and middle turbinates were examined. The middle meatus, and sphenoethmoid recess was examined bilaterally. Cultures were not obtained.  There were no complications.      Pertinent positives included: There was not edema and purulence at the right middle meatus, although a right septal spur was noted. Polyps were identified in the left nasal passage -marked improvement since prior visit- extending from the bulla ethmoidalis and the posterior ethmoid cells medially. Masses were not identified. Scar band from the left middle turbinate to the septum was lysed.     Tolerated well without complication. I attest that I was present for and did the entire procedure myself. PREVIOUS IMAGES (FROM PRIOR VISIT)                Assessment and Plan     1. Nasal polyposis  Patient with nasal polyposis of the left nasal passage -improvement on Flonase, budesonide rinses, Singulair. Previous debridement of obstructing polyps with histopathology showing no malignancy.  -Provement in nasal patency at this visit-polyps noted extending from the bulla ethmoidalis and posterior ethmoid tract medially, however no nasal passage obstruction at this time  -Continue budesonide rinses, Flonase-we will stop montelukast    Return clinic in 3 months for repeat evaluation    Ham Esparza MD  [facility]  Department of Otolaryngology/Head and Neck Surgery  9/9/20    I have performed a head and neck physical exam personally or was physically present during the key or critical portions of the service. Medical Decision Making: The following items were considered in medical decision making:  Independent review of images  Review / order clinical lab tests  Review / order radiology tests  Decision to obtain old records  Review and summation of old records as accessed through [facility] (a summary of my findings in these old records: none)     Portions of this note were dictated using Dragon.  There may be linguistic errors secondary to the use of this program.

## 2020-09-21 RX ORDER — CANAGLIFLOZIN 100 MG/1
TABLET, FILM COATED ORAL
Qty: 90 TABLET | Refills: 0 | Status: SHIPPED | OUTPATIENT
Start: 2020-09-21 | End: 2020-12-21

## 2020-09-21 RX ORDER — LEVOTHYROXINE SODIUM 0.1 MG/1
TABLET ORAL
Qty: 90 TABLET | Refills: 0 | Status: SHIPPED | OUTPATIENT
Start: 2020-09-21 | End: 2020-12-21

## 2020-11-16 RX ORDER — INSULIN GLARGINE 100 [IU]/ML
INJECTION, SOLUTION SUBCUTANEOUS
Qty: 50 ML | Refills: 3 | Status: SHIPPED | OUTPATIENT
Start: 2020-11-16 | End: 2021-11-11

## 2020-11-24 ENCOUNTER — OFFICE VISIT (OUTPATIENT)
Dept: INTERNAL MEDICINE CLINIC | Age: 80
End: 2020-11-24

## 2020-11-24 VITALS — HEIGHT: 71 IN | WEIGHT: 256 LBS | BODY MASS INDEX: 35.84 KG/M2 | TEMPERATURE: 97.5 F

## 2020-11-24 DIAGNOSIS — E11.22 TYPE 2 DIABETES MELLITUS WITH STAGE 3A CHRONIC KIDNEY DISEASE, WITHOUT LONG-TERM CURRENT USE OF INSULIN (HCC): ICD-10-CM

## 2020-11-24 DIAGNOSIS — N18.31 TYPE 2 DIABETES MELLITUS WITH STAGE 3A CHRONIC KIDNEY DISEASE, WITHOUT LONG-TERM CURRENT USE OF INSULIN (HCC): ICD-10-CM

## 2020-11-24 PROBLEM — E66.01 MORBIDLY OBESE (HCC): Status: RESOLVED | Noted: 2020-08-17 | Resolved: 2020-11-24

## 2020-11-24 LAB
A/G RATIO: 1.1 (ref 1.1–2.2)
ALBUMIN SERPL-MCNC: 4 G/DL (ref 3.4–5)
ALP BLD-CCNC: 93 U/L (ref 40–129)
ALT SERPL-CCNC: 32 U/L (ref 10–40)
ANION GAP SERPL CALCULATED.3IONS-SCNC: 10 MMOL/L (ref 3–16)
AST SERPL-CCNC: 35 U/L (ref 15–37)
BASOPHILS ABSOLUTE: 0 K/UL (ref 0–0.2)
BASOPHILS RELATIVE PERCENT: 0.7 %
BILIRUB SERPL-MCNC: 0.4 MG/DL (ref 0–1)
BUN BLDV-MCNC: 38 MG/DL (ref 7–20)
CALCIUM SERPL-MCNC: 9.9 MG/DL (ref 8.3–10.6)
CHLORIDE BLD-SCNC: 103 MMOL/L (ref 99–110)
CHOLESTEROL, TOTAL: 159 MG/DL (ref 0–199)
CO2: 32 MMOL/L (ref 21–32)
CREAT SERPL-MCNC: 1.2 MG/DL (ref 0.8–1.3)
EOSINOPHILS ABSOLUTE: 0.5 K/UL (ref 0–0.6)
EOSINOPHILS RELATIVE PERCENT: 9.5 %
GFR AFRICAN AMERICAN: >60
GFR NON-AFRICAN AMERICAN: 59
GLOBULIN: 3.5 G/DL
GLUCOSE BLD-MCNC: 65 MG/DL (ref 70–99)
HCT VFR BLD CALC: 43.4 % (ref 40.5–52.5)
HDLC SERPL-MCNC: 58 MG/DL (ref 40–60)
HEMOGLOBIN: 14.5 G/DL (ref 13.5–17.5)
LDL CHOLESTEROL CALCULATED: 85 MG/DL
LYMPHOCYTES ABSOLUTE: 1.7 K/UL (ref 1–5.1)
LYMPHOCYTES RELATIVE PERCENT: 33 %
MCH RBC QN AUTO: 30.1 PG (ref 26–34)
MCHC RBC AUTO-ENTMCNC: 33.3 G/DL (ref 31–36)
MCV RBC AUTO: 90.4 FL (ref 80–100)
MONOCYTES ABSOLUTE: 0.5 K/UL (ref 0–1.3)
MONOCYTES RELATIVE PERCENT: 10.1 %
NEUTROPHILS ABSOLUTE: 2.3 K/UL (ref 1.7–7.7)
NEUTROPHILS RELATIVE PERCENT: 46.7 %
PDW BLD-RTO: 14.4 % (ref 12.4–15.4)
PLATELET # BLD: 239 K/UL (ref 135–450)
PMV BLD AUTO: 7.7 FL (ref 5–10.5)
POTASSIUM SERPL-SCNC: 5 MMOL/L (ref 3.5–5.1)
RBC # BLD: 4.8 M/UL (ref 4.2–5.9)
SODIUM BLD-SCNC: 145 MMOL/L (ref 136–145)
TOTAL PROTEIN: 7.5 G/DL (ref 6.4–8.2)
TRIGL SERPL-MCNC: 81 MG/DL (ref 0–150)
URIC ACID, SERUM: 6.5 MG/DL (ref 3.5–7.2)
VLDLC SERPL CALC-MCNC: 16 MG/DL
WBC # BLD: 5 K/UL (ref 4–11)

## 2020-11-24 PROCEDURE — 4040F PNEUMOC VAC/ADMIN/RCVD: CPT | Performed by: INTERNAL MEDICINE

## 2020-11-24 PROCEDURE — G0439 PPPS, SUBSEQ VISIT: HCPCS | Performed by: INTERNAL MEDICINE

## 2020-11-24 PROCEDURE — 1123F ACP DISCUSS/DSCN MKR DOCD: CPT | Performed by: INTERNAL MEDICINE

## 2020-11-24 PROCEDURE — G8484 FLU IMMUNIZE NO ADMIN: HCPCS | Performed by: INTERNAL MEDICINE

## 2020-11-24 ASSESSMENT — LIFESTYLE VARIABLES
HOW OFTEN DO YOU HAVE A DRINK CONTAINING ALCOHOL: 1
HOW OFTEN DO YOU HAVE SIX OR MORE DRINKS ON ONE OCCASION: 0
AUDIT-C TOTAL SCORE: 1
HOW MANY STANDARD DRINKS CONTAINING ALCOHOL DO YOU HAVE ON A TYPICAL DAY: 0

## 2020-11-24 ASSESSMENT — PATIENT HEALTH QUESTIONNAIRE - PHQ9
SUM OF ALL RESPONSES TO PHQ QUESTIONS 1-9: 0
2. FEELING DOWN, DEPRESSED OR HOPELESS: 0
1. LITTLE INTEREST OR PLEASURE IN DOING THINGS: 0
SUM OF ALL RESPONSES TO PHQ9 QUESTIONS 1 & 2: 0
SUM OF ALL RESPONSES TO PHQ QUESTIONS 1-9: 0
SUM OF ALL RESPONSES TO PHQ QUESTIONS 1-9: 0

## 2020-11-24 NOTE — PATIENT INSTRUCTIONS
Personalized Preventive Plan for Jeffrey Simmons - 11/24/2020  Medicare offers a range of preventive health benefits. Some of the tests and screenings are paid in full while other may be subject to a deductible, co-insurance, and/or copay. Some of these benefits include a comprehensive review of your medical history including lifestyle, illnesses that may run in your family, and various assessments and screenings as appropriate. After reviewing your medical record and screening and assessments performed today your provider may have ordered immunizations, labs, imaging, and/or referrals for you. A list of these orders (if applicable) as well as your Preventive Care list are included within your After Visit Summary for your review. Other Preventive Recommendations:    · A preventive eye exam performed by an eye specialist is recommended every 1-2 years to screen for glaucoma; cataracts, macular degeneration, and other eye disorders. · A preventive dental visit is recommended every 6 months. · Try to get at least 150 minutes of exercise per week or 10,000 steps per day on a pedometer . · Order or download the FREE \"Exercise & Physical Activity: Your Everyday Guide\" from [employer]. Call 0-495.784.1431 or search [employer] online. · You need 4055-5933 mg of calcium and 5065-0455 IU of vitamin D per day. It is possible to meet your calcium requirement with diet alone, but a vitamin D supplement is usually necessary to meet this goal.  · When exposed to the sun, use a sunscreen that protects against both UVA and UVB radiation with an SPF of 30 or greater. Reapply every 2 to 3 hours or after sweating, drying off with a towel, or swimming. · Always wear a seat belt when traveling in a car. Always wear a helmet when riding a bicycle or motorcycle. Heart-Healthy Diet   Sodium, Fat, and Cholesterol Controlled Diet       What Is a Heart Healthy Diet?    A heart-healthy diet is one that limits sodium , certain types of fat , and cholesterol . This type of diet is recommended for:   People with any form of cardiovascular disease (eg, coronary heart disease , peripheral vascular disease , previous heart attack , previous stroke )   People with risk factors for cardiovascular disease, such as high blood pressure , high cholesterol , or diabetes   Anyone who wants to lower their risk of developing cardiovascular disease   Sodium    Sodium is a mineral found in many foods. In general, most people consume much more sodium than they need. Diets high in sodium can increase blood pressure and lead to edema (water retention). On a heart-healthy diet, you should consume no more than 2,300 mg (milligrams) of sodium per dayabout the amount in one teaspoon of table salt. The foods highest in sodium include table salt (about 50% sodium), processed foods, convenience foods, and preserved foods. Cholesterol    Cholesterol is a fat-like, waxy substance in your blood. Our bodies make some cholesterol. It is also found in animal products, with the highest amounts in fatty meat, egg yolks, whole milk, cheese, shellfish, and organ meats. On a heart-healthy diet, you should limit your cholesterol intake to less than 200 mg per day. It is normal and important to have some cholesterol in your bloodstream. But too much cholesterol can cause plaque to build up within your arteries, which can eventually lead to a heart attack or stroke. The two types of cholesterol that are most commonly referred to are:   Low-density lipoprotein (LDL) cholesterol  Also known as bad cholesterol, this is the cholesterol that tends to build up along your arteries. Bad cholesterol levels are increased by eating fats that are saturated or hydrogenated. Optimal level of this cholesterol is less than 100. Over 130 starts to get risky for heart disease.    High-density lipoprotein (HDL) cholesterol  Also known as good cholesterol, this type of cholesterol actually carries cholesterol away from your arteries and may, therefore, help lower your risk of having a heart attack. You want this level to be high (ideally greater than 60). It is a risk to have a level less than 40. You can raise this good cholesterol by eating olive oil, canola oil, avocados, or nuts. Exercise raises this level, too. Fat    Fat is calorie dense and packs a lot of calories into a small amount of food. Even though fats should be limited due to their high calorie content, not all fats are bad. In fact, some fats are quite healthful. Fat can be broken down into four main types. The good-for-you fats are:   Monounsaturated fat  found in oils such as olive and canola, avocados, and nuts and natural nut butters; can decrease cholesterol levels, while keeping levels of HDL cholesterol high   Polyunsaturated fat  found in oils such as safflower, sunflower, soybean, corn, and sesame; can decrease total cholesterol and LDL cholesterol   Omega-3 fatty acids  particularly those found in fatty fish (such as salmon, trout, tuna, mackerel, herring, and sardines); can decrease risk of arrhythmias, decrease triglyceride levels, and slightly lower blood pressure   The fats that you want to limit are:   Saturated fat  found in animal products, many fast foods, and a few vegetables; increases total blood cholesterol, including LDL levels   Animal fats that are saturated include: butter, lard, whole-milk dairy products, meat fat, and poultry skin   Vegetable fats that are saturated include: hydrogenated shortening, palm oil, coconut oil, cocoa butter   Hydrogenated or trans fat  found in margarine and vegetable shortening, most shelf stable snack foods, and fried foods; increases LDL and decreases HDL     It is generally recommended that you limit your total fat for the day to less than 30% of your total calories.  If you follow an 1800-calorie heart healthy diet, for example, this would mean 60 grams of fat or less per day. Saturated fat and trans fat in your diet raises your blood cholesterol the most, much more than dietary cholesterol does. For this reason, on a heart-healthy diet, less than 7% of your calories should come from saturated fat and ideally 0% from trans fat. On an 1800-calorie diet, this translates into less than 14 grams of saturated fat per day, leaving 46 grams of fat to come from mono- and polyunsaturated fats.    Food Choices on a Heart Healthy Diet   Food Category   Foods Recommended   Foods to Avoid   Grains   Breads and rolls without salted tops Most dry and cooked cereals Unsalted crackers and breadsticks Low-sodium or homemade breadcrumbs or stuffing All rice and pastas   Breads, rolls, and crackers with salted tops High-fat baked goods (eg, muffins, donuts, pastries) Quick breads, self-rising flour, and biscuit mixes Regular bread crumbs Instant hot cereals Commercially prepared rice, pasta, or stuffing mixes   Vegetables   Most fresh, frozen, and low-sodium canned vegetables Low-sodium and salt-free vegetable juices Canned vegetables if unsalted or rinsed   Regular canned vegetables and juices, including sauerkraut and pickled vegetables Frozen vegetables with sauces Commercially prepared potato and vegetable mixes   Fruits   Most fresh, frozen, and canned fruits All fruit juices   Fruits processed with salt or sodium   Milk   Nonfat or low-fat (1%) milk Nonfat or low-fat yogurt Cottage cheese, low-fat ricotta, cheeses labeled as low-fat and low-sodium   Whole milk Reduced-fat (2%) milk Malted and chocolate milk Full fat yogurt Most cheeses (unless low-fat and low salt) Buttermilk (no more than 1 cup per week)   Meats and Beans   Lean cuts of fresh or frozen beef, veal, lamb, or pork (look for the word loin) Fresh or frozen poultry without the skin Fresh or frozen fish and some shellfish Egg whites and egg substitutes (Limit whole eggs to three per week) Tofu Nuts or seeds (unsalted, dry-roasted), low-sodium peanut butter Dried peas, beans, and lentils   Any smoked, cured, salted, or canned meat, fish, or poultry (including pickett, chipped beef, cold cuts, hot dogs, sausages, sardines, and anchovies) Poultry skins Breaded and/or fried fish or meats Canned peas, beans, and lentils Salted nuts   Fats and Oils   Olive oil and canola oil Low-sodium, low-fat salad dressings and mayonnaise   Butter, margarine, coconut and palm oils, pickett fat   Snacks, Sweets, and Condiments   Low-sodium or unsalted versions of broths, soups, soy sauce, and condiments Pepper, herbs, and spices; vinegar, lemon, or lime juice Low-fat frozen desserts (yogurt, sherbet, fruit bars) Sugar, cocoa powder, honey, syrup, jam, and preserves Low-fat, trans-fat free cookies, cakes, and pies Manoj and animal crackers, fig bars, trina snaps   High-fat desserts Broth, soups, gravies, and sauces, made from instant mixes or other high-sodium ingredients Salted snack foods Canned olives Meat tenderizers, seasoning salt, and most flavored vinegars   Beverages   Low-sodium carbonated beverages Tea and coffee in moderation Soy milk   Commercially softened water   Suggestions   Make whole grains, fruits, and vegetables the base of your diet. Choose heart-healthy fats such as canola, olive, and flaxseed oil, and foods high in heart-healthy fats, such as nuts, seeds, soybeans, tofu, and fish. Eat fish at least twice per week; the fish highest in omega-3 fatty acids and lowest in mercury include salmon, herring, mackerel, sardines, and canned chunk light tuna. If you eat fish less than twice per week or have high triglycerides, talk to your doctor about taking fish oil supplements. Read food labels.    For products low in fat and cholesterol, look for fat free, low-fat, cholesterol free, saturated fat free, and trans fat freeAlso scan the Nutrition Facts Label, which lists saturated fat, trans fat, and cholesterol amounts. For products low in sodium, look for sodium free, very low sodium, low sodium, no added salt, and unsalted   Skip the salt when cooking or at the table; if food needs more flavor, get creative and try out different herbs and spices. Garlic and onion also add substantial flavor to foods. Trim any visible fat off meat and poultry before cooking, and drain the fat off after aparicio. Use cooking methods that require little or no added fat, such as grilling, boiling, baking, poaching, broiling, roasting, steaming, stir-frying, and sauting. Avoid fast food and convenience food. They tend to be high in saturated and trans fat and have a lot of added salt. Talk to a registered dietitian for individualized diet advice. Last Reviewed: March 2011 Leandro Weiss MS, MPH, RD   Updated: 3/29/2011   ·     Keep Your Memory [address]       Many factors can affect your ability to remembera hectic lifestyle, aging, stress, chronic disease, and certain medicines. But, there are steps you can take to sharpen your mind and help preserve your memory. Challenge Your Brain   Regularly challenging your mind may help keeps it in top shape. Good mental exercises include:   Crossword puzzlesUse a dictionary if you need it; you will learn more that way. Brainteasers Try some! Crafts, such as wood working and sewing   Hobbies, such as gardening and building model airplanes   SocializingVisit old friends or join groups to meet new ones. Reading   Learning a new language   Taking a class, whether it be art history or senthil chi   TravelingExperience the food, history, and culture of your destination   Learning to use a computer   Going to museums, the theater, or thought-provoking movies   Changing things in your daily life, such as reversing your pattern in the grocery store or brushing your teeth using your nondominant hand   Use Memory Aids   There is no need to remember every detail on your own.  These memory aids can help:   Calendars and day planners   Electronic organizers to store all sorts of helpful informationThese devices can \"beep\" to remind you of appointments. A book of days to record birthdays, anniversaries, and other occasions that occur on the same date every year   Detailed \"to-do\" lists and strategically placed sticky notes   Quick \"study\" sessionsBefore a gathering, review who will be there so their names will be fresh in your mind. Establish routinesFor example, keep your keys, wallet, and umbrella in the same place all the time or take medicine with your 8:00 AM glass of juice   Live a Healthy Life   Many actions that will keep your body strong will do the same for your mind. For example:   Talk to Your Doctor About Herbs and Supplements    Malnutrition and vitamin deficiencies can impair your mental function. For example, vitamin B12 deficiency can cause a range of symptoms, including confusion. But, what if your nutritional needs are being met? Can herbs and supplements still offer a benefit? Researchers have investigated a range of natural remedies, such as ginkgo , ginseng , and the supplement phosphatidylserine (PS). So far, though, the evidence is inconsistent as to whether these products can improve memory or thinking. If you are interested in taking herbs and supplements, talk to your doctor first because they may interact with other medicines that you are taking. Exercise Regularly    Among the many benefits of regular exercise are increased blood flow to the brain and decreased risk of certain diseases that can interfere with memory function. One study found that even moderate exercise has a beneficial effect. Examples of \"moderate\" exercise include:   Playing 18 holes of golf once a week, without a cart   Playing tennis twice a week   Walking one mile per day   Manage Stress    It can be tough to remember what is important when your mind is cluttered.  Make time for relaxation. Choose activities that calm you down, and make it routine. Manage Chronic Conditions    Side effects of high blood pressure , diabetes, and heart disease can interfere with mental function. Many of the lifestyle steps discussed here can help manage these conditions. Strive to eat a healthy diet, exercise regularly, get stress under control, and follow your doctor's advice for your condition. Minimize Medications    Talk to your doctor about the medicines that you take. Some may be unnecessary. Also, healthy lifestyle habits may lower the need for certain drugs. Last Reviewed: April 2010 Taqueria Levin MD   Updated: 4/13/2010   ·     [address]       As we get older, changes in balance, gait, strength, vision, hearing, and cognition make even the most youthful senior more prone to accidents. Falls are one of the leading health risks for older people. This increased risk of falling is related to:   Aging process (eg, decreased muscle strength, slowed reflexes)   Higher incidence of chronic health problems (eg, arthritis, diabetes) that may limit mobility, agility or sensory awareness   Side effects of medicine (eg, dizziness, blurred vision)especially medicines like prescription pain medicines and drugs used to treat mental health conditions   Depending on the brittleness of your bones, the consequences of a fall can be serious and long lasting. Home Life   Research by the Association of Aging [facility]) shows that some home accidents among older adults can be prevented by making simple lifestyle changes and basic modifications and repairs to the home environment. Here are some lifestyle changes that experts recommend:   Have your hearing and vision checked regularly. Be sure to wear prescription glasses that are right for you. Speak to your doctor or pharmacist about the possible side effects of your medicines. A number of medicines can cause dizziness.    If you have problems with wood floors can be particularly slippery. Stairs should always have handrails and be carpeted or fitted with a non-skid tread. Is your telephone easily reachable. Is the cord safely tucked away? Room by Room   According to the Association of Aging, bathrooms and luke are the two most potentially hazardous rooms in your home. In the Kitchen    Be sure your stove is in proper working order and always make sure burners and the oven are off before you go out or go to sleep. Keep pots on the back burners, turn handles away from the front of the stove, and keep stove clean and free of grease build-up. Kitchen ventilation systems and range exhausts should be working properly. Keep flammable objects such as towels and pot holders away from the cooking area except when in use. Make sure kitchen curtains are tied back. Move cords and appliances away from the sink and hot surfaces. If extension cords are needed, install wiring guides so they do not hang over the sink, range, or working areas. Look for coffee pots, kettles and toaster ovens with automatic shut-offs. Keep a mop handy in the kitchen so you can wipe up spills instantly. You should also have a small fire extinguisher. Arrange your kitchen with frequently used items on lower shelves to avoid the need to stand on a stepstool to reach them. Make sure countertops are well-lit to avoid injuries while cutting and preparing food. In the Bathroom    Use a non-slip mat or decals in the tub and shower, since wet, soapy tile or porcelain surfaces are extremely slippery. Make sure bathroom rugs are non-skid or tape them firmly to the floor. Bathtubs should have at least one, preferably two, grab bars, firmly attached to structural supports in the wall. (Do not use built-in soap holders or glass shower doors as grab bars.)    Tub seats fitted with non-slip material on the legs allow you to wash sitting down.  For people with limited mobility, bathtub transfer benches allow you to slide safely into the tub. Raised toilet seats and toilet safety rails are helpful for those with knee or hip problems. In the [facility]    Make sure you use a nightlight and that the area around your bed is clear of potential obstacles. Be careful with electric blankets and never go to sleep with a heating pad, which can cause serious burns even if on a low setting. Use fire-resistant mattress covers and pillows, and NEVER smoke in bed. Keep a phone next to the bed that is programmed to dial 911 at the push of a button. If you have a chronic condition, you may want to sign on with an automatic call-in service. Typically the system includes a small pendant that connects directly to an emergency medical voice-response system. You should also make arrangements to stay in contact with someonefriend, neighbor, family memberon a regular schedule. Fire Prevention   According to the [employer]. (Smoke Alarms for Every) [address] [place] [address], senior citizens are one of the two highest risk groups for death and serious injuries due to residential fires. When cooking, wear short-sleeved items, never a bulky long-sleeved robe. The [facility]'s Safety Checklist for Older Consumers emphasizes the importance of checking basements, garages, workshops and storage areas for fire hazards, such as volatile liquids, piles of old rags or clothing and overloaded circuits. Never smoke in bed or when lying down on a couch or recliner chair. Small portable electric or kerosene heaters are responsible for many home fires and should be used cautiously if at all. If you do use one, be sure to keep them away from flammable materials. In case of fire, make sure you have a pre-established emergency exit plan. Have a professional check your fireplace and other fuel-burning appliances yearly.     Helping Hands   Baby boomers entering the rankin years will continue to see the development of new products to help older adults live safely and independently in spite of age-related changes. Making Life More Livable  , by Teresa Marshall, lists over 1,000 products for \"living well in the mature years,\" such as bathing and mobility aids, household security devices, ergonomically designed knives and peelers, and faucet valves and knobs for temperature control. Medical supply stores and organizations are good sources of information about products that improve your quality of life and insure your safety.      Last Reviewed: November 2009 Rea Opitz, MD   Updated: 3/7/2011     ·

## 2020-11-24 NOTE — PROGRESS NOTES
BLOOD GLUCOSE MONITOR) PARAS TEST TWICE DAILY. DX: E11.9  Rebecca Eckert MD   EMBRACE LANCETS ULTRA THIN 30G MISC TEST TWICE DAILY.  DX: E11.9  Rebecca Eckert MD   vitamin B-12 (CYANOCOBALAMIN) 1000 MCG tablet Take 1,000 mcg by mouth daily  Historical Provider, MD   rosuvastatin (CRESTOR) 10 MG tablet Take 10 mg by mouth daily  Historical Provider, MD   B-D INS SYR ULTRAFINE 1CC/30G 30G X 1/2\" 1 ML MISC USE WITH LANTUS NIGHTLY  Rebecca Eckert MD   TRUETEST TEST strip TEST DAILY  Rebecca Eckert MD       Past Medical History:   Diagnosis Date    A-fib [facility])     Chronic hepatitis C without hepatic coma ([facility] [address] 10/23/2015    Closed displaced fracture of right patella 11/8/2017    Closed nondisplaced fracture of styloid process of left radius 10/30/2017    Dental disease     Dizziness     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 ([facility] [address]     Esophageal reflux     Fracture of nasal bone     Headache     Hearing loss     Hyperlipidemia     Hyperlipidemia associated with type 2 diabetes mellitus ([address] 10/23/2015    Hypertension     Hypertrophy of prostate without urinary obstruction and other lower urinary tract symptoms (LUTS) 11/1/2011    Hypothyroidism     Insufficiency fracture of tibia 9/6/2016    Osteoarthrosis, not specified whether generalized/localized, lower leg     Pain in joint, lower leg     Right knee pain 11/8/2017    Status post total left knee replacement 7/17/2017    Tinnitus     Type 2 diabetes mellitus 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 edema ([address] 10/23/2015    Type II or unspecified type diabetes mellitus without mention of complication, not stated as uncontrolled     Unspecified viral hepatitis C without hepatic coma        Past Surgical History:   Procedure Laterality Date    CHOLECYSTECTOMY, LAPAROSCOPIC      COLONOSCOPY  12/14/07    ESOPHAGUS SURGERY      esophagus endoscopy    EYE SURGERY      JOINT REPLACEMENT Left 07/17/2017    LEFT TOTAL KNEE REPLACEMENT      KNEE ARTHROSCOPY      OTHER SURGICAL HISTORY Left 11/14/2016    LEFT KNEE DIRECT VIDEO ARTHROSCOPY, MEDIAL MENISECTOMY, CHONDROPLASTY, INTERNAL FIXATION OF MEDIAL TIBIAL INSUFFICIENCY FRACTURE WITH BONE SUBSTITUTE    TONSILLECTOMY         Family History   Problem Relation Age of Onset    Cancer Mother     Kidney Disease Brother         on dialasis    Diabetes type 2  Brother     Heart Disease Brother     Breast Cancer Sister        CareTeam (Including outside providers/suppliers regularly involved in providing care):   Patient Care Team:  Racquel Richardson MD as PCP - Maile Devlin MD as PCP - Hematology/Oncology (Hematology and Oncology)  Racquel Richardson MD as PCP - [facility] Empaneled Provider  Yossi Hahn MD (Orthopedic Surgery)  Renee Gitelman, MD as Referring Physician (Gastroenterology)    Wt Readings from Last 3 Encounters:   11/24/20 256 lb (116.1 kg)   09/09/20 249 lb (112.9 kg)   08/17/20 247 lb (112 kg)     Vitals:    11/24/20 0801   Temp: 97.5 °F (36.4 °C)   Weight: 256 lb (116.1 kg)   Height: 5' 11\" (1.803 m)     Body mass index is 35.7 kg/m². Based upon direct observation of the patient, evaluation of cognition reveals recent and remote memory intact.     General Appearance: alert and oriented to person, place and time, well developed and well- nourished, in no acute distress  Skin: warm and dry, no rash or erythema  Head: normocephalic and atraumatic  Eyes: pupils equal, round, and reactive to light, extraocular eye movements intact, conjunctivae normal  ENT: tympanic membrane, external ear and ear canal normal bilaterally, nose without deformity, nasal mucosa and turbinates normal without polyps  Neck: supple and non-tender without mass, no thyromegaly or thyroid nodules, no cervical lymphadenopathy  Pulmonary/Chest: clear to auscultation bilaterally- no wheezes, rales or rhonchi, normal air movement, no respiratory distress  Cardiovascular: normal rate, regular rhythm, normal S1 and S2, no murmurs, rubs, clicks, or gallops, distal pulses intact, no carotid bruits  Abdomen: soft, non-tender, non-distended, normal bowel sounds, no masses or organomegaly  Extremities: no cyanosis, clubbing. He has 2+ edema - chronic  Musculoskeletal: normal range of motion, no joint swelling, deformity or tenderness  Neurologic: reflexes normal and symmetric, no cranial nerve deficit, gait, coordination and speech normal    Patient's complete Health Risk Assessment and screening values have been reviewed and are found in Flowsheets. The following problems were reviewed today and where indicated follow up appointments were made and/or referrals ordered. Positive Risk Factor Screenings with Interventions:     General Health and ACP:  General  In general, how would you say your health is?: Fair  In the past 7 days, have you experienced any of the following? New or Increased Pain, New or Increased Fatigue, Loneliness, Social Isolation, Stress or Anger?: (!) New or Increased Pain, Stress(back pain)  Do you get the social and emotional support that you need?: (!) No  Do you have a Living Will?: Yes  Advance Directives     Power of  Living Will ACP-Advance Directive ACP-Power of     Not on File [place] on 08/27/19 Filed [address] [facility] Risk Interventions:  · Pain issues: his back is bothering him as he is not active. It feels better when he is active.   · Social isolation: due to the pandemic    Health Habits/Nutrition:  Health Habits/Nutrition  Do you exercise for at least 20 minutes 2-3 times per week?: Yes  Have you lost any weight without trying in the past 3 months?: No  Do you eat fewer than 2 meals per day?: No  Have you seen a dentist within the past year?: (n/a)  Body mass index: (!) 35.7  Health Habits/Nutrition Interventions:  · Nutritional issues:  educational materials for healthy, well-balanced diet

## 2020-11-25 LAB
ESTIMATED AVERAGE GLUCOSE: 131.2 MG/DL
HBA1C MFR BLD: 6.2 %

## 2020-12-09 ENCOUNTER — OFFICE VISIT (OUTPATIENT)
Dept: ENT CLINIC | Age: 80
End: 2020-12-09
Payer: MEDICARE

## 2020-12-09 VITALS
SYSTOLIC BLOOD PRESSURE: 139 MMHG | TEMPERATURE: 98.2 F | WEIGHT: 258.4 LBS | HEIGHT: 72 IN | BODY MASS INDEX: 35 KG/M2 | HEART RATE: 86 BPM | DIASTOLIC BLOOD PRESSURE: 80 MMHG

## 2020-12-09 PROCEDURE — G8484 FLU IMMUNIZE NO ADMIN: HCPCS | Performed by: OTOLARYNGOLOGY

## 2020-12-09 PROCEDURE — G8427 DOCREV CUR MEDS BY ELIG CLIN: HCPCS | Performed by: OTOLARYNGOLOGY

## 2020-12-09 PROCEDURE — 1123F ACP DISCUSS/DSCN MKR DOCD: CPT | Performed by: OTOLARYNGOLOGY

## 2020-12-09 PROCEDURE — 99213 OFFICE O/P EST LOW 20 MIN: CPT | Performed by: OTOLARYNGOLOGY

## 2020-12-09 PROCEDURE — G8417 CALC BMI ABV UP PARAM F/U: HCPCS | Performed by: OTOLARYNGOLOGY

## 2020-12-09 PROCEDURE — 4040F PNEUMOC VAC/ADMIN/RCVD: CPT | Performed by: OTOLARYNGOLOGY

## 2020-12-09 PROCEDURE — 1036F TOBACCO NON-USER: CPT | Performed by: OTOLARYNGOLOGY

## 2020-12-09 RX ORDER — FLUTICASONE PROPIONATE 50 MCG
1 SPRAY, SUSPENSION (ML) NASAL 2 TIMES DAILY
Qty: 1 BOTTLE | Refills: 5 | Status: SHIPPED | OUTPATIENT
Start: 2020-12-09 | End: 2021-03-04

## 2020-12-09 NOTE — PROGRESS NOTES
Hypertrophy of prostate without urinary obstruction and other lower urinary tract symptoms (LUTS) 2011    Hypothyroidism     Insufficiency fracture of tibia 2016    Osteoarthrosis, not specified whether generalized/localized, lower leg     Pain in joint, lower leg     Right knee pain 2017    Status post total left knee replacement 2017    Tinnitus     Type 2 diabetes mellitus 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 edema ([facility] [address] 10/23/2015    Type II or unspecified type diabetes mellitus without mention of complication, not stated as uncontrolled     Unspecified viral hepatitis C without hepatic coma        Past Surgical History     Past Surgical History:   Procedure Laterality Date    CHOLECYSTECTOMY, LAPAROSCOPIC      COLONOSCOPY  07    ESOPHAGUS SURGERY      esophagus endoscopy    EYE SURGERY      JOINT REPLACEMENT Left 2017    LEFT TOTAL KNEE REPLACEMENT      KNEE ARTHROSCOPY      OTHER SURGICAL HISTORY Left 2016    LEFT KNEE DIRECT VIDEO ARTHROSCOPY, MEDIAL MENISECTOMY, CHONDROPLASTY, INTERNAL FIXATION OF MEDIAL TIBIAL INSUFFICIENCY FRACTURE WITH BONE SUBSTITUTE    TONSILLECTOMY         Family History     Family History   Problem Relation Age of Onset    Cancer Mother     Kidney Disease Brother         on dialasis    Diabetes type 2  Brother     Heart Disease Brother     Breast Cancer Sister        Social History     Social History     Tobacco Use    Smoking status: Former Smoker     Packs/day: 0.50     Years: 30.00     Pack years: 15.00     Last attempt to quit: 1990     Years since quittin.9    Smokeless tobacco: Former User     Quit date: 10/3/1977   Substance Use Topics    Alcohol use: Yes     Comment: social    Drug use: No        Allergies     No Known Allergies    Medications     Current Outpatient Medications   Medication Sig Dispense Refill    pioglitazone (ACTOS) 30 MG tablet TAKE 1 TABLET DAILY 90 tablet 0  LANTUS 100 UNIT/ML injection vial INJECT 50 UNITS UNDER THE SKIN AT NIGHT 50 mL 3    apixaban (ELIQUIS) 5 MG TABS tablet TAKE 1 TABLET TWICE A  tablet 0    blood glucose test strips (ONETOUCH ULTRA) strip TEST TWICE A DAY. DX;E11.9 200 each 3    INVOKANA 100 MG TABS tablet TAKE 1 TABLET DAILY 90 tablet 0    levothyroxine (SYNTHROID) 100 MCG tablet TAKE 1 TABLET DAILY 90 tablet 0    fluticasone (FLONASE) 50 MCG/ACT nasal spray 1 spray by Each Nostril route 2 times daily 1 Bottle 2    budesonide (PULMICORT) 0.5 MG/2ML nebulizer suspension Use 1 ampule (2cc) in saline irrigations twice daily 60 ampule 3    Hypertonic Nasal Wash (SINUS RINSE) PACK 1 Bottle by Nasal route 2 times daily 1 each 1    hydrochlorothiazide (HYDRODIURIL) 12.5 MG tablet TAKE 1 TABLET DAILY 90 tablet 3    tamsulosin (FLOMAX) 0.4 MG capsule TAKE 1 CAPSULE DAILY 90 capsule 4    diltiazem (CARTIA XT) 120 MG extended release capsule TAKE 1 CAPSULE DAILY 90 capsule 4    Blood Glucose Monitoring Suppl ([employer] BLOOD GLUCOSE MONITOR) PARAS TEST TWICE DAILY. DX: E11.9 1 Device 0    EMBRACE LANCETS ULTRA THIN 30G MISC TEST TWICE DAILY. DX: E11.9 200 each 3    vitamin B-12 (CYANOCOBALAMIN) 1000 MCG tablet Take 1,000 mcg by mouth daily      rosuvastatin (CRESTOR) 10 MG tablet Take 10 mg by mouth daily      B-D INS SYR ULTRAFINE 1CC/30G 30G X 1/2\" 1 ML MISC USE WITH LANTUS NIGHTLY 90 each 11    TRUETEST TEST strip TEST DAILY 100 strip 3     No current facility-administered medications for this visit.         Review of Systems     REVIEW OF SYSTEMS  The following systems were reviewed and revealed the following in addition to any already discussed in the HPI:    CONSTITUTIONAL: No weight loss, no fever, no night sweats, no chills  EYES: no vision changes, no blurry vision  EARS: no changes in hearing, no otalgia  NOSE: no epistaxis, no purulent rhinorrhea  RESPIRATORY: no difficulty breathing, no shortness of breath  CV: no chest pain, no peripheral vascular disease  HEME: No coagulation disorder, no bleeding disorder  NEURO: No TIA or stroke-like symptoms  SKIN: No new rashes in the head and neck, no recent skin cancers  MOUTH: No new ulcers, no recent teeth infections  GASTROINTESTINAL: No diarrhea, stomach pain  PSYCH: No anxiety, no depression    PhysicalExam     Vitals:    12/09/20 0936   BP: 139/80   Site: Left Upper Arm   Position: Sitting   Cuff Size: Large Adult   Pulse: 86   Temp: 98.2 °F (36.8 °C)   TempSrc: Infrared   Weight: 258 lb 6.4 oz (117.2 kg)   Height: 5' 11.5\" (1.816 m)       PHYSICAL EXAM  /80 (Site: Left Upper Arm, Position: Sitting, Cuff Size: Large Adult)   Pulse 86   Temp 98.2 °F (36.8 °C) (Infrared)   Ht 5' 11.5\" (1.816 m)   Wt 258 lb 6.4 oz (117.2 kg)   BMI 35.54 kg/m²     GENERAL: No Acute Distress, Alert and Oriented, no hoarseness  EYES: EOMI, Anti-icteric  NOSE: On anterior rhinoscopy there is no epistaxis, nasal mucosa within normal limits, no purulent drainage - good nasal airflow bilaterally  EARS: Normal external appearance bilaterally (hearing aids in place)  FACE: 1/6 House-Brackmann Scale, symmetric, sensation equal bilaterally  ORAL CAVITY: No masses or lesions palpated, uvula is midline, moist mucous membranes, 0+ tonsils, absent dentition  NECK: Normal range of motion, no thyromegaly, trachea is midline, no lymphadenopathy, no neck masses, no crepitus  CHEST: Normal respiratory effort, no retractions, breathing comfortably. SKIN: No rashes, normal appearing skin, no evidence of skin lesions/tumors  NEURO: CN 2, 3, 4, 5, 6, 7, 11, 12 intact bilaterally     Procedure     Nasal Endoscopy    Pre OP: Left nasal polyposis  Post OP: Same  Procedure: Nasal endoscopy     After obtaining verbal consent from the patient 2% lidocaine with afrin was sprayed into the nasal cavities. After allowing a time for anesthesia, a 0-degree nasal endoscope was used to visualize the nasal passages.   The septum, inferior, and middle turbinates were examined. The middle meatus, and sphenoethmoid recess was examined bilaterally. Cultures were not obtained. There were no complications.      Pertinent positives included: There was not edema and purulence at the right middle meatus, although a right septal spur was noted and polyps and sphenoethmoid recess. Polyps were identified in the left nasal passage stable since prior visit- extending from the bulla ethmoidalis and the posterior ethmoid cells medially. Masses were not identified. Small amount of bloody crusting over the left anterior Kiesselbach plexus; Surgicel was placed over this.     Tolerated well without complication. I attest that I was present for and did the entire procedure myself. Previous images:        PREVIOUS IMAGES (FROM PRIOR VISIT)                Assessment and Plan     1. Nasal polyposis  Patient with nasal polyposis of the left nasal passage - improvement on Flonase, budesonide rinses, Singulair (stopped at last visit). Previous debridement of obstructing polyps with histopathology showing no malignancy. -Stable character in nasal patency at this visit-polyps noted extending from the bulla ethmoidalis and posterior ethmoid tract medially, however no nasal passage obstruction at this time  -Continue budesonide rinses, Flonase  -Follow-up in 6-months -at this time the patient is not interested in surgery or immunotherapy    Daljit Arzate MD  [address] [place] [address],[address]  Department of Otolaryngology/Head and Neck Surgery  12/9/20    I have performed a head and neck physical exam personally or was physically present during the key or critical portions of the service. Medical Decision Making:   The following items were considered in medical decision making:  Independent review of images  Review / order clinical lab tests  Review / order radiology tests  Decision to obtain old records  Review and summation of old records as accessed through [address] (a summary of my findings in these old records: none)     Portions of this note were dictated using Dragon.  There may be linguistic errors secondary to the use of this program.

## 2021-03-01 RX ORDER — PIOGLITAZONEHYDROCHLORIDE 30 MG/1
TABLET ORAL
Qty: 90 TABLET | Refills: 0 | Status: SHIPPED | OUTPATIENT
Start: 2021-03-01 | End: 2021-06-01

## 2021-03-04 ENCOUNTER — TELEPHONE (OUTPATIENT)
Dept: ENT CLINIC | Age: 81
End: 2021-03-04

## 2021-03-04 DIAGNOSIS — J33.9 NASAL POLYPOSIS: Primary | ICD-10-CM

## 2021-03-04 RX ORDER — FLUTICASONE PROPIONATE 50 MCG
1 SPRAY, SUSPENSION (ML) NASAL 2 TIMES DAILY
Qty: 2 BOTTLE | Refills: 5 | Status: SHIPPED | OUTPATIENT
Start: 2021-03-04 | End: 2022-06-13

## 2021-03-04 NOTE — TELEPHONE ENCOUNTER
Called and notified patient of the prescription placed for Flonase. Patient expressed understanding.

## 2021-04-09 ENCOUNTER — OFFICE VISIT (OUTPATIENT)
Dept: INTERNAL MEDICINE CLINIC | Age: 81
End: 2021-04-09

## 2021-04-09 VITALS
BODY MASS INDEX: 36.54 KG/M2 | HEART RATE: 70 BPM | WEIGHT: 261 LBS | RESPIRATION RATE: 12 BRPM | DIASTOLIC BLOOD PRESSURE: 80 MMHG | TEMPERATURE: 98.3 F | HEIGHT: 71 IN | SYSTOLIC BLOOD PRESSURE: 120 MMHG

## 2021-04-09 DIAGNOSIS — B18.2 CHRONIC HEPATITIS C WITHOUT HEPATIC COMA (HCC): ICD-10-CM

## 2021-04-09 DIAGNOSIS — N18.31 TYPE 2 DIABETES MELLITUS WITH STAGE 3A CHRONIC KIDNEY DISEASE, WITHOUT LONG-TERM CURRENT USE OF INSULIN (HCC): Primary | ICD-10-CM

## 2021-04-09 DIAGNOSIS — D47.2 MGUS (MONOCLONAL GAMMOPATHY OF UNKNOWN SIGNIFICANCE): ICD-10-CM

## 2021-04-09 DIAGNOSIS — N39.45 CONTINUOUS LEAKAGE OF URINE: ICD-10-CM

## 2021-04-09 DIAGNOSIS — E11.22 TYPE 2 DIABETES MELLITUS WITH STAGE 3A CHRONIC KIDNEY DISEASE, WITHOUT LONG-TERM CURRENT USE OF INSULIN (HCC): Primary | ICD-10-CM

## 2021-04-09 DIAGNOSIS — E11.69 HYPERLIPIDEMIA ASSOCIATED WITH TYPE 2 DIABETES MELLITUS (HCC): ICD-10-CM

## 2021-04-09 DIAGNOSIS — G25.0 BENIGN ESSENTIAL TREMOR: ICD-10-CM

## 2021-04-09 DIAGNOSIS — E78.5 HYPERLIPIDEMIA ASSOCIATED WITH TYPE 2 DIABETES MELLITUS (HCC): ICD-10-CM

## 2021-04-09 DIAGNOSIS — I48.0 PAF (PAROXYSMAL ATRIAL FIBRILLATION) (HCC): ICD-10-CM

## 2021-04-09 DIAGNOSIS — E11.3293 TYPE 2 DIABETES MELLITUS WITH BOTH EYES AFFECTED BY MILD NONPROLIFERATIVE RETINOPATHY WITHOUT MACULAR EDEMA, WITH LONG-TERM CURRENT USE OF INSULIN (HCC): ICD-10-CM

## 2021-04-09 DIAGNOSIS — E03.9 ACQUIRED HYPOTHYROIDISM: ICD-10-CM

## 2021-04-09 DIAGNOSIS — Z79.4 TYPE 2 DIABETES MELLITUS WITH BOTH EYES AFFECTED BY MILD NONPROLIFERATIVE RETINOPATHY WITHOUT MACULAR EDEMA, WITH LONG-TERM CURRENT USE OF INSULIN (HCC): ICD-10-CM

## 2021-04-09 DIAGNOSIS — E66.01 MORBIDLY OBESE (HCC): ICD-10-CM

## 2021-04-09 DIAGNOSIS — I10 ESSENTIAL HYPERTENSION: ICD-10-CM

## 2021-04-09 PROCEDURE — 99214 OFFICE O/P EST MOD 30 MIN: CPT | Performed by: INTERNAL MEDICINE

## 2021-04-09 PROCEDURE — 1123F ACP DISCUSS/DSCN MKR DOCD: CPT | Performed by: INTERNAL MEDICINE

## 2021-04-09 PROCEDURE — G8427 DOCREV CUR MEDS BY ELIG CLIN: HCPCS | Performed by: INTERNAL MEDICINE

## 2021-04-09 PROCEDURE — 1036F TOBACCO NON-USER: CPT | Performed by: INTERNAL MEDICINE

## 2021-04-09 PROCEDURE — 4040F PNEUMOC VAC/ADMIN/RCVD: CPT | Performed by: INTERNAL MEDICINE

## 2021-04-09 PROCEDURE — G8417 CALC BMI ABV UP PARAM F/U: HCPCS | Performed by: INTERNAL MEDICINE

## 2021-04-09 ASSESSMENT — ENCOUNTER SYMPTOMS
ABDOMINAL PAIN: 0
WHEEZING: 0
SHORTNESS OF BREATH: 0
BACK PAIN: 0
VOMITING: 0
NAUSEA: 0
RHINORRHEA: 0

## 2021-04-09 NOTE — PROGRESS NOTES
Subjective:      Patient ID: Ijeoma Grimes is a 68 y.o. male. HPI  Patient is here for follow up diabetes, hypertension, hyperlipidemia, GERD, OA and hepatitis C. Diabetes Mellitus Type 2: Current symptoms/problems include none. Medication compliance:  compliant most of the time  Diabetic diet compliance:  compliant most of the time,  Weight trend: increasing-up by 6 lbs  Current exercise: walks 3 time(s) per week    Home blood sugar records: fasting range:  mg/dl  Any episodes of hypoglycemia? none  Eye exam current (within one year): yes   reports that he quit smoking about 31 years ago. He has a 15.00 pack-year smoking history. He quit smokeless tobacco use about 43 years ago. Daily Aspirin? No  Known diabetic complications: nephropathy and retinopathy    Hypertension:  Blood pressure typically runs normal outside of the office. He is adherent to a low sodium diet. Patient denies chest pain, dry cough, fatigue. Antihypertensive medication side effects: no medication side effects noted. Use of agents associated with hypertension: none. Hyperlipidemia:  No new myalgias or GI upset on atorvastatin (Lipitor). Lab Results   Component Value Date    LABA1C 6.2 11/24/2020    LABA1C 6.6 08/17/2020    LABA1C 7.0 05/05/2020     Lab Results   Component Value Date    LABMICR YES 01/09/2020    CREATININE 1.2 11/24/2020     Lab Results   Component Value Date    ALT 32 11/24/2020    AST 35 11/24/2020     No components found for: CHLPL  Lab Results   Component Value Date    TRIG 81 11/24/2020     Lab Results   Component Value Date    HDL 58 11/24/2020     Lab Results   Component Value Date    LDLCALC 85 11/24/2020     . No heartburn. He has hypothyroidism. It is stable. No constipation or diarrhea. He has CKD from DM. He has diabetic retinopathy. Eyes stable. He has noted urinary incontinence for two months. He leaks a lot of urine.        Review of Systems   Constitutional: Negative for activity change and appetite change. HENT: Negative for postnasal drip and rhinorrhea. Respiratory: Negative for shortness of breath and wheezing. Cardiovascular: Positive for leg swelling. Negative for chest pain and palpitations. Gastrointestinal: Negative for abdominal pain, nausea and vomiting. Genitourinary: Negative for difficulty urinating, frequency and hematuria. Musculoskeletal: Negative for back pain and joint swelling. Skin: Negative for rash. Neurological: Positive for tremors. Negative for light-headedness. Psychiatric/Behavioral: Negative for sleep disturbance. Current Outpatient Medications:     INVOKANA 100 MG TABS tablet, TAKE 1 TABLET DAILY, Disp: 90 tablet, Rfl: 0    levothyroxine (SYNTHROID) 100 MCG tablet, TAKE 1 TABLET DAILY, Disp: 90 tablet, Rfl: 0    dilTIAZem (CARDIZEM CD) 120 MG extended release capsule, TAKE 1 CAPSULE DAILY, Disp: 90 capsule, Rfl: 0    tamsulosin (FLOMAX) 0.4 MG capsule, TAKE 1 CAPSULE DAILY, Disp: 90 capsule, Rfl: 0    fluticasone (FLONASE) 50 MCG/ACT nasal spray, 1 spray by Each Nostril route 2 times daily, Disp: 2 Bottle, Rfl: 5    pioglitazone (ACTOS) 30 MG tablet, TAKE 1 TABLET DAILY, Disp: 90 tablet, Rfl: 0    apixaban (ELIQUIS) 5 MG TABS tablet, TAKE 1 TABLET TWICE A DAY, Disp: 180 tablet, Rfl: 0    LANTUS 100 UNIT/ML injection vial, INJECT 50 UNITS UNDER THE SKIN AT NIGHT, Disp: 50 mL, Rfl: 3    blood glucose test strips (ONETOUCH ULTRA) strip, TEST TWICE A DAY. DX;E11.9, Disp: 200 each, Rfl: 3    budesonide (PULMICORT) 0.5 MG/2ML nebulizer suspension, Use 1 ampule (2cc) in saline irrigations twice daily, Disp: 60 ampule, Rfl: 3    Hypertonic Nasal Wash (SINUS RINSE) PACK, 1 Bottle by Nasal route 2 times daily, Disp: 1 each, Rfl: 1    hydrochlorothiazide (HYDRODIURIL) 12.5 MG tablet, TAKE 1 TABLET DAILY, Disp: 90 tablet, Rfl: 3    Blood Glucose Monitoring Suppl ([employer] BLOOD GLUCOSE MONITOR) PARAS, TEST TWICE DAILY.  DX: E11.9, Disp: 1 Device, Rfl: 0    EMBRACE LANCETS ULTRA THIN 30G MISC, TEST TWICE DAILY. DX: E11.9, Disp: 200 each, Rfl: 3    vitamin B-12 (CYANOCOBALAMIN) 1000 MCG tablet, Take 1,000 mcg by mouth daily, Disp: , Rfl:     rosuvastatin (CRESTOR) 10 MG tablet, Take 10 mg by mouth daily, Disp: , Rfl:     B-D INS SYR ULTRAFINE 1CC/30G 30G X 1/2\" 1 ML MISC, USE WITH LANTUS NIGHTLY, Disp: 90 each, Rfl: 11    TRUETEST TEST strip, TEST DAILY, Disp: 100 strip, Rfl: 3        There are no changes to past medical history, family history, social history or review of systems(except as noted in the history section) since prior note (all reviewed with patient). /80 (Site: Right Upper Arm, Position: Sitting, Cuff Size: Medium Adult)   Pulse 70   Temp 98.3 °F (36.8 °C)   Resp 12   Ht 5' 11\" (1.803 m)   Wt 261 lb (118.4 kg)   BMI 36.40 kg/m²        Objective:   Physical Exam   Constitutional: He is oriented to person, place, and time. He appears well-developed and well-nourished. HENT:   Head: Normocephalic. Eyes: Pupils are equal, round, and reactive to light. Conjunctivae and EOM are normal.   Neck: Trachea normal and normal range of motion. Neck supple. No JVD present. Carotid bruit is not present. No thyroid mass and no thyromegaly present. Cardiovascular: Normal rate and normal heart sounds. An irregularly irregular rhythm present. Exam reveals no gallop. No murmur heard. Pulmonary/Chest: Effort normal and breath sounds normal. No respiratory distress. He has no wheezes. He has no rales. Abdominal: Soft. Bowel sounds are normal. He exhibits no distension and no mass. There is no splenomegaly or hepatomegaly. There is no abdominal tenderness. Musculoskeletal:         General: Edema present. Lymphadenopathy:     He has no cervical adenopathy. Neurological: He is alert and oriented to person, place, and time. He displays tremor (right upper extremity at rest). Skin: Skin is warm and dry.  No rash noted.   Psychiatric: He has a normal mood and affect. His behavior is normal. Judgment and thought content normal.       Assessment:       Diagnosis Orders   1. Type 2 diabetes mellitus with stage 3a chronic kidney disease, without long-term current use of insulin ([facility])  Ana María Diego MD, Urology, [facility]   2. Acquired hypothyroidism  TSH WITH REFLEX TO FT4   3. Essential hypertension     4. Benign essential tremor     5. Hyperlipidemia associated with type 2 diabetes mellitus ([address]     6. Type 2 diabetes mellitus with both eyes affected by mild nonproliferative retinopathy without macular edema, with long-term current use of insulin ([facility])     7. Morbidly obese ([address]     8. PAF (paroxysmal atrial fibrillation) ([address]     9. MGUS (monoclonal gammopathy of unknown significance)     10. Chronic hepatitis C without hepatic coma ([facility])     11. Continuous leakage of urine            Plan:      Check labs. DM type 2: controlled. Stressed diet and exercise. On Lantus and Novolog. Check labs. Nasal polyposis. Improved. Hypertension: at goal.  He is on Prinivil and HCTZ. Tremor: unchanged. Not on any medication. Hyperlipidemia: at goal.  Hypothyroidism:  Patient's hypothyroidism is stable on replacement therapy. Check TSH. A fib:  He has seen cardiology. On Eliquis. Morbid Obesity  - Body mass index is 36.4 kg/m². - Complicating assessment and treatment. Placing patient at risk for multiple co-morbidities as well as early death and contributing to the patient's presentation.   - Counseled on weight loss. See urology for urinary incontinence. Discussed use, benefit, and side effects of prescribed medications. Barriers to medication compliance addressed. All patient questions answered. Pt voiced understanding. Decrease calorie intake. Exercise,weight loss recommended. The current medical regimen is effective;  continue present plan and medications. See orders.

## 2021-04-10 LAB — TSH REFLEX FT4: 0.88 UIU/ML (ref 0.27–4.2)

## 2021-05-28 RX ORDER — LEVOTHYROXINE SODIUM 0.1 MG/1
TABLET ORAL
Qty: 90 TABLET | Refills: 3 | Status: SHIPPED | OUTPATIENT
Start: 2021-05-28 | End: 2022-05-31

## 2021-05-28 RX ORDER — CANAGLIFLOZIN 100 MG/1
TABLET, FILM COATED ORAL
Qty: 90 TABLET | Refills: 3 | Status: SHIPPED | OUTPATIENT
Start: 2021-05-28 | End: 2022-03-01 | Stop reason: ALTCHOICE

## 2021-06-01 RX ORDER — HYDROCHLOROTHIAZIDE 12.5 MG/1
TABLET ORAL
Qty: 90 TABLET | Refills: 3 | Status: SHIPPED | OUTPATIENT
Start: 2021-06-01 | End: 2022-06-01

## 2021-06-01 RX ORDER — PIOGLITAZONEHYDROCHLORIDE 30 MG/1
TABLET ORAL
Qty: 90 TABLET | Refills: 0 | Status: SHIPPED | OUTPATIENT
Start: 2021-06-01 | End: 2021-08-30

## 2021-06-09 ENCOUNTER — OFFICE VISIT (OUTPATIENT)
Dept: ENT CLINIC | Age: 81
End: 2021-06-09
Payer: MEDICARE

## 2021-06-09 VITALS — HEIGHT: 71 IN | BODY MASS INDEX: 36.4 KG/M2 | WEIGHT: 260 LBS

## 2021-06-09 DIAGNOSIS — J33.9 LEFT NASAL POLYPS: Primary | ICD-10-CM

## 2021-06-09 PROCEDURE — 1123F ACP DISCUSS/DSCN MKR DOCD: CPT | Performed by: OTOLARYNGOLOGY

## 2021-06-09 PROCEDURE — 1036F TOBACCO NON-USER: CPT | Performed by: OTOLARYNGOLOGY

## 2021-06-09 PROCEDURE — 31231 NASAL ENDOSCOPY DX: CPT | Performed by: OTOLARYNGOLOGY

## 2021-06-09 PROCEDURE — G8417 CALC BMI ABV UP PARAM F/U: HCPCS | Performed by: OTOLARYNGOLOGY

## 2021-06-09 PROCEDURE — G8427 DOCREV CUR MEDS BY ELIG CLIN: HCPCS | Performed by: OTOLARYNGOLOGY

## 2021-06-09 PROCEDURE — 4040F PNEUMOC VAC/ADMIN/RCVD: CPT | Performed by: OTOLARYNGOLOGY

## 2021-06-09 NOTE — PROGRESS NOTES
[address] UP VISIT      Patient Name: [address] [place] [address] Record Number:  [id number]  Primary Care Physician:  Tyler Delgado MD    ChiefComplaint     Chief Complaint   Patient presents with    Nasal Congestion     Patient is here to follow up on nasal polyposis. He denies any current symptoms. History of Present Illness     Mariah Isaac is an 68 y.o. male previously seen for nasal obstruction/congestion for the past 7 months with significant burden of left-sided sinonasal polyposis. On rigid nasal endoscopy multiple polyps of the left nasal passage were noted - the right side appeared clear apart from a right septal spur extending into the middle meatus. He has since had an office removal of obstructive polyps, with histopathology showing no malignancy. Interval History 9/9/20: Patient has been on budesonide rinses, montelukast oral and fluticasone topical.  He states good bilateral nasal airflow, no active rhinorrhea (clear or purulent), epistaxis, hyposmia/anosmia. No current facial pain or pressure. Interval History 12/9/2020: Continues to be on budesonide rinses and topical Flonase, states no change from last visit-continues to state good bilateral nasal airflow    Interval History 6/9/2021: No change - good nasal airflow bilaterally, continuing budesonide rinses and Flonase.      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)     Esophageal reflux     Fracture of nasal bone     Headache     Hearing loss     Hyperlipidemia     Hyperlipidemia associated with type 2 diabetes mellitus ([facility] [address] 10/23/2015    Hypertension  Hypertrophy of prostate without urinary obstruction and other lower urinary tract symptoms (LUTS) 2011    Hypothyroidism     Insufficiency fracture of tibia 2016    Osteoarthrosis, not specified whether generalized/localized, lower leg     Pain in joint, lower leg     Right knee pain 2017    Status post total left knee replacement 2017    Tinnitus     Type 2 diabetes mellitus 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 edema ([facility] [address] 10/23/2015    Type II or unspecified type diabetes mellitus without mention of complication, not stated as uncontrolled     Unspecified viral hepatitis C without hepatic coma        Past Surgical History     Past Surgical History:   Procedure Laterality Date    CHOLECYSTECTOMY, LAPAROSCOPIC      COLONOSCOPY  07    ESOPHAGUS SURGERY      esophagus endoscopy    EYE SURGERY      JOINT REPLACEMENT Left 2017    LEFT TOTAL KNEE REPLACEMENT      KNEE ARTHROSCOPY      OTHER SURGICAL HISTORY Left 2016    LEFT KNEE DIRECT VIDEO ARTHROSCOPY, MEDIAL MENISECTOMY, CHONDROPLASTY, INTERNAL FIXATION OF MEDIAL TIBIAL INSUFFICIENCY FRACTURE WITH BONE SUBSTITUTE    TONSILLECTOMY         Family History     Family History   Problem Relation Age of Onset    Cancer Mother     Kidney Disease Brother         on dialasis    Diabetes type 2  Brother     Heart Disease Brother     Breast Cancer Sister        Social History     Social History     Tobacco Use    Smoking status: Former Smoker     Packs/day: 0.50     Years: 30.00     Pack years: 15.00     Quit date: 1990     Years since quittin.4    Smokeless tobacco: Former User     Quit date: 10/3/1977   Vaping Use    Vaping Use: Never used   Substance Use Topics    Alcohol use: Yes     Comment: social    Drug use: No        Allergies     No Known Allergies    Medications     Current Outpatient Medications   Medication Sig Dispense Refill    pioglitazone (ACTOS) 30 MG tablet TAKE 1 TABLET DAILY 90 tablet 0    hydroCHLOROthiazide (HYDRODIURIL) 12.5 MG tablet TAKE 1 TABLET DAILY 90 tablet 3    levothyroxine (SYNTHROID) 100 MCG tablet TAKE 1 TABLET DAILY 90 tablet 3    INVOKANA 100 MG TABS tablet TAKE 1 TABLET DAILY 90 tablet 3    apixaban (ELIQUIS) 5 MG TABS tablet TAKE 1 TABLET TWICE A  tablet 3    dilTIAZem (CARDIZEM CD) 120 MG extended release capsule TAKE 1 CAPSULE DAILY 90 capsule 0    tamsulosin (FLOMAX) 0.4 MG capsule TAKE 1 CAPSULE DAILY 90 capsule 0    fluticasone (FLONASE) 50 MCG/ACT nasal spray 1 spray by Each Nostril route 2 times daily 2 Bottle 5    LANTUS 100 UNIT/ML injection vial INJECT 50 UNITS UNDER THE SKIN AT NIGHT 50 mL 3    blood glucose test strips (ONETOUCH ULTRA) strip TEST TWICE A DAY. DX;E11.9 200 each 3    budesonide (PULMICORT) 0.5 MG/2ML nebulizer suspension Use 1 ampule (2cc) in saline irrigations twice daily 60 ampule 3    Hypertonic Nasal Wash (SINUS RINSE) PACK 1 Bottle by Nasal route 2 times daily 1 each 1    Blood Glucose Monitoring Suppl ([employer] BLOOD GLUCOSE MONITOR) PARAS TEST TWICE DAILY. DX: E11.9 1 Device 0    EMBRACE LANCETS ULTRA THIN 30G MISC TEST TWICE DAILY. DX: E11.9 200 each 3    vitamin B-12 (CYANOCOBALAMIN) 1000 MCG tablet Take 1,000 mcg by mouth daily      rosuvastatin (CRESTOR) 10 MG tablet Take 10 mg by mouth daily      B-D INS SYR ULTRAFINE 1CC/30G 30G X 1/2\" 1 ML MISC USE WITH LANTUS NIGHTLY 90 each 11    TRUETEST TEST strip TEST DAILY 100 strip 3     No current facility-administered medications for this visit.        Review of Systems     REVIEW OF SYSTEMS  The following systems were reviewed and revealed the following in addition to any already discussed in the HPI:    CONSTITUTIONAL: No weight loss, no fever, no night sweats, no chills  EYES: no vision changes, no blurry vision  EARS: no changes in hearing, no otalgia  NOSE: no epistaxis, no purulent rhinorrhea  RESPIRATORY: no difficulty breathing, no shortness of breath  CV: no chest pain, no peripheral vascular disease  HEME: No coagulation disorder, no bleeding disorder  NEURO: No TIA or stroke-like symptoms  SKIN: No new rashes in the head and neck, no recent skin cancers  MOUTH: No new ulcers, no recent teeth infections  GASTROINTESTINAL: No diarrhea, stomach pain  PSYCH: No anxiety, no depression    PhysicalExam     Vitals:    06/09/21 1009   Weight: 260 lb (117.9 kg)   Height: 5' 11\" (1.803 m)       PHYSICAL EXAM  Ht 5' 11\" (1.803 m)   Wt 260 lb (117.9 kg)   BMI 36.26 kg/m²     GENERAL: No Acute Distress, Alert and Oriented, no hoarseness  EYES: EOMI, Anti-icteric  NOSE: On anterior rhinoscopy there is no epistaxis, nasal mucosa within normal limits, no purulent drainage - good nasal airflow bilaterally  EARS: Normal external appearance bilaterally (hearing aids in place)  FACE: 1/6 House-Brackmann Scale, symmetric, sensation equal bilaterally  ORAL CAVITY: No masses or lesions palpated, uvula is midline, moist mucous membranes, 0+ tonsils, absent dentition  NECK: Normal range of motion, no thyromegaly, trachea is midline, no lymphadenopathy, no neck masses, no crepitus  CHEST: Normal respiratory effort, no retractions, breathing comfortably. SKIN: No rashes, normal appearing skin, no evidence of skin lesions/tumors  NEURO: CN 2, 3, 4, 5, 6, 7, 11, 12 intact bilaterally     Procedure     Nasal Endoscopy    Pre OP: Left nasal polyposis  Post OP: Same  Procedure: Nasal endoscopy     After obtaining verbal consent from the patient 2% lidocaine with afrin was sprayed into the nasal cavities. After allowing a time for anesthesia, a 45-degree nasal endoscope was used to visualize the nasal passages. The septum, inferior, and middle turbinates were examined. The middle meatus, and sphenoethmoid recess was examined bilaterally. Cultures were not obtained.  There were no complications.      Pertinent positives included:   -Right side: karla bullosa noted, no polyps in the middle meatus or sphenoethmoid recess. Oropharynx clear  -Left side: essentially unchanged - polyps in the middle meatus extending inferior to the middle turbinate medially, no polyps in the inferior meatus/no polypoid change of the inferior turbinate     Tolerated well without complication. I attest that I was present for and did the entire procedure myself. Prior images (essentially no change at this visit)        Previous images:        PREVIOUS IMAGES (FROM PRIOR VISIT)                Assessment and Plan     1. Nasal polyposis  Patient with nasal polyposis of the left nasal passage - improvement on Flonase, budesonide rinses, Singulair (stopped at last visit). Previous debridement of obstructing polyps with histopathology showing no malignancy. -Stable character in nasal patency at this visit-polyps noted extending from the bulla ethmoidalis and posterior ethmoid tract medially, however no nasal passage obstruction at this time  -Continue budesonide rinses, Flonase  -Follow-up BETH Jo MD  [address] [place] [address],[address]  Department of Otolaryngology/Head and Neck Surgery  6/9/21    I have performed a head and neck physical exam personally or was physically present during the key or critical portions of the service. Medical Decision Making: The following items were considered in medical decision making:  Independent review of images  Review / order clinical lab tests  Review / order radiology tests  Decision to obtain old records  Review and summation of old records as accessed through [facility] (a summary of my findings in these old records: none)     Portions of this note were dictated using Dragon.  There may be linguistic errors secondary to the use of this program.

## 2021-06-10 RX ORDER — TAMSULOSIN HYDROCHLORIDE 0.4 MG/1
CAPSULE ORAL
Qty: 90 CAPSULE | Refills: 0 | Status: SHIPPED | OUTPATIENT
Start: 2021-06-10 | End: 2021-09-08

## 2021-06-10 RX ORDER — DILTIAZEM HYDROCHLORIDE 120 MG/1
CAPSULE, COATED, EXTENDED RELEASE ORAL
Qty: 90 CAPSULE | Refills: 0 | Status: SHIPPED | OUTPATIENT
Start: 2021-06-10 | End: 2021-09-08

## 2021-06-17 ENCOUNTER — TELEPHONE (OUTPATIENT)
Dept: INTERNAL MEDICINE CLINIC | Age: 81
End: 2021-06-17

## 2021-06-17 DIAGNOSIS — M25.461 SWOLLEN R KNEE: Primary | ICD-10-CM

## 2021-06-17 NOTE — TELEPHONE ENCOUNTER
----- Message from Jazlyn Romano MD sent at 6/17/2021  2:06 PM EDT -----  Contact: Joaquin [phone]  Xray knee two view  ----- Message -----  From: Sebastian Rushing  Sent: 6/17/2021  12:58 PM EDT  To: MD Dr. Arlette Archer patient:      Gene Roberts fell in back yard this morning about 11:30. He has had 2 knee replacements and his R knee is swollen and he can not walk or drive. He is using ice and has taken Tylenol. Dr. Leslie Pulido from [facility] was his surgeon and they cannot reach him. Please advise.  Thank you

## 2021-06-28 ENCOUNTER — TELEPHONE (OUTPATIENT)
Dept: INTERNAL MEDICINE CLINIC | Age: 81
End: 2021-06-28

## 2021-06-28 ENCOUNTER — TELEPHONE (OUTPATIENT)
Dept: ORTHOPEDIC SURGERY | Age: 81
End: 2021-06-28

## 2021-06-28 NOTE — TELEPHONE ENCOUNTER
----- Message from Ross Page MD sent at 6/28/2021 12:54 PM EDT -----  Contact: Paty Platt  [phone]  Call the ortho who did the knee replacement and make an appt  ----- Message -----  From: Bella Christie  Sent: 6/28/2021   9:40 AM EDT  To: Ross Page MD    Wife called and stated that her  had knee replacement about 4 years ago. Patient fell outside of the weekend and right knee is swollen. They are putting ice on the knee. Knee still hurts. Wife was calling to find out what they should do. Please call and advise.     Oumar Cantu

## 2021-07-01 ENCOUNTER — OFFICE VISIT (OUTPATIENT)
Dept: ORTHOPEDIC SURGERY | Age: 81
End: 2021-07-01
Payer: MEDICARE

## 2021-07-01 VITALS — BODY MASS INDEX: 36.4 KG/M2 | WEIGHT: 260 LBS | HEIGHT: 71 IN

## 2021-07-01 DIAGNOSIS — M25.561 ACUTE PAIN OF RIGHT KNEE: Primary | ICD-10-CM

## 2021-07-01 DIAGNOSIS — S83.411A SPRAIN OF MEDIAL COLLATERAL LIGAMENT OF RIGHT KNEE, INITIAL ENCOUNTER: ICD-10-CM

## 2021-07-01 PROCEDURE — 99203 OFFICE O/P NEW LOW 30 MIN: CPT | Performed by: ORTHOPAEDIC SURGERY

## 2021-07-01 PROCEDURE — 1123F ACP DISCUSS/DSCN MKR DOCD: CPT | Performed by: ORTHOPAEDIC SURGERY

## 2021-07-01 PROCEDURE — G8428 CUR MEDS NOT DOCUMENT: HCPCS | Performed by: ORTHOPAEDIC SURGERY

## 2021-07-01 PROCEDURE — 4040F PNEUMOC VAC/ADMIN/RCVD: CPT | Performed by: ORTHOPAEDIC SURGERY

## 2021-07-01 PROCEDURE — G8417 CALC BMI ABV UP PARAM F/U: HCPCS | Performed by: ORTHOPAEDIC SURGERY

## 2021-07-01 PROCEDURE — 1036F TOBACCO NON-USER: CPT | Performed by: ORTHOPAEDIC SURGERY

## 2021-07-01 NOTE — PROGRESS NOTES
KNEE VISIT      HISTORY OF PRESENT ILLNESS    Jose Ferris is a 68 y.o. male who presents for evaluation of right knee pain. He had a right total knee replacement done there approximately 15 years ago. He had had a fall in the past and had a transverse patella fracture with minimal separation but good integrity of the quadriceps mechanism and he, for a variety of reasons elected not to have a surgical repair. At this time he had a fall recently a few weeks ago and now has some pain medially which she says is getting slightly better. He says he can walk without ambulatory aids and can straighten his leg completely without leg. He says he really does not want to have anything aggressive done because his wife has severe Alzheimer's. ROS    Well-documented patient history form dated 7/1/2021  All other ROS negative except for above.     Past Surgical history    Past Surgical History:   Procedure Laterality Date    CHOLECYSTECTOMY, LAPAROSCOPIC      COLONOSCOPY  12/14/07    ESOPHAGUS SURGERY      esophagus endoscopy    EYE SURGERY      JOINT REPLACEMENT Left 07/17/2017    LEFT TOTAL KNEE REPLACEMENT      KNEE ARTHROSCOPY      OTHER SURGICAL HISTORY Left 11/14/2016    LEFT KNEE DIRECT VIDEO ARTHROSCOPY, MEDIAL MENISECTOMY, CHONDROPLASTY, INTERNAL FIXATION OF MEDIAL TIBIAL INSUFFICIENCY FRACTURE WITH BONE SUBSTITUTE    TONSILLECTOMY         PAST MEDICAL    Past Medical History:   Diagnosis Date    A-fib [facility])     Chronic hepatitis C without hepatic coma ([facility] [address] 10/23/2015    Closed displaced fracture of right patella 11/8/2017    Closed nondisplaced fracture of styloid process of left radius 10/30/2017    Dental disease     Dizziness     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)     Esophageal reflux     Fracture of nasal bone     Headache     Hearing loss     Hyperlipidemia     Hyperlipidemia associated with type 2 diabetes mellitus ([address] 10/23/2015    Hypertension     Hypertrophy of prostate without urinary obstruction and other lower urinary tract symptoms (LUTS) 11/1/2011    Hypothyroidism     Insufficiency fracture of tibia 9/6/2016    Osteoarthrosis, not specified whether generalized/localized, lower leg     Pain in joint, lower leg     Right knee pain 11/8/2017    Status post total left knee replacement 7/17/2017    Tinnitus     Type 2 diabetes mellitus 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 edema ([facility] [address] 10/23/2015    Type II or unspecified type diabetes mellitus without mention of complication, not stated as uncontrolled     Unspecified viral hepatitis C without hepatic coma        Allergies    No Known Allergies    Meds    Current Outpatient Medications   Medication Sig Dispense Refill    tamsulosin (FLOMAX) 0.4 MG capsule TAKE 1 CAPSULE DAILY 90 capsule 0    dilTIAZem (CARDIZEM CD) 120 MG extended release capsule TAKE 1 CAPSULE DAILY 90 capsule 0    pioglitazone (ACTOS) 30 MG tablet TAKE 1 TABLET DAILY 90 tablet 0    hydroCHLOROthiazide (HYDRODIURIL) 12.5 MG tablet TAKE 1 TABLET DAILY 90 tablet 3    levothyroxine (SYNTHROID) 100 MCG tablet TAKE 1 TABLET DAILY 90 tablet 3    INVOKANA 100 MG TABS tablet TAKE 1 TABLET DAILY 90 tablet 3    apixaban (ELIQUIS) 5 MG TABS tablet TAKE 1 TABLET TWICE A  tablet 3    fluticasone (FLONASE) 50 MCG/ACT nasal spray 1 spray by Each Nostril route 2 times daily 2 Bottle 5    LANTUS 100 UNIT/ML injection vial INJECT 50 UNITS UNDER THE SKIN AT NIGHT 50 mL 3    blood glucose test strips (ONETOUCH ULTRA) strip TEST TWICE A DAY. DX;E11.9 200 each 3    budesonide (PULMICORT) 0.5 MG/2ML nebulizer suspension Use 1 ampule (2cc) in saline irrigations twice daily 60 ampule 3    Hypertonic Nasal Wash (SINUS RINSE) PACK 1 Bottle by Nasal route 2 times daily 1 each 1    Blood Glucose Monitoring Suppl ([employer] BLOOD GLUCOSE MONITOR) PARAS TEST TWICE DAILY.  DX: E11.9 1 Device 0    EMBRACE LANCETS ULTRA THIN 30G MISC TEST TWICE DAILY. DX: E11.9 200 each 3    vitamin B-12 (CYANOCOBALAMIN) 1000 MCG tablet Take 1,000 mcg by mouth daily      rosuvastatin (CRESTOR) 10 MG tablet Take 10 mg by mouth daily      B-D INS SYR ULTRAFINE 1CC/30G 30G X 1/2\" 1 ML MISC USE WITH LANTUS NIGHTLY 90 each 11    TRUETEST TEST strip TEST DAILY 100 strip 3     No current facility-administered medications for this visit. Social    Social History     Socioeconomic History    Marital status:      Spouse name: Not on file    Number of children: Not on file    Years of education: Not on file    Highest education level: Not on file   Occupational History    Occupation: volunteer     Employer: [employer]     Tobacco Use    Smoking status: Former Smoker     Packs/day: 0.50     Years: 30.00     Pack years: 15.00     Quit date: 1990     Years since quittin.5    Smokeless tobacco: Former User     Quit date: 10/3/1977   Vaping Use    Vaping Use: Never used   Substance and Sexual Activity    Alcohol use: Yes     Comment: social    Drug use: No    Sexual activity: Yes     Partners: Female   Other Topics Concern    Not on file   Social History Narrative    Not on file     Social Determinants of Health     Financial Resource Strain:     Difficulty of Paying Living Expenses:    Food Insecurity:     Worried About Running Out of Food in the Last Year:     [address] in the Last Year:    Transportation Needs:     Lack of Transportation (Medical):      Lack of Transportation (Non-Medical):    Physical Activity:     Days of Exercise per Week:     Minutes of Exercise per Session:    Stress:     Feeling of Stress :    Social Connections:     Frequency of Communication with Friends and Family:     Frequency of Social Gatherings with Friends and Family:     Attends [employer] Services:     Active Member of Clubs or Organizations:     Attends Club or Organization Meetings:     Marital Status:    Intimate Partner Violence:     Fear of Current or Ex-Partner:     Emotionally Abused:     Physically Abused:     Sexually Abused:        Family HISTORY    Family History   Problem Relation Age of Onset    Cancer Mother     Kidney Disease Brother         on dialasis    Diabetes type 2  Brother     Heart Disease Brother     Breast Cancer Sister        PHYSICAL EXAM    Vital Signs:  Ht 5' 11\" (1.803 m)   Wt 260 lb (117.9 kg)   BMI 36.26 kg/m²   General Appearance:  Normal body habitus. Alert and oriented to person, place, and time. Affect:  Normal.   Gait:  Normal. Good balance and coordination. Skin:  Intact. He has some mild swelling medially and has some resolving ecchymoses in that location consistent with soft tissue injury  Sensation:  Intact. Strength:  Intact. Reflexes:  Intact. Pulses:  Intact. Knee Exam:    Effusion: Negative    Range of Motion Right Left   Extension 0 0   Flexion 115 115     Provocative Test Right Left    Positive Negative Positive Negative   Anterior drawer [] [] [] []   Lachman [] [] [] []   Posterior drawer [] [] [] []   Varus testing [] [x] [] [x]   Valgus testing [] [x] [] [x]   Joint line tenderness [x] [] [] [x]     Additional Exam Comments: His neurocirculatory exam is normal symmetric in both lower extremities. He does have a fair amount of pedal edema in both legs which is a chronic issue. IMAGING STUDIES    X-rays 2 views right knee reveals well-positioned total knee replacement no signs of failure with exception of the transverse fracture of the patella with minimal separation and essentially unchanged from x-rays taken 3+ years ago. IMPRESSION    Right knee medial collateral ligament sprain    PLAN      1. Conservative care options including physical therapy, NSAIDs, bracing, and activity modification were discussed. 2.  The indications for therapeutic injections were discussed. 3.  The indications for additional imaging studies were discussed.    4. After considering the various options discussed, the patient elected to pursue a course that includes observation reassurance and return here in 4 weeks if necessary. I told him he may try some Voltaren gel on the medial side of the knee 2-3 times a day to see if it might afford some relief.

## 2021-07-05 NOTE — LETTER
[address] [place] [address] Cardiology - [facility] [address]  Phone: [phone]  Fax: [phone]    [place], [place]        October 6, 2017     Anjali Sherwood, [address] [facility], [facility] [place] [address]    Patient: Donita Fournier  MR Number: [id number]  YOB: 1940  Date of Visit: 10/3/2017    Dear Dr. Anjali Sherwood:    I recently saw our mutual patient, listed above. Below are the relevant portions of my assessment and plan of care. [address]   Electrophysiology  Note              Date:  October 3, 2017  Patient name: Donita Fournier  YOB: 1940    Primary Care physician: Anjali Sherwood MD    HISTORY OF PRESENT ILLNESS: The patient is a 78 y.o.  male with a past medical history of paroxysmal atrial fibrillation, HTN, DM, CKD, hypothyroidism, Hepatitis C, and HLD. He was seen by his PCP in 12/2016 and atrial fibrillation was incidentally found. He was started on Eliquis and Cardizem. An echo on 1/17/2017 was normal and his EF was 55%. At his visit in 3/2017 he was in sinus rhythm and was unaware of the conversion. In 7/2017 he had a knee replacement. Today he is being seen for atrial fibrillation. His EKG shows sinus rhythm with PACs. He is feeling well and denies chest pain, palpitations, shortness of breath, and dizziness. States his left leg swelling has been worse since his knee surgery. Past Medical History:   has a past medical history of A-fib ([address] Chronic hepatitis C without hepatic coma ([address] 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 ([address] Esophageal reflux; Hyperlipidemia; Hyperlipidemia associated with type 2 diabetes mellitus ([address] Hypertension; Hypertrophy of prostate without urinary obstruction and other lower urinary tract symptoms (LUTS);  Hypothyroidism; Osteoarthrosis, not specified whether generalized/localized, lower leg; Pain in joint, lower leg; Type 2 Dr Suleiman Cheung sent a message via tiger text  She will review the refill request and notify me if there are any issues  diabetes mellitus 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 edema ([facility] Type II or unspecified type diabetes mellitus without mention of complication, not stated as uncontrolled; and Unspecified viral hepatitis C without hepatic coma. Past Surgical History:   has a past surgical history that includes eye surgery; Colonoscopy (12/14/07); Esophagus surgery; Knee arthroscopy; Cholecystectomy, laparoscopic; other surgical history (Left, 11/14/2016); and joint replacement (Left, 07/17/2017). Home Medications:    Prior to Admission medications    Medication Sig Start Date End Date Taking?  Authorizing Provider   tamsulosin (FLOMAX) 0.4 MG capsule TAKE ONE CAPSULE BY MOUTH DAILY 9/15/17   Mendoza Johns MD   hydrochlorothiazide (HYDRODIURIL) 12.5 MG tablet TAKE 1 TABLET DAILY 9/15/17   Harvinder Swenson MD   pioglitazone (ACTOS) 30 MG tablet TAKE 1 TABLET DAILY 9/6/17   Mendoza Johns MD   vitamin B-12 (CYANOCOBALAMIN) 1000 MCG tablet Take 1,000 mcg by mouth daily    Historical Provider, MD   Omega-3 Fatty Acids (FISH OIL) 1000 MG CAPS Take 1,000 mg by mouth daily    Historical Provider, MD   apixaban (ELIQUIS) 5 MG TABS tablet Take 1 tablet by mouth 2 times daily 7/20/17   Gregg Barnes CNP   LANTUS 100 UNIT/ML injection vial INJECT 50 UNITS UNDER THE SKIN AT NIGHT  Patient taking differently: INJECT 45 UNITS UNDER THE SKIN AT NIGHT 7/13/17   Mendoza Johns MD   levothyroxine (SYNTHROID) 100 MCG tablet TAKE 1 TABLET DAILY 7/6/17   Mendoza Johns MD   canagliflozin (INVOKANA) 100 MG TABS tablet TAKE 1 TABLET DAILY 7/3/17   Mendoza Johns MD   diltiazem (DILTIAZEM CD) 120 MG extended release capsule Take 1 capsule by mouth daily 4/20/17   Gregg Barnes CNP   rosuvastatin (CRESTOR) 10 MG tablet Take 10 mg by mouth daily    Historical Provider, MD HARDING INS SYR ULTRAFINE 1CC/30G 30G X 1/2\" 1 ML MISC USE WITH LANTUS NIGHTLY 6/20/16   Vu Allen MD   TRUETEST TEST strip TEST DAILY 8/28/13   Vu Allen MD       Allergies:  Review of patient's allergies indicates no known allergies. Social History:   reports that he has quit smoking. He has a 15.00 pack-year smoking history. He quit smokeless tobacco use about 40 years ago. He reports that he drinks alcohol. He reports that he does not use illicit drugs. Family History: family history is not on file. Review of Systems   Constitutional: Negative. HENT: Negative. Eyes: Negative. Respiratory: Negative. Cardiovascular: see HPI  Gastrointestinal: Negative. Genitourinary: Negative. Musculoskeletal: chronic BLE swelling (LLE worse than RLE due to OA per patient)  Skin: Negative. Neurological: Negative. Hematological: Negative. Psychiatric/Behavioral: Negative. PHYSICAL EXAM:    Physical Examination:    /76  Pulse 70  Ht 5' 10\" (1.778 m)  Wt 230 lb (104.3 kg)  BMI 33 kg/m2     Constitutional and general appearance: alert, cooperative, no distress and appears stated age  [de-identified]: PERRL, no cervical lymphadenopathy. No masses palpable.  Normal oral mucosa  Respiratory:  · Normal excursion and expansion without use of accessory muscles  · Resp auscultation: Normal breath sounds without dullness or wheezing  Cardiovascular:  · The apical impulse is not displaced  · Heart tones are crisp and normal. Regular S1 and S2.  · Jugular venous pulsation Normal  · The carotid upstroke is normal in amplitude and contour without delay or bruit  · Peripheral pulses are symmetrical and full   Abdomen:  · No masses or tenderness  · Bowel sounds present  Extremities:  ·  No cyanosis or clubbing  ·  3+ LLE edema, 2+ RLE edema  ·  Skin: warm and dry  Neurological:  · Alert and oriented  · Moves all extremities well  · No abnormalities of mood, affect, memory, mentation, or behavior are noted    DATA:    ECG 10/3/2017:  SR with PACs HR 70 Echo 1/17/2017:  Normal left ventricle systolic function with an estimated ejection fraction   of 55%.   No regional wall motion abnormalities are seen.   Normal left ventricle diastolic filing pressure.   Mild pulmonic regurgitation. CARDIOLOGY LABS:   CBC: No results for input(s): WBC, HGB, HCT, PLT in the last 72 hours. BMP: No results for input(s): NA, K, CO2, BUN, CREATININE, LABGLOM, GLUCOSE in the last 72 hours. PT/INR: No results for input(s): PROTIME, INR in the last 72 hours. APTT:No results for input(s): APTT in the last 72 hours. FASTING LIPID PANEL:  Lab Results   Component Value Date    HDL 54 08/24/2017    LDLCALC 48 08/24/2017    TRIG 82 08/24/2017     LIVER PROFILE:No results for input(s): AST, ALT, ALB in the last 72 hours. Assessment:   1. Asymptomatic paroxysmal atrial fibrillation: incidentally detected by PCP in 12/2016   -rhythm is sinus today   -on Eliquis 5mg po BID for [id number] score 3 (HTN, age, DM)  2. HTN: controlled  3. DM  4. CKD: managed by nephrology  5. Hepatitis C  6. Hypothyroidism  7. Osteoarthritis with chronic swelling of BLE (left worse than right)  8. Anemia: followed by hematology    Plan:   1. Continue Eliquis and Cardizem  2. Follow up in 6 months. Patient would like to discuss his candidacy for Watchman with Dr. Ria Ortiz. Christine Prince, [address]  [phone]     If you have questions, please do not hesitate to call me. I look forward to following Bevely Favre along with you.     Sincerely,        Christine Prince, CNP

## 2021-08-30 ENCOUNTER — OFFICE VISIT (OUTPATIENT)
Dept: INTERNAL MEDICINE CLINIC | Age: 81
End: 2021-08-30

## 2021-08-30 VITALS
BODY MASS INDEX: 36.4 KG/M2 | WEIGHT: 260 LBS | SYSTOLIC BLOOD PRESSURE: 130 MMHG | DIASTOLIC BLOOD PRESSURE: 80 MMHG | RESPIRATION RATE: 12 BRPM | HEIGHT: 71 IN | HEART RATE: 64 BPM

## 2021-08-30 DIAGNOSIS — G25.0 BENIGN ESSENTIAL TREMOR: ICD-10-CM

## 2021-08-30 DIAGNOSIS — E11.3293 TYPE 2 DIABETES MELLITUS WITH BOTH EYES AFFECTED BY MILD NONPROLIFERATIVE RETINOPATHY WITHOUT MACULAR EDEMA, WITH LONG-TERM CURRENT USE OF INSULIN (HCC): ICD-10-CM

## 2021-08-30 DIAGNOSIS — E11.22 TYPE 2 DIABETES MELLITUS WITH STAGE 3A CHRONIC KIDNEY DISEASE, WITHOUT LONG-TERM CURRENT USE OF INSULIN (HCC): Primary | ICD-10-CM

## 2021-08-30 DIAGNOSIS — I10 ESSENTIAL HYPERTENSION: ICD-10-CM

## 2021-08-30 DIAGNOSIS — B18.2 CHRONIC HEPATITIS C WITHOUT HEPATIC COMA (HCC): ICD-10-CM

## 2021-08-30 DIAGNOSIS — D47.2 MGUS (MONOCLONAL GAMMOPATHY OF UNKNOWN SIGNIFICANCE): ICD-10-CM

## 2021-08-30 DIAGNOSIS — N18.31 TYPE 2 DIABETES MELLITUS WITH STAGE 3A CHRONIC KIDNEY DISEASE, WITHOUT LONG-TERM CURRENT USE OF INSULIN (HCC): Primary | ICD-10-CM

## 2021-08-30 DIAGNOSIS — E03.9 ACQUIRED HYPOTHYROIDISM: ICD-10-CM

## 2021-08-30 DIAGNOSIS — E78.5 HYPERLIPIDEMIA ASSOCIATED WITH TYPE 2 DIABETES MELLITUS (HCC): ICD-10-CM

## 2021-08-30 DIAGNOSIS — Z79.4 TYPE 2 DIABETES MELLITUS WITH BOTH EYES AFFECTED BY MILD NONPROLIFERATIVE RETINOPATHY WITHOUT MACULAR EDEMA, WITH LONG-TERM CURRENT USE OF INSULIN (HCC): ICD-10-CM

## 2021-08-30 DIAGNOSIS — E11.69 HYPERLIPIDEMIA ASSOCIATED WITH TYPE 2 DIABETES MELLITUS (HCC): ICD-10-CM

## 2021-08-30 DIAGNOSIS — I48.0 PAF (PAROXYSMAL ATRIAL FIBRILLATION) (HCC): ICD-10-CM

## 2021-08-30 PROCEDURE — 4040F PNEUMOC VAC/ADMIN/RCVD: CPT | Performed by: INTERNAL MEDICINE

## 2021-08-30 PROCEDURE — 99214 OFFICE O/P EST MOD 30 MIN: CPT | Performed by: INTERNAL MEDICINE

## 2021-08-30 PROCEDURE — G8427 DOCREV CUR MEDS BY ELIG CLIN: HCPCS | Performed by: INTERNAL MEDICINE

## 2021-08-30 PROCEDURE — G8417 CALC BMI ABV UP PARAM F/U: HCPCS | Performed by: INTERNAL MEDICINE

## 2021-08-30 PROCEDURE — 1123F ACP DISCUSS/DSCN MKR DOCD: CPT | Performed by: INTERNAL MEDICINE

## 2021-08-30 PROCEDURE — 1036F TOBACCO NON-USER: CPT | Performed by: INTERNAL MEDICINE

## 2021-08-30 RX ORDER — PIOGLITAZONEHYDROCHLORIDE 30 MG/1
TABLET ORAL
Qty: 90 TABLET | Refills: 3 | Status: SHIPPED | OUTPATIENT
Start: 2021-08-30 | End: 2022-08-25

## 2021-08-30 ASSESSMENT — ENCOUNTER SYMPTOMS
ABDOMINAL PAIN: 0
WHEEZING: 0
BACK PAIN: 0
RHINORRHEA: 0
SHORTNESS OF BREATH: 0
VOMITING: 0
NAUSEA: 0

## 2021-08-30 ASSESSMENT — PATIENT HEALTH QUESTIONNAIRE - PHQ9
SUM OF ALL RESPONSES TO PHQ QUESTIONS 1-9: 0
1. LITTLE INTEREST OR PLEASURE IN DOING THINGS: 0
SUM OF ALL RESPONSES TO PHQ9 QUESTIONS 1 & 2: 0
2. FEELING DOWN, DEPRESSED OR HOPELESS: 0
SUM OF ALL RESPONSES TO PHQ QUESTIONS 1-9: 0
SUM OF ALL RESPONSES TO PHQ QUESTIONS 1-9: 0

## 2021-08-30 NOTE — PROGRESS NOTES
Subjective:      Patient ID: David Marin is a 68 y.o. male. HPI  Patient is here for follow up diabetes, hypertension, hyperlipidemia, GERD, OA and hepatitis C. Diabetes Mellitus Type 2: Current symptoms/problems include none. Medication compliance:  compliant most of the time  Diabetic diet compliance:  compliant most of the time,  Weight trend: increasing-up by 6 lbs  Current exercise: walks 3 time(s) per week    Home blood sugar records: fasting range:  mg/dl  Any episodes of hypoglycemia? none  Eye exam current (within one year): yes   reports that he quit smoking about 31 years ago. He has a 15.00 pack-year smoking history. He quit smokeless tobacco use about 43 years ago. Daily Aspirin? No  Known diabetic complications: nephropathy and retinopathy    Hypertension:  Blood pressure typically runs normal outside of the office. He is adherent to a low sodium diet. Patient denies chest pain, dry cough, fatigue. Antihypertensive medication side effects: no medication side effects noted. Use of agents associated with hypertension: none. Hyperlipidemia:  No new myalgias or GI upset on atorvastatin (Lipitor). Lab Results   Component Value Date    LABA1C 6.2 11/24/2020    LABA1C 6.6 08/17/2020    LABA1C 7.0 05/05/2020     Lab Results   Component Value Date    LABMICR YES 01/09/2020    CREATININE 1.2 11/24/2020     Lab Results   Component Value Date    ALT 32 11/24/2020    AST 35 11/24/2020     No components found for: CHLPL  Lab Results   Component Value Date    TRIG 81 11/24/2020     Lab Results   Component Value Date    HDL 58 11/24/2020     Lab Results   Component Value Date    LDLCALC 85 11/24/2020     . No heartburn. He has hypothyroidism. It is stable. No constipation or diarrhea. He has CKD from DM. He has diabetic retinopathy. Eyes stable. His urinary incontinence has resolved. He has seen Urology for this.        Review of Systems   Constitutional: Negative for activity change and appetite change. HENT: Negative for postnasal drip and rhinorrhea. Respiratory: Negative for shortness of breath and wheezing. Cardiovascular: Positive for leg swelling. Negative for chest pain and palpitations. Gastrointestinal: Negative for abdominal pain, nausea and vomiting. Genitourinary: Negative for difficulty urinating, frequency and hematuria. Musculoskeletal: Negative for back pain and joint swelling. Skin: Negative for rash. Neurological: Positive for tremors. Negative for light-headedness. Psychiatric/Behavioral: Negative for sleep disturbance. Current Outpatient Medications:     pioglitazone (ACTOS) 30 MG tablet, TAKE 1 TABLET DAILY, Disp: 90 tablet, Rfl: 3    tamsulosin (FLOMAX) 0.4 MG capsule, TAKE 1 CAPSULE DAILY, Disp: 90 capsule, Rfl: 0    dilTIAZem (CARDIZEM CD) 120 MG extended release capsule, TAKE 1 CAPSULE DAILY, Disp: 90 capsule, Rfl: 0    hydroCHLOROthiazide (HYDRODIURIL) 12.5 MG tablet, TAKE 1 TABLET DAILY, Disp: 90 tablet, Rfl: 3    levothyroxine (SYNTHROID) 100 MCG tablet, TAKE 1 TABLET DAILY, Disp: 90 tablet, Rfl: 3    INVOKANA 100 MG TABS tablet, TAKE 1 TABLET DAILY, Disp: 90 tablet, Rfl: 3    apixaban (ELIQUIS) 5 MG TABS tablet, TAKE 1 TABLET TWICE A DAY, Disp: 180 tablet, Rfl: 3    fluticasone (FLONASE) 50 MCG/ACT nasal spray, 1 spray by Each Nostril route 2 times daily, Disp: 2 Bottle, Rfl: 5    LANTUS 100 UNIT/ML injection vial, INJECT 50 UNITS UNDER THE SKIN AT NIGHT, Disp: 50 mL, Rfl: 3    blood glucose test strips (ONETOUCH ULTRA) strip, TEST TWICE A DAY. DX;E11.9, Disp: 200 each, Rfl: 3    budesonide (PULMICORT) 0.5 MG/2ML nebulizer suspension, Use 1 ampule (2cc) in saline irrigations twice daily, Disp: 60 ampule, Rfl: 3    Hypertonic Nasal Wash (SINUS RINSE) PACK, 1 Bottle by Nasal route 2 times daily, Disp: 1 each, Rfl: 1    Blood Glucose Monitoring Suppl ([place] BLOOD GLUCOSE MONITOR) PARAS, TEST TWICE DAILY.  DX: E11.9, Disp: 1 Device, Rfl: 0    EMBRACE LANCETS ULTRA THIN 30G MISC, TEST TWICE DAILY. DX: E11.9, Disp: 200 each, Rfl: 3    vitamin B-12 (CYANOCOBALAMIN) 1000 MCG tablet, Take 1,000 mcg by mouth daily, Disp: , Rfl:     rosuvastatin (CRESTOR) 10 MG tablet, Take 10 mg by mouth daily, Disp: , Rfl:     B-D INS SYR ULTRAFINE 1CC/30G 30G X 1/2\" 1 ML MISC, USE WITH LANTUS NIGHTLY, Disp: 90 each, Rfl: 11    TRUETEST TEST strip, TEST DAILY, Disp: 100 strip, Rfl: 3        There are no changes to past medical history, family history, social history or review of systems(except as noted in the history section) since prior note (all reviewed with patient). /80 (Site: Right Upper Arm, Position: Sitting, Cuff Size: Medium Adult)   Pulse 64   Resp 12   Ht 5' 11\" (1.803 m)   Wt 260 lb (117.9 kg)   BMI 36.26 kg/m²        Objective:   Physical Exam  Constitutional:       Appearance: He is well-developed. HENT:      Head: Normocephalic. Eyes:      Conjunctiva/sclera: Conjunctivae normal.      Pupils: Pupils are equal, round, and reactive to light. Neck:      Thyroid: No thyroid mass or thyromegaly. Vascular: No carotid bruit or JVD. Trachea: Trachea normal.   Cardiovascular:      Rate and Rhythm: Normal rate. Rhythm irregularly irregular. Heart sounds: Normal heart sounds. No murmur heard. No gallop. Pulmonary:      Effort: Pulmonary effort is normal. No respiratory distress. Breath sounds: Normal breath sounds. No wheezing or rales. Abdominal:      General: Bowel sounds are normal. There is no distension. Palpations: Abdomen is soft. There is no hepatomegaly, splenomegaly or mass. Tenderness: There is no abdominal tenderness. Musculoskeletal:      Cervical back: Normal range of motion and neck supple. Lymphadenopathy:      Cervical: No cervical adenopathy. Skin:     General: Skin is warm and dry. Findings: No rash.    Neurological:      Mental Status: He is alert and oriented to person, place, and time. Motor: Tremor (right upper extremity at rest) present. Psychiatric:         Behavior: Behavior normal.         Thought Content: Thought content normal.         Judgment: Judgment normal.         Assessment:       Diagnosis Orders   1. Type 2 diabetes mellitus with stage 3a chronic kidney disease, without long-term current use of insulin ([facility])  CBC Auto Differential    Comprehensive Metabolic Panel    Lipid Panel    Uric Acid    Hemoglobin A1C   2. Essential hypertension     3. Acquired hypothyroidism     4. Hyperlipidemia associated with type 2 diabetes mellitus ([facility]     5. Type 2 diabetes mellitus with both eyes affected by mild nonproliferative retinopathy without macular edema, with long-term current use of insulin ([facility])     6. Benign essential tremor     7. MGUS (monoclonal gammopathy of unknown significance)     8. Chronic hepatitis C without hepatic coma ([facility])     9. PAF (paroxysmal atrial fibrillation) ([facility]            Plan:      Check labs. DM type 2: controlled. Stressed diet and exercise. On Lantus and Novolog. Check labs. Doing well. Hypertension: at goal.  He is on Prinivil and HCTZ. Tremor: unchanged. Not on any medication. Hyperlipidemia: at goal.  Hypothyroidism:  Patient's hypothyroidism is stable on replacement therapy. Check TSH. A fib:  He has seen cardiology. On Eliquis. Morbid Obesity  - Body mass index is 36.26 kg/m². - Complicating assessment and treatment. Placing patient at risk for multiple co-morbidities as well as early death and contributing to the patient's presentation.   - Counseled on weight loss. MGUS unchanged. Discussed use, benefit, and side effects of prescribed medications. Barriers to medication compliance addressed. All patient questions answered. Pt voiced understanding. Decrease calorie intake. Exercise,weight loss recommended.   The current medical regimen is effective;  continue present plan and medications. See orders.

## 2021-09-01 ENCOUNTER — HOSPITAL ENCOUNTER (OUTPATIENT)
Age: 81
Discharge: HOME | End: 2021-09-01
Payer: MEDICARE

## 2021-09-01 DIAGNOSIS — N18.31 TYPE 2 DIABETES MELLITUS WITH STAGE 3A CHRONIC KIDNEY DISEASE, WITHOUT LONG-TERM CURRENT USE OF INSULIN (HCC): ICD-10-CM

## 2021-09-01 DIAGNOSIS — E11.22 TYPE 2 DIABETES MELLITUS WITH STAGE 3A CHRONIC KIDNEY DISEASE, WITHOUT LONG-TERM CURRENT USE OF INSULIN (HCC): ICD-10-CM

## 2021-09-01 LAB
A/G RATIO: 1.1 (ref 1.1–2.2)
ALBUMIN SERPL-MCNC: 4.1 G/DL (ref 3.4–5)
ALP BLD-CCNC: 99 U/L (ref 40–129)
ALT SERPL-CCNC: 34 U/L (ref 10–40)
ANION GAP SERPL CALCULATED.3IONS-SCNC: 11 MMOL/L (ref 3–16)
AST SERPL-CCNC: 40 U/L (ref 15–37)
BASOPHILS ABSOLUTE: 0.1 K/UL (ref 0–0.2)
BASOPHILS RELATIVE PERCENT: 1.6 %
BILIRUB SERPL-MCNC: 0.6 MG/DL (ref 0–1)
BUN BLDV-MCNC: 29 MG/DL (ref 7–20)
CALCIUM SERPL-MCNC: 9.6 MG/DL (ref 8.3–10.6)
CHLORIDE BLD-SCNC: 102 MMOL/L (ref 99–110)
CHOLESTEROL, TOTAL: 172 MG/DL (ref 0–199)
CO2: 30 MMOL/L (ref 21–32)
CREAT SERPL-MCNC: 1.3 MG/DL (ref 0.8–1.3)
EOSINOPHILS ABSOLUTE: 0.5 K/UL (ref 0–0.6)
EOSINOPHILS RELATIVE PERCENT: 8.1 %
GFR AFRICAN AMERICAN: >60
GFR NON-AFRICAN AMERICAN: 53
GLOBULIN: 3.7 G/DL
GLUCOSE BLD-MCNC: 76 MG/DL (ref 70–99)
HCT VFR BLD CALC: 44.3 % (ref 40.5–52.5)
HDLC SERPL-MCNC: 55 MG/DL (ref 40–60)
HEMOGLOBIN: 14.9 G/DL (ref 13.5–17.5)
LDL CHOLESTEROL CALCULATED: 100 MG/DL
LYMPHOCYTES ABSOLUTE: 1.9 K/UL (ref 1–5.1)
LYMPHOCYTES RELATIVE PERCENT: 30.8 %
MCH RBC QN AUTO: 30.7 PG (ref 26–34)
MCHC RBC AUTO-ENTMCNC: 33.5 G/DL (ref 31–36)
MCV RBC AUTO: 91.6 FL (ref 80–100)
MONOCYTES ABSOLUTE: 0.5 K/UL (ref 0–1.3)
MONOCYTES RELATIVE PERCENT: 7.7 %
NEUTROPHILS ABSOLUTE: 3.3 K/UL (ref 1.7–7.7)
NEUTROPHILS RELATIVE PERCENT: 51.8 %
PDW BLD-RTO: 14 % (ref 12.4–15.4)
PLATELET # BLD: 202 K/UL (ref 135–450)
PMV BLD AUTO: 7.7 FL (ref 5–10.5)
POTASSIUM SERPL-SCNC: 4.9 MMOL/L (ref 3.5–5.1)
RBC # BLD: 4.84 M/UL (ref 4.2–5.9)
SODIUM BLD-SCNC: 143 MMOL/L (ref 136–145)
TOTAL PROTEIN: 7.8 G/DL (ref 6.4–8.2)
TRIGL SERPL-MCNC: 83 MG/DL (ref 0–150)
URIC ACID, SERUM: 6.9 MG/DL (ref 3.5–7.2)
VLDLC SERPL CALC-MCNC: 17 MG/DL
WBC # BLD: 6.3 K/UL (ref 4–11)

## 2021-09-01 PROCEDURE — 83036 HEMOGLOBIN GLYCOSYLATED A1C: CPT

## 2021-09-01 PROCEDURE — 80061 LIPID PANEL: CPT

## 2021-09-01 PROCEDURE — 85025 COMPLETE CBC W/AUTO DIFF WBC: CPT

## 2021-09-01 PROCEDURE — 84550 ASSAY OF BLOOD/URIC ACID: CPT

## 2021-09-01 PROCEDURE — 36415 COLL VENOUS BLD VENIPUNCTURE: CPT

## 2021-09-01 PROCEDURE — 80053 COMPREHEN METABOLIC PANEL: CPT

## 2021-09-02 LAB
ESTIMATED AVERAGE GLUCOSE: 134.1 MG/DL
HBA1C MFR BLD: 6.3 %

## 2021-10-27 RX ORDER — BLOOD SUGAR DIAGNOSTIC
STRIP MISCELLANEOUS
Qty: 200 STRIP | Refills: 3 | Status: SHIPPED | OUTPATIENT
Start: 2021-10-27 | End: 2022-10-24

## 2021-11-11 RX ORDER — INSULIN GLARGINE 100 [IU]/ML
INJECTION, SOLUTION SUBCUTANEOUS
Qty: 50 ML | Refills: 0 | Status: SHIPPED | OUTPATIENT
Start: 2021-11-11 | End: 2022-02-09

## 2021-12-08 ENCOUNTER — OFFICE VISIT (OUTPATIENT)
Dept: INTERNAL MEDICINE CLINIC | Age: 81
End: 2021-12-08

## 2021-12-08 VITALS
HEART RATE: 70 BPM | DIASTOLIC BLOOD PRESSURE: 80 MMHG | RESPIRATION RATE: 12 BRPM | HEIGHT: 71 IN | WEIGHT: 265 LBS | BODY MASS INDEX: 37.1 KG/M2 | SYSTOLIC BLOOD PRESSURE: 124 MMHG

## 2021-12-08 DIAGNOSIS — N18.31 TYPE 2 DIABETES MELLITUS WITH STAGE 3A CHRONIC KIDNEY DISEASE, WITHOUT LONG-TERM CURRENT USE OF INSULIN (HCC): ICD-10-CM

## 2021-12-08 DIAGNOSIS — E03.9 ACQUIRED HYPOTHYROIDISM: ICD-10-CM

## 2021-12-08 DIAGNOSIS — E11.22 TYPE 2 DIABETES MELLITUS WITH STAGE 3A CHRONIC KIDNEY DISEASE, WITHOUT LONG-TERM CURRENT USE OF INSULIN (HCC): ICD-10-CM

## 2021-12-08 DIAGNOSIS — Z00.00 ROUTINE GENERAL MEDICAL EXAMINATION AT A HEALTH CARE FACILITY: ICD-10-CM

## 2021-12-08 DIAGNOSIS — Z79.4 TYPE 2 DIABETES MELLITUS WITH BOTH EYES AFFECTED BY MILD NONPROLIFERATIVE RETINOPATHY WITHOUT MACULAR EDEMA, WITH LONG-TERM CURRENT USE OF INSULIN (HCC): ICD-10-CM

## 2021-12-08 DIAGNOSIS — Z00.00 MEDICARE ANNUAL WELLNESS VISIT, SUBSEQUENT: Primary | ICD-10-CM

## 2021-12-08 DIAGNOSIS — G25.0 BENIGN ESSENTIAL TREMOR: Chronic | ICD-10-CM

## 2021-12-08 DIAGNOSIS — D47.2 MGUS (MONOCLONAL GAMMOPATHY OF UNKNOWN SIGNIFICANCE): ICD-10-CM

## 2021-12-08 DIAGNOSIS — E11.3293 TYPE 2 DIABETES MELLITUS WITH BOTH EYES AFFECTED BY MILD NONPROLIFERATIVE RETINOPATHY WITHOUT MACULAR EDEMA, WITH LONG-TERM CURRENT USE OF INSULIN (HCC): ICD-10-CM

## 2021-12-08 DIAGNOSIS — E11.69 HYPERLIPIDEMIA ASSOCIATED WITH TYPE 2 DIABETES MELLITUS (HCC): ICD-10-CM

## 2021-12-08 DIAGNOSIS — E78.5 HYPERLIPIDEMIA ASSOCIATED WITH TYPE 2 DIABETES MELLITUS (HCC): ICD-10-CM

## 2021-12-08 DIAGNOSIS — I48.0 PAF (PAROXYSMAL ATRIAL FIBRILLATION) (HCC): ICD-10-CM

## 2021-12-08 PROCEDURE — G8484 FLU IMMUNIZE NO ADMIN: HCPCS | Performed by: INTERNAL MEDICINE

## 2021-12-08 PROCEDURE — 4040F PNEUMOC VAC/ADMIN/RCVD: CPT | Performed by: INTERNAL MEDICINE

## 2021-12-08 PROCEDURE — 1123F ACP DISCUSS/DSCN MKR DOCD: CPT | Performed by: INTERNAL MEDICINE

## 2021-12-08 PROCEDURE — G0439 PPPS, SUBSEQ VISIT: HCPCS | Performed by: INTERNAL MEDICINE

## 2021-12-08 ASSESSMENT — PATIENT HEALTH QUESTIONNAIRE - PHQ9
2. FEELING DOWN, DEPRESSED OR HOPELESS: 1
SUM OF ALL RESPONSES TO PHQ9 QUESTIONS 1 & 2: 1
SUM OF ALL RESPONSES TO PHQ QUESTIONS 1-9: 1
SUM OF ALL RESPONSES TO PHQ QUESTIONS 1-9: 1
1. LITTLE INTEREST OR PLEASURE IN DOING THINGS: 0
SUM OF ALL RESPONSES TO PHQ QUESTIONS 1-9: 1

## 2021-12-08 ASSESSMENT — LIFESTYLE VARIABLES
HOW OFTEN DO YOU HAVE SIX OR MORE DRINKS ON ONE OCCASION: 0
HOW OFTEN DO YOU HAVE A DRINK CONTAINING ALCOHOL: 1
AUDIT-C TOTAL SCORE: 1
HOW MANY STANDARD DRINKS CONTAINING ALCOHOL DO YOU HAVE ON A TYPICAL DAY: 0

## 2021-12-08 NOTE — PROGRESS NOTES
Medicare Annual Wellness Visit  Name: King Rosales Date: 2021   MRN: [id number] Sex: Male   Age: 68 y.o. Ethnicity: Non- / Non    : 1940 Race: White (non-)      Marion Gleason is here for Annual Exam    Screenings for behavioral, psychosocial and functional/safety risks, and cognitive dysfunction are all negative except as indicated below. These results, as well as other patient data from the [address] form, are documented in Flowsheets linked to this Encounter. Diabetes Mellitus Type 2: Current symptoms/problems include none. Medication compliance:  compliant most of the time  Diabetic diet compliance:  compliant most of the time,  Weight trend: stable  Current exercise: no regular exercise but he is active    Home blood sugar records: fasting range: 70-90 mg/dl  Any episodes of hypoglycemia? yes - rarely  Eye exam current (within one year): yes   reports that he quit smoking about 31 years ago. He has a 15.00 pack-year smoking history. He quit smokeless tobacco use about 44 years ago. Daily Aspirin? No: n/a  Known diabetic complications: none    Hypertension:  Blood pressure typically runs normal outside of the office. He is adherent to a low sodium diet. Patient denies chest pain, dry cough, fatigue. Antihypertensive medication side effects: no medication side effects noted. Use of agents associated with hypertension: none. Hyperlipidemia:  No new myalgias or GI upset on rosuvastatin (Crestor).        Lab Results   Component Value Date    LABA1C 6.3 2021    LABA1C 6.2 2020    LABA1C 6.6 2020     Lab Results   Component Value Date    LABMICR YES 2020    CREATININE 1.3 2021     Lab Results   Component Value Date    ALT 34 2021    AST 40 (H) 2021     No components found for: CHLPL  Lab Results   Component Value Date    TRIG 83 2021     Lab Results   Component Value Date    HDL 55 2021     Lab Results   Component Value Date    LDLCALC 100 09/01/2021        No Known Allergies      Prior to Visit Medications    Medication Sig Taking? Authorizing Provider   LANTUS 100 UNIT/ML injection vial INJECT 50 UNITS UNDER THE SKIN AT NIGHT  Holly Cannon MD   blood glucose test strips (ONETOUCH ULTRA) strip USE TO TEST TWICE A DAY  Holly Cannon MD   dilTIAZem (CARDIZEM CD) 120 MG extended release capsule TAKE 1 CAPSULE DAILY  Holly Friday, MD   tamsulosin (FLOMAX) 0.4 MG capsule TAKE 1 CAPSULE DAILY  Holly Friday, MD   pioglitazone (ACTOS) 30 MG tablet TAKE 1 TABLET DAILY  Holly Friday, MD   hydroCHLOROthiazide (HYDRODIURIL) 12.5 MG tablet TAKE 1 TABLET DAILY  Holly Friday, MD   levothyroxine (SYNTHROID) 100 MCG tablet TAKE 1 TABLET DAILY  Holly Friday, MD   INVOKANA 100 MG TABS tablet TAKE 1 TABLET DAILY  Holly Friday, MD   apixaban (ELIQUIS) 5 MG TABS tablet TAKE 1 TABLET TWICE A DAY  Holly Friday, MD   fluticasone (FLONASE) 50 MCG/ACT nasal spray 1 spray by Each Nostril route 2 times daily  Sachin Wiley MD   budesonide (PULMICORT) 0.5 MG/2ML nebulizer suspension Use 1 ampule (2cc) in saline irrigations twice daily  Sachin Wiley MD   Hypertonic Nasal Wash (SINUS RINSE) PACK 1 Bottle by Nasal route 2 times daily  Sachin Wiley MD   Blood Glucose Monitoring Suppl (EMBRACE BLOOD GLUCOSE MONITOR) PARAS TEST TWICE DAILY. DX: E11.9  MD CLARK Thompson LANCETS ULTRA THIN 30G MISC TEST TWICE DAILY.  DX: E11.9  Holly Cannon MD   vitamin B-12 (CYANOCOBALAMIN) 1000 MCG tablet Take 1,000 mcg by mouth daily  Historical Provider, MD   rosuvastatin (CRESTOR) 10 MG tablet Take 10 mg by mouth daily  Historical Provider, MD HARDING INS SYR ULTRAFINE 1CC/30G 30G X 1/2\" 1 ML MISC USE WITH LANTUS NIGHTLY  Holly Cannon MD   TRUETEST TEST strip TEST DAILY  Holly Cannon MD         Past Medical History:   Diagnosis Date    A-fib [facility])     Chronic hepatitis C without hepatic coma ([facility] [address] 10/23/2015    Closed displaced fracture of right patella 11/8/2017    Closed nondisplaced fracture of styloid process of left radius 10/30/2017    Dental disease     Dizziness     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)     Esophageal reflux     Fracture of nasal bone     Headache     Hearing loss     Hyperlipidemia     Hyperlipidemia associated with type 2 diabetes mellitus ([address] 10/23/2015    Hypertension     Hypertrophy of prostate without urinary obstruction and other lower urinary tract symptoms (LUTS) 11/1/2011    Hypothyroidism     Insufficiency fracture of tibia 9/6/2016    Osteoarthrosis, not specified whether generalized/localized, lower leg     Pain in joint, lower leg     Right knee pain 11/8/2017    Status post total left knee replacement 7/17/2017    Tinnitus     Type 2 diabetes mellitus 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 edema ([address] 10/23/2015    Type II or unspecified type diabetes mellitus without mention of complication, not stated as uncontrolled     Unspecified viral hepatitis C without hepatic coma        Past Surgical History:   Procedure Laterality Date    CHOLECYSTECTOMY, LAPAROSCOPIC      COLONOSCOPY  12/14/07    ESOPHAGUS SURGERY      esophagus endoscopy    EYE SURGERY      JOINT REPLACEMENT Left 07/17/2017    LEFT TOTAL KNEE REPLACEMENT      KNEE ARTHROSCOPY      OTHER SURGICAL HISTORY Left 11/14/2016    LEFT KNEE DIRECT VIDEO ARTHROSCOPY, MEDIAL MENISECTOMY, CHONDROPLASTY, INTERNAL FIXATION OF MEDIAL TIBIAL INSUFFICIENCY FRACTURE WITH BONE SUBSTITUTE    TONSILLECTOMY           Family History   Problem Relation Age of Onset    Cancer Mother     Kidney Disease Brother         on dialasis    Diabetes type 2  Brother     Heart Disease Brother     Breast Cancer Sister        CareTeam (Including outside providers/suppliers regularly involved in providing care):   Patient Care Team:  June Guillermo MD as PCP - Armond Devlin MD as PCP - Hematology/Oncology (Hematology and Oncology)  June Guillermo MD as PCP - [facility] Provider  Ev London MD (Orthopedic Surgery)  Meg Mendoza MD as Referring Physician (Gastroenterology)    Wt Readings from Last 3 Encounters:   12/08/21 265 lb (120.2 kg)   08/30/21 260 lb (117.9 kg)   07/01/21 260 lb (117.9 kg)     Vitals:    12/08/21 1519   BP: 124/80   Site: Right Upper Arm   Position: Sitting   Cuff Size: Large Adult   Pulse: 70   Resp: 12   Weight: 265 lb (120.2 kg)   Height: 5' 11\" (1.803 m)     Body mass index is 36.96 kg/m². Based upon direct observation of the patient, evaluation of cognition reveals recent and remote memory intact.     General Appearance: alert and oriented to person, place and time, well developed and well- nourished, in no acute distress  Skin: warm and dry, no rash or erythema  Head: normocephalic and atraumatic  Eyes: pupils equal, round, and reactive to light, extraocular eye movements intact, conjunctivae normal  ENT: tympanic membrane, external ear and ear canal normal bilaterally, nose without deformity, nasal mucosa and turbinates normal without polyps  Neck: supple and non-tender without mass, no thyromegaly or thyroid nodules, no cervical lymphadenopathy  Pulmonary/Chest: clear to auscultation bilaterally- no wheezes, rales or rhonchi, normal air movement, no respiratory distress  Cardiovascular: normal rate, regular rhythm, normal S1 and S2, no murmurs, rubs, clicks, or gallops, distal pulses intact, no carotid bruits  Abdomen: soft, non-tender, non-distended, normal bowel sounds, no masses or organomegaly  Extremities: no cyanosis, clubbing ++ edema  Musculoskeletal: normal range of motion, no joint swelling, deformity or tenderness  Neurologic: reflexes normal and symmetric, no cranial nerve deficit, gait, coordination and speech normal    Patient's complete Health Risk Assessment and screening values have been reviewed and are found in Flowsheets. The following problems were reviewed today and where indicated follow up appointments were made and/or referrals ordered. Positive Risk Factor Screenings with Interventions:            General Health and ACP:  General  In general, how would you say your health is?: Good  In the past 7 days, have you experienced any of the following?  New or Increased Pain, New or Increased Fatigue, Loneliness, Social Isolation, Stress or Anger?: (!) New or Increased Fatigue, Stress, Anger  Do you get the social and emotional support that you need?: Yes  Do you have a Living Will?: Yes  Advance Directives     Power of  Living Will ACP-Advance Directive ACP-Power of     Not on File Filed on 08/27/19 Filed Not on File      General Health Risk Interventions:  · patient is dealing with his wife's dementia    Health Habits/Nutrition:  Health Habits/Nutrition  Do you exercise for at least 20 minutes 2-3 times per week?: Yes  Have you lost any weight without trying in the past 3 months?: No  Do you eat only one meal per day?: No  Have you seen the dentist within the past year?: N/A - wear dentures  Body mass index: (!) 36.96  Health Habits/Nutrition Interventions:  · Nutritional issues:  educational materials for healthy, well-balanced diet provided       Personalized Preventive Plan   Current Health Maintenance Status  Immunization History   Administered Date(s) Administered    Favian SANTANA, Primary or Immunocompromised, PF, 100mcg/0.5mL 02/24/2021, 03/29/2021, 11/10/2021    Influenza 08/27/2012    Influenza A ([id number]) Vaccine IM 01/11/2010    Influenza Virus Vaccine 09/28/2011, 10/03/2014, 10/23/2015    Influenza Whole 10/01/2010    Influenza, High Dose (Fluzone 65 yrs and older) 10/24/2016, 10/03/2018    Pneumococcal Conjugate 13-valent ([id number]) 10/03/2014    Pneumococcal Polysaccharide ([id number]) 10/08/2009        Health Maintenance   Topic Date Due    Hepatitis A vaccine (1 of 2 - Risk 2-dose series) Never done    DTaP/Tdap/Td vaccine (1 - Tdap) Never done    Flu vaccine (1) 09/01/2021    Annual Wellness Visit (AWV)  11/25/2021    TSH testing  04/09/2022    Lipid screen  09/01/2022    Potassium monitoring  09/01/2022    Creatinine monitoring  09/01/2022    Shingles Vaccine  Completed    Pneumococcal 65+ years Vaccine  Completed    COVID-19 Vaccine  Completed    Hib vaccine  Aged Out    Meningococcal (ACWY) vaccine  Aged Out     Recommendations for [employer] Due: see orders and patient instructions/AVS.  . Recommended screening schedule for the next 5-10 years is provided to the patient in written form: see Patient Instructions/AVS.    Almita Bojorquez was seen today for annual exam.    Diagnoses and all orders for this visit:    Medicare annual wellness visit, subsequent    Type 2 diabetes mellitus with stage 3a chronic kidney disease, without long-term current use of insulin ([address]  -     Basic Metabolic Panel;  Future  -     Hemoglobin A1C; Future    Benign essential tremor    Hyperlipidemia associated with type 2 diabetes mellitus (HCC)    Acquired hypothyroidism  -     TSH WITH REFLEX TO FT4; Future    MGUS (monoclonal gammopathy of unknown significance)    PAF (paroxysmal atrial fibrillation) (HCC)    Type 2 diabetes mellitus with both eyes affected by mild nonproliferative retinopathy without macular edema, with long-term current use of insulin ([address]

## 2021-12-08 NOTE — PATIENT INSTRUCTIONS
Personalized Preventive Plan for Hussein Jacobo - 12/8/2021  Medicare offers a range of preventive health benefits. Some of the tests and screenings are paid in full while other may be subject to a deductible, co-insurance, and/or copay. Some of these benefits include a comprehensive review of your medical history including lifestyle, illnesses that may run in your family, and various assessments and screenings as appropriate. After reviewing your medical record and screening and assessments performed today your provider may have ordered immunizations, labs, imaging, and/or referrals for you. A list of these orders (if applicable) as well as your Preventive Care list are included within your After Visit Summary for your review. Other Preventive Recommendations:    · A preventive eye exam performed by an eye specialist is recommended every 1-2 years to screen for glaucoma; cataracts, macular degeneration, and other eye disorders. · A preventive dental visit is recommended every 6 months. · Try to get at least 150 minutes of exercise per week or 10,000 steps per day on a pedometer . · Order or download the FREE \"Exercise & Physical Activity: Your Everyday Guide\" from [employer]. Call 6-209.912.4189 or search [employer] online. · You need 1004-3065 mg of calcium and 8900-6076 IU of vitamin D per day. It is possible to meet your calcium requirement with diet alone, but a vitamin D supplement is usually necessary to meet this goal.  · When exposed to the sun, use a sunscreen that protects against both UVA and UVB radiation with an SPF of 30 or greater. Reapply every 2 to 3 hours or after sweating, drying off with a towel, or swimming. · Always wear a seat belt when traveling in a car. Always wear a helmet when riding a bicycle or motorcycle.     Keep Your Memory [place] [employer]       Many factors can affect your ability to remembera hectic lifestyle, aging, stress, chronic disease, and certain medicines. But, there are steps you can take to sharpen your mind and help preserve your memory. Challenge Your Brain   Regularly challenging your mind may help keeps it in top shape. Good mental exercises include:   Crossword puzzlesUse a dictionary if you need it; you will learn more that way. Brainteasers Try some! Crafts, such as wood working and sewing   Hobbies, such as gardening and building model airplanes   SocializingVisit old friends or join groups to meet new ones. Reading   Learning a new language   Taking a class, whether it be art history or senthil chi   TravelingExperience the food, history, and culture of your destination   Learning to use a computer   Going to museums, the theater, or thought-provoking movies   Changing things in your daily life, such as reversing your pattern in the grocery store or brushing your teeth using your nondominant hand   Use Memory Aids   There is no need to remember every detail on your own. These memory aids can help:   Calendars and day planners   Electronic organizers to store all sorts of helpful informationThese devices can \"beep\" to remind you of appointments. A book of days to record birthdays, anniversaries, and other occasions that occur on the same date every year   Detailed \"to-do\" lists and strategically placed sticky notes   Quick \"study\" sessionsBefore a gathering, review who will be there so their names will be fresh in your mind. Establish routinesFor example, keep your keys, wallet, and umbrella in the same place all the time or take medicine with your 8:00 AM glass of juice   Live a Healthy Life   Many actions that will keep your body strong will do the same for your mind. For example:   Talk to Your Doctor About Herbs and Supplements    Malnutrition and vitamin deficiencies can impair your mental function. For example, vitamin B12 deficiency can cause a range of symptoms, including confusion.  But, what if your nutritional needs are being met? Can herbs and supplements still offer a benefit? Researchers have investigated a range of natural remedies, such as ginkgo , ginseng , and the supplement phosphatidylserine (PS). So far, though, the evidence is inconsistent as to whether these products can improve memory or thinking. If you are interested in taking herbs and supplements, talk to your doctor first because they may interact with other medicines that you are taking. Exercise Regularly    Among the many benefits of regular exercise are increased blood flow to the brain and decreased risk of certain diseases that can interfere with memory function. One study found that even moderate exercise has a beneficial effect. Examples of \"moderate\" exercise include:   Playing 18 holes of golf once a week, without a cart   Playing tennis twice a week   Walking one mile per day   Manage Stress    It can be tough to remember what is important when your mind is cluttered. Make time for relaxation. Choose activities that calm you down, and make it routine. Manage Chronic Conditions    Side effects of high blood pressure , diabetes, and heart disease can interfere with mental function. Many of the lifestyle steps discussed here can help manage these conditions. Strive to eat a healthy diet, exercise regularly, get stress under control, and follow your doctor's advice for your condition. Minimize Medications    Talk to your doctor about the medicines that you take. Some may be unnecessary. Also, healthy lifestyle habits may lower the need for certain drugs. Last Reviewed: April 2010 Km Johnson MD   Updated: 4/13/2010   ·     [address]       As we get older, changes in balance, gait, strength, vision, hearing, and cognition make even the most youthful senior more prone to accidents. Falls are one of the leading health risks for older people.  This increased risk of falling is related to:   Aging process (eg, decreased muscle strength, slowed reflexes)   Higher incidence of chronic health problems (eg, arthritis, diabetes) that may limit mobility, agility or sensory awareness   Side effects of medicine (eg, dizziness, blurred vision)especially medicines like prescription pain medicines and drugs used to treat mental health conditions   Depending on the brittleness of your bones, the consequences of a fall can be serious and long lasting. Home Life   Research by the Association of Aging [facility]) shows that some home accidents among older adults can be prevented by making simple lifestyle changes and basic modifications and repairs to the home environment. Here are some lifestyle changes that experts recommend:   Have your hearing and vision checked regularly. Be sure to wear prescription glasses that are right for you. Speak to your doctor or pharmacist about the possible side effects of your medicines. A number of medicines can cause dizziness. If you have problems with sleep, talk to your doctor. Limit your intake of alcohol. If necessary, use a cane or walker to help maintain your balance. Wear supportive, rubber-soled shoes, even at home. If you live in a region that gets wintry weather, you may want to put special cleats on your shoes to prevent you from slipping on the snow and ice. Exercise regularly to help maintain muscle tone, agility, and balance. Always hold the banister when going up or down stairs. Also, use  bars when getting in or out of the bath or shower, or using the toilet. To avoid dizziness, get up slowly from a lying down position. Sit up first, dangling your legs for a minute or two before rising to a standing position. Overall Home Safety Check   According to the Consumer [employer] Safety Commision's \"Older Consumer Home Safety Checklist,\" it is important to check for potential hazards in each room. And remember, proper lighting is an essential factor in home safety.  If you cannot see clearly, you are more likely to fall. Important questions to ask yourself include:   Are lamp, electric, extension, and telephone cords placed out of the flow of traffic and maintained in good condition? Have frayed cords been replaced? Are all small rugs and runners slip resistant? If not, you can secure them to the floor with a special double-sided carpet tape. Are smoke detectors properly locatedone on every floor of your home and one outside of every sleeping area? Are they in good working order? Are batteries replaced at least once a year? Do you have a well-maintained carbon monoxide detector outside every sleeping are in your home? Does your furniture layout leave plenty of space to maneuver between and around chairs, tables, beds, and sofas? Are hallways, stairs and passages between rooms well lit? Can you reach a lamp without getting out of bed? Are floor surfaces well maintained? Shag rugs, high-pile carpeting, tile floors, and polished wood floors can be particularly slippery. Stairs should always have handrails and be carpeted or fitted with a non-skid tread. Is your telephone easily reachable. Is the cord safely tucked away? Room by Room   According to the Association of Aging, bathrooms and luke are the two most potentially hazardous rooms in your home. In the Kitchen    Be sure your stove is in proper working order and always make sure burners and the oven are off before you go out or go to sleep. Keep pots on the back burners, turn handles away from the front of the stove, and keep stove clean and free of grease build-up. Kitchen ventilation systems and range exhausts should be working properly. Keep flammable objects such as towels and pot holders away from the cooking area except when in use. Make sure kitchen curtains are tied back. Move cords and appliances away from the sink and hot surfaces.  If extension cords are needed, install wiring guides so they do not hang over the sink, range, or working areas. Look for coffee pots, kettles and toaster ovens with automatic shut-offs. Keep a mop handy in the kitchen so you can wipe up spills instantly. You should also have a small fire extinguisher. Arrange your kitchen with frequently used items on lower shelves to avoid the need to stand on a stepstool to reach them. Make sure countertops are well-lit to avoid injuries while cutting and preparing food. In the Bathroom    Use a non-slip mat or decals in the tub and shower, since wet, soapy tile or porcelain surfaces are extremely slippery. Make sure bathroom rugs are non-skid or tape them firmly to the floor. Bathtubs should have at least one, preferably two, grab bars, firmly attached to structural supports in the wall. (Do not use built-in soap holders or glass shower doors as grab bars.)    Tub seats fitted with non-slip material on the legs allow you to wash sitting down. For people with limited mobility, bathtub transfer benches allow you to slide safely into the tub. Raised toilet seats and toilet safety rails are helpful for those with knee or hip problems. In the [facility]    Make sure you use a nightlight and that the area around your bed is clear of potential obstacles. Be careful with electric blankets and never go to sleep with a heating pad, which can cause serious burns even if on a low setting. Use fire-resistant mattress covers and pillows, and NEVER smoke in bed. Keep a phone next to the bed that is programmed to dial 911 at the push of a button. If you have a chronic condition, you may want to sign on with an automatic call-in service. Typically the system includes a small pendant that connects directly to an emergency medical voice-response system. You should also make arrangements to stay in contact with someonefriend, neighbor, family memberon a regular schedule.    Fire Prevention   According to the [employer] (Smoke Alarms for Every) [facility] [facility], senior citizens are one of the two highest risk groups for death and serious injuries due to residential fires. When cooking, wear short-sleeved items, never a bulky long-sleeved robe. The [facility]'s Safety Checklist for Older Consumers emphasizes the importance of checking basements, garages, workshops and storage areas for fire hazards, such as volatile liquids, piles of old rags or clothing and overloaded circuits. Never smoke in bed or when lying down on a couch or recliner chair. Small portable electric or kerosene heaters are responsible for many home fires and should be used cautiously if at all. If you do use one, be sure to keep them away from flammable materials. In case of fire, make sure you have a pre-established emergency exit plan. Have a professional check your fireplace and other fuel-burning appliances yearly. [employer]   Baby boomers entering the rankin years will continue to see the development of new products to help older adults live safely and independently in spite of age-related changes. Making Life More Livable  , by [employer], lists over 1,000 products for \"living well in the mature years,\" such as bathing and mobility aids, household security devices, ergonomically designed knives and peelers, and faucet valves and knobs for temperature control. Medical supply stores and organizations are good sources of information about products that improve your quality of life and insure your safety.      Last Reviewed: November 2009 Derick Gentile MD   Updated: 3/7/2011     ·

## 2021-12-09 LAB
ANION GAP SERPL CALCULATED.3IONS-SCNC: 14 MMOL/L (ref 3–16)
BUN BLDV-MCNC: 34 MG/DL (ref 7–20)
CALCIUM SERPL-MCNC: 9.2 MG/DL (ref 8.3–10.6)
CHLORIDE BLD-SCNC: 98 MMOL/L (ref 99–110)
CO2: 28 MMOL/L (ref 21–32)
CREAT SERPL-MCNC: 1.5 MG/DL (ref 0.8–1.3)
ESTIMATED AVERAGE GLUCOSE: 134.1 MG/DL
GFR AFRICAN AMERICAN: 55
GFR NON-AFRICAN AMERICAN: 45
GLUCOSE BLD-MCNC: 83 MG/DL (ref 70–99)
HBA1C MFR BLD: 6.3 %
POTASSIUM SERPL-SCNC: 4.8 MMOL/L (ref 3.5–5.1)
SODIUM BLD-SCNC: 140 MMOL/L (ref 136–145)
TSH REFLEX FT4: 0.83 UIU/ML (ref 0.27–4.2)

## 2022-02-09 RX ORDER — INSULIN GLARGINE 100 [IU]/ML
INJECTION, SOLUTION SUBCUTANEOUS
Qty: 50 ML | Refills: 0 | Status: SHIPPED | OUTPATIENT
Start: 2022-02-09 | End: 2022-04-22

## 2022-03-01 ENCOUNTER — TELEPHONE (OUTPATIENT)
Dept: INTERNAL MEDICINE CLINIC | Age: 82
End: 2022-03-01

## 2022-03-01 RX ORDER — EMPAGLIFLOZIN 25 MG/1
1 TABLET, FILM COATED ORAL DAILY
Qty: 90 TABLET | Refills: 1 | Status: SHIPPED | OUTPATIENT
Start: 2022-03-01 | End: 2022-10-17 | Stop reason: SDUPTHER

## 2022-03-01 NOTE — TELEPHONE ENCOUNTER
EGD REPORT.    Surgeon; Nahid Salguero D.O.    ASSISTANT: none    2019    Fern Jennings  : 1943  MRN: [id number]    PREOPERATIVE DIAGNOSES / INDICATIONS;  · History of peptic ulcer disease  · History of Hirsch's esophagus    POSTOPERATIVE DIAGNOSES;  · Multiple small bleeding antral ulcers  · No evidence of duodenal ulcer disease  · Distal esophagitis consistent with the patient's history of Hirsch's esophagus    OPERATION PERFORMED:  · EGD with biopsy    ESTIMATED BLOOD LOSS:  Less than 5 mL    ANESTHESIA;  Fentanyl 50  Versed 2  Oxygen nasal cannula  Zofran 4 mg IV   · Moderate sedation administered by nursing staff, directly supervised by me  · Start time 1135  · Stop time 1143    SPECIMENS: See lab results    COMPLICATIONS: None    DESCRIPTION OF OPERATION;  The risks and benefits of the procedure were explained to the patient who demonstrated an understanding and consented to the procedure.  The history was reviewed and the patient was examined.  The patient was deemed stable for the procedure and monitored throughout.     Esophagogastroduodenoscopy done with Olympus scope.    Esophagoscopy revealed the mucosa in the entire esophagus.    Gastroscopy revealed the mucosa in the cardia, fundus, body and antrum.    Duodenoscopy revealed the mucosa in the duodenal bulb and postbulbar area.    · Multiple small bleeding antral ulcers-antrum was biopsied  · No evidence of duodenal ulcer disease  · Distal esophagitis consistent with the patient's history of Hirsch's esophagus-GE junction was biopsied    Following the procedure. The stomach was decompressed, the endoscope was removed and the procedure was terminated. Immediate postoperative condition the patient was normal and transferred to recovery area.     RECOMMENDATIONS;  · We'll prescribe a daily antacid  · Repeat EGD every 2-3 years  ·    Per Dr. Kelly Esqueda 25 mg. Pt informed.

## 2022-03-07 RX ORDER — DILTIAZEM HYDROCHLORIDE 120 MG/1
CAPSULE, COATED, EXTENDED RELEASE ORAL
Qty: 90 CAPSULE | Refills: 3 | Status: ON HOLD | OUTPATIENT
Start: 2022-03-07 | End: 2022-08-05 | Stop reason: SDUPTHER

## 2022-03-07 RX ORDER — TAMSULOSIN HYDROCHLORIDE 0.4 MG/1
CAPSULE ORAL
Qty: 90 CAPSULE | Refills: 3 | Status: ON HOLD | OUTPATIENT
Start: 2022-03-07 | End: 2022-08-05 | Stop reason: SDUPTHER

## 2022-04-01 ENCOUNTER — OFFICE VISIT (OUTPATIENT)
Dept: INTERNAL MEDICINE CLINIC | Age: 82
End: 2022-04-01

## 2022-04-01 VITALS
SYSTOLIC BLOOD PRESSURE: 130 MMHG | RESPIRATION RATE: 12 BRPM | WEIGHT: 262 LBS | DIASTOLIC BLOOD PRESSURE: 80 MMHG | BODY MASS INDEX: 36.68 KG/M2 | HEIGHT: 71 IN | HEART RATE: 70 BPM

## 2022-04-01 DIAGNOSIS — D47.2 MGUS (MONOCLONAL GAMMOPATHY OF UNKNOWN SIGNIFICANCE): ICD-10-CM

## 2022-04-01 DIAGNOSIS — I10 ESSENTIAL HYPERTENSION: ICD-10-CM

## 2022-04-01 DIAGNOSIS — E11.3293 TYPE 2 DIABETES MELLITUS WITH BOTH EYES AFFECTED BY MILD NONPROLIFERATIVE RETINOPATHY WITHOUT MACULAR EDEMA, WITH LONG-TERM CURRENT USE OF INSULIN (HCC): ICD-10-CM

## 2022-04-01 DIAGNOSIS — E11.69 HYPERLIPIDEMIA ASSOCIATED WITH TYPE 2 DIABETES MELLITUS (HCC): ICD-10-CM

## 2022-04-01 DIAGNOSIS — Z79.4 TYPE 2 DIABETES MELLITUS WITH BOTH EYES AFFECTED BY MILD NONPROLIFERATIVE RETINOPATHY WITHOUT MACULAR EDEMA, WITH LONG-TERM CURRENT USE OF INSULIN (HCC): ICD-10-CM

## 2022-04-01 DIAGNOSIS — E78.5 HYPERLIPIDEMIA ASSOCIATED WITH TYPE 2 DIABETES MELLITUS (HCC): ICD-10-CM

## 2022-04-01 DIAGNOSIS — N18.31 TYPE 2 DIABETES MELLITUS WITH STAGE 3A CHRONIC KIDNEY DISEASE, WITHOUT LONG-TERM CURRENT USE OF INSULIN (HCC): ICD-10-CM

## 2022-04-01 DIAGNOSIS — E03.9 ACQUIRED HYPOTHYROIDISM: ICD-10-CM

## 2022-04-01 DIAGNOSIS — E11.22 TYPE 2 DIABETES MELLITUS WITH STAGE 3A CHRONIC KIDNEY DISEASE, WITHOUT LONG-TERM CURRENT USE OF INSULIN (HCC): Primary | ICD-10-CM

## 2022-04-01 DIAGNOSIS — E66.01 SEVERE OBESITY (BMI 35.0-39.9) WITH COMORBIDITY (HCC): ICD-10-CM

## 2022-04-01 DIAGNOSIS — B18.2 CHRONIC HEPATITIS C WITHOUT HEPATIC COMA (HCC): ICD-10-CM

## 2022-04-01 DIAGNOSIS — N18.31 TYPE 2 DIABETES MELLITUS WITH STAGE 3A CHRONIC KIDNEY DISEASE, WITHOUT LONG-TERM CURRENT USE OF INSULIN (HCC): Primary | ICD-10-CM

## 2022-04-01 DIAGNOSIS — I48.0 PAF (PAROXYSMAL ATRIAL FIBRILLATION) (HCC): ICD-10-CM

## 2022-04-01 DIAGNOSIS — G25.0 BENIGN ESSENTIAL TREMOR: ICD-10-CM

## 2022-04-01 DIAGNOSIS — E11.22 TYPE 2 DIABETES MELLITUS WITH STAGE 3A CHRONIC KIDNEY DISEASE, WITHOUT LONG-TERM CURRENT USE OF INSULIN (HCC): ICD-10-CM

## 2022-04-01 LAB
A/G RATIO: 1.1 (ref 1.1–2.2)
ALBUMIN SERPL-MCNC: 4 G/DL (ref 3.4–5)
ALP BLD-CCNC: 88 U/L (ref 40–129)
ALT SERPL-CCNC: 32 U/L (ref 10–40)
ANION GAP SERPL CALCULATED.3IONS-SCNC: 15 MMOL/L (ref 3–16)
AST SERPL-CCNC: 38 U/L (ref 15–37)
BASOPHILS ABSOLUTE: 0 K/UL (ref 0–0.2)
BASOPHILS RELATIVE PERCENT: 0.7 %
BILIRUB SERPL-MCNC: 0.5 MG/DL (ref 0–1)
BUN BLDV-MCNC: 37 MG/DL (ref 7–20)
CALCIUM SERPL-MCNC: 9.8 MG/DL (ref 8.3–10.6)
CHLORIDE BLD-SCNC: 100 MMOL/L (ref 99–110)
CO2: 30 MMOL/L (ref 21–32)
CREAT SERPL-MCNC: 1.6 MG/DL (ref 0.8–1.3)
EOSINOPHILS ABSOLUTE: 0.4 K/UL (ref 0–0.6)
EOSINOPHILS RELATIVE PERCENT: 8.8 %
GFR AFRICAN AMERICAN: 51
GFR NON-AFRICAN AMERICAN: 42
GLUCOSE BLD-MCNC: 61 MG/DL (ref 70–99)
HCT VFR BLD CALC: 45.6 % (ref 40.5–52.5)
HEMOGLOBIN: 14.6 G/DL (ref 13.5–17.5)
LYMPHOCYTES ABSOLUTE: 1.6 K/UL (ref 1–5.1)
LYMPHOCYTES RELATIVE PERCENT: 32.1 %
MCH RBC QN AUTO: 29 PG (ref 26–34)
MCHC RBC AUTO-ENTMCNC: 32 G/DL (ref 31–36)
MCV RBC AUTO: 90.6 FL (ref 80–100)
MONOCYTES ABSOLUTE: 0.5 K/UL (ref 0–1.3)
MONOCYTES RELATIVE PERCENT: 10.2 %
NEUTROPHILS ABSOLUTE: 2.4 K/UL (ref 1.7–7.7)
NEUTROPHILS RELATIVE PERCENT: 48.2 %
PDW BLD-RTO: 14.5 % (ref 12.4–15.4)
PLATELET # BLD: 271 K/UL (ref 135–450)
PMV BLD AUTO: 7.1 FL (ref 5–10.5)
POTASSIUM SERPL-SCNC: 5.2 MMOL/L (ref 3.5–5.1)
RBC # BLD: 5.03 M/UL (ref 4.2–5.9)
SODIUM BLD-SCNC: 145 MMOL/L (ref 136–145)
TOTAL PROTEIN: 7.6 G/DL (ref 6.4–8.2)
URIC ACID, SERUM: 7 MG/DL (ref 3.5–7.2)
WBC # BLD: 5 K/UL (ref 4–11)

## 2022-04-01 PROCEDURE — G8427 DOCREV CUR MEDS BY ELIG CLIN: HCPCS | Performed by: INTERNAL MEDICINE

## 2022-04-01 PROCEDURE — 1123F ACP DISCUSS/DSCN MKR DOCD: CPT | Performed by: INTERNAL MEDICINE

## 2022-04-01 PROCEDURE — 99214 OFFICE O/P EST MOD 30 MIN: CPT | Performed by: INTERNAL MEDICINE

## 2022-04-01 PROCEDURE — 4040F PNEUMOC VAC/ADMIN/RCVD: CPT | Performed by: INTERNAL MEDICINE

## 2022-04-01 PROCEDURE — G8417 CALC BMI ABV UP PARAM F/U: HCPCS | Performed by: INTERNAL MEDICINE

## 2022-04-01 PROCEDURE — 1036F TOBACCO NON-USER: CPT | Performed by: INTERNAL MEDICINE

## 2022-04-01 ASSESSMENT — ENCOUNTER SYMPTOMS
WHEEZING: 0
BACK PAIN: 0
ABDOMINAL PAIN: 0
SHORTNESS OF BREATH: 0
RHINORRHEA: 0
VOMITING: 0
NAUSEA: 0

## 2022-04-01 NOTE — PROGRESS NOTES
Subjective:      Patient ID: Marc oAntonio Luke is a 80 y.o. male. HPI  Patient is here for follow up diabetes, hypertension, hyperlipidemia, GERD, OA and hepatitis C. Diabetes Mellitus Type 2: Current symptoms/problems include none. Medication compliance:  compliant most of the time  Diabetic diet compliance:  compliant most of the time,  Weight trend:down 3 lbs  Current exercise: walks 3 time(s) per week    Home blood sugar records: fasting range:  mg/dl  Any episodes of hypoglycemia? none  Eye exam current (within one year): yes   reports that he quit smoking about 32 years ago. He has a 15.00 pack-year smoking history. He quit smokeless tobacco use about 44 years ago. Daily Aspirin? No  Known diabetic complications: nephropathy and retinopathy    Hypertension:  Blood pressure typically runs normal outside of the office. He is adherent to a low sodium diet. Patient denies chest pain, dry cough, fatigue. He has chronic leg edema. Antihypertensive medication side effects: no medication side effects noted. Use of agents associated with hypertension: none. Hyperlipidemia:  No new myalgias or GI upset on atorvastatin (Lipitor). Lab Results   Component Value Date    LABA1C 6.3 12/08/2021    LABA1C 6.3 09/01/2021    LABA1C 6.2 11/24/2020     Lab Results   Component Value Date    LABMICR YES 01/09/2020    CREATININE 1.5 (H) 12/08/2021     Lab Results   Component Value Date    ALT 34 09/01/2021    AST 40 (H) 09/01/2021     No components found for: CHLPL  Lab Results   Component Value Date    TRIG 83 09/01/2021     Lab Results   Component Value Date    HDL 55 09/01/2021     Lab Results   Component Value Date    LDLCALC 100 09/01/2021     . No heartburn. He has hypothyroidism. It is stable. No constipation or diarrhea. He has CKD from DM. He has diabetic retinopathy. Eyes stable. He takes Flomax for BPH with LUTS and it helps. He has history of chronic hepatitis C.     He has chronic atrial fibrillation. Rate controlled. No palpitations. No bleeding or bruising on Eliquis. Review of Systems   Constitutional: Negative for activity change and appetite change. HENT: Negative for postnasal drip and rhinorrhea. Respiratory: Negative for shortness of breath and wheezing. Cardiovascular: Positive for leg swelling. Negative for chest pain and palpitations. Gastrointestinal: Negative for abdominal pain, nausea and vomiting. Genitourinary: Negative for difficulty urinating, frequency and hematuria. Musculoskeletal: Negative for back pain and joint swelling. Skin: Negative for rash. Neurological: Positive for tremors. Negative for light-headedness. Psychiatric/Behavioral: Negative for sleep disturbance.        Current Outpatient Medications:     tamsulosin (FLOMAX) 0.4 MG capsule, TAKE 1 CAPSULE DAILY, Disp: 90 capsule, Rfl: 3    dilTIAZem (CARDIZEM CD) 120 MG extended release capsule, TAKE 1 CAPSULE DAILY, Disp: 90 capsule, Rfl: 3    empagliflozin (JARDIANCE) 25 MG tablet, Take 1 tablet by mouth daily, Disp: 90 tablet, Rfl: 1    LANTUS 100 UNIT/ML injection vial, INJECT 50 UNITS UNDER THE SKIN AT NIGHT, Disp: 50 mL, Rfl: 0    blood glucose test strips (ONETOUCH ULTRA) strip, USE TO TEST TWICE A DAY, Disp: 200 strip, Rfl: 3    pioglitazone (ACTOS) 30 MG tablet, TAKE 1 TABLET DAILY, Disp: 90 tablet, Rfl: 3    hydroCHLOROthiazide (HYDRODIURIL) 12.5 MG tablet, TAKE 1 TABLET DAILY, Disp: 90 tablet, Rfl: 3    levothyroxine (SYNTHROID) 100 MCG tablet, TAKE 1 TABLET DAILY, Disp: 90 tablet, Rfl: 3    apixaban (ELIQUIS) 5 MG TABS tablet, TAKE 1 TABLET TWICE A DAY, Disp: 180 tablet, Rfl: 3    fluticasone (FLONASE) 50 MCG/ACT nasal spray, 1 spray by Each Nostril route 2 times daily, Disp: 2 Bottle, Rfl: 5    budesonide (PULMICORT) 0.5 MG/2ML nebulizer suspension, Use 1 ampule (2cc) in saline irrigations twice daily, Disp: 60 ampule, Rfl: 3    Hypertonic Nasal Wash (SINUS RINSE) PACK, 1 Bottle by Nasal route 2 times daily, Disp: 1 each, Rfl: 1    Blood Glucose Monitoring Suppl (EMBRACE BLOOD GLUCOSE MONITOR) PARAS, TEST TWICE DAILY. DX: E11.9, Disp: 1 Device, Rfl: 0    EMBRACE LANCETS ULTRA THIN 30G MISC, TEST TWICE DAILY. DX: E11.9, Disp: 200 each, Rfl: 3    vitamin B-12 (CYANOCOBALAMIN) 1000 MCG tablet, Take 1,000 mcg by mouth daily, Disp: , Rfl:     rosuvastatin (CRESTOR) 10 MG tablet, Take 10 mg by mouth daily, Disp: , Rfl:     B-D INS SYR ULTRAFINE 1CC/30G 30G X 1/2\" 1 ML MISC, USE WITH LANTUS NIGHTLY, Disp: 90 each, Rfl: 11    TRUETEST TEST strip, TEST DAILY, Disp: 100 strip, Rfl: 3        There are no changes to past medical history, family history, social history or review of systems(except as noted in the history section) since prior note (all reviewed with patient). /80 (Site: Right Upper Arm, Position: Sitting, Cuff Size: Large Adult)   Pulse 70   Resp 12   Ht 5' 11\" (1.803 m)   Wt 262 lb (118.8 kg)   BMI 36.54 kg/m²        Objective:   Physical Exam  Constitutional:       Appearance: He is well-developed. HENT:      Head: Normocephalic. Eyes:      Conjunctiva/sclera: Conjunctivae normal.      Pupils: Pupils are equal, round, and reactive to light. Neck:      Thyroid: No thyroid mass or thyromegaly. Vascular: No carotid bruit or JVD. Trachea: Trachea normal.   Cardiovascular:      Rate and Rhythm: Normal rate. Rhythm irregularly irregular. Heart sounds: Normal heart sounds. No murmur heard. No gallop. Pulmonary:      Effort: Pulmonary effort is normal. No respiratory distress. Breath sounds: Normal breath sounds. No wheezing or rales. Abdominal:      General: Bowel sounds are normal. There is no distension. Palpations: Abdomen is soft. There is no hepatomegaly, splenomegaly or mass. Tenderness: There is no abdominal tenderness. Musculoskeletal:      Cervical back: Normal range of motion and neck supple.       Right lower leg: Edema present. Left lower leg: Edema present. Lymphadenopathy:      Cervical: No cervical adenopathy. Skin:     General: Skin is warm and dry. Findings: No rash. Neurological:      Mental Status: He is alert and oriented to person, place, and time. Motor: Tremor (right upper extremity at rest) present. Psychiatric:         Behavior: Behavior normal.         Thought Content: Thought content normal.         Judgment: Judgment normal.         Assessment:       Diagnosis Orders   1. Type 2 diabetes mellitus with stage 3a chronic kidney disease, without long-term current use of insulin ([facility])  Comprehensive Metabolic Panel    CBC with Auto Differential    Hemoglobin A1C    Uric Acid   2. Type 2 diabetes mellitus with both eyes affected by mild nonproliferative retinopathy without macular edema, with long-term current use of insulin ([facility])     3. Benign essential tremor     4. Essential hypertension     5. Chronic hepatitis C without hepatic coma ([facility] [address]     6. Acquired hypothyroidism     7. Hyperlipidemia associated with type 2 diabetes mellitus ([facility] [address]     8. PAF (paroxysmal atrial fibrillation) ([facility] [address]     9. MGUS (monoclonal gammopathy of unknown significance)     10. Severe obesity (BMI 35.0-39. 9) with comorbidity [facility])            Plan:      Check labs. DM type 2: controlled. Stressed diet and exercise. On Lantus and Novolog. Check labs. Doing well. Hypertension: at goal.  He is on Prinivil and HCTZ. Tremor: unchanged. Not on any medication. Hyperlipidemia: at goal.  Hypothyroidism:  Patient's hypothyroidism is stable on replacement therapy. Check TSH. A fib:  He has seen cardiology. On Eliquis. Morbid Obesity  - Body mass index is 36.54 kg/m². - Complicating assessment and treatment. Placing patient at risk for multiple co-morbidities as well as early death and contributing to the patient's presentation.   - Counseled on weight loss. MGUS unchanged.           Discussed use, benefit, and side effects of prescribed medications. Barriers to medication compliance addressed. All patient questions answered. Pt voiced understanding. Decrease calorie intake. Exercise,weight loss recommended. The current medical regimen is effective;  continue present plan and medications. See orders.

## 2022-04-02 LAB
ESTIMATED AVERAGE GLUCOSE: 139.9 MG/DL
HBA1C MFR BLD: 6.5 %

## 2022-04-05 DIAGNOSIS — E87.5 HYPERKALEMIA: Primary | ICD-10-CM

## 2022-04-22 RX ORDER — INSULIN GLARGINE-YFGN 100 [IU]/ML
INJECTION, SOLUTION SUBCUTANEOUS
Qty: 50 ML | Refills: 0 | Status: SHIPPED | OUTPATIENT
Start: 2022-04-22 | End: 2022-07-21

## 2022-05-31 RX ORDER — LEVOTHYROXINE SODIUM 0.1 MG/1
TABLET ORAL
Qty: 90 TABLET | Refills: 1 | Status: SHIPPED | OUTPATIENT
Start: 2022-05-31

## 2022-06-01 RX ORDER — HYDROCHLOROTHIAZIDE 12.5 MG/1
TABLET ORAL
Qty: 90 TABLET | Refills: 3 | Status: SHIPPED | OUTPATIENT
Start: 2022-06-01 | End: 2022-08-05

## 2022-06-13 ENCOUNTER — HOSPITAL ENCOUNTER (OUTPATIENT)
Dept: WOUND CARE | Age: 82
Discharge: HOME | End: 2022-06-13
Payer: MEDICARE

## 2022-06-13 VITALS
TEMPERATURE: 97.8 F | BODY MASS INDEX: 39.06 KG/M2 | SYSTOLIC BLOOD PRESSURE: 135 MMHG | WEIGHT: 279 LBS | DIASTOLIC BLOOD PRESSURE: 86 MMHG | HEIGHT: 71 IN | RESPIRATION RATE: 18 BRPM | HEART RATE: 82 BPM

## 2022-06-13 DIAGNOSIS — L97.822 ULCER OF LEFT PRETIBIAL REGION WITH FAT LAYER EXPOSED (HCC): Primary | ICD-10-CM

## 2022-06-13 PROCEDURE — 99213 OFFICE O/P EST LOW 20 MIN: CPT

## 2022-06-13 PROCEDURE — 29581 APPL MULTLAYER CMPRN SYS LEG: CPT

## 2022-06-13 RX ORDER — LIDOCAINE HYDROCHLORIDE 40 MG/ML
SOLUTION TOPICAL ONCE
Status: CANCELLED | OUTPATIENT
Start: 2022-06-13 | End: 2022-06-13

## 2022-06-13 RX ORDER — LIDOCAINE 50 MG/G
OINTMENT TOPICAL ONCE
Status: CANCELLED | OUTPATIENT
Start: 2022-06-13 | End: 2022-06-13

## 2022-06-13 RX ORDER — BACITRACIN ZINC AND POLYMYXIN B SULFATE 500; 1000 [USP'U]/G; [USP'U]/G
OINTMENT TOPICAL ONCE
Status: CANCELLED | OUTPATIENT
Start: 2022-06-13 | End: 2022-06-13

## 2022-06-13 RX ORDER — LIDOCAINE 40 MG/G
CREAM TOPICAL ONCE
Status: CANCELLED | OUTPATIENT
Start: 2022-06-13 | End: 2022-06-13

## 2022-06-13 RX ORDER — LIDOCAINE 50 MG/G
OINTMENT TOPICAL ONCE
Status: DISCONTINUED | OUTPATIENT
Start: 2022-06-13 | End: 2022-06-14 | Stop reason: HOSPADM

## 2022-06-13 NOTE — PLAN OF CARE
Pt to the [facility] for initial appointment for left lower leg wound. Pt to have silver foam and Fourflex applied to left lower leg. Discussed with patient the importance of elevating legs and compression. Pt states that he has tried compression stockings and he can't get them up easily. When swelling is decreased will work on ordering compression garments. Pt to follow up in the [facility] in 1 week. Discharge instructions reviewed with patient, all questions answered, copy given to patient. Dressings were applied to all wounds per M.D. Instructions at this visit.

## 2022-06-15 ENCOUNTER — TELEPHONE (OUTPATIENT)
Dept: INTERNAL MEDICINE CLINIC | Age: 82
End: 2022-06-15

## 2022-06-15 ENCOUNTER — TELEPHONE (OUTPATIENT)
Dept: WOUND CARE | Age: 82
End: 2022-06-15

## 2022-06-15 ENCOUNTER — HOSPITAL ENCOUNTER (OUTPATIENT)
Dept: WOUND CARE | Age: 82
Discharge: HOME | End: 2022-06-15
Payer: MEDICARE

## 2022-06-15 VITALS
BODY MASS INDEX: 38.91 KG/M2 | HEIGHT: 71 IN | HEART RATE: 73 BPM | DIASTOLIC BLOOD PRESSURE: 83 MMHG | RESPIRATION RATE: 18 BRPM | SYSTOLIC BLOOD PRESSURE: 138 MMHG | TEMPERATURE: 97 F

## 2022-06-15 DIAGNOSIS — M79.89 LEG SWELLING: Primary | ICD-10-CM

## 2022-06-15 DIAGNOSIS — L97.822 ULCER OF LEFT PRETIBIAL REGION WITH FAT LAYER EXPOSED (HCC): Primary | ICD-10-CM

## 2022-06-15 PROCEDURE — 29581 APPL MULTLAYER CMPRN SYS LEG: CPT

## 2022-06-15 RX ORDER — LIDOCAINE HYDROCHLORIDE 40 MG/ML
SOLUTION TOPICAL ONCE
Status: CANCELLED | OUTPATIENT
Start: 2022-06-15 | End: 2022-06-15

## 2022-06-15 RX ORDER — LIDOCAINE 50 MG/G
OINTMENT TOPICAL ONCE
Status: CANCELLED | OUTPATIENT
Start: 2022-06-15 | End: 2022-06-15

## 2022-06-15 RX ORDER — FUROSEMIDE 40 MG/1
40 TABLET ORAL DAILY
Qty: 30 TABLET | Refills: 0 | Status: SHIPPED | OUTPATIENT
Start: 2022-06-15 | End: 2022-07-15

## 2022-06-15 RX ORDER — BACITRACIN ZINC AND POLYMYXIN B SULFATE 500; 1000 [USP'U]/G; [USP'U]/G
OINTMENT TOPICAL ONCE
Status: CANCELLED | OUTPATIENT
Start: 2022-06-15 | End: 2022-06-15

## 2022-06-15 RX ORDER — LIDOCAINE 40 MG/G
CREAM TOPICAL ONCE
Status: CANCELLED | OUTPATIENT
Start: 2022-06-15 | End: 2022-06-15

## 2022-06-15 NOTE — TELEPHONE ENCOUNTER
----- Message from Nellie Jack MD sent at 6/15/2022 10:42 AM EDT -----  Contact: Mingo Mobley [phone]  [facility] in one week  ----- Message -----  From: Cristianyesi Nic  Sent: 6/15/2022   9:29 AM EDT  To: Nellie Jack MD    Patient states he has edema in both legs. He has a wound above his L ankle that is seeping. He went to wound care and they wrapped his legs and told him to call his PCP and ask about prescription for diuretic. He is due back at wound care again today at 3:00 to change and re-wrap legs.      [facility]    Thank you

## 2022-06-15 NOTE — TELEPHONE ENCOUNTER
----- Message from Corinne Ruffin MD sent at 6/15/2022 10:41 AM EDT -----  Contact: Geovanni Carter [phone]  Lasix 40 mg po daily #30  ----- Message -----  From: aMrgaret Anderson  Sent: 6/15/2022   9:29 AM EDT  To: Corinne Ruffin MD    Patient states he has edema in both legs. He has a wound above his L ankle that is seeping. He went to wound care and they wrapped his legs and told him to call his PCP and ask about prescription for diuretic. He is due back at wound care again today at 3:00 to change and re-wrap legs.      [facility]    Thank you

## 2022-06-15 NOTE — PLAN OF CARE
Pt seen today in [address] [place] [address],[address] for wound reassessment and dressing change. Left leg edema decreased this visit. Dressing placed per Dr. Kristin Blevins orders. Pt tolerated dressing placement. Follow up in [address] [place] [address] on 5/20/22 as ordered. Pt. Aware to call sooner with any problems or questions/concerns.

## 2022-06-18 NOTE — PROGRESS NOTES
[facility] Progress Note      Elizabeth Guerrero     : 1940    DATE OF VISIT:  2022    Subjective:     Elizabeth Guerrero is a 80 y.o. male who has a chief complaint of a venous and lymphedema ulcer located on the left leg. Patient does have history of leg swelling and other wounds on his legs. Mr. 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 ([address] Esophageal reflux, Fracture of nasal bone, Headache, Hearing loss, Hyperlipidemia, Hyperlipidemia associated with type 2 diabetes mellitus ([address] Hypertension, Hypertrophy of prostate without urinary obstruction and other lower urinary tract symptoms (LUTS), Hypothyroidism, Insufficiency fracture of tibia, Osteoarthrosis, not specified whether generalized/localized, lower leg, Pain in joint, lower leg, Right knee pain, Status post total left knee replacement, Tinnitus, Type 2 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 He has a past surgical history that includes eye surgery; Colonoscopy (07); Esophagus surgery; Knee arthroscopy; Cholecystectomy, laparoscopic; other surgical history (Left, 2016); joint replacement (Left, 2017); Tonsillectomy; and Spine surgery. His family history includes Breast Cancer in his sister; Cancer in his mother; Diabetes type 2  in his brother; Heart Disease in his brother; Kidney Disease in his brother. Mr. Adebayo Owens reports that he quit smoking about 32 years ago. He has a 15.00 pack-year smoking history.  He quit smokeless tobacco use about 44 years ago. He reports current alcohol use. He reports that he does not use drugs. His current medication list consists of Embrace Blood Glucose Monitor, Embrace Lancets Ultra Thin 30G, Insulin Syringe-Needle U-100, Multiple Vitamins-Minerals, apixaban, blood glucose test strips, budesonide, dilTIAZem, empagliflozin, furosemide, hydroCHLOROthiazide, insulin glargine-yfgn, levothyroxine, pioglitazone, rosuvastatin, and tamsulosin. Allergies: Patient has no known allergies. Pertinent items from the review of systems are discussed in the HPI; the remainder of the ROS was reviewed and is negative. Objective:     /86   Pulse 82   Temp 97.8 °F (36.6 °C) (Oral)   Resp 18   Ht 5' 11\" (1.803 m)   Wt 279 lb (126.6 kg)   BMI 38.91 kg/m²   General Appearance: alert and oriented to person, place and time, well developed and well- nourished, in no acute distress  Head: normocephalic and atraumatic  Eyes: pupils equal, round  Neck: trachea midline  Pulmonary/Chest: no wheezes  Cardiovascular: normal rate, regular rhythm  Abdomen: soft, non-tender, non-distended    Dorsalis pedis pulse left palpable  Posterior tibial pulse left palpable  Dorsalis pedis pulse right palpable  Posterior tibial pulse right palpable      Ulcer on the anterior aspect left lower leg with mild fibrotic tissue, red granulation tissue, mild to moderate serous drainage, no hyperkeratotic rim, no undermining, no tunneling, no purulence, no malodor, no eschar,  no periwound maceration, mild periwound erythema, mild to moderate edema, no crepitus, no increase in skin temperature, ulcer probes to soft tissue only    + edema bilateral LE.   + venous dermatitis bilateral LE. Today's ulcer measurements are in the wound documentation flowsheet.      Wound measurements:  Wound 06/13/22 #1, Left Medial Leg, Lymphedema, Full Thickness, Onset Unknown (several years)-Wound Length (cm): 0.5 cm    Wound 06/13/22 #1, Left Medial Leg, Lymphedema, Full Thickness, Onset Unknown (several years)-Wound Width (cm): 1 cm    Wound 06/13/22 #1, Left Medial Leg, Lymphedema, Full Thickness, Onset Unknown (several years)-Wound Depth (cm): 0.1 cm    LABS  Lab Results   Component Value Date    LABA1C 6.5 04/01/2022         Assessment:     Patient Active Problem List   Diagnosis Code    Essential hypertension I10    Osteoarthrosis involving lower leg [id number]    Pain in joint, lower leg M25.569    Hepatitis C virus infection without hepatic coma B19.20    Benign essential tremor G25.0    Hypertrophy of prostate without urinary obstruction and other lower urinary tract symptoms (LUTS) N40.0    Hypothyroidism E03.9    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) E11.22, N18.30    Hyperlipidemia associated with type 2 diabetes mellitus (HCC) E11.69, E78.5    Type 2 diabetes mellitus 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 edema (HCC) C89.6703    Degenerative tear of medial meniscus of left knee M23.204    Primary osteoarthritis of left knee M17.12    PAF (paroxysmal atrial fibrillation) (HCC) I48.0    MGUS (monoclonal gammopathy of unknown significance) D47.2    Kidney lesion N28.9    Premature atrial contraction I49.1    Nasal polyposis J33.9    Ulcer of left pretibial region with fat layer exposed ([facility] T87.800       Assessment of today's active condition(s): lymphedema bilateral LE, ulcer left lower leg. Factors contributing to occurrence and/or persistence of the chronic ulcer include edema, venous stasis, and lymphedema. Sharp debridement is not indicated today, based upon the exam findings in the ulcer(s) above. Discharge plan:     Treatment in the wound care center today: Wound 06/13/22 #1, Left Medial Leg, Lymphedema, Full Thickness, Onset Unknown (several years)-Dressing/Treatment:  (AG FOam,4 Flex,spandigrip g). Home treatment: good handwashing before and after any dressing changes.  Cleanse ulcer with saline or soap & water before dressing change. May use Vaseline (petrolatum), Aquaphor, Aveeno, CeraVe, Cetaphil, Eucerin, Lubriderm, etc for dry skin. Dressing type for home: Per physician order, left application of multilayer compression wrap was performed in the wound care center today, to help manage edema, stasis dermatitis, and/or venous ulcers. Leave primary dressing and multi-layer wrap(s) in place until the next appointment. Also discussed ways to keep the wrap dry for a shower, including a plastic cast-guard, available in retail pharmacies. He should call before his next visit if there is any significant pain, significant strike-through of drainage to the surface of the wrap, or any significant sense of the wrap sliding down more than an inch or so, bunching-up and abrading his skin. . Written discharge instructions given to patient. Offload ulcer(s) as directed. Elevate leg(s) as directed. Follow up in 1 week.              Electronically signed by Nemo Tom DPM on 6/18/2022 at 3:54 PM.

## 2022-06-20 ENCOUNTER — HOSPITAL ENCOUNTER (OUTPATIENT)
Dept: WOUND CARE | Age: 82
Discharge: HOME | End: 2022-06-20
Payer: COMMERCIAL

## 2022-06-20 VITALS
BODY MASS INDEX: 36.57 KG/M2 | HEIGHT: 71 IN | DIASTOLIC BLOOD PRESSURE: 72 MMHG | TEMPERATURE: 97.2 F | HEART RATE: 78 BPM | WEIGHT: 261.2 LBS | RESPIRATION RATE: 18 BRPM | SYSTOLIC BLOOD PRESSURE: 122 MMHG

## 2022-06-20 DIAGNOSIS — L97.822 ULCER OF LEFT PRETIBIAL REGION WITH FAT LAYER EXPOSED (HCC): Primary | ICD-10-CM

## 2022-06-20 PROCEDURE — 99213 OFFICE O/P EST LOW 20 MIN: CPT

## 2022-06-20 RX ORDER — BACITRACIN ZINC AND POLYMYXIN B SULFATE 500; 1000 [USP'U]/G; [USP'U]/G
OINTMENT TOPICAL ONCE
Status: CANCELLED | OUTPATIENT
Start: 2022-06-20 | End: 2022-06-20

## 2022-06-20 RX ORDER — LIDOCAINE 40 MG/G
CREAM TOPICAL ONCE
Status: DISCONTINUED | OUTPATIENT
Start: 2022-06-20 | End: 2022-06-21 | Stop reason: HOSPADM

## 2022-06-20 RX ORDER — LIDOCAINE 40 MG/G
CREAM TOPICAL ONCE
Status: CANCELLED | OUTPATIENT
Start: 2022-06-20 | End: 2022-06-20

## 2022-06-20 RX ORDER — LIDOCAINE 50 MG/G
OINTMENT TOPICAL ONCE
Status: CANCELLED | OUTPATIENT
Start: 2022-06-20 | End: 2022-06-20

## 2022-06-20 RX ORDER — LIDOCAINE HYDROCHLORIDE 40 MG/ML
SOLUTION TOPICAL ONCE
Status: CANCELLED | OUTPATIENT
Start: 2022-06-20 | End: 2022-06-20

## 2022-06-20 NOTE — PLAN OF CARE
Pt to the [address] [place] [address],[address] for follow up appointment. Wound not debrided and edema decreased this week. Pt states that the compression wraps are falling down after 1-2 days. Pt has been wearing his compression stockings since his edema is down and he can get them on. Pt to have mepilex border applied to leg wound and then wear compression stockings. Pt to follow up in the [address] [place] [address],[address] in 1 week. Discharge instructions reviewed with patient, all questions answered, copy given to patient. Dressings were applied to all wounds per M.D. Instructions at this visit.

## 2022-06-21 NOTE — PROGRESS NOTES
[facility] Progress Note      Gibson Núñez     : 1940    DATE OF VISIT:  2022    Subjective:     Gibson Núñez is a 80 y.o. male who has a chief complaint of a venous ulcer located on the left leg. States doing well with wound. Swelling is doing better. Compression wraps fell down but he is now able to get on his compression stockings. Mr. 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 ([address] Esophageal reflux, Fracture of nasal bone, Headache, Hearing loss, Hyperlipidemia, Hyperlipidemia associated with type 2 diabetes mellitus ([address] Hypertension, Hypertrophy of prostate without urinary obstruction and other lower urinary tract symptoms (LUTS), Hypothyroidism, Insufficiency fracture of tibia, Osteoarthrosis, not specified whether generalized/localized, lower leg, Pain in joint, lower leg, Right knee pain, Status post total left knee replacement, Tinnitus, Type 2 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 He has a past surgical history that includes eye surgery; Colonoscopy (07); Esophagus surgery; Knee arthroscopy; Cholecystectomy, laparoscopic; other surgical history (Left, 2016); joint replacement (Left, 2017); Tonsillectomy; and Spine surgery. His family history includes Breast Cancer in his sister; Cancer in his mother; Diabetes type 2  in his brother; Heart Disease in his brother; Kidney Disease in his brother. Mr. Arline Clay reports that he quit smoking about 32 years ago.  He has a 15.00 pack-year smoking history. He quit smokeless tobacco use about 44 years ago. He reports current alcohol use. He reports that he does not use drugs. His current medication list consists of Embrace Blood Glucose Monitor, Embrace Lancets Ultra Thin 30G, Insulin Syringe-Needle U-100, Multiple Vitamins-Minerals, apixaban, blood glucose test strips, budesonide, dilTIAZem, empagliflozin, furosemide, hydroCHLOROthiazide, insulin glargine-yfgn, levothyroxine, pioglitazone, rosuvastatin, and tamsulosin. Allergies: Patient has no known allergies. Pertinent items from the review of systems are discussed in the HPI; the remainder of the ROS was reviewed and is negative. Objective:     /72   Pulse 78   Temp 97.2 °F (36.2 °C) (Oral)   Resp 18   Ht 5' 11\" (1.803 m)   Wt 261 lb 3.2 oz (118.5 kg)   BMI 36.43 kg/m²       Dorsalis pedis pulse left palpable  Posterior tibial pulse left palpable  Dorsalis pedis pulse right palpable  Posterior tibial pulse right palpable      Ulcer on the anterior aspect left lower leg with minimal fibrotic tissue, red granulation tissue, mild serous drainage, no hyperkeratotic rim, no undermining, no tunneling, no purulence, no malodor, no eschar, no periwound maceration, mild periwound erythema, mild edema,  no crepitus, no increase in skin temperature, ulcer probes to soft tissue only    Edema bilateral LE. Venous dermatitis bilateral LE. Today's ulcer measurements are in the wound documentation flowsheet.      Wound measurements:  Wound 06/13/22 #1, Left Medial Leg, Lymphedema, Full Thickness, Onset Unknown (several years)-Wound Length (cm): 0.5 cm    Wound 06/13/22 #1, Left Medial Leg, Lymphedema, Full Thickness, Onset Unknown (several years)-Wound Width (cm): 1.9 cm    Wound 06/13/22 #1, Left Medial Leg, Lymphedema, Full Thickness, Onset Unknown (several years)-Wound Depth (cm): 0.2 cm    LABS  Lab Results   Component Value Date    LABA1C 6.5 04/01/2022 Assessment:     Patient Active Problem List   Diagnosis Code    Essential hypertension I10    Osteoarthrosis involving lower leg [id number]    Pain in joint, lower leg M25.569    Hepatitis C virus infection without hepatic coma B19.20    Benign essential tremor G25.0    Hypertrophy of prostate without urinary obstruction and other lower urinary tract symptoms (LUTS) N40.0    Hypothyroidism E03.9    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) E11.22, N18.30    Hyperlipidemia associated with type 2 diabetes mellitus (HCC) E11.69, E78.5    Type 2 diabetes mellitus 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 edema (HCC) U72.2279    Degenerative tear of medial meniscus of left knee M23.204    Primary osteoarthritis of left knee M17.12    PAF (paroxysmal atrial fibrillation) (HCC) I48.0    MGUS (monoclonal gammopathy of unknown significance) D47.2    Kidney lesion N28.9    Premature atrial contraction I49.1    Nasal polyposis J33.9    Ulcer of left pretibial region with fat layer exposed ([facility] [address] [address] J75.116       Assessment of today's active condition(s): lymphedema/venous leg ulcer left. Factors contributing to occurrence and/or persistence of the chronic ulcer include edema, venous stasis, and lymphedema. Sharp debridement is not indicated today, based upon the exam findings in the ulcer(s) above. Discharge plan:     Treatment in the wound care center today: Wound 06/13/22 #1, Left Medial Leg, Lymphedema, Full Thickness, Onset Unknown (several years)-Dressing/Treatment: Other (comment) (Benzoin to yeni,SilverAlginate,Mepilex,CompressionStocking). Home treatment: good handwashing before and after any dressing changes. Cleanse ulcer with saline or soap & water before dressing change. May use Vaseline (petrolatum), Aquaphor, Aveeno, CeraVe, Cetaphil, Eucerin, Lubriderm, etc for dry skin. Dressing type for home: Mepilex border and compression stocking.    Written discharge instructions given to patient. Offload ulcer(s) as directed. Elevate leg(s) as directed. Follow up in 1 week. Compression wrap fell down even after nurse visit for dressing change. He put on a stocking and now it fits. Will see if that is able to control his swelling. If not will most likely need to go back into compression wrap.          Electronically signed by Taylor Arias DPM on 6/21/2022 at 5:15 PM.

## 2022-06-27 ENCOUNTER — HOSPITAL ENCOUNTER (OUTPATIENT)
Dept: WOUND CARE | Age: 82
Discharge: HOME | End: 2022-06-27
Payer: COMMERCIAL

## 2022-06-27 VITALS
BODY MASS INDEX: 35.28 KG/M2 | HEART RATE: 86 BPM | SYSTOLIC BLOOD PRESSURE: 127 MMHG | DIASTOLIC BLOOD PRESSURE: 73 MMHG | RESPIRATION RATE: 18 BRPM | TEMPERATURE: 97.9 F | WEIGHT: 252 LBS | HEIGHT: 71 IN

## 2022-06-27 DIAGNOSIS — L97.822 ULCER OF LEFT PRETIBIAL REGION WITH FAT LAYER EXPOSED (HCC): Primary | ICD-10-CM

## 2022-06-27 PROCEDURE — 99213 OFFICE O/P EST LOW 20 MIN: CPT

## 2022-06-27 RX ORDER — LIDOCAINE HYDROCHLORIDE 40 MG/ML
SOLUTION TOPICAL ONCE
Status: DISCONTINUED | OUTPATIENT
Start: 2022-06-27 | End: 2022-06-28 | Stop reason: HOSPADM

## 2022-06-27 RX ORDER — LIDOCAINE 40 MG/G
CREAM TOPICAL ONCE
Status: CANCELLED | OUTPATIENT
Start: 2022-06-27 | End: 2022-06-27

## 2022-06-27 RX ORDER — LIDOCAINE 40 MG/G
CREAM TOPICAL ONCE
Status: DISCONTINUED | OUTPATIENT
Start: 2022-06-27 | End: 2022-06-28 | Stop reason: HOSPADM

## 2022-06-27 RX ORDER — BACITRACIN ZINC AND POLYMYXIN B SULFATE 500; 1000 [USP'U]/G; [USP'U]/G
OINTMENT TOPICAL ONCE
Status: CANCELLED | OUTPATIENT
Start: 2022-06-27 | End: 2022-06-27

## 2022-06-27 RX ORDER — BACITRACIN ZINC AND POLYMYXIN B SULFATE 500; 1000 [USP'U]/G; [USP'U]/G
OINTMENT TOPICAL ONCE
Status: DISCONTINUED | OUTPATIENT
Start: 2022-06-27 | End: 2022-06-28 | Stop reason: HOSPADM

## 2022-06-27 RX ORDER — LIDOCAINE 50 MG/G
OINTMENT TOPICAL ONCE
Status: CANCELLED | OUTPATIENT
Start: 2022-06-27 | End: 2022-06-27

## 2022-06-27 RX ORDER — LIDOCAINE HYDROCHLORIDE 40 MG/ML
SOLUTION TOPICAL ONCE
Status: CANCELLED | OUTPATIENT
Start: 2022-06-27 | End: 2022-06-27

## 2022-06-27 RX ORDER — LIDOCAINE 50 MG/G
OINTMENT TOPICAL ONCE
Status: DISCONTINUED | OUTPATIENT
Start: 2022-06-27 | End: 2022-06-28 | Stop reason: HOSPADM

## 2022-06-27 NOTE — PLAN OF CARE
Wound stable & showing improvement, no debridement. Will cont. With current wound care regime with dressing this week. Pt. To remove dressing next week & apply new dressing as ordered, pt. Verbalizes understanding. Pt. Wearing his compression stocking for edema control. Reinforced importance of elevation & compression to help with circulation, edema control & wound healing, pt. Verbalizes understanding. F/u in [facility] in 2 weeks as ordered, pt. Aware to call sooner with any changes or questions/concerns. Discharge instructions reviewed with patient & spouse, all questions answered, copy given to patient. Dressings were applied to all wounds per M.D. Instructions at this visit.

## 2022-07-07 ENCOUNTER — HOSPITAL ENCOUNTER (OUTPATIENT)
Age: 82
Discharge: HOME | End: 2022-07-07
Payer: MEDICARE

## 2022-07-07 DIAGNOSIS — M79.89 LEG SWELLING: ICD-10-CM

## 2022-07-07 LAB
ANION GAP SERPL CALCULATED.3IONS-SCNC: 13 MMOL/L (ref 3–16)
BUN BLDV-MCNC: 40 MG/DL (ref 7–20)
CALCIUM SERPL-MCNC: 9.9 MG/DL (ref 8.3–10.6)
CHLORIDE BLD-SCNC: 92 MMOL/L (ref 99–110)
CO2: 32 MMOL/L (ref 21–32)
CREAT SERPL-MCNC: 1.7 MG/DL (ref 0.8–1.3)
GFR AFRICAN AMERICAN: 47
GFR NON-AFRICAN AMERICAN: 39
GLUCOSE BLD-MCNC: 169 MG/DL (ref 70–99)
POTASSIUM SERPL-SCNC: 3.8 MMOL/L (ref 3.5–5.1)
SODIUM BLD-SCNC: 137 MMOL/L (ref 136–145)

## 2022-07-07 PROCEDURE — 36415 COLL VENOUS BLD VENIPUNCTURE: CPT

## 2022-07-07 PROCEDURE — 80048 BASIC METABOLIC PNL TOTAL CA: CPT

## 2022-07-11 ENCOUNTER — HOSPITAL ENCOUNTER (OUTPATIENT)
Dept: WOUND CARE | Age: 82
Discharge: HOME | End: 2022-07-11
Payer: COMMERCIAL

## 2022-07-11 VITALS
SYSTOLIC BLOOD PRESSURE: 120 MMHG | DIASTOLIC BLOOD PRESSURE: 69 MMHG | HEIGHT: 71 IN | HEART RATE: 81 BPM | RESPIRATION RATE: 20 BRPM | TEMPERATURE: 97.8 F | BODY MASS INDEX: 35.15 KG/M2

## 2022-07-11 DIAGNOSIS — L97.822 ULCER OF LEFT PRETIBIAL REGION WITH FAT LAYER EXPOSED (HCC): Primary | ICD-10-CM

## 2022-07-11 PROCEDURE — 99213 OFFICE O/P EST LOW 20 MIN: CPT

## 2022-07-11 RX ORDER — LIDOCAINE 50 MG/G
OINTMENT TOPICAL ONCE
Status: CANCELLED | OUTPATIENT
Start: 2022-07-11 | End: 2022-07-11

## 2022-07-11 RX ORDER — LIDOCAINE 40 MG/G
CREAM TOPICAL ONCE
Status: DISCONTINUED | OUTPATIENT
Start: 2022-07-11 | End: 2022-07-12 | Stop reason: HOSPADM

## 2022-07-11 RX ORDER — BACITRACIN ZINC AND POLYMYXIN B SULFATE 500; 1000 [USP'U]/G; [USP'U]/G
OINTMENT TOPICAL ONCE
Status: CANCELLED | OUTPATIENT
Start: 2022-07-11 | End: 2022-07-11

## 2022-07-11 RX ORDER — LIDOCAINE 40 MG/G
CREAM TOPICAL ONCE
Status: CANCELLED | OUTPATIENT
Start: 2022-07-11 | End: 2022-07-11

## 2022-07-11 RX ORDER — LIDOCAINE HYDROCHLORIDE 40 MG/ML
SOLUTION TOPICAL ONCE
Status: CANCELLED | OUTPATIENT
Start: 2022-07-11 | End: 2022-07-11

## 2022-07-11 NOTE — PLAN OF CARE
Pt to the [address] [place] [address],[address] [facility] for follow up appointment. Wound measuring smaller this week and was not debrided today. Edema decreased today. Pt to continue with silver alginate to wound and wear compression stockings. Pt to follow up in the [address] [place] [address],[address] [place] [address] in 1 week. Discharge instructions reviewed with patient, all questions answered, copy given to patient. Dressings were applied to all wounds per M.D. Instructions at this visit.

## 2022-07-15 RX ORDER — FUROSEMIDE 40 MG/1
40 TABLET ORAL DAILY
Qty: 30 TABLET | Refills: 2 | Status: SHIPPED | OUTPATIENT
Start: 2022-07-15

## 2022-07-17 NOTE — PROGRESS NOTES
tobacco use about 44 years ago. He reports current alcohol use. He reports that he does not use drugs. His current medication list consists of Embrace Blood Glucose Monitor, Embrace Lancets Ultra Thin 30G, Insulin Syringe-Needle U-100, Multiple Vitamins-Minerals, apixaban, blood glucose test strips, dilTIAZem, empagliflozin, furosemide, hydroCHLOROthiazide, insulin glargine-yfgn, levothyroxine, pioglitazone, rosuvastatin, and tamsulosin. Allergies: Patient has no known allergies. Pertinent items from the review of systems are discussed in the HPI; the remainder of the ROS was reviewed and is negative. Objective:     /69   Pulse 81   Temp 97.8 °F (36.6 °C) (Oral)   Resp 20   Ht 5' 11\" (1.803 m)   BMI 35.15 kg/m²       Dorsalis pedis pulse left palpable  Posterior tibial pulse left palpable  Dorsalis pedis pulse right palpable  Posterior tibial pulse right palpable      Ulcer on the anterior aspect left lower leg with minimal fibrotic tissue, red granulation tissue, mild serous drainage, no hyperkeratotic rim, no undermining, no tunneling, no purulence, no malodor, no eschar, no periwound maceration, mild periwound erythema, mild edema,  no crepitus, no increase in skin temperature, ulcer probes to soft tissue only    Edema bilateral LE. Venous dermatitis bilateral LE. Today's ulcer measurements are in the wound documentation flowsheet.      Wound measurements:  Wound 06/13/22 #1, Left Medial Leg, Lymphedema, Full Thickness, Onset Unknown (several years)-Wound Length (cm): 0.5 cm    Wound 06/13/22 #1, Left Medial Leg, Lymphedema, Full Thickness, Onset Unknown (several years)-Wound Width (cm): 0.5 cm    Wound 06/13/22 #1, Left Medial Leg, Lymphedema, Full Thickness, Onset Unknown (several years)-Wound Depth (cm): 0.1 cm    LABS  Lab Results   Component Value Date    LABA1C 6.5 04/01/2022         Assessment:     Patient Active Problem List   Diagnosis Code    Essential hypertension I10 Osteoarthrosis involving lower leg [id number]    Pain in joint, lower leg M25.569    Hepatitis C virus infection without hepatic coma B19.20    Benign essential tremor G25.0    Hypertrophy of prostate without urinary obstruction and other lower urinary tract symptoms (LUTS) N40.0    Hypothyroidism E03.9    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) E11.22, N18.30    Hyperlipidemia associated with type 2 diabetes mellitus (HCC) E11.69, E78.5    Type 2 diabetes mellitus 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 edema (HCC) O63.4033    Degenerative tear of medial meniscus of left knee M23.204    Primary osteoarthritis of left knee M17.12    PAF (paroxysmal atrial fibrillation) (HCC) I48.0    MGUS (monoclonal gammopathy of unknown significance) D47.2    Kidney lesion N28.9    Premature atrial contraction I49.1    Nasal polyposis J33.9    Ulcer of left pretibial region with fat layer exposed ([facility] [address] H29.653       Assessment of today's active condition(s): lymphedema/venous leg ulcer left. Factors contributing to occurrence and/or persistence of the chronic ulcer include edema, venous stasis, and lymphedema. Sharp debridement is not indicated today, based upon the exam findings in the ulcer(s) above. Discharge plan:     Treatment in the wound care center today: Wound 06/13/22 #1, Left Medial Leg, Lymphedema, Full Thickness, Onset Unknown (several years)-Dressing/Treatment: Other (comment) (Benzoin,SilverAlginate,Mepilex,CompressionStocking). Home treatment: good handwashing before and after any dressing changes. Cleanse ulcer with saline or soap & water before dressing change. May use Vaseline (petrolatum), Aquaphor, Aveeno, CeraVe, Cetaphil, Eucerin, Lubriderm, etc for dry skin. Dressing type for home: Mepilex border and compression stocking. Written discharge instructions given to patient. Offload ulcer(s) as directed. Elevate leg(s) as directed. Follow up in 1 week. Electronically signed by Ange Young DPM on 7/17/2022 at 6:52 PM.

## 2022-07-17 NOTE — PROGRESS NOTES
[address] [facility] Progress Note      Ivonne Latif     : 1940    DATE OF VISIT:  2022    Subjective:     Ivonne Latif is a 80 y.o. male who has a chief complaint of a venous ulcer located on the left leg. States doing well with wound. Swelling is doing better. Doing well with compression stockings. Mr. 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 ([address] Esophageal reflux, Fracture of nasal bone, Headache, Hearing loss, Hyperlipidemia, Hyperlipidemia associated with type 2 diabetes mellitus ([address] Hypertension, Hypertrophy of prostate without urinary obstruction and other lower urinary tract symptoms (LUTS), Hypothyroidism, Insufficiency fracture of tibia, Osteoarthrosis, not specified whether generalized/localized, lower leg, Pain in joint, lower leg, Right knee pain, Status post total left knee replacement, Tinnitus, Type 2 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 He has a past surgical history that includes eye surgery; Colonoscopy (07); Esophagus surgery; Knee arthroscopy; Cholecystectomy, laparoscopic; other surgical history (Left, 2016); joint replacement (Left, 2017); Tonsillectomy; and Spine surgery. His family history includes Breast Cancer in his sister; Cancer in his mother; Diabetes type 2  in his brother; Heart Disease in his brother; Kidney Disease in his brother. Mr. Cristofer Chaves reports that he quit smoking about 32 years ago. He has a 15.00 pack-year smoking history.  He quit smokeless tobacco use about 44 years ago. He reports current alcohol use. He reports that he does not use drugs. His current medication list consists of Embrace Blood Glucose Monitor, Embrace Lancets Ultra Thin 30G, Insulin Syringe-Needle U-100, Multiple Vitamins-Minerals, apixaban, blood glucose test strips, dilTIAZem, empagliflozin, furosemide, hydroCHLOROthiazide, insulin glargine-yfgn, levothyroxine, pioglitazone, rosuvastatin, and tamsulosin. Allergies: Patient has no known allergies. Pertinent items from the review of systems are discussed in the HPI; the remainder of the ROS was reviewed and is negative. Objective:     /73   Pulse 86   Temp 97.9 °F (36.6 °C) (Oral)   Resp 18   Ht 5' 11\" (1.803 m)   Wt 252 lb (114.3 kg)   BMI 35.15 kg/m²       Dorsalis pedis pulse left palpable  Posterior tibial pulse left palpable  Dorsalis pedis pulse right palpable  Posterior tibial pulse right palpable      Ulcer on the anterior aspect left lower leg with minimal fibrotic tissue, red granulation tissue, mild serous drainage, no hyperkeratotic rim, no undermining, no tunneling, no purulence, no malodor, no eschar, no periwound maceration, mild periwound erythema, mild edema,  no crepitus, no increase in skin temperature, ulcer probes to soft tissue only    Edema bilateral LE. Venous dermatitis bilateral LE. Today's ulcer measurements are in the wound documentation flowsheet.      Wound measurements:  Wound 06/13/22 #1, Left Medial Leg, Lymphedema, Full Thickness, Onset Unknown (several years)-Wound Length (cm): 0.5 cm    Wound 06/13/22 #1, Left Medial Leg, Lymphedema, Full Thickness, Onset Unknown (several years)-Wound Width (cm): 1 cm    Wound 06/13/22 #1, Left Medial Leg, Lymphedema, Full Thickness, Onset Unknown (several years)-Wound Depth (cm): 0.1 cm    LABS  Lab Results   Component Value Date    LABA1C 6.5 04/01/2022         Assessment:     Patient Active Problem List   Diagnosis Code Essential hypertension I10    Osteoarthrosis involving lower leg [id number]    Pain in joint, lower leg M25.569    Hepatitis C virus infection without hepatic coma B19.20    Benign essential tremor G25.0    Hypertrophy of prostate without urinary obstruction and other lower urinary tract symptoms (LUTS) N40.0    Hypothyroidism E03.9    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) E11.22, N18.30    Hyperlipidemia associated with type 2 diabetes mellitus (HCC) E11.69, E78.5    Type 2 diabetes mellitus 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 edema (HCC) J13.6479    Degenerative tear of medial meniscus of left knee M23.204    Primary osteoarthritis of left knee M17.12    PAF (paroxysmal atrial fibrillation) (HCC) I48.0    MGUS (monoclonal gammopathy of unknown significance) D47.2    Kidney lesion N28.9    Premature atrial contraction I49.1    Nasal polyposis J33.9    Ulcer of left pretibial region with fat layer exposed ([address] M11.336       Assessment of today's active condition(s): lymphedema/venous leg ulcer left. Factors contributing to occurrence and/or persistence of the chronic ulcer include edema, venous stasis, and lymphedema. Sharp debridement is not indicated today, based upon the exam findings in the ulcer(s) above. Discharge plan:     Treatment in the wound care center today: Wound 06/13/22 #1, Left Medial Leg, Lymphedema, Full Thickness, Onset Unknown (several years)-Dressing/Treatment: Other (comment) (Benzoin to yeni,SilverAlginate,Mepilex,Stocking). Home treatment: good handwashing before and after any dressing changes. Cleanse ulcer with saline or soap & water before dressing change. May use Vaseline (petrolatum), Aquaphor, Aveeno, CeraVe, Cetaphil, Eucerin, Lubriderm, etc for dry skin. Dressing type for home: Mepilex border and compression stocking. Written discharge instructions given to patient. Offload ulcer(s) as directed. Elevate leg(s) as directed.    Follow up in 1 week.       Compression wrap fell down even after nurse visit for dressing change. He put on a stocking and now it fits. Will see if that is able to control his swelling. If not will most likely need to go back into compression wrap.          Electronically signed by Cynthia Brooks DPM on 7/17/2022 at 5:41 PM.

## 2022-07-18 ENCOUNTER — HOSPITAL ENCOUNTER (OUTPATIENT)
Dept: WOUND CARE | Age: 82
Discharge: HOME | End: 2022-07-18

## 2022-07-21 RX ORDER — INSULIN GLARGINE-YFGN 100 [IU]/ML
70 INJECTION, SOLUTION SUBCUTANEOUS NIGHTLY
Qty: 7 EACH | Refills: 3 | Status: SHIPPED | OUTPATIENT
Start: 2022-07-21

## 2022-07-21 NOTE — TELEPHONE ENCOUNTER
Left message with spouse to have patient return call. Need to verify if he is doing 40 units or 50 units nightly.

## 2022-07-25 ENCOUNTER — HOSPITAL ENCOUNTER (OUTPATIENT)
Dept: WOUND CARE | Age: 82
Discharge: HOME | End: 2022-07-25
Payer: COMMERCIAL

## 2022-07-25 VITALS
WEIGHT: 245.2 LBS | TEMPERATURE: 97.7 F | SYSTOLIC BLOOD PRESSURE: 121 MMHG | HEIGHT: 71 IN | HEART RATE: 59 BPM | DIASTOLIC BLOOD PRESSURE: 73 MMHG | RESPIRATION RATE: 20 BRPM | BODY MASS INDEX: 34.33 KG/M2

## 2022-07-25 DIAGNOSIS — L97.822 ULCER OF LEFT PRETIBIAL REGION WITH FAT LAYER EXPOSED (HCC): Primary | ICD-10-CM

## 2022-07-25 PROCEDURE — 99213 OFFICE O/P EST LOW 20 MIN: CPT

## 2022-07-25 PROCEDURE — 11042 DBRDMT SUBQ TIS 1ST 20SQCM/<: CPT

## 2022-07-25 RX ORDER — LIDOCAINE 50 MG/G
OINTMENT TOPICAL ONCE
Status: CANCELLED | OUTPATIENT
Start: 2022-07-25 | End: 2022-07-25

## 2022-07-25 RX ORDER — LIDOCAINE 40 MG/G
CREAM TOPICAL ONCE
Status: CANCELLED | OUTPATIENT
Start: 2022-07-25 | End: 2022-07-25

## 2022-07-25 RX ORDER — LIDOCAINE HYDROCHLORIDE 40 MG/ML
SOLUTION TOPICAL ONCE
Status: CANCELLED | OUTPATIENT
Start: 2022-07-25 | End: 2022-07-25

## 2022-07-25 RX ORDER — LIDOCAINE 40 MG/G
CREAM TOPICAL ONCE
Status: DISCONTINUED | OUTPATIENT
Start: 2022-07-25 | End: 2022-07-26 | Stop reason: HOSPADM

## 2022-07-25 RX ORDER — BACITRACIN ZINC AND POLYMYXIN B SULFATE 500; 1000 [USP'U]/G; [USP'U]/G
OINTMENT TOPICAL ONCE
Status: CANCELLED | OUTPATIENT
Start: 2022-07-25 | End: 2022-07-25

## 2022-07-25 NOTE — DISCHARGE INSTRUCTIONS
215 [facility] Physician Orders and Discharge 800 [facility]  [address], [address]  [place], [facility]  Telephone: [phone]      Fax: [phone] home care company:       Your wound-care supplies will be provided by:     NAME:  Allison Heath   YOB: 1940  PRIMARY DIAGNOSIS FOR WOUND CARE CENTER: Lymphedema     Wound cleansing:  Do not scrub or use excessive force. Wash hands with soap and water before and after dressing changes. Prior to applying a clean dressing, cleanse wound with normal saline, wound cleanser, or mild soap and water. Ask your physician or nurse before getting the wound(s) wet in the shower. Wound care for home:      Left lower leg wound:      Wash wound with soap and water with dressing changes      Benzoin to periwound      silver alginate      mepilex border      Leave on all week. Wear compression stockings daily       Right lower leg    Medium spandagrip or compression stocking toes to knees on right lower leg. May put on in the morning and take off at bed time. Please note, all wounds (unless stated otherwise here) were mechanically debrided at the time of cleansing here in the wound-care center today, so a small amount of pain, drainage or bleeding from that process might be expected, and is normal.     All products for home use, including multiple products for a single wound if applicable, are medically necessary in order to achieve the best chance at timely wound healing. See provider documentation for details if needed. Substituted dressings applied in the [address] [place] [address] today, if applicable:           New orders for this week (labs, imaging, medications, etc.):        Will order compression garments in the future     Additional instructions for specific diagnoses:     General comments for venous / lymphedema ulcers:   *  Elevate your legs to the level of your heart for at least 30 minutes, several times daily. *  Walk as much as you can tolerate. Avoid standing for long periods of time, but if you must stand, regularly do heel-raises and calf-pump exercises. *  If you have compression wraps designed to remain in place all week, be sure to keep them from getting wet. *  If you have compression garments that can be changed regularly, apply them first thing in the morning, and remove them when you go to bed. Moisturize your skin at bedtime, with Vaseline, Aquaphor, Aveeno, CeraVe, Cetaphil, Eucerin, Lubriderm, etc; but keep the skin between your toes dry. *  If you smoke, your wound can not heal properly -- please talk with us when you're ready to quit. *  Be sure to adhere to any recommendations from your PCP about diuretics (water pills), diet, exercise, and maintaining a healthy weight. If you have questions, please ask. *  If you have a compression pump, aim for at least two hours of use daily. F/U Appointment is with  [facility] [employer] in 1 week, on                                   at                       .     Your nurse  is Adebayo Kolb RN. If we applied slip-resistant hospital socks today, be sure to remove them at least once a day to inspect your toes or feet, even if you're not changing the wraps or dressings underneath. If you see anything concerning (redness, excess moisture, etc), please call and let us know right away. Should you experience any significant changes in your wound(s) (including redness, increased warmth, increased pain, increased drainage, odor, or fever) or have questions about your wound care, please contact the [facility] at [phone] Monday-Thursday from 8:00 am - 4:30 pm, or Friday from 8:00 am - 2:30 pm.  If you need help with your wound outside these hours and cannot wait until we are again available, contact your home-care company (if applicable), your PCP, or go to the nearest emergency room.

## 2022-07-25 NOTE — PROGRESS NOTES
[facility] Progress Note      Andrea Hill     : 1940    DATE OF VISIT:  2022    Subjective:     Andrea Hill is a 80 y.o. male who has a chief complaint of a venous ulcer located on the left leg. States doing well with wound. Doing well with compression stockings. Missed some of his diuretics but is back on them regularly now. Mr. 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 ([address] Esophageal reflux, Fracture of nasal bone, Headache, Hearing loss, Hyperlipidemia, Hyperlipidemia associated with type 2 diabetes mellitus ([address] Hypertension, Hypertrophy of prostate without urinary obstruction and other lower urinary tract symptoms (LUTS), Hypothyroidism, Insufficiency fracture of tibia, Osteoarthrosis, not specified whether generalized/localized, lower leg, Pain in joint, lower leg, Right knee pain, Status post total left knee replacement, Tinnitus, Type 2 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 He has a past surgical history that includes eye surgery; Colonoscopy (07); Esophagus surgery; Knee arthroscopy; Cholecystectomy, laparoscopic; other surgical history (Left, 2016); joint replacement (Left, 2017); Tonsillectomy; and Spine surgery. His family history includes Breast Cancer in his sister; Cancer in his mother; Diabetes type 2  in his brother; Heart Disease in his brother; Kidney Disease in his brother. Mr. Samia Carlos reports that he quit smoking about 32 years ago.  He has a 15.00 pack-year smoking history. He quit smokeless tobacco use about 44 years ago. He reports current alcohol use. He reports that he does not use drugs. His current medication list consists of Embrace Blood Glucose Monitor, Embrace Lancets Ultra Thin 30G, Insulin Syringe-Needle U-100, Multiple Vitamins-Minerals, apixaban, blood glucose test strips, dilTIAZem, empagliflozin, furosemide, hydroCHLOROthiazide, insulin glargine-yfgn, levothyroxine, pioglitazone, rosuvastatin, and tamsulosin. Allergies: Patient has no known allergies. Pertinent items from the review of systems are discussed in the HPI; the remainder of the ROS was reviewed and is negative. Objective:     /73   Pulse 59   Temp 97.7 °F (36.5 °C) (Oral)   Resp 20   Ht 5' 11\" (1.803 m)   Wt 245 lb 3.2 oz (111.2 kg)   BMI 34.20 kg/m²       Dorsalis pedis pulse left palpable  Posterior tibial pulse left palpable  Dorsalis pedis pulse right palpable  Posterior tibial pulse right palpable      Ulcer on the anterior aspect left lower leg with minimal fibrotic tissue, red granulation tissue, mild serous drainage, no hyperkeratotic rim, no undermining, no tunneling, no purulence, no malodor, no eschar, no periwound maceration, mild periwound erythema, mild edema,  no crepitus, no increase in skin temperature, ulcer probes to soft tissue only    Edema bilateral LE. Venous dermatitis bilateral LE. Today's ulcer measurements are in the wound documentation flowsheet.      Wound measurements:  Wound 06/13/22 #1, Left Medial Leg, Lymphedema, Full Thickness, Onset Unknown (several years)-Wound Length (cm): 0.7 cm    Wound 06/13/22 #1, Left Medial Leg, Lymphedema, Full Thickness, Onset Unknown (several years)-Wound Width (cm): 1.2 cm    Wound 06/13/22 #1, Left Medial Leg, Lymphedema, Full Thickness, Onset Unknown (several years)-Wound Depth (cm): 0.1 cm    LABS  Lab Results   Component Value Date    LABA1C 6.5 04/01/2022         Assessment: Patient Active Problem List   Diagnosis Code    Essential hypertension I10    Osteoarthrosis involving lower leg [id number]    Pain in joint, lower leg M25.569    Hepatitis C virus infection without hepatic coma B19.20    Benign essential tremor G25.0    Hypertrophy of prostate without urinary obstruction and other lower urinary tract symptoms (LUTS) N40.0    Hypothyroidism E03.9    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) E11.22, N18.30    Hyperlipidemia associated with type 2 diabetes mellitus (HCC) E11.69, E78.5    Type 2 diabetes mellitus 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 edema (HCC) C68.1732    Degenerative tear of medial meniscus of left knee M23.204    Primary osteoarthritis of left knee M17.12    PAF (paroxysmal atrial fibrillation) (HCC) I48.0    MGUS (monoclonal gammopathy of unknown significance) D47.2    Kidney lesion N28.9    Premature atrial contraction I49.1    Nasal polyposis J33.9    Ulcer of left pretibial region with fat layer exposed ([facility] P21.945       Assessment of today's active condition(s): lymphedema/venous leg ulcer left. Factors contributing to occurrence and/or persistence of the chronic ulcer include edema, venous stasis, and lymphedema. Sharp debridement is not indicated today, based upon the exam findings in the ulcer(s) above. Discharge plan:     Treatment in the wound care center today:  . Home treatment: good handwashing before and after any dressing changes. Cleanse ulcer with saline or soap & water before dressing change. May use Vaseline (petrolatum), Aquaphor, Aveeno, CeraVe, Cetaphil, Eucerin, Lubriderm, etc for dry skin. Dressing type for home: Mepilex border and compression stocking. Written discharge instructions given to patient. Offload ulcer(s) as directed. Elevate leg(s) as directed. Exercise daily. Could benefit from compression pumps. Follow up in 1 week.                Electronically signed by Bienvenido Daly DPM on 7/25/2022 at 3:49 PM.

## 2022-07-25 NOTE — PLAN OF CARE
Pt to the [address] [place] [address] for follow up appointment. Wound not debrided today per Dr Margaret Causey. Pt to have silver alginate and mepilex border applied to wound. Pt to continue to wear compression stockings. Pt to follow up in the [address] [place] [address] in 1 week. Discharge instructions reviewed with patient, all questions answered, copy given to patient. Dressings were applied to all wounds per M.D. Instructions at this visit.

## 2022-07-26 NOTE — DISCHARGE INSTRUCTIONS
215 [facility] Physician Orders and Discharge 800 [facility]  [address], [address]  [place], [facility]  Telephone: [phone]      Fax: [phone] home care company:       Your wound-care supplies will be provided by:     NAME:  Allison Heath   YOB: 1940  PRIMARY DIAGNOSIS FOR WOUND CARE CENTER: Lymphedema     Wound cleansing:  Do not scrub or use excessive force. Wash hands with soap and water before and after dressing changes. Prior to applying a clean dressing, cleanse wound with normal saline, wound cleanser, or mild soap and water. Ask your physician or nurse before getting the wound(s) wet in the shower. Wound care for home:      Left lower leg wound:      Wash wound with soap and water with dressing changes      Benzoin to periwound      silver alginate      mepilex border      Leave on all week. Wear compression stockings daily       Right lower leg    Medium spandagrip or compression stocking toes to knees on right lower leg. May put on in the morning and take off at bed time. Please note, all wounds (unless stated otherwise here) were mechanically debrided at the time of cleansing here in the wound-care center today, so a small amount of pain, drainage or bleeding from that process might be expected, and is normal.     All products for home use, including multiple products for a single wound if applicable, are medically necessary in order to achieve the best chance at timely wound healing. See provider documentation for details if needed. Substituted dressings applied in the [facility] today, if applicable:           New orders for this week (labs, imaging, medications, etc.):        Will order compression garments in the future     Additional instructions for specific diagnoses:     General comments for venous / lymphedema ulcers:   *  Elevate your legs to the level of your heart for at least 30 minutes, several times daily. *  Walk as much as you can tolerate. Avoid standing for long periods of time, but if you must stand, regularly do heel-raises and calf-pump exercises. *  If you have compression wraps designed to remain in place all week, be sure to keep them from getting wet. *  If you have compression garments that can be changed regularly, apply them first thing in the morning, and remove them when you go to bed. Moisturize your skin at bedtime, with Vaseline, Aquaphor, Aveeno, CeraVe, Cetaphil, Eucerin, Lubriderm, etc; but keep the skin between your toes dry. *  If you smoke, your wound can not heal properly -- please talk with us when you're ready to quit. *  Be sure to adhere to any recommendations from your PCP about diuretics (water pills), diet, exercise, and maintaining a healthy weight. If you have questions, please ask. *  If you have a compression pump, aim for at least two hours of use daily. F/U Appointment is with Dr. Live Turner in 1 week, on                                   at                       .     Your nurse  is Rebecca Ramos RN. If we applied slip-resistant hospital socks today, be sure to remove them at least once a day to inspect your toes or feet, even if you're not changing the wraps or dressings underneath. If you see anything concerning (redness, excess moisture, etc), please call and let us know right away. Should you experience any significant changes in your wound(s) (including redness, increased warmth, increased pain, increased drainage, odor, or fever) or have questions about your wound care, please contact the [employer] at [phone] Monday-Thursday from 8:00 am - 4:30 pm, or Friday from 8:00 am - 2:30 pm.  If you need help with your wound outside these hours and cannot wait until we are again available, contact your home-care company (if applicable), your PCP, or go to the nearest emergency room.

## 2022-08-01 ENCOUNTER — HOSPITAL ENCOUNTER (OUTPATIENT)
Dept: WOUND CARE | Age: 82
Discharge: HOME | End: 2022-08-01
Payer: COMMERCIAL

## 2022-08-01 VITALS
DIASTOLIC BLOOD PRESSURE: 61 MMHG | HEIGHT: 71 IN | BODY MASS INDEX: 34.2 KG/M2 | HEART RATE: 66 BPM | TEMPERATURE: 97.9 F | SYSTOLIC BLOOD PRESSURE: 119 MMHG | RESPIRATION RATE: 18 BRPM

## 2022-08-01 DIAGNOSIS — L97.822 ULCER OF LEFT PRETIBIAL REGION WITH FAT LAYER EXPOSED (HCC): Primary | ICD-10-CM

## 2022-08-01 PROCEDURE — 99213 OFFICE O/P EST LOW 20 MIN: CPT

## 2022-08-01 RX ORDER — BACITRACIN ZINC AND POLYMYXIN B SULFATE 500; 1000 [USP'U]/G; [USP'U]/G
OINTMENT TOPICAL ONCE
Status: CANCELLED | OUTPATIENT
Start: 2022-08-01 | End: 2022-08-01

## 2022-08-01 RX ORDER — LIDOCAINE 40 MG/G
CREAM TOPICAL ONCE
Status: DISCONTINUED | OUTPATIENT
Start: 2022-08-01 | End: 2022-08-02 | Stop reason: HOSPADM

## 2022-08-01 RX ORDER — LIDOCAINE HYDROCHLORIDE 40 MG/ML
SOLUTION TOPICAL ONCE
Status: CANCELLED | OUTPATIENT
Start: 2022-08-01 | End: 2022-08-01

## 2022-08-01 RX ORDER — LIDOCAINE 40 MG/G
CREAM TOPICAL ONCE
Status: CANCELLED | OUTPATIENT
Start: 2022-08-01 | End: 2022-08-01

## 2022-08-01 RX ORDER — LIDOCAINE 50 MG/G
OINTMENT TOPICAL ONCE
Status: CANCELLED | OUTPATIENT
Start: 2022-08-01 | End: 2022-08-01

## 2022-08-01 NOTE — PROGRESS NOTES
[facility] Progress Note      Keturha Cochran     : 1940    DATE OF VISIT:  2022    Subjective:     Keturah Cochran is a 80 y.o. male who has a chief complaint of a venous ulcer located on the left leg. States doing well with wound. Doing well with compression stockings. Swelling is doing better. Mr. 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 ([address] Esophageal reflux, Fracture of nasal bone, Headache, Hearing loss, Hyperlipidemia, Hyperlipidemia associated with type 2 diabetes mellitus ([address] Hypertension, Hypertrophy of prostate without urinary obstruction and other lower urinary tract symptoms (LUTS), Hypothyroidism, Insufficiency fracture of tibia, Osteoarthrosis, not specified whether generalized/localized, lower leg, Pain in joint, lower leg, Right knee pain, Status post total left knee replacement, Tinnitus, Type 2 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 He has a past surgical history that includes eye surgery; Colonoscopy (07); Esophagus surgery; Knee arthroscopy; Cholecystectomy, laparoscopic; other surgical history (Left, 2016); joint replacement (Left, 2017); Tonsillectomy; and Spine surgery. His family history includes Breast Cancer in his sister; Cancer in his mother; Diabetes type 2  in his brother; Heart Disease in his brother; Kidney Disease in his brother. Mr. Manju Echevarria reports that he quit smoking about 32 years ago. He has a 15.00 pack-year smoking history.  He quit smokeless tobacco use about 44 years ago. He reports current alcohol use. He reports that he does not use drugs. His current medication list consists of Embrace Blood Glucose Monitor, Embrace Lancets Ultra Thin 30G, Insulin Syringe-Needle U-100, Multiple Vitamins-Minerals, apixaban, blood glucose test strips, dilTIAZem, empagliflozin, furosemide, hydroCHLOROthiazide, insulin glargine-yfgn, levothyroxine, pioglitazone, rosuvastatin, and tamsulosin. Allergies: Patient has no known allergies. Pertinent items from the review of systems are discussed in the HPI; the remainder of the ROS was reviewed and is negative. Objective:     /61   Pulse 66   Temp 97.9 °F (36.6 °C) (Oral)   Resp 18   Ht 5' 11\" (1.803 m)   BMI 34.20 kg/m²       Dorsalis pedis pulse left palpable  Posterior tibial pulse left palpable  Dorsalis pedis pulse right palpable  Posterior tibial pulse right palpable      Ulcer on the anterior aspect left lower leg with very minimal fibrotic tissue, red granulation tissue, mild serous drainage, no hyperkeratotic rim, no undermining, no tunneling, no purulence, no malodor, no eschar, no periwound maceration, mild periwound erythema, mild edema,  no crepitus, no increase in skin temperature, ulcer probes to soft tissue only    Edema bilateral LE. Venous dermatitis bilateral LE. Today's ulcer measurements are in the wound documentation flowsheet.      Wound measurements:  Wound 06/13/22 #1, Left Medial Leg, Lymphedema, Full Thickness, Onset Unknown (several years)-Wound Length (cm): 0.7 cm    Wound 06/13/22 #1, Left Medial Leg, Lymphedema, Full Thickness, Onset Unknown (several years)-Wound Width (cm): 1.4 cm    Wound 06/13/22 #1, Left Medial Leg, Lymphedema, Full Thickness, Onset Unknown (several years)-Wound Depth (cm): 0.1 cm    LABS  Lab Results   Component Value Date    LABA1C 6.5 04/01/2022         Assessment:     Patient Active Problem List   Diagnosis Code    Essential hypertension I10    Osteoarthrosis involving lower leg [id number]    Pain in joint, lower leg M25.569    Hepatitis C virus infection without hepatic coma B19.20    Benign essential tremor G25.0    Hypertrophy of prostate without urinary obstruction and other lower urinary tract symptoms (LUTS) N40.0    Hypothyroidism E03.9    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) E11.22, N18.30    Hyperlipidemia associated with type 2 diabetes mellitus (HCC) E11.69, E78.5    Type 2 diabetes mellitus 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 edema (HCC) C59.9727    Degenerative tear of medial meniscus of left knee M23.204    Primary osteoarthritis of left knee M17.12    PAF (paroxysmal atrial fibrillation) (HCC) I48.0    MGUS (monoclonal gammopathy of unknown significance) D47.2    Kidney lesion N28.9    Premature atrial contraction I49.1    Nasal polyposis J33.9    Ulcer of left pretibial region with fat layer exposed ([facility] W13.935       Assessment of today's active condition(s): lymphedema/venous leg ulcer left. Factors contributing to occurrence and/or persistence of the chronic ulcer include edema, venous stasis, and lymphedema. Sharp debridement is not indicated today, based upon the exam findings in the ulcer(s) above. Discharge plan:     Treatment in the wound care center today: Wound 06/13/22 #1, Left Medial Leg, Lymphedema, Full Thickness, Onset Unknown (several years)-Dressing/Treatment: Other (comment) (benzoin opticel ag bordered gauze). Home treatment: good handwashing before and after any dressing changes. Cleanse ulcer with saline or soap & water before dressing change. May use Vaseline (petrolatum), Aquaphor, Aveeno, CeraVe, Cetaphil, Eucerin, Lubriderm, etc for dry skin. Dressing type for home: Mepilex border and compression stocking. Written discharge instructions given to patient. Offload ulcer(s) as directed. Elevate leg(s) as directed. Exercise daily.    Could benefit from compression pumps. Follow up in 1 week.              Electronically signed by Terri Babb DPM on 8/1/2022 at 12:39 PM.

## 2022-08-01 NOTE — PLAN OF CARE
Pt to the [address] [place] [address] for follow up appointment. Wound measuring smaller this week. Pt to continue with weekly dressing to wound and wear compression garments. Pt to follow up in the [address] [place] [address],[address] in 1 week. Discharge instructions reviewed with patient, all questions answered, copy given to patient. Dressings were applied to all wounds per M.D. Instructions at this visit.

## 2022-08-02 NOTE — DISCHARGE INSTRUCTIONS
minutes, several times daily. *  Walk as much as you can tolerate. Avoid standing for long periods of time, but if you must stand, regularly do heel-raises and calf-pump exercises. *  If you have compression wraps designed to remain in place all week, be sure to keep them from getting wet. *  If you have compression garments that can be changed regularly, apply them first thing in the morning, and remove them when you go to bed. Moisturize your skin at bedtime, with Vaseline, Aquaphor, Aveeno, CeraVe, Cetaphil, Eucerin, Lubriderm, etc; but keep the skin between your toes dry. *  If you smoke, your wound can not heal properly -- please talk with us when you're ready to quit. *  Be sure to adhere to any recommendations from your PCP about diuretics (water pills), diet, exercise, and maintaining a healthy weight. If you have questions, please ask. *  If you have a compression pump, aim for at least two hours of use daily. F/U Appointment is with Dr. Patrick Vivas in 1 week, on                                   at                       .     Your nurse  is PAPO Wetzel. If we applied slip-resistant hospital socks today, be sure to remove them at least once a day to inspect your toes or feet, even if you're not changing the wraps or dressings underneath. If you see anything concerning (redness, excess moisture, etc), please call and let us know right away. Should you experience any significant changes in your wound(s) (including redness, increased warmth, increased pain, increased drainage, odor, or fever) or have questions about your wound care, please contact the [address] [place] [address] at [phone] Monday-Thursday from 8:00 am - 4:30 pm, or Friday from 8:00 am - 2:30 pm.  If you need help with your wound outside these hours and cannot wait until we are again available, contact your home-care company (if applicable), your PCP, or go to the nearest emergency room.

## 2022-08-04 ENCOUNTER — APPOINTMENT (OUTPATIENT)
Dept: CT IMAGING | Age: 82
End: 2022-08-04
Payer: MEDICARE

## 2022-08-04 ENCOUNTER — HOSPITAL ENCOUNTER (OUTPATIENT)
Age: 82
Setting detail: OBSERVATION
Discharge: HOME | End: 2022-08-05
Attending: EMERGENCY MEDICINE | Admitting: INTERNAL MEDICINE
Payer: MEDICARE

## 2022-08-04 ENCOUNTER — APPOINTMENT (OUTPATIENT)
Dept: GENERAL RADIOLOGY | Age: 82
End: 2022-08-04
Payer: MEDICARE

## 2022-08-04 ENCOUNTER — TELEPHONE (OUTPATIENT)
Dept: INTERNAL MEDICINE CLINIC | Age: 82
End: 2022-08-04

## 2022-08-04 DIAGNOSIS — I95.1 ORTHOSTATIC HYPOTENSION: ICD-10-CM

## 2022-08-04 DIAGNOSIS — R55 SYNCOPE AND COLLAPSE: Primary | ICD-10-CM

## 2022-08-04 DIAGNOSIS — N17.9 AKI (ACUTE KIDNEY INJURY) (HCC): ICD-10-CM

## 2022-08-04 LAB
A/G RATIO: 1 (ref 1.1–2.2)
ALBUMIN SERPL-MCNC: 3.7 G/DL (ref 3.4–5)
ALP BLD-CCNC: 92 U/L (ref 40–129)
ALT SERPL-CCNC: 31 U/L (ref 10–40)
ANION GAP SERPL CALCULATED.3IONS-SCNC: 11 MMOL/L (ref 3–16)
AST SERPL-CCNC: 39 U/L (ref 15–37)
BASOPHILS ABSOLUTE: 0 K/UL (ref 0–0.2)
BASOPHILS RELATIVE PERCENT: 0.4 %
BILIRUB SERPL-MCNC: 0.6 MG/DL (ref 0–1)
BUN BLDV-MCNC: 39 MG/DL (ref 7–20)
CALCIUM SERPL-MCNC: 9.7 MG/DL (ref 8.3–10.6)
CHLORIDE BLD-SCNC: 94 MMOL/L (ref 99–110)
CO2: 35 MMOL/L (ref 21–32)
CREAT SERPL-MCNC: 1.7 MG/DL (ref 0.8–1.3)
EKG ATRIAL RATE: 68 BPM
EKG DIAGNOSIS: NORMAL
EKG Q-T INTERVAL: 416 MS
EKG QRS DURATION: 90 MS
EKG QTC CALCULATION (BAZETT): 449 MS
EKG R AXIS: 13 DEGREES
EKG T AXIS: -23 DEGREES
EKG VENTRICULAR RATE: 70 BPM
EOSINOPHILS ABSOLUTE: 0.4 K/UL (ref 0–0.6)
EOSINOPHILS RELATIVE PERCENT: 5.7 %
GFR AFRICAN AMERICAN: 47
GFR NON-AFRICAN AMERICAN: 39
GLUCOSE BLD-MCNC: 121 MG/DL (ref 70–99)
GLUCOSE BLD-MCNC: 154 MG/DL (ref 70–99)
GLUCOSE BLD-MCNC: 74 MG/DL (ref 70–99)
HCT VFR BLD CALC: 46.4 % (ref 40.5–52.5)
HEMOGLOBIN: 15.2 G/DL (ref 13.5–17.5)
INFLUENZA A: NOT DETECTED
INFLUENZA B: NOT DETECTED
LYMPHOCYTES ABSOLUTE: 2.5 K/UL (ref 1–5.1)
LYMPHOCYTES RELATIVE PERCENT: 35.8 %
MCH RBC QN AUTO: 29.4 PG (ref 26–34)
MCHC RBC AUTO-ENTMCNC: 32.9 G/DL (ref 31–36)
MCV RBC AUTO: 89.5 FL (ref 80–100)
MONOCYTES ABSOLUTE: 0.6 K/UL (ref 0–1.3)
MONOCYTES RELATIVE PERCENT: 8.7 %
NEUTROPHILS ABSOLUTE: 3.5 K/UL (ref 1.7–7.7)
NEUTROPHILS RELATIVE PERCENT: 49.4 %
PDW BLD-RTO: 14.3 % (ref 12.4–15.4)
PERFORMED ON: ABNORMAL
PERFORMED ON: NORMAL
PLATELET # BLD: 193 K/UL (ref 135–450)
PMV BLD AUTO: 7.6 FL (ref 5–10.5)
POTASSIUM SERPL-SCNC: 3.7 MMOL/L (ref 3.5–5.1)
PRO-BNP: 1157 PG/ML (ref 0–449)
RBC # BLD: 5.18 M/UL (ref 4.2–5.9)
SARS-COV-2 RNA, RT PCR: NOT DETECTED
SODIUM BLD-SCNC: 140 MMOL/L (ref 136–145)
TOTAL PROTEIN: 7.3 G/DL (ref 6.4–8.2)
TROPONIN: 0.01 NG/ML
TROPONIN: 0.02 NG/ML
TROPONIN: 0.02 NG/ML
TROPONIN: 0.03 NG/ML
TSH REFLEX FT4: 0.82 UIU/ML (ref 0.27–4.2)
WBC # BLD: 7 K/UL (ref 4–11)

## 2022-08-04 PROCEDURE — 85025 COMPLETE CBC W/AUTO DIFF WBC: CPT

## 2022-08-04 PROCEDURE — 93005 ELECTROCARDIOGRAM TRACING: CPT | Performed by: NURSE PRACTITIONER

## 2022-08-04 PROCEDURE — 84484 ASSAY OF TROPONIN QUANT: CPT

## 2022-08-04 PROCEDURE — 84443 ASSAY THYROID STIM HORMONE: CPT

## 2022-08-04 PROCEDURE — 83880 ASSAY OF NATRIURETIC PEPTIDE: CPT

## 2022-08-04 PROCEDURE — 36415 COLL VENOUS BLD VENIPUNCTURE: CPT

## 2022-08-04 PROCEDURE — 99285 EMERGENCY DEPT VISIT HI MDM: CPT

## 2022-08-04 PROCEDURE — 96360 HYDRATION IV INFUSION INIT: CPT

## 2022-08-04 PROCEDURE — 83036 HEMOGLOBIN GLYCOSYLATED A1C: CPT

## 2022-08-04 PROCEDURE — 87636 SARSCOV2 & INF A&B AMP PRB: CPT

## 2022-08-04 PROCEDURE — 2580000003 HC RX 258

## 2022-08-04 PROCEDURE — 70450 CT HEAD/BRAIN W/O DYE: CPT

## 2022-08-04 PROCEDURE — 71045 X-RAY EXAM CHEST 1 VIEW: CPT

## 2022-08-04 PROCEDURE — G0378 HOSPITAL OBSERVATION PER HR: HCPCS

## 2022-08-04 PROCEDURE — 80053 COMPREHEN METABOLIC PANEL: CPT

## 2022-08-04 PROCEDURE — 2580000003 HC RX 258: Performed by: NURSE PRACTITIONER

## 2022-08-04 PROCEDURE — 93010 ELECTROCARDIOGRAM REPORT: CPT | Performed by: INTERNAL MEDICINE

## 2022-08-04 PROCEDURE — 6370000000 HC RX 637 (ALT 250 FOR IP)

## 2022-08-04 RX ORDER — POTASSIUM CHLORIDE 7.45 MG/ML
10 INJECTION INTRAVENOUS PRN
Status: DISCONTINUED | OUTPATIENT
Start: 2022-08-04 | End: 2022-08-05 | Stop reason: HOSPADM

## 2022-08-04 RX ORDER — INSULIN LISPRO 100 [IU]/ML
0-4 INJECTION, SOLUTION INTRAVENOUS; SUBCUTANEOUS NIGHTLY
Status: DISCONTINUED | OUTPATIENT
Start: 2022-08-04 | End: 2022-08-05 | Stop reason: HOSPADM

## 2022-08-04 RX ORDER — SODIUM CHLORIDE 0.9 % (FLUSH) 0.9 %
5-40 SYRINGE (ML) INJECTION PRN
Status: DISCONTINUED | OUTPATIENT
Start: 2022-08-04 | End: 2022-08-05 | Stop reason: HOSPADM

## 2022-08-04 RX ORDER — MAGNESIUM SULFATE IN WATER 40 MG/ML
2000 INJECTION, SOLUTION INTRAVENOUS PRN
Status: DISCONTINUED | OUTPATIENT
Start: 2022-08-04 | End: 2022-08-05 | Stop reason: HOSPADM

## 2022-08-04 RX ORDER — 0.9 % SODIUM CHLORIDE 0.9 %
500 INTRAVENOUS SOLUTION INTRAVENOUS ONCE
Status: COMPLETED | OUTPATIENT
Start: 2022-08-04 | End: 2022-08-04

## 2022-08-04 RX ORDER — POTASSIUM CHLORIDE 20 MEQ/1
40 TABLET, EXTENDED RELEASE ORAL PRN
Status: DISCONTINUED | OUTPATIENT
Start: 2022-08-04 | End: 2022-08-05 | Stop reason: HOSPADM

## 2022-08-04 RX ORDER — ACETAMINOPHEN 325 MG/1
650 TABLET ORAL EVERY 6 HOURS PRN
Status: DISCONTINUED | OUTPATIENT
Start: 2022-08-04 | End: 2022-08-05 | Stop reason: HOSPADM

## 2022-08-04 RX ORDER — ONDANSETRON 4 MG/1
4 TABLET, ORALLY DISINTEGRATING ORAL EVERY 8 HOURS PRN
Status: DISCONTINUED | OUTPATIENT
Start: 2022-08-04 | End: 2022-08-05 | Stop reason: HOSPADM

## 2022-08-04 RX ORDER — INSULIN GLARGINE 100 [IU]/ML
40 INJECTION, SOLUTION SUBCUTANEOUS NIGHTLY
Status: DISCONTINUED | OUTPATIENT
Start: 2022-08-04 | End: 2022-08-05 | Stop reason: HOSPADM

## 2022-08-04 RX ORDER — ONDANSETRON 2 MG/ML
4 INJECTION INTRAMUSCULAR; INTRAVENOUS EVERY 6 HOURS PRN
Status: DISCONTINUED | OUTPATIENT
Start: 2022-08-04 | End: 2022-08-05 | Stop reason: HOSPADM

## 2022-08-04 RX ORDER — SODIUM CHLORIDE 0.9 % (FLUSH) 0.9 %
5-40 SYRINGE (ML) INJECTION EVERY 12 HOURS SCHEDULED
Status: DISCONTINUED | OUTPATIENT
Start: 2022-08-04 | End: 2022-08-05 | Stop reason: HOSPADM

## 2022-08-04 RX ORDER — ROSUVASTATIN CALCIUM 10 MG/1
10 TABLET, COATED ORAL DAILY
Status: DISCONTINUED | OUTPATIENT
Start: 2022-08-05 | End: 2022-08-05 | Stop reason: HOSPADM

## 2022-08-04 RX ORDER — INSULIN LISPRO 100 [IU]/ML
0-4 INJECTION, SOLUTION INTRAVENOUS; SUBCUTANEOUS
Status: DISCONTINUED | OUTPATIENT
Start: 2022-08-04 | End: 2022-08-05 | Stop reason: HOSPADM

## 2022-08-04 RX ORDER — ACETAMINOPHEN 650 MG/1
650 SUPPOSITORY RECTAL EVERY 6 HOURS PRN
Status: DISCONTINUED | OUTPATIENT
Start: 2022-08-04 | End: 2022-08-05 | Stop reason: HOSPADM

## 2022-08-04 RX ORDER — SODIUM CHLORIDE 9 MG/ML
INJECTION, SOLUTION INTRAVENOUS CONTINUOUS
Status: DISCONTINUED | OUTPATIENT
Start: 2022-08-04 | End: 2022-08-05 | Stop reason: HOSPADM

## 2022-08-04 RX ORDER — DILTIAZEM HYDROCHLORIDE 120 MG/1
120 CAPSULE, EXTENDED RELEASE ORAL DAILY
Status: DISCONTINUED | OUTPATIENT
Start: 2022-08-05 | End: 2022-08-05 | Stop reason: HOSPADM

## 2022-08-04 RX ORDER — LEVOTHYROXINE SODIUM 0.1 MG/1
100 TABLET ORAL DAILY
Status: DISCONTINUED | OUTPATIENT
Start: 2022-08-05 | End: 2022-08-05 | Stop reason: HOSPADM

## 2022-08-04 RX ORDER — SODIUM CHLORIDE 9 MG/ML
INJECTION, SOLUTION INTRAVENOUS PRN
Status: DISCONTINUED | OUTPATIENT
Start: 2022-08-04 | End: 2022-08-05 | Stop reason: HOSPADM

## 2022-08-04 RX ORDER — DEXTROSE MONOHYDRATE 100 MG/ML
INJECTION, SOLUTION INTRAVENOUS CONTINUOUS PRN
Status: DISCONTINUED | OUTPATIENT
Start: 2022-08-04 | End: 2022-08-05 | Stop reason: HOSPADM

## 2022-08-04 RX ORDER — POLYETHYLENE GLYCOL 3350 17 G/17G
17 POWDER, FOR SOLUTION ORAL DAILY PRN
Status: DISCONTINUED | OUTPATIENT
Start: 2022-08-04 | End: 2022-08-05 | Stop reason: HOSPADM

## 2022-08-04 RX ORDER — SODIUM CHLORIDE 9 MG/ML
INJECTION, SOLUTION INTRAVENOUS CONTINUOUS
Status: DISCONTINUED | OUTPATIENT
Start: 2022-08-04 | End: 2022-08-04

## 2022-08-04 RX ADMIN — SODIUM CHLORIDE, PRESERVATIVE FREE 10 ML: 5 INJECTION INTRAVENOUS at 21:05

## 2022-08-04 RX ADMIN — SODIUM CHLORIDE: 9 INJECTION, SOLUTION INTRAVENOUS at 14:38

## 2022-08-04 RX ADMIN — INSULIN GLARGINE 40 UNITS: 100 INJECTION, SOLUTION SUBCUTANEOUS at 20:40

## 2022-08-04 RX ADMIN — SODIUM CHLORIDE: 9 INJECTION, SOLUTION INTRAVENOUS at 19:05

## 2022-08-04 RX ADMIN — APIXABAN 5 MG: 5 TABLET, FILM COATED ORAL at 20:40

## 2022-08-04 RX ADMIN — SODIUM CHLORIDE 500 ML: 9 INJECTION, SOLUTION INTRAVENOUS at 13:56

## 2022-08-04 ASSESSMENT — ENCOUNTER SYMPTOMS
SORE THROAT: 0
SHORTNESS OF BREATH: 0
RHINORRHEA: 0
DIARRHEA: 0
EYE PAIN: 0
BLOOD IN STOOL: 0
VOMITING: 0
COUGH: 0
ABDOMINAL PAIN: 0
BACK PAIN: 0
NAUSEA: 0

## 2022-08-04 ASSESSMENT — PAIN - FUNCTIONAL ASSESSMENT: PAIN_FUNCTIONAL_ASSESSMENT: NONE - DENIES PAIN

## 2022-08-04 NOTE — FLOWSHEET NOTE
08/04/22 1742   Vital Signs   Temp 96.9 °F (36.1 °C)   Temp Source Oral   Heart Rate 78   Heart Rate Source Monitor   Resp 16   /66   BP Location Left upper arm   MAP (Calculated) 85.67   Patient Position Sitting   Level of Consciousness Alert (0)   MEWS Score 1   Oxygen Therapy   SpO2 96 %   Pulse Oximetry Type Intermittent   Pulse Oximeter Device Mode Intermittent   O2 Device None (Room air)   Patient admitted to pcu at this time. NO orders to be released at this time. VSS. Patient is able to demonstrate the ability to move from a reclining position to an upright position within the recliner.

## 2022-08-04 NOTE — TELEPHONE ENCOUNTER
----- Message from Jose Alberto Morin MD sent at 8/4/2022 11:37 AM EDT -----  Contact: eMe Kaur [phone]  He should go to the ER and get checked  ----- Message -----  From: Dmitriy Lozano  Sent: 8/4/2022   8:15 AM EDT  To: Jose Alberto Morin MD    Patient reports he passed out this morning and fell. His BS is 80 and he states he is not hurt and got up ok but is a little shaky.      Thank you

## 2022-08-04 NOTE — ED PROVIDER NOTES
I independently evaluated and obtained a history and physical on [address] . I personally saw the patient and performed a substantive portion of the visit including all aspects of the medical decision making. All diagnostic, treatment, and disposition assistants were made to myself in conjunction the advanced practice provider. For further details of this patient's emergency department encounter, please see the advanced practice provider's documentation. History: Patient is a 79-year-old male who reports severe lightheadedness when sitting up for the past 3 weeks as well as an episode of loss of consciousness earlier today. Patient denies any chest pain shortness of breath or hemoptysis. He reports his symptoms are severe intermittent and wax and wane. Reports standing up worsens his pain and laying down improves it. Physician Exam: Very pleasant middle-aged  male. Irregular bradycardic rhythm. Intact distal pulses. No focal neurologic deficits. MDM:    I personally saw the patient and performed a substantive portion of the visit including all aspects of the medical decision making. The Ekg interpreted by me shows  atrial fibrillation with a rate of 70  Axis is   Normal  QTc is   449ms  Intervals and Durations are unremarkable. ST Segments: nonspecific changes  Nonspecific changes on previous EKG on 10/9/2018      Initial troponin is elevated 0.03. EKG does not show any signs of acute STEMI. Repeat labs are obtained and show improved troponin at 0.01. Patient is admitted at [facility] for further hospitalist evaluation. Patient expresses understanding and agreement with this plan. FINAL IMPRESSION      1. Syncope and collapse    2. Orthostatic hypotension    3.  PAUL (acute kidney injury) ([facility]             Magalis Ramirez MD  08/04/22 9915

## 2022-08-04 NOTE — ED PROVIDER NOTES
[address] [facility]  EM[facility]        Pt Name: Andrea Hill  MRN: [id number]  [place] 1940  Date of evaluation: 8/4/2022  Provider: SUNNY Frost - CNP  PCP: Jett More MD  Note Started: 1:56 PM EDT      RASHID. I have evaluated this patient. My supervising physician was available for consultation. Triage CHIEF COMPLAINT       Chief Complaint   Patient presents with    Dizziness     Pt states dizziness upon standing started about 3 weeks ago. PT got up this morning and went to make coffee and felt dizzy and fell. No reports of losing consciousness. Pt states when he tries to urinate and pushes too hard, he gets dizzy as well. HISTORY OF PRESENT ILLNESS   (Location/Symptom, Timing/Onset, Context/Setting, Quality, Duration, Modifying Factors, Severity)  Note limiting factors. Chief Complaint: Spell where he passed out due to lightheadedness    Andrea Hill is a 80 y.o. male who presents to the emergency department after he had a spell where he passed out due to lightheadedness. He states that he awakened this morning felt pretty good and went to make some coffee. He states that he was standing and began to feel lightheaded. He states that he then passed out. He states that he was able to fall to the ground without injury. Reported that he continued to have lightheadedness and was able to awaken believe shortly after. Denies prolonged unconscious spell. States that he has no headache or neck pain. No chest or abdominal pain. No back pain. He states otherwise feeling well. No focal weakness. He states that he just had a spell where he was lightheaded. After his syncopal spell his lightheadedness resolved. States that he then was walking and felt lightheaded again. He states that at no point was he room spinning sensation or vertigo type symptoms.   He states that he had a spell a couple years ago similar like this before where he had a syncopal spell after feeling lightheaded. He described to me his symptoms as if he was going from a seated position to a standing position and that feeling of lightheadedness upon quick movement and position changes what the way he fell today. Symptoms of abdominal pain or back pain. No chest pain. No palpitations. Nursing Notes were all reviewed and agreed with or any disagreements were addressed in the HPI. REVIEW OF SYSTEMS    (2-9 systems for level 4, 10 or more for level 5)     Review of Systems   Constitutional:  Negative for chills, diaphoresis and fever. HENT:  Negative for congestion, ear pain, rhinorrhea and sore throat. Eyes:  Negative for pain and visual disturbance. Respiratory:  Negative for cough and shortness of breath. Cardiovascular:  Negative for chest pain and leg swelling. Gastrointestinal:  Negative for abdominal pain, blood in stool, diarrhea, nausea and vomiting. Genitourinary:  Negative for difficulty urinating, dysuria, flank pain and frequency. Musculoskeletal:  Negative for back pain and neck pain. Skin:  Negative for rash and wound. Neurological:  Positive for syncope and light-headedness. Negative for dizziness, seizures, facial asymmetry, numbness and headaches.      PAST MEDICAL HISTORY     Past Medical History:   Diagnosis Date    A-fib [facility])     Atrial fibrillation ([facility]     Chronic hepatitis C without hepatic coma (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)     Esophageal reflux     Fracture of nasal bone     Headache     Hearing loss     Hyperlipidemia     Hyperlipidemia associated with type 2 diabetes mellitus ([address] 10/23/2015    Hypertension     Hypertrophy of prostate without urinary obstruction and other lower urinary tract symptoms (LUTS) 11/1/2011    Hypothyroidism Insufficiency fracture of tibia 9/6/2016    Osteoarthrosis, not specified whether generalized/localized, lower leg     Pain in joint, lower leg     Right knee pain 11/8/2017    Status post total left knee replacement 7/17/2017    Tinnitus     Type 2 diabetes mellitus 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 edema ([facility] 10/23/2015    Type II or unspecified type diabetes mellitus without mention of complication, not stated as uncontrolled     Unspecified viral hepatitis C without hepatic coma        SURGICAL HISTORY     Past Surgical History:   Procedure Laterality Date    CHOLECYSTECTOMY, LAPAROSCOPIC      COLONOSCOPY  12/14/07    ESOPHAGUS SURGERY      esophagus endoscopy    EYE SURGERY      JOINT REPLACEMENT Left 07/17/2017    LEFT TOTAL KNEE REPLACEMENT      KNEE ARTHROSCOPY      OTHER SURGICAL HISTORY Left 11/14/2016    LEFT KNEE DIRECT VIDEO ARTHROSCOPY, MEDIAL MENISECTOMY, CHONDROPLASTY, INTERNAL FIXATION OF MEDIAL TIBIAL INSUFFICIENCY FRACTURE WITH BONE SUBSTITUTE    SPINE SURGERY      x2 - discs removed (punctured discs per pt)    TONSILLECTOMY         CURRENTMEDICATIONS       Discharge Medication List as of 8/5/2022  2:02 PM        CONTINUE these medications which have NOT CHANGED    Details   apixaban (ELIQUIS) 5 MG TABS tablet TAKE 1 TABLET TWICE A DAY, Disp-180 tablet, R-3Normal      insulin glargine-yfgn (SEMGLEE, YFGN,) 100 UNIT/ML injection vial Inject 70 Units into the skin nightly, Disp-7 each, R-3DOSE ADJUSTMENTNormal      furosemide (LASIX) 40 MG tablet Take 1 tablet by mouth daily, Disp-30 tablet, R-2Normal      Multiple Vitamins-Minerals (MULTIVITAMIN ADULTS PO) Take by mouth dailyHistorical Med      levothyroxine (SYNTHROID) 100 MCG tablet TAKE 1 TABLET DAILY, Disp-90 tablet, R-1Normal      empagliflozin (JARDIANCE) 25 MG tablet Take 1 tablet by mouth daily, Disp-90 tablet, R-1Normal      !! blood glucose test strips (ONETOUCH ULTRA) strip USE TO TEST TWICE A DAY, Disp-200 °F (36.8 °C) Oral 76 18 96 % 5' 11\" (1.803 m) 240 lb (108.9 kg)       Physical Exam  Vitals and nursing note reviewed. Constitutional:       Appearance: Normal appearance. He is not toxic-appearing or diaphoretic. HENT:      Head: Normocephalic and atraumatic. Nose: Nose normal.   Eyes:      General:         Right eye: No discharge. Left eye: No discharge. Cardiovascular:      Rate and Rhythm: Normal rate and regular rhythm. Pulses: Normal pulses. Heart sounds: No murmur heard. Pulmonary:      Effort: Pulmonary effort is normal. No respiratory distress. Breath sounds: No wheezing or rhonchi. Abdominal:      Palpations: Abdomen is soft. Tenderness: There is no abdominal tenderness. There is no guarding or rebound. Musculoskeletal:         General: Normal range of motion. Cervical back: Normal range of motion and neck supple. Right lower leg: 3+ Edema present. Left lower leg: 3+ Edema present. Skin:     General: Skin is warm and dry. Capillary Refill: Capillary refill takes less than 2 seconds. Neurological:      General: No focal deficit present. Mental Status: He is alert and oriented to person, place, and time.    Psychiatric:         Mood and Affect: Mood normal.         Behavior: Behavior normal.       DIAGNOSTIC RESULTS   LABS:    Labs Reviewed   COMPREHENSIVE METABOLIC PANEL - Abnormal; Notable for the following components:       Result Value    Chloride 94 (*)     CO2 35 (*)     Glucose 121 (*)     BUN 39 (*)     Creatinine 1.7 (*)     GFR Non- 39 (*)     GFR African American 47 (*)     Albumin/Globulin Ratio 1.0 (*)     AST 39 (*)     All other components within normal limits   TROPONIN - Abnormal; Notable for the following components:    Troponin 0.03 (*)     All other components within normal limits   BRAIN NATRIURETIC PEPTIDE - Abnormal; Notable for the following components:    Pro-BNP 1,157 (*)     All other components within normal limits   TROPONIN - Abnormal; Notable for the following components:    Troponin 0.02 (*)     All other components within normal limits   TROPONIN - Abnormal; Notable for the following components:    Troponin 0.02 (*)     All other components within normal limits   BASIC METABOLIC PANEL W/ REFLEX TO MG FOR LOW K - Abnormal; Notable for the following components:    Chloride 98 (*)     CO2 36 (*)     Glucose 52 (*)     BUN 36 (*)     Creatinine 1.6 (*)     GFR Non- 42 (*)     GFR  51 (*)     All other components within normal limits   POCT GLUCOSE - Abnormal; Notable for the following components:    POC Glucose 154 (*)     All other components within normal limits   POCT GLUCOSE - Abnormal; Notable for the following components:    POC Glucose 126 (*)     All other components within normal limits   COVID-19 & INFLUENZA COMBO   CBC WITH AUTO DIFFERENTIAL   TROPONIN   HEMOGLOBIN A1C   TSH WITH REFLEX TO FT4   CBC WITH AUTO DIFFERENTIAL   POCT GLUCOSE   POCT GLUCOSE   POCT GLUCOSE   POCT GLUCOSE   POCT GLUCOSE   POCT GLUCOSE       When ordered, only abnormal lab results are displayed. All other labs were within normal range or not returned as of this dictation. EKG: When ordered, EKG's are interpreted by the Emergency Department Physician in the absence of a cardiologist.  Please see their note for interpretation of EKG. RADIOLOGY:   Non-plain film images such as CT, Ultrasound and MRI are read by the radiologist. Plain radiographic images are visualized andpreliminarily interpreted by the  ED Provider with the below findings:        Interpretation perthe Radiologist below, if available at the time of this note:    VL DUP CAROTID BILATERAL   Final Result      CT HEAD WO CONTRAST   Final Result   1. No acute intracranial hemorrhage, intra-axial mass, or acute territorial   infarct   2.  Bilateral ethmoid sinus disease         XR CHEST PORTABLE   Final Result   No acute process. Bibasilar hypoaeration           No results found. PROCEDURES   Unless otherwise noted below, none     Procedures    CRITICAL CARE TIME   N/A    CONSULTS:  IP CONSULT TO HOSPITALIST      EMERGENCY DEPARTMENT COURSE and DIFFERENTIAL DIAGNOSIS/MDM:   Vitals:    Vitals:    08/04/22 2046 08/05/22 0229 08/05/22 0230 08/05/22 0737   BP: 125/77 119/67  113/70   Pulse: 69 87  62   Resp: 19 18  18   Temp: 97 °F (36.1 °C) (!) 96.6 °F (35.9 °C)  97.9 °F (36.6 °C)   TempSrc: Oral Oral  Oral   SpO2: 91% 90%  96%   Weight:   242 lb 6.4 oz (110 kg)    Height:           Patient was given thefollowing medications:  Medications   0.9 % sodium chloride bolus (0 mLs IntraVENous Stopped 8/4/22 1437)         Is this patient to be included in the SEP-1 Core Measure due to severe sepsis or septic shock? No   Exclusion criteria - the patient is NOT to be included for SEP-1 Core Measure due to: Infection is not suspected    Patient is noted to be orthostatic hypotensive. He had a syncopal spell today. He also has some dizziness and general lightheadedness upon position changes. At this time I believe he would benefit from admission. I spoke with Hospitalist who is evaluating the patient for admission. Patient is agreeable to stay. The patient's troponin was initially elevated patient had no episodes of chest pain or shortness of breath. His repeat troponin was normal.  CT scan of brain was read as negative. Chest x-ray read as negative. FINAL IMPRESSION      1. Syncope and collapse    2. Orthostatic hypotension    3.  PAUL (acute kidney injury) [facility])          DISPOSITION/PLAN   DISPOSITION Admitted 08/04/2022 04:36:15 PM      PATIENT REFERREDTO:  Erendira Preston MD  [address] , [facility]  [phone]    Follow up      Erendira Preston, [address]  [phone]          DISCHARGE MEDICATIONS:  Discharge Medication List as of 8/5/2022  2:02 PM          DISCONTINUED MEDICATIONS:  Discharge Medication List as of 8/5/2022  2:02 PM        STOP taking these medications       hydroCHLOROthiazide (HYDRODIURIL) 12.5 MG tablet Comments:   Reason for Stopping:                      (Please note that portions ofthis note were completed with a voice recognition program.  Efforts were made to edit the dictations but occasionally words are mis-transcribed.)    SUNNY Goode CNP (electronically signed)             SUNNY Goode CNP  08/08/22 2398

## 2022-08-05 ENCOUNTER — APPOINTMENT (OUTPATIENT)
Dept: VASCULAR LAB | Age: 82
End: 2022-08-05
Payer: MEDICARE

## 2022-08-05 VITALS
DIASTOLIC BLOOD PRESSURE: 70 MMHG | HEART RATE: 62 BPM | HEIGHT: 71 IN | TEMPERATURE: 97.9 F | RESPIRATION RATE: 18 BRPM | BODY MASS INDEX: 33.94 KG/M2 | WEIGHT: 242.4 LBS | SYSTOLIC BLOOD PRESSURE: 113 MMHG | OXYGEN SATURATION: 96 %

## 2022-08-05 PROBLEM — N18.31 STAGE 3A CHRONIC KIDNEY DISEASE (HCC): Status: ACTIVE | Noted: 2022-08-05

## 2022-08-05 PROBLEM — I48.11 LONGSTANDING PERSISTENT ATRIAL FIBRILLATION (HCC): Status: ACTIVE | Noted: 2022-08-05

## 2022-08-05 LAB
ANION GAP SERPL CALCULATED.3IONS-SCNC: 8 MMOL/L (ref 3–16)
BASOPHILS ABSOLUTE: 0 K/UL (ref 0–0.2)
BASOPHILS RELATIVE PERCENT: 0.4 %
BUN BLDV-MCNC: 36 MG/DL (ref 7–20)
CALCIUM SERPL-MCNC: 9.1 MG/DL (ref 8.3–10.6)
CHLORIDE BLD-SCNC: 98 MMOL/L (ref 99–110)
CO2: 36 MMOL/L (ref 21–32)
CREAT SERPL-MCNC: 1.6 MG/DL (ref 0.8–1.3)
EOSINOPHILS ABSOLUTE: 0.4 K/UL (ref 0–0.6)
EOSINOPHILS RELATIVE PERCENT: 8.3 %
ESTIMATED AVERAGE GLUCOSE: 157.1 MG/DL
GFR AFRICAN AMERICAN: 51
GFR NON-AFRICAN AMERICAN: 42
GLUCOSE BLD-MCNC: 126 MG/DL (ref 70–99)
GLUCOSE BLD-MCNC: 52 MG/DL (ref 70–99)
GLUCOSE BLD-MCNC: 72 MG/DL (ref 70–99)
GLUCOSE BLD-MCNC: 89 MG/DL (ref 70–99)
HBA1C MFR BLD: 7.1 %
HCT VFR BLD CALC: 43.5 % (ref 40.5–52.5)
HEMOGLOBIN: 14.3 G/DL (ref 13.5–17.5)
LV EF: 58 %
LVEF MODALITY: NORMAL
LYMPHOCYTES ABSOLUTE: 1.4 K/UL (ref 1–5.1)
LYMPHOCYTES RELATIVE PERCENT: 27.5 %
MCH RBC QN AUTO: 29.4 PG (ref 26–34)
MCHC RBC AUTO-ENTMCNC: 32.9 G/DL (ref 31–36)
MCV RBC AUTO: 89.6 FL (ref 80–100)
MONOCYTES ABSOLUTE: 0.6 K/UL (ref 0–1.3)
MONOCYTES RELATIVE PERCENT: 11.3 %
NEUTROPHILS ABSOLUTE: 2.7 K/UL (ref 1.7–7.7)
NEUTROPHILS RELATIVE PERCENT: 52.5 %
PDW BLD-RTO: 14 % (ref 12.4–15.4)
PERFORMED ON: ABNORMAL
PERFORMED ON: NORMAL
PERFORMED ON: NORMAL
PLATELET # BLD: 191 K/UL (ref 135–450)
PMV BLD AUTO: 6.9 FL (ref 5–10.5)
POTASSIUM REFLEX MAGNESIUM: 3.6 MMOL/L (ref 3.5–5.1)
RBC # BLD: 4.86 M/UL (ref 4.2–5.9)
SODIUM BLD-SCNC: 142 MMOL/L (ref 136–145)
WBC # BLD: 5.1 K/UL (ref 4–11)

## 2022-08-05 PROCEDURE — 85025 COMPLETE CBC W/AUTO DIFF WBC: CPT

## 2022-08-05 PROCEDURE — 93880 EXTRACRANIAL BILAT STUDY: CPT

## 2022-08-05 PROCEDURE — G0378 HOSPITAL OBSERVATION PER HR: HCPCS

## 2022-08-05 PROCEDURE — 2580000003 HC RX 258

## 2022-08-05 PROCEDURE — 36415 COLL VENOUS BLD VENIPUNCTURE: CPT

## 2022-08-05 PROCEDURE — 6370000000 HC RX 637 (ALT 250 FOR IP)

## 2022-08-05 PROCEDURE — 99217 PR OBSERVATION CARE DISCHARGE MANAGEMENT: CPT | Performed by: INTERNAL MEDICINE

## 2022-08-05 PROCEDURE — 93306 TTE W/DOPPLER COMPLETE: CPT

## 2022-08-05 PROCEDURE — 80048 BASIC METABOLIC PNL TOTAL CA: CPT

## 2022-08-05 RX ORDER — DILTIAZEM HYDROCHLORIDE 120 MG/1
120 CAPSULE, COATED, EXTENDED RELEASE ORAL NIGHTLY
Qty: 90 CAPSULE | Refills: 3
Start: 2022-08-05

## 2022-08-05 RX ORDER — TAMSULOSIN HYDROCHLORIDE 0.4 MG/1
0.4 CAPSULE ORAL NIGHTLY
Qty: 90 CAPSULE | Refills: 3
Start: 2022-08-05

## 2022-08-05 RX ADMIN — Medication 16 G: at 06:22

## 2022-08-05 RX ADMIN — ROSUVASTATIN CALCIUM 10 MG: 10 TABLET, FILM COATED ORAL at 08:47

## 2022-08-05 RX ADMIN — LEVOTHYROXINE SODIUM 100 MCG: 100 TABLET ORAL at 05:30

## 2022-08-05 RX ADMIN — DILTIAZEM HYDROCHLORIDE 120 MG: 120 CAPSULE, EXTENDED RELEASE ORAL at 08:47

## 2022-08-05 RX ADMIN — SODIUM CHLORIDE, PRESERVATIVE FREE 10 ML: 5 INJECTION INTRAVENOUS at 08:48

## 2022-08-05 RX ADMIN — APIXABAN 5 MG: 5 TABLET, FILM COATED ORAL at 08:47

## 2022-08-05 NOTE — CARE COORDINATION
Review of chart screened for potential discharge planning needs. Contact with _patient____(pt/support person)   Lives w/and is the caregiver for his spouse. Lives in a single story house w/ramped access. Pt works in [employer] office. Ambulates w/o device. DC order noted. No new HHC or DME needs  Role of discharge planner explained with verbalized understanding. No Needs identified by pt/support person for barriers to discharge. Readmission Risk Score:OBS  Discharge timeout done with Becki RN. All discharge needs and concerns addressed.

## 2022-08-05 NOTE — DISCHARGE INSTRUCTIONS
Change cardizem and flomax to night  Stop HCTZ    Take rest in daytime  Continue diabetic meds as before

## 2022-08-05 NOTE — PLAN OF CARE
Problem: Discharge Planning  Goal: Discharge to home or other facility with appropriate resources  Recent Flowsheet Documentation  Taken 8/4/2022 1759 by Aren Zamudio RN  Discharge to home or other facility with appropriate resources:   Identify barriers to discharge with patient and caregiver   Identify discharge learning needs (meds, wound care, etc)     Problem: ABCDS Injury Assessment  Goal: Absence of physical injury  Recent Flowsheet Documentation  Taken 8/4/2022 2323 by Isa Maria RN  Absence of Physical Injury: Implement safety measures based on patient assessment     Problem: Neurosensory - Adult  Goal: Achieves stable or improved neurological status  Outcome: Progressing  Flowsheets (Taken 8/4/2022 2326)  Achieves stable or improved neurological status:   Assess for and report changes in neurological status   Maintain blood pressure and fluid volume within ordered parameters to optimize cerebral perfusion and minimize risk of hemorrhage   Monitor temperature, glucose, and sodium. Initiate appropriate interventions as ordered  Note: All labs obtained & monitored per MD order. No c/o dizziness this shift. Problem: Metabolic/Fluid and Electrolytes - Adult  Goal: Glucose maintained within prescribed range  Outcome: Progressing  Flowsheets (Taken 8/4/2022 2326)  Glucose maintained within prescribed range:   Monitor blood glucose as ordered   Assess for signs and symptoms of hyperglycemia and hypoglycemia   Administer ordered medications to maintain glucose within target range  Note: FSBS continue as ordered. Lantus 40u given this shift.

## 2022-08-05 NOTE — FLOWSHEET NOTE
08/05/22 1322   Encounter Summary   Encounter Overview/Reason  Rituals, Rites and Sacraments;Spiritual/Emotional Needs   Service Provided For: Patient and family together   [facility]   Support System Spouse   Last Encounter    (8/5 communion and prayer)   Complexity of Encounter Low   Begin Time 1307   End Time  1322   Total Time Calculated 15 min   Rituals, Rites and Sacraments   Type Uatsdin Communion

## 2022-08-05 NOTE — PROGRESS NOTES
Upon review of am labs noted FSBS 52. Patient denies feelings of hypoglycemia. Alert and oriented without c/o dizziness. 16g glucose tablets given at this time and 4oz apple juice. Will rechecks glucose per policy.  William Vides RN

## 2022-08-05 NOTE — H&P
Hospital Medicine History & Physical      PCP: [facility], MD    Date of Admission: 8/4/2022    Date of Service: Pt seen/examined on 8/5/2022     Chief Complaint:    Chief Complaint   Patient presents with    Dizziness     Pt states dizziness upon standing started about 3 weeks ago. PT got up this morning and went to make coffee and felt dizzy and fell. No reports of losing consciousness. Pt states when he tries to urinate and pushes too hard, he gets dizzy as well. History Of Present Illness: The patient is a 80 y.o. male with pmhx of atrial fibrillation, chronic hepatitis C, DM, CKD, HLD, HTN, hypothyroidism  who presented to [address] [place] [address] ED with complaint of episode where he passed out. This morning while attempting to fill his coffee pot with water he began to feel very light-headed and dizzy. He \"blacked out\" for a second and fell to the floor slowly. Denies hitting anything. He says he was only out for a few seconds and got up instantly without difficulty. He then ate his breakfast prior to coming to the ED. Denies any symptoms prior or after the event. No fever, chills, chest pain, dyspnea, nausea, vomiting, diarrhea, hematuria, dysuria, headache, or vision changes. He has passed out one other time about a year ago but does report feeling dizzy very often, especially when changing positions.   He does also take all of his medications in the AM.     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)     Esophageal reflux     Fracture of nasal bone     Headache     Hearing loss     Hyperlipidemia     Hyperlipidemia associated with type 2 diabetes mellitus ([address] 10/23/2015 Hypertension     Hypertrophy of prostate without urinary obstruction and other lower urinary tract symptoms (LUTS) 11/1/2011    Hypothyroidism     Insufficiency fracture of tibia 9/6/2016    Osteoarthrosis, not specified whether generalized/localized, lower leg     Pain in joint, lower leg     Right knee pain 11/8/2017    Status post total left knee replacement 7/17/2017    Tinnitus     Type 2 diabetes mellitus 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 edema ([facility] 10/23/2015    Type II or unspecified type diabetes mellitus without mention of complication, not stated as uncontrolled     Unspecified viral hepatitis C without hepatic coma        Past Surgical History:        Procedure Laterality Date    CHOLECYSTECTOMY, LAPAROSCOPIC      COLONOSCOPY  12/14/07    ESOPHAGUS SURGERY      esophagus endoscopy    EYE SURGERY      JOINT REPLACEMENT Left 07/17/2017    LEFT TOTAL KNEE REPLACEMENT      KNEE ARTHROSCOPY      OTHER SURGICAL HISTORY Left 11/14/2016    LEFT KNEE DIRECT VIDEO ARTHROSCOPY, MEDIAL MENISECTOMY, CHONDROPLASTY, INTERNAL FIXATION OF MEDIAL TIBIAL INSUFFICIENCY FRACTURE WITH BONE SUBSTITUTE    SPINE SURGERY      x2 - discs removed (punctured discs per pt)    TONSILLECTOMY         Medications Prior to Admission:    Prior to Admission medications    Medication Sig Start Date End Date Taking?  Authorizing Provider   apixaban (ELIQUIS) 5 MG TABS tablet TAKE 1 TABLET TWICE A DAY 7/27/22   Jose Manuel Blunt MD   insulin glargine-yfgn (SEMGLEE, YFGN,) 100 UNIT/ML injection vial Inject 70 Units into the skin nightly  Patient taking differently: Inject 40 Units into the skin nightly 7/21/22   Jose Manuel Bulnt MD   furosemide (LASIX) 40 MG tablet Take 1 tablet by mouth daily 7/15/22   Jose Mnauel Blunt MD   Multiple Vitamins-Minerals (MULTIVITAMIN ADULTS PO) Take by mouth daily    Historical Provider, MD   hydroCHLOROthiazide (HYDRODIURIL) 12.5 MG tablet TAKE 1 TABLET DAILY 6/1/22   Jaquelin Richmond MD   levothyroxine (SYNTHROID) 100 MCG tablet TAKE 1 TABLET DAILY 5/31/22   Jaquelin Richmond MD   tamsulosin (FLOMAX) 0.4 MG capsule TAKE 1 CAPSULE DAILY 3/7/22   Jaquelin Richmond MD   dilTIAZem (CARDIZEM CD) 120 MG extended release capsule TAKE 1 CAPSULE DAILY 3/7/22   Jaquelin Richmond MD   empagliflozin (JARDIANCE) 25 MG tablet Take 1 tablet by mouth daily 3/1/22   Jaquelin Richmond MD   blood glucose test strips (ONETOUCH ULTRA) strip USE TO TEST TWICE A DAY 10/27/21   Jaquelin Richmond MD   pioglitazone (ACTOS) 30 MG tablet TAKE 1 TABLET DAILY 8/30/21   Jaquelin Richmond MD   Blood Glucose Monitoring Suppl ([employer] BLOOD GLUCOSE MONITOR) PARAS TEST TWICE DAILY. DX: E11.9 11/20/19   Jaquelin Richmond MD   EMBRACE LANCETS ULTRA THIN 30G MISC TEST TWICE DAILY. DX: E11.9 11/20/19   Jaquelin Richmond MD   rosuvastatin (CRESTOR) 10 MG tablet Take 10 mg by mouth daily    Historical Provider, MD   B-D INS SYR ULTRAFINE 1CC/30G 30G X 1/2\" 1 ML MISC USE WITH LANTUS NIGHTLY 6/20/16   Jaquelin Richmond MD   TRUETEST TEST strip TEST DAILY 8/28/13   Jaquelin Richmond MD       Allergies:  Patient has no known allergies. Social History:  The patient currently lives at home with his wife    TOBACCO:   reports that he quit smoking about 32 years ago. He has a 15.00 pack-year smoking history. He quit smokeless tobacco use about 44 years ago. ETOH:   reports current alcohol use.       Family History:   Positive as follows:        Problem Relation Age of Onset    Cancer Mother     Kidney Disease Brother         on dialasis    Diabetes type 2  Brother     Heart Disease Brother     Breast Cancer Sister        REVIEW OF SYSTEMS:       Constitutional: Negative for fever   HENT: Negative for sore throat   Eyes: Negative for redness   Respiratory: Negative  for dyspnea, cough   Cardiovascular: Negative for chest pain   Gastrointestinal: Negative for vomiting, diarrhea   Genitourinary: Negative for hematuria   Musculoskeletal: Negative for arthralgias   Skin: Negative for rash   Neurological: +for syncope   Hematological: Negative for adenopathy   Psychiatric/Behavorial: Negative for anxiety    PHYSICAL EXAM:    /70   Pulse 62   Temp 97.9 °F (36.6 °C) (Oral)   Resp 18   Ht 5' 11\" (1.803 m)   Wt 242 lb 6.4 oz (110 kg)   SpO2 96%   BMI 33.81 kg/m²     Gen: No distress. Alert. Very pleasant elderly male   Eyes: PERRL. No sclera icterus. No conjunctival injection. Neck: No JVD. No Carotid Bruit. Trachea midline. Resp: No accessory muscle use. No crackles. No wheezes. No rhonchi. CV: +Irregularly irregular rate and rhythm. No murmur. No rub. 3+ edema of lower extremities bilaterally   Peripheral Pulses: +2 palpable, equal bilaterally   GI: Non-tender. Non-distended. No masses. No organomegaly. Normal bowel sounds. No hernia. Skin: Warm and dry. No nodule on exposed extremities. No rash on exposed extremities. +erythema and edema of bilateral lower extremities, scant ecchymosis on upper extremities   M/S: No cyanosis. No joint deformity. No clubbing. Neuro: Awake. Grossly nonfocal    Psych: Oriented x 3. No anxiety or agitation. Laura Boggs MD have reviewed the chart on Kamille Swain and personally interviewed and examined patient, reviewed the data (labs and imaging) and after discussion with my PA formulated the plan. Agree with note with the following edits. HPI:     I reviewed the patient's Past Medical History, Past Surgical History, Medications, and Allergies. 80 y.o. male with pmhx of atrial fibrillation, chronic hepatitis C, DM, CKD, HLD, HTN, hypothyroidism  who presented to [facility] ED with complaint of episode where he passed out. This morning while attempting to fill his coffee pot with water he began to feel very light-headed and dizzy.  He \"blacked out\" for a second and fell to the floor slowly. Denies hitting anything. He says he was only out for a few seconds and got up instantly without difficulty. He then ate his breakfast prior to coming to the ED. Denies any symptoms prior or after the event. No fever, chills, chest pain, dyspnea, nausea, vomiting, diarrhea, hematuria, dysuria, headache, or vision changes. He has passed out one other time about a year ago but does report feeling dizzy very often, especially when changing positions. He reports he takes all his meds in am         General: elderly male, obese, Awake, alert and oriented. Appears to be not in any distress  Mucous Membranes:  Pink , anicteric  Neck: No JVD, no carotid bruit, no thyromegaly  Chest:  Clear to auscultation bilaterally, no added sounds  Cardiovascular:  RRR S1S2 heard, no murmurs or gallops  Abdomen:  Soft, undistended, non tender, no organomegaly, BS present  Extremities: damaris chronic lymphedema with superficial wound on left medial leg   Varicose veins No edema or cyanosis.  Distal pulses well felt  Neurological : grossly normal, non focal               Lab Results   Component Value Date    WBC 5.1 08/05/2022    HGB 14.3 08/05/2022    HCT 43.5 08/05/2022    MCV 89.6 08/05/2022     08/05/2022     Lab Results   Component Value Date     08/05/2022    K 3.6 08/05/2022    CL 98 (L) 08/05/2022    CO2 36 (H) 08/05/2022    BUN 36 (H) 08/05/2022    CREATININE 1.6 (H) 08/05/2022    GLUCOSE 52 (L) 08/05/2022    CALCIUM 9.1 08/05/2022    PROT 7.3 08/04/2022    LABALBU 3.7 08/04/2022    BILITOT 0.6 08/04/2022    ALKPHOS 92 08/04/2022    AST 39 (H) 08/04/2022    ALT 31 08/04/2022    LABGLOM 42 (A) 08/05/2022    GFRAA 51 (A) 08/05/2022    AGRATIO 1.0 (L) 08/04/2022    GLOB 3.7 09/01/2021           CARDIAC ENZYMES  Recent Labs     08/04/22  1520 08/04/22  1842 08/04/22  2209   TROPONINI 0.01 0.02* 0.02*       U/A:    Lab Results   Component Value Date/Time    NITRITE neg 10/03/2018 08:47 AM    COLORU Yellow 01/09/2020 12:37 PM    WBCUA 0-2 01/09/2020 12:37 PM    RBCUA 0-2 01/09/2020 12:37 PM    BACTERIA Rare 01/09/2020 12:37 PM    CLARITYU Clear 01/09/2020 12:37 PM    SPECGRAV 1.015 01/09/2020 12:37 PM    LEUKOCYTESUR Negative 01/09/2020 12:37 PM    BLOODU Negative 01/09/2020 12:37 PM    GLUCOSEU >=1000 01/09/2020 12:37 PM       CULTURES  COVID and Flu not detected     EKG:  Atrial fibrillation Controlled ventricular rateLow voltage QRSNonspecific ST abnormality , probably digitalis effectAbnormal ECGWhen compared with ECG of 27-OCT-2017 08:21,afib is now present. QRS duration has increasedInverted T waves have replaced nonspecific T wave abnormality in Inferior leadsConfirmed by Hollie Jerome MD, [address] ([id number]) on 8/4/2022 3:28:45 PM     RADIOLOGY    VL DUP CAROTID BILATERAL         CT HEAD WO CONTRAST   Final Result   1. No acute intracranial hemorrhage, intra-axial mass, or acute territorial   infarct   2. Bilateral ethmoid sinus disease         XR CHEST PORTABLE   Final Result   No acute process.       Bibasilar hypoaeration             ASSESSMENT/PLAN:    #Syncopal episode - likely orthostatic hypotension   - recurrent dizziness with standing per hx  -CT head negative   -CXR negative   -orthostatics initially positive , home meds held IVF given   -echo with no acute findings --> no changes from 2017, final result still pending   -carotid dopplers with no major stenosis   -contributing factor could be that patient takes all BP meds at the same time in the AM, will change some meds to night time to space them out - change cardizem and flomax to nightly  Contnue lasix in am  Stop hctz     #Elevated troponin  -0.03--> 0.01 --> 0.02 x2  -could be 2/2 to CKD   -no CP   -can follow up with PCP  -low suspicion for ACS at this time     #CKD Stage IIIA  -Cr stable   -continue to monitor       #Atrial fibrillation   -rate controlled   -on eliquis for AC  -on diltiazem   -follows with cardiology    #Dm type 2  -holding oral meds   --SSI -continue to monitor BS     #HTN   -was on HCTZ, can discontinue, he is on lasix     #HLD   -on statin    #Lower leg edema   -on lasix     #Chronic Hep C     #Hypothyroidism   -continue synthroid     DVT Prophylaxis: on eliquis   Diet: ADULT DIET;  Regular; 4 carb choices (60 gm/meal)  Code Status: Full Code    PRINCESS Santacruz  08/05/22  10:52 AM      Dc to home  Stop hctz  Pt stable to go home  Agree with above  Changes made to note    Melinda Wilson MD,8/5/2022 1:07 PM

## 2022-08-05 NOTE — PROGRESS NOTES
F/U glucose 71. Refused further interventions. Patient stated he likes to keep his blood sugar between 70-80 and was afraid anymore juice, etc would make it too high. Currently sitting up on side of bed talking with wife. Will notify on coming shift to continue to monitor.  Nathan Allan RN

## 2022-08-05 NOTE — FLOWSHEET NOTE
08/05/22 1135   Vital Signs   Blood Pressure Lying 124/73   Pulse Lying 64 PER MINUTE   Blood Pressure Sitting 125/79   Pulse Sitting 79 PER MINUTE   Blood Pressure Standing 102/62   Pulse Standing 72 PER MINUTE   Orthostatics completed. Cristopher Hardy updated.

## 2022-08-05 NOTE — PROGRESS NOTES
Patient educated on discharge instructions as well as new medications use, dosage, administration and possible side effects. Patient verified knowledge. IV removed without difficulty and dry dressing in place. Telemetry monitor removed and returned to [facility]. Pt left facility in stable condition to Home with all of their personal belongings.

## 2022-08-05 NOTE — PROGRESS NOTES
4 Eyes Skin Assessment     The patient is being assess for   Admission    I agree that 2 RN's have performed a thorough Head to Toe Skin Assessment on the patient. ALL assessment sites listed below have been assessed. Areas assessed for pressure by both nurses:   [x]   Head, Face, and Ears   [x]   Shoulders, Back, and Chest, Abdomen  [x]   Arms, Elbows, and Hands   [x]   Coccyx, Sacrum, and Ischium  [x]   Legs, Feet, and Heels        Skin Assessed Under all Medical Devices by both nurses:  {Medical devices:12723}   n/a           All Mepilex Borders were peeled back and area peeked at by both nurses:  Yes  Please list where Mepilex Borders are located:  LLE calf             **SHARE this note so that the co-signing nurse is able to place an eSignature**    Co-signer eSignature: {Esignature:[id number]}    Does the Patient have Skin Breakdown related to pressure?   No     (Insert Photo here***)         Manjinder Prevention initiated:  NA   Wound Care Orders initiated:  No      WOC nurse consulted for Pressure Injury (Stage 3,4, Unstageable, DTI, NWPT, Complex wounds)and New or Established Ostomies:  NA      Primary Nurse eSignature: Electronically signed by Jose Murguia RN on 8/5/22 at 4:45 AM EDT

## 2022-08-05 NOTE — FLOWSHEET NOTE
Attempted to visit with Pt to offer communion. Pt out of the room. Spouse present. Spouse had no needs at this time. Will attempt to follow up at a later time. [facility] Associate       08/05/22 1031   Encounter Summary   Encounter Overview/Reason  Initial Encounter; Attempted Encounter   Service Provided For: Family; Patient not available   Referral/Consult From: Nurse;Rounding   Support System Spouse   Last Encounter    (8/5 attempted visit, wife present, Pt OOR)   Complexity of Encounter Low   Begin Time 1025   End Time  1032   Total Time Calculated 7 min

## 2022-08-05 NOTE — PROGRESS NOTES
Shift report given to [facility]. Patient stable. Care transferred.  Meche Vizcaino RN

## 2022-08-05 NOTE — FLOWSHEET NOTE
08/05/22 0737   Vital Signs   Temp 97.9 °F (36.6 °C)   Temp Source Oral   Heart Rate 62   Heart Rate Source Monitor   Resp 18   /70   BP Location Left upper arm   BP Method Automatic   MAP (Calculated) 84.33   Level of Consciousness Alert (0)   MEWS Score 1   Oxygen Therapy   SpO2 96 %   O2 Device None (Room air)   Patient is resting showing no s/s of distress. Patient is alert and oriented. Meds were given, see MAR. Patient is denying any needs. Bed is in lowest position and call light is within reach. Will continue to monitor. Shift assessment complete, see flowsheets. Patient down to vascular lab.

## 2022-08-08 ENCOUNTER — CARE COORDINATION (OUTPATIENT)
Dept: CASE MANAGEMENT | Age: 82
End: 2022-08-08

## 2022-08-08 ENCOUNTER — HOSPITAL ENCOUNTER (OUTPATIENT)
Dept: WOUND CARE | Age: 82
Discharge: HOME | End: 2022-08-08
Payer: COMMERCIAL

## 2022-08-08 VITALS
DIASTOLIC BLOOD PRESSURE: 81 MMHG | TEMPERATURE: 97.9 F | SYSTOLIC BLOOD PRESSURE: 132 MMHG | BODY MASS INDEX: 33.81 KG/M2 | HEART RATE: 73 BPM | RESPIRATION RATE: 18 BRPM | HEIGHT: 71 IN

## 2022-08-08 DIAGNOSIS — L97.822 ULCER OF LEFT PRETIBIAL REGION WITH FAT LAYER EXPOSED (HCC): Primary | ICD-10-CM

## 2022-08-08 DIAGNOSIS — R55 SYNCOPE AND COLLAPSE: Primary | ICD-10-CM

## 2022-08-08 PROCEDURE — 1111F DSCHRG MED/CURRENT MED MERGE: CPT | Performed by: INTERNAL MEDICINE

## 2022-08-08 PROCEDURE — 99213 OFFICE O/P EST LOW 20 MIN: CPT

## 2022-08-08 RX ORDER — LIDOCAINE 40 MG/G
CREAM TOPICAL ONCE
Status: CANCELLED | OUTPATIENT
Start: 2022-08-08 | End: 2022-08-08

## 2022-08-08 RX ORDER — BACITRACIN ZINC AND POLYMYXIN B SULFATE 500; 1000 [USP'U]/G; [USP'U]/G
OINTMENT TOPICAL ONCE
Status: CANCELLED | OUTPATIENT
Start: 2022-08-08 | End: 2022-08-08

## 2022-08-08 RX ORDER — LIDOCAINE HYDROCHLORIDE 40 MG/ML
SOLUTION TOPICAL ONCE
Status: CANCELLED | OUTPATIENT
Start: 2022-08-08 | End: 2022-08-08

## 2022-08-08 RX ORDER — LIDOCAINE 40 MG/G
CREAM TOPICAL ONCE
Status: DISCONTINUED | OUTPATIENT
Start: 2022-08-08 | End: 2022-08-09 | Stop reason: HOSPADM

## 2022-08-08 RX ORDER — LIDOCAINE 50 MG/G
OINTMENT TOPICAL ONCE
Status: CANCELLED | OUTPATIENT
Start: 2022-08-08 | End: 2022-08-08

## 2022-08-08 NOTE — PLAN OF CARE
Pt to the [address] [place] [address] for follow up appointment. Wound was not debrided today. Pt to have collagen and bordered gauze placed to wound. Pt given prescription for compression garments. Pt states that he has gotten them at [facility] previously. Pt to follow up in the [address] [place] [address],[address] in 1 week. Discharge instructions reviewed with patient, all questions answered, copy given to patient. Dressings were applied to all wounds per M.D. Instructions at this visit.

## 2022-08-08 NOTE — CARE COORDINATION
[facility] Transitions Initial Follow Up Call    Call within 2 business days of discharge: Yes    Patient: Celena Garcia Patient : 1940   MRN: [id number]  Reason for Admission: syncopal episode likely orthostatic hypotension, recurrent dizziness with standing per hx, elevated troponin, CKD, A Fib on Eliquis, DM2 (A1C 7.1%), HTN, HLD, LE edema, chronic Hep C, hypothyroidism -> home no services  Discharge Date: 22 RARS: No data recorded    Last Discharge [facility]       Date Complaint Diagnosis Description Type Department Provider    22 Dizziness Syncope and collapse . .. ED to Hosp-Admission (Discharged) (ADMITTED) [facility] PCU Kimberli Rubio MD; Shana Kulkarni . .. Spoke with: Celena Garcia (patient)    Facility: [facility]     Non-face-to-face services provided:  Obtained and reviewed discharge summary and/or continuity of care documents  Education of patient/family/caregiver/guardian to support self-management-s/s monitor  Assessment and support for treatment adherence and medication management-1111F completed    Was this an external facility discharge? No Discharge Facility: NA    Challenges to be reviewed by the provider   Additional needs identified to be addressed with provider: No       Method of communication with provider : none    Advance Care Planning:   Does patient have an Advance Directive: has ACP docs. Care Transition Nurse contacted the patient by telephone to perform post hospital discharge assessment. Provided introduction to self, and explanation of the CTN role. CTN reviewed discharge instructions, medical action plan and red flags with patient who verbalized understanding. Patient given an opportunity to ask questions and does not have any further questions or concerns at this time. Were discharge instructions available to patient? Yes.  Reviewed appropriate site of care based on symptoms and resources available to patient including: PCP  Specialist. The patient agrees to contact the PCP office for questions related to their healthcare. Medication reconciliation was performed with patient, who verbalizes understanding of administration of home medications. Feels well. No further episodes of lightheadedness, dizziness or syncope. 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 This morning's  slighly elevated from baseline as he went to the festival this weekend and ate outside of his normal diet. Overall feels well. Denies further needs. CTN provided contact information. He completed OV with Dr Flint Soulier today and knows how to reach PCP who was the one who discharged him for prn needs. He has appt with PCP upcoming as noted below. No further CTN outreach based on severity of symptoms and risk factors.     Care Transitions 24 Hour Call    Schedule Follow Up Appointment with PCP: Completed  Do you have a copy of your discharge instructions?: Yes  Do you have all of your prescriptions and are they filled?: Yes  Have you been contacted by a [address]?: No  Have you scheduled your follow up appointment?: Yes  How are you going to get to your appointment?: Car - drive self  Do you feel like you have everything you need to keep you well at home?: Yes  Care Transitions Interventions  No Identified Needs         Follow Up  Future Appointments   Date Time Provider Hussein Moss   8/15/2022 10:15 AM PAUL Ramírez   8/22/2022 10:15 AM PAUL Ramírez   8/29/2022 10:15 AM PAUL Ramírez   9/6/2022  3:10 PM MD [facility] None       Jovanni Hughes RN

## 2022-08-09 NOTE — DISCHARGE INSTRUCTIONS
daily.  *  Walk as much as you can tolerate. Avoid standing for long periods of time, but if you must stand, regularly do heel-raises and calf-pump exercises. *  If you have compression wraps designed to remain in place all week, be sure to keep them from getting wet. *  If you have compression garments that can be changed regularly, apply them first thing in the morning, and remove them when you go to bed. Moisturize your skin at bedtime, with Vaseline, Aquaphor, Aveeno, CeraVe, Cetaphil, Eucerin, Lubriderm, etc; but keep the skin between your toes dry. *  If you smoke, your wound can not heal properly -- please talk with us when you're ready to quit. *  Be sure to adhere to any recommendations from your PCP about diuretics (water pills), diet, exercise, and maintaining a healthy weight. If you have questions, please ask. *  If you have a compression pump, aim for at least two hours of use daily. F/U Appointment is with Dr. Patti Lincoln in 1 week, on                                   at                       .     Your nurse  is Karine Lucas RN. If we applied slip-resistant hospital socks today, be sure to remove them at least once a day to inspect your toes or feet, even if you're not changing the wraps or dressings underneath. If you see anything concerning (redness, excess moisture, etc), please call and let us know right away. Should you experience any significant changes in your wound(s) (including redness, increased warmth, increased pain, increased drainage, odor, or fever) or have questions about your wound care, please contact the [address] [place] [address] at [phone] Monday-Thursday from 8:00 am - 4:30 pm, or Friday from 8:00 am - 2:30 pm.  If you need help with your wound outside these hours and cannot wait until we are again available, contact your home-care company (if applicable), your PCP, or go to the nearest emergency room.

## 2022-08-09 NOTE — PROGRESS NOTES
[facility] Progress Note      Yvrose Garcia     : 1940    DATE OF VISIT:  2022    Subjective:     Yvrose Garcia is a 80 y.o. male who has a chief complaint of a venous ulcer located on the left leg. States doing well with wound. Doing well with compression stockings. Since his last visit was admitted to [facility] due to syncope. They adjusted his BP medication and is feeling better. Mr. 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 ([address] Esophageal reflux, Fracture of nasal bone, Headache, Hearing loss, Hyperlipidemia, Hyperlipidemia associated with type 2 diabetes mellitus ([address] Hypertension, Hypertrophy of prostate without urinary obstruction and other lower urinary tract symptoms (LUTS), Hypothyroidism, Insufficiency fracture of tibia, Osteoarthrosis, not specified whether generalized/localized, lower leg, Pain in joint, lower leg, Right knee pain, Status post total left knee replacement, Tinnitus, Type 2 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 He has a past surgical history that includes eye surgery; Colonoscopy (07); Esophagus surgery; Knee arthroscopy; Cholecystectomy, laparoscopic; other surgical history (Left, 2016); joint replacement (Left, 2017); Tonsillectomy; and Spine surgery. His family history includes Breast Cancer in his sister; Cancer in his mother; Diabetes type 2  in his brother; Heart Disease in his brother; Kidney Disease in his brother.      Mr. Livia Hutchins reports that he Lubriderm, etc for dry skin. Dressing type for home: Mepilex border and compression stocking. Written discharge instructions given to patient. Offload ulcer(s) as directed. Elevate leg(s) as directed. Exercise daily. Could benefit from compression pumps. Follow up in 1 week.              Electronically signed by Lo Healy DPM on 8/9/2022 at 12:07 PM.

## 2022-08-15 ENCOUNTER — HOSPITAL ENCOUNTER (OUTPATIENT)
Dept: WOUND CARE | Age: 82
Discharge: HOME | End: 2022-08-15
Payer: COMMERCIAL

## 2022-08-15 VITALS
DIASTOLIC BLOOD PRESSURE: 62 MMHG | HEART RATE: 75 BPM | BODY MASS INDEX: 33.75 KG/M2 | SYSTOLIC BLOOD PRESSURE: 128 MMHG | TEMPERATURE: 98.2 F | RESPIRATION RATE: 18 BRPM | WEIGHT: 241.08 LBS | HEIGHT: 71 IN

## 2022-08-15 DIAGNOSIS — L97.822 ULCER OF LEFT PRETIBIAL REGION WITH FAT LAYER EXPOSED (HCC): Primary | ICD-10-CM

## 2022-08-15 PROCEDURE — 99212 OFFICE O/P EST SF 10 MIN: CPT

## 2022-08-15 RX ORDER — LIDOCAINE 40 MG/G
CREAM TOPICAL ONCE
Status: DISCONTINUED | OUTPATIENT
Start: 2022-08-15 | End: 2022-08-16 | Stop reason: HOSPADM

## 2022-08-15 RX ORDER — LIDOCAINE HYDROCHLORIDE 40 MG/ML
SOLUTION TOPICAL ONCE
Status: CANCELLED | OUTPATIENT
Start: 2022-08-15 | End: 2022-08-15

## 2022-08-15 RX ORDER — LIDOCAINE 40 MG/G
CREAM TOPICAL ONCE
Status: CANCELLED | OUTPATIENT
Start: 2022-08-15 | End: 2022-08-15

## 2022-08-15 RX ORDER — LIDOCAINE 50 MG/G
OINTMENT TOPICAL ONCE
Status: CANCELLED | OUTPATIENT
Start: 2022-08-15 | End: 2022-08-15

## 2022-08-15 RX ORDER — BACITRACIN ZINC AND POLYMYXIN B SULFATE 500; 1000 [USP'U]/G; [USP'U]/G
OINTMENT TOPICAL ONCE
Status: CANCELLED | OUTPATIENT
Start: 2022-08-15 | End: 2022-08-15

## 2022-08-15 NOTE — PLAN OF CARE
[facility]. Wound not debrided today. Pt to continue with silver alginate and bordered gauze to wound. Pt to follow up in [facility] in 1 week. Instructed patient to follow up with PCP regarding diuretics. Discharge instructions reviewed with patient, all questions answered, copy given to patient. Dressings were applied to all wounds per M.D. Instructions at this visit.

## 2022-08-16 NOTE — DISCHARGE INSTRUCTIONS
[address] [facility] Physician Orders and Discharge [address] [facility]  [address], [address]  [place], [facility]  Telephone: [phone]      Fax: [phone] home care company:       Your wound-care supplies will be provided by:     NAME:  Otis Vides   YOB: 1940  PRIMARY DIAGNOSIS FOR WOUND CARE CENTER: Lymphedema     Wound cleansing:  Do not scrub or use excessive force. Wash hands with soap and water before and after dressing changes. Prior to applying a clean dressing, cleanse wound with normal saline, wound cleanser, or mild soap and water. Ask your physician or nurse before getting the wound(s) wet in the shower. Wound care for home:      Left lower leg wound:      Wash wound with soap and water with dressing changes      Benzoin to periwound      collagen ag      mepilex border      Leave on all week. Wear compression stockings daily       Right lower leg    Medium spandagrip or compression stocking toes to knees on right lower leg. May put on in the morning and take off at bed time. Please note, all wounds (unless stated otherwise here) were mechanically debrided at the time of cleansing here in the wound-care center today, so a small amount of pain, drainage or bleeding from that process might be expected, and is normal.     All products for home use, including multiple products for a single wound if applicable, are medically necessary in order to achieve the best chance at timely wound healing. See provider documentation for details if needed. Substituted dressings applied in the [address] [place] [address],[address] today, if applicable:           New orders for this week (labs, imaging, medications, etc.):      Talk with PCP about your water pills     Additional instructions for specific diagnoses:     General comments for venous / lymphedema ulcers:   *  Elevate your legs to the level of your heart for at least 30 minutes, several times daily.  *  Walk as much as you can tolerate. Avoid standing for long periods of time, but if you must stand, regularly do heel-raises and calf-pump exercises. *  If you have compression wraps designed to remain in place all week, be sure to keep them from getting wet. *  If you have compression garments that can be changed regularly, apply them first thing in the morning, and remove them when you go to bed. Moisturize your skin at bedtime, with Vaseline, Aquaphor, Aveeno, CeraVe, Cetaphil, Eucerin, Lubriderm, etc; but keep the skin between your toes dry. *  If you smoke, your wound can not heal properly -- please talk with us when you're ready to quit. *  Be sure to adhere to any recommendations from your PCP about diuretics (water pills), diet, exercise, and maintaining a healthy weight. If you have questions, please ask. *  If you have a compression pump, aim for at least two hours of use daily. F/U Appointment is with Dr. Bakari Linn in 1 week, on                                   at                       .     Your nurse  is Jamaica Chino RN. If we applied slip-resistant hospital socks today, be sure to remove them at least once a day to inspect your toes or feet, even if you're not changing the wraps or dressings underneath. If you see anything concerning (redness, excess moisture, etc), please call and let us know right away. Should you experience any significant changes in your wound(s) (including redness, increased warmth, increased pain, increased drainage, odor, or fever) or have questions about your wound care, please contact the [employer] at [phone] Monday-Thursday from 8:00 am - 4:30 pm, or Friday from 8:00 am - 2:30 pm.  If you need help with your wound outside these hours and cannot wait until we are again available, contact your home-care company (if applicable), your PCP, or go to the nearest emergency room.

## 2022-08-22 ENCOUNTER — HOSPITAL ENCOUNTER (OUTPATIENT)
Dept: WOUND CARE | Age: 82
Discharge: HOME | End: 2022-08-22
Payer: COMMERCIAL

## 2022-08-22 VITALS
DIASTOLIC BLOOD PRESSURE: 69 MMHG | RESPIRATION RATE: 18 BRPM | TEMPERATURE: 98.4 F | BODY MASS INDEX: 34.38 KG/M2 | WEIGHT: 245.6 LBS | HEIGHT: 71 IN | SYSTOLIC BLOOD PRESSURE: 118 MMHG | HEART RATE: 60 BPM

## 2022-08-22 DIAGNOSIS — L97.822 ULCER OF LEFT PRETIBIAL REGION WITH FAT LAYER EXPOSED (HCC): Primary | ICD-10-CM

## 2022-08-22 PROCEDURE — 99212 OFFICE O/P EST SF 10 MIN: CPT

## 2022-08-22 RX ORDER — LIDOCAINE 40 MG/G
CREAM TOPICAL ONCE
Status: DISCONTINUED | OUTPATIENT
Start: 2022-08-22 | End: 2022-08-23 | Stop reason: HOSPADM

## 2022-08-22 RX ORDER — LIDOCAINE 50 MG/G
OINTMENT TOPICAL ONCE
Status: CANCELLED | OUTPATIENT
Start: 2022-08-22 | End: 2022-08-22

## 2022-08-22 RX ORDER — BACITRACIN ZINC AND POLYMYXIN B SULFATE 500; 1000 [USP'U]/G; [USP'U]/G
OINTMENT TOPICAL ONCE
Status: CANCELLED | OUTPATIENT
Start: 2022-08-22 | End: 2022-08-22

## 2022-08-22 RX ORDER — LIDOCAINE 40 MG/G
CREAM TOPICAL ONCE
Status: CANCELLED | OUTPATIENT
Start: 2022-08-22 | End: 2022-08-22

## 2022-08-22 RX ORDER — LIDOCAINE HYDROCHLORIDE 40 MG/ML
SOLUTION TOPICAL ONCE
Status: CANCELLED | OUTPATIENT
Start: 2022-08-22 | End: 2022-08-22

## 2022-08-22 NOTE — PLAN OF CARE
Pt to the [address] [place] [address],[address] [facility] for follow up appointment. Wound was not debrided today. Pt to continue with silver alginate and dry dressing to wound. Pt to continue to wear compression stockings and follow up in the [address] [place] [address],[address] [place] [address] in 1 week. Discharge instructions reviewed with patient, all questions answered, copy given to patient. Dressings were applied to all wounds per M.D. Instructions at this visit.

## 2022-08-22 NOTE — PROGRESS NOTES
[facility] Progress Note      Shazia Newton     : 1940    DATE OF VISIT:  2022    Subjective:     Shazia Newton is a 80 y.o. male who has a chief complaint of a venous ulcer located on the left leg. States doing well with wound. Doing well with compression stockings. Since his last visit was admitted to [facility] due to syncope. They adjusted his BP medication and is feeling better. Mr. Wil Newton has a past medical history of A-fib 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 ([address] Esophageal reflux, Fracture of nasal bone, Headache, Hearing loss, Hyperlipidemia, Hyperlipidemia associated with type 2 diabetes mellitus ([address] Hypertension, Hypertrophy of prostate without urinary obstruction and other lower urinary tract symptoms (LUTS), Hypothyroidism, Insufficiency fracture of tibia, Osteoarthrosis, not specified whether generalized/localized, lower leg, Pain in joint, lower leg, Right knee pain, Status post total left knee replacement, Tinnitus, Type 2 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 He has a past surgical history that includes eye surgery; Colonoscopy (07); Esophagus surgery; Knee arthroscopy; Cholecystectomy, laparoscopic; other surgical history (Left, 2016); joint replacement (Left, 2017); Tonsillectomy; and Spine surgery. His family history includes Breast Cancer in his sister; Cancer in his mother; Diabetes type 2  in his brother; Heart Disease in his brother; Kidney Disease in his brother.      Mr. Wil Newton reports that he quit smoking about 32 years ago. He has a 15.00 pack-year smoking history. He quit smokeless tobacco use about 44 years ago. He reports current alcohol use. He reports that he does not use drugs. His current medication list consists of Embrace Blood Glucose Monitor, Embrace Lancets Ultra Thin 30G, Insulin Syringe-Needle U-100, Multiple Vitamins-Minerals, apixaban, blood glucose test strips, dilTIAZem, empagliflozin, furosemide, hydroCHLOROthiazide, insulin glargine-yfgn, levothyroxine, pioglitazone, rosuvastatin, and tamsulosin. Allergies: Patient has no known allergies. Pertinent items from the review of systems are discussed in the HPI; the remainder of the ROS was reviewed and is negative. Objective:     /69   Pulse 60   Temp 98.4 °F (36.9 °C) (Oral)   Resp 18   Ht 5' 11\" (1.803 m)   Wt 245 lb 9.6 oz (111.4 kg)   BMI 34.25 kg/m²       Dorsalis pedis pulse left palpable  Posterior tibial pulse left palpable  Dorsalis pedis pulse right palpable  Posterior tibial pulse right palpable      Ulcer on the anterior aspect left lower leg with very minimal fibrotic tissue, red granulation tissue, mild serous drainage, no hyperkeratotic rim, no undermining, no tunneling, no purulence, no malodor, no eschar, no periwound maceration, mild periwound erythema, mild edema,  no crepitus, no increase in skin temperature, ulcer probes to soft tissue only  Multiple small skin bridges noted. Edema bilateral LE. Venous dermatitis bilateral LE. Today's ulcer measurements are in the wound documentation flowsheet.      Wound measurements:  Wound 06/13/22 #1, Left Medial Leg, Lymphedema, Full Thickness, Onset Unknown (several years)-Wound Length (cm): 0.7 cm    Wound 06/13/22 #1, Left Medial Leg, Lymphedema, Full Thickness, Onset Unknown (several years)-Wound Width (cm): 2 cm    Wound 06/13/22 #1, Left Medial Leg, Lymphedema, Full Thickness, Onset Unknown (several years)-Wound Depth (cm): 0.1 cm    LABS  Lab Results   Component Value Date    LABA1C 7.1 08/04/2022         Assessment:     Patient Active Problem List   Diagnosis Code    Benign essential HTN I10    Osteoarthrosis involving lower leg [id number]    Pain in joint, lower leg M25.569    Hepatitis C virus infection without hepatic coma B19.20    Benign essential tremor G25.0    Hypertrophy of prostate without urinary obstruction and other lower urinary tract symptoms (LUTS) N40.0    Hypothyroidism E03.9    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) E11.22, N18.30    Hyperlipidemia associated with type 2 diabetes mellitus (HCC) E11.69, E78.5    Type 2 diabetes mellitus 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 edema (HCC) N97.8264    Degenerative tear of medial meniscus of left knee M23.204    Primary osteoarthritis of left knee M17.12    PAF (paroxysmal atrial fibrillation) (HCC) I48.0    MGUS (monoclonal gammopathy of unknown significance) D47.2    Kidney lesion N28.9    Premature atrial contraction I49.1    Nasal polyposis J33.9    Ulcer of left pretibial region with fat layer exposed ([facility] [address] [facility] B16.320    Syncope and collapse R55    Stage 3a chronic kidney disease (HCC) N18.31    Longstanding persistent atrial fibrillation (HCC) I48.11       Assessment of today's active condition(s): lymphedema/venous leg ulcer left. Factors contributing to occurrence and/or persistence of the chronic ulcer include edema, venous stasis, and lymphedema. Sharp debridement is not indicated today, based upon the exam findings in the ulcer(s) above. Discharge plan:     Treatment in the wound care center today: Wound 06/13/22 #1, Left Medial Leg, Lymphedema, Full Thickness, Onset Unknown (several years)-Dressing/Treatment:  (benzoin(yeni)Collagen, mepilex border). Home treatment: good handwashing before and after any dressing changes. Cleanse ulcer with saline or soap & water before dressing change.  May use Vaseline (petrolatum), Aquaphor, Aveeno, CeraVe, Cetaphil, Eucerin, Lubriderm, etc for dry skin. Dressing type for home: Mepilex border and compression stocking. Written discharge instructions given to patient. Offload ulcer(s) as directed. Elevate leg(s) as directed. Exercise daily. Could benefit from compression pumps. Follow up in 1 week.              Electronically signed by Alba Ordaz DPM on 8/22/2022 at 2:56 PM.

## 2022-08-25 RX ORDER — PIOGLITAZONEHYDROCHLORIDE 30 MG/1
TABLET ORAL
Qty: 90 TABLET | Refills: 3 | Status: SHIPPED | OUTPATIENT
Start: 2022-08-25

## 2022-08-29 ENCOUNTER — HOSPITAL ENCOUNTER (OUTPATIENT)
Dept: WOUND CARE | Age: 82
Discharge: HOME | End: 2022-08-29
Payer: COMMERCIAL

## 2022-08-29 VITALS
RESPIRATION RATE: 18 BRPM | DIASTOLIC BLOOD PRESSURE: 77 MMHG | BODY MASS INDEX: 34.52 KG/M2 | HEART RATE: 67 BPM | SYSTOLIC BLOOD PRESSURE: 123 MMHG | WEIGHT: 246.6 LBS | TEMPERATURE: 97.9 F | HEIGHT: 71 IN

## 2022-08-29 DIAGNOSIS — L97.822 ULCER OF LEFT PRETIBIAL REGION WITH FAT LAYER EXPOSED (HCC): Primary | ICD-10-CM

## 2022-08-29 PROCEDURE — 99212 OFFICE O/P EST SF 10 MIN: CPT

## 2022-08-29 RX ORDER — LIDOCAINE 50 MG/G
OINTMENT TOPICAL ONCE
Status: CANCELLED | OUTPATIENT
Start: 2022-08-29 | End: 2022-08-29

## 2022-08-29 RX ORDER — LIDOCAINE HYDROCHLORIDE 40 MG/ML
SOLUTION TOPICAL ONCE
Status: CANCELLED | OUTPATIENT
Start: 2022-08-29 | End: 2022-08-29

## 2022-08-29 RX ORDER — LIDOCAINE 40 MG/G
CREAM TOPICAL ONCE
Status: DISCONTINUED | OUTPATIENT
Start: 2022-08-29 | End: 2022-08-30 | Stop reason: HOSPADM

## 2022-08-29 RX ORDER — LIDOCAINE 40 MG/G
CREAM TOPICAL ONCE
Status: CANCELLED | OUTPATIENT
Start: 2022-08-29 | End: 2022-08-29

## 2022-08-29 RX ORDER — BACITRACIN ZINC AND POLYMYXIN B SULFATE 500; 1000 [USP'U]/G; [USP'U]/G
OINTMENT TOPICAL ONCE
Status: CANCELLED | OUTPATIENT
Start: 2022-08-29 | End: 2022-08-29

## 2022-08-29 NOTE — PROGRESS NOTES
[facility] Progress Note      Michel Rendon     : 1940    DATE OF VISIT:  2022    Subjective:     Michel Rendon is a 80 y.o. male who has a chief complaint of a venous ulcer located on the left leg. States doing well with wound. Doing well with compression stockings. States stays active. Mr. 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 ([address] Esophageal reflux, Fracture of nasal bone, Headache, Hearing loss, Hyperlipidemia, Hyperlipidemia associated with type 2 diabetes mellitus ([address] Hypertension, Hypertrophy of prostate without urinary obstruction and other lower urinary tract symptoms (LUTS), Hypothyroidism, Insufficiency fracture of tibia, Osteoarthrosis, not specified whether generalized/localized, lower leg, Pain in joint, lower leg, Right knee pain, Status post total left knee replacement, Tinnitus, Type 2 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 He has a past surgical history that includes eye surgery; Colonoscopy (07); Esophagus surgery; Knee arthroscopy; Cholecystectomy, laparoscopic; other surgical history (Left, 2016); joint replacement (Left, 2017); Tonsillectomy; and Spine surgery. His family history includes Breast Cancer in his sister; Cancer in his mother; Diabetes type 2  in his brother; Heart Disease in his brother; Kidney Disease in his brother. Mr. Pawel Calloway reports that he quit smoking about 32 years ago. He has a 15.00 pack-year smoking history.  He quit smokeless tobacco use about 44 years ago. He reports current alcohol use. He reports that he does not use drugs. His current medication list consists of Embrace Blood Glucose Monitor, Embrace Lancets Ultra Thin 30G, Insulin Syringe-Needle U-100, Multiple Vitamins-Minerals, apixaban, blood glucose test strips, dilTIAZem, empagliflozin, furosemide, hydroCHLOROthiazide, insulin glargine-yfgn, levothyroxine, pioglitazone, rosuvastatin, and tamsulosin. Allergies: Patient has no known allergies. Pertinent items from the review of systems are discussed in the HPI; the remainder of the ROS was reviewed and is negative. Objective:     /77   Pulse 67   Temp 97.9 °F (36.6 °C)   Resp 18   Ht 5' 11\" (1.803 m)   Wt 246 lb 9.6 oz (111.9 kg)   BMI 34.39 kg/m²       Dorsalis pedis pulse left palpable  Posterior tibial pulse left palpable  Dorsalis pedis pulse right palpable  Posterior tibial pulse right palpable      Ulcer on the anterior aspect left lower leg with very minimal fibrotic tissue, red granulation tissue, mild serous drainage, no hyperkeratotic rim, no undermining, no tunneling, no purulence, no malodor, no eschar, no periwound maceration, mild periwound erythema, mild edema,  no crepitus, no increase in skin temperature, ulcer probes to soft tissue only    Multiple small skin bridges and islands noted. Edema bilateral LE. Venous dermatitis bilateral LE. Today's ulcer measurements are in the wound documentation flowsheet.      Wound measurements:  Wound 06/13/22 #1, Left Medial Leg, Lymphedema, Full Thickness, Onset Unknown (several years)-Wound Length (cm): 0.7 cm    Wound 06/13/22 #1, Left Medial Leg, Lymphedema, Full Thickness, Onset Unknown (several years)-Wound Width (cm): 1.5 cm    Wound 06/13/22 #1, Left Medial Leg, Lymphedema, Full Thickness, Onset Unknown (several years)-Wound Depth (cm): 0.1 cm    LABS  Lab Results   Component Value Date    LABA1C 7.1 08/04/2022         Assessment: Patient Active Problem List   Diagnosis Code    Benign essential HTN I10    Osteoarthrosis involving lower leg [id number]    Pain in joint, lower leg M25.569    Hepatitis C virus infection without hepatic coma B19.20    Benign essential tremor G25.0    Hypertrophy of prostate without urinary obstruction and other lower urinary tract symptoms (LUTS) N40.0    Hypothyroidism E03.9    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) E11.22, N18.30    Hyperlipidemia associated with type 2 diabetes mellitus (HCC) E11.69, E78.5    Type 2 diabetes mellitus 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 edema (HCC) Z85.9917    Degenerative tear of medial meniscus of left knee M23.204    Primary osteoarthritis of left knee M17.12    PAF (paroxysmal atrial fibrillation) (HCC) I48.0    MGUS (monoclonal gammopathy of unknown significance) D47.2    Kidney lesion N28.9    Premature atrial contraction I49.1    Nasal polyposis J33.9    Ulcer of left pretibial region with fat layer exposed ([address] S49.316    Syncope and collapse R55    Stage 3a chronic kidney disease (HCC) N18.31    Longstanding persistent atrial fibrillation (HCC) I48.11       Assessment of today's active condition(s): lymphedema/venous leg ulcer left. Factors contributing to occurrence and/or persistence of the chronic ulcer include edema, venous stasis, and lymphedema. Sharp debridement is not indicated today, based upon the exam findings in the ulcer(s) above. Discharge plan:     Treatment in the wound care center today:  . Home treatment: good handwashing before and after any dressing changes. Cleanse ulcer with saline or soap & water before dressing change. May use Vaseline (petrolatum), Aquaphor, Aveeno, CeraVe, Cetaphil, Eucerin, Lubriderm, etc for dry skin. Dressing type for home: Mepilex border and compression stocking. Written discharge instructions given to patient. Offload ulcer(s) as directed. Elevate leg(s) as directed.    Exercise

## 2022-08-29 NOTE — PLAN OF CARE
Pt to the [facility] for follow up appointment. Wound measuring smaller this week and was not debrided. Pt to continue with weekly dressing and compression stockings. Pt to follow up in the [facility] in 2 weeks due to holiday. Pt to change dressing at home next week. Discharge instructions reviewed with patient, all questions answered, copy given to patient. Dressings were applied to all wounds per M.D. Instructions at this visit.

## 2022-08-30 NOTE — DISCHARGE INSTRUCTIONS
[address] [facility] Physician Orders and Discharge 800 [facility]  [address], [address]  [place], [facility]  Telephone: [phone]      Fax: [phone] home care company:       Your wound-care supplies will be provided by:     NAME:  Kyra Galloway   YOB: 1940  PRIMARY DIAGNOSIS FOR WOUND CARE CENTER: Lymphedema     Wound cleansing:  Do not scrub or use excessive force. Wash hands with soap and water before and after dressing changes. Prior to applying a clean dressing, cleanse wound with normal saline, wound cleanser, or mild soap and water. Ask your physician or nurse before getting the wound(s) wet in the shower. Wound care for home:      Left lower leg wound:      Wash wound with soap and water with dressing changes      Benzoin to periwound      collagen ag      mepilex border      Leave on all week. Wear compression stockings daily       Right lower leg    Medium spandagrip or compression stocking toes to knees on right lower leg. May put on in the morning and take off at bed time. Please note, all wounds (unless stated otherwise here) were mechanically debrided at the time of cleansing here in the wound-care center today, so a small amount of pain, drainage or bleeding from that process might be expected, and is normal.     All products for home use, including multiple products for a single wound if applicable, are medically necessary in order to achieve the best chance at timely wound healing. See provider documentation for details if needed. Substituted dressings applied in the [facility] today, if applicable:           New orders for this week (labs, imaging, medications, etc.):      Talk with PCP about your water pills     Additional instructions for specific diagnoses:     General comments for venous / lymphedema ulcers:   *  Elevate your legs to the level of your heart for at least 30 minutes, several times daily.  *  Walk as much as you can tolerate. Avoid standing for long periods of time, but if you must stand, regularly do heel-raises and calf-pump exercises. *  If you have compression wraps designed to remain in place all week, be sure to keep them from getting wet. *  If you have compression garments that can be changed regularly, apply them first thing in the morning, and remove them when you go to bed. Moisturize your skin at bedtime, with Vaseline, Aquaphor, Aveeno, CeraVe, Cetaphil, Eucerin, Lubriderm, etc; but keep the skin between your toes dry. *  If you smoke, your wound can not heal properly -- please talk with us when you're ready to quit. *  Be sure to adhere to any recommendations from your PCP about diuretics (water pills), diet, exercise, and maintaining a healthy weight. If you have questions, please ask. *  If you have a compression pump, aim for at least two hours of use daily. F/U Appointment is with Dr. Elveria Osler in 1 week on                                   at                       .     Your nurse  is Christopher Mars RN. If we applied slip-resistant hospital socks today, be sure to remove them at least once a day to inspect your toes or feet, even if you're not changing the wraps or dressings underneath. If you see anything concerning (redness, excess moisture, etc), please call and let us know right away. Should you experience any significant changes in your wound(s) (including redness, increased warmth, increased pain, increased drainage, odor, or fever) or have questions about your wound care, please contact the [facility] at [phone] Monday-Thursday from 8:00 am - 4:30 pm, or Friday from 8:00 am - 2:30 pm.  If you need help with your wound outside these hours and cannot wait until we are again available, contact your home-care company (if applicable), your PCP, or go to the nearest emergency room.

## 2022-09-06 ENCOUNTER — OFFICE VISIT (OUTPATIENT)
Dept: INTERNAL MEDICINE CLINIC | Age: 82
End: 2022-09-06

## 2022-09-06 VITALS
SYSTOLIC BLOOD PRESSURE: 130 MMHG | BODY MASS INDEX: 34.58 KG/M2 | WEIGHT: 247 LBS | DIASTOLIC BLOOD PRESSURE: 80 MMHG | RESPIRATION RATE: 12 BRPM | HEART RATE: 70 BPM | HEIGHT: 71 IN

## 2022-09-06 DIAGNOSIS — N18.31 TYPE 2 DIABETES MELLITUS WITH STAGE 3A CHRONIC KIDNEY DISEASE, WITHOUT LONG-TERM CURRENT USE OF INSULIN (HCC): Primary | ICD-10-CM

## 2022-09-06 DIAGNOSIS — E11.22 TYPE 2 DIABETES MELLITUS WITH STAGE 3A CHRONIC KIDNEY DISEASE, WITHOUT LONG-TERM CURRENT USE OF INSULIN (HCC): Primary | ICD-10-CM

## 2022-09-06 DIAGNOSIS — B18.2 CHRONIC HEPATITIS C WITHOUT HEPATIC COMA (HCC): ICD-10-CM

## 2022-09-06 DIAGNOSIS — Z79.4 TYPE 2 DIABETES MELLITUS WITH BOTH EYES AFFECTED BY MILD NONPROLIFERATIVE RETINOPATHY WITHOUT MACULAR EDEMA, WITH LONG-TERM CURRENT USE OF INSULIN (HCC): ICD-10-CM

## 2022-09-06 DIAGNOSIS — E03.9 ACQUIRED HYPOTHYROIDISM: ICD-10-CM

## 2022-09-06 DIAGNOSIS — E11.69 HYPERLIPIDEMIA ASSOCIATED WITH TYPE 2 DIABETES MELLITUS (HCC): ICD-10-CM

## 2022-09-06 DIAGNOSIS — I48.0 PAF (PAROXYSMAL ATRIAL FIBRILLATION) (HCC): ICD-10-CM

## 2022-09-06 DIAGNOSIS — E78.5 HYPERLIPIDEMIA ASSOCIATED WITH TYPE 2 DIABETES MELLITUS (HCC): ICD-10-CM

## 2022-09-06 DIAGNOSIS — E11.3293 TYPE 2 DIABETES MELLITUS WITH BOTH EYES AFFECTED BY MILD NONPROLIFERATIVE RETINOPATHY WITHOUT MACULAR EDEMA, WITH LONG-TERM CURRENT USE OF INSULIN (HCC): ICD-10-CM

## 2022-09-06 DIAGNOSIS — G25.0 BENIGN ESSENTIAL TREMOR: ICD-10-CM

## 2022-09-06 DIAGNOSIS — I10 BENIGN ESSENTIAL HTN: ICD-10-CM

## 2022-09-06 DIAGNOSIS — Z23 NEED FOR INFLUENZA VACCINATION: ICD-10-CM

## 2022-09-06 DIAGNOSIS — N18.31 STAGE 3A CHRONIC KIDNEY DISEASE (HCC): ICD-10-CM

## 2022-09-06 PROCEDURE — G8417 CALC BMI ABV UP PARAM F/U: HCPCS | Performed by: INTERNAL MEDICINE

## 2022-09-06 PROCEDURE — G8427 DOCREV CUR MEDS BY ELIG CLIN: HCPCS | Performed by: INTERNAL MEDICINE

## 2022-09-06 PROCEDURE — 90662 IIV NO PRSV INCREASED AG IM: CPT | Performed by: INTERNAL MEDICINE

## 2022-09-06 PROCEDURE — 1036F TOBACCO NON-USER: CPT | Performed by: INTERNAL MEDICINE

## 2022-09-06 PROCEDURE — 90471 IMMUNIZATION ADMIN: CPT | Performed by: INTERNAL MEDICINE

## 2022-09-06 PROCEDURE — 1123F ACP DISCUSS/DSCN MKR DOCD: CPT | Performed by: INTERNAL MEDICINE

## 2022-09-06 PROCEDURE — 3051F HG A1C>EQUAL 7.0%<8.0%: CPT | Performed by: INTERNAL MEDICINE

## 2022-09-06 PROCEDURE — 99214 OFFICE O/P EST MOD 30 MIN: CPT | Performed by: INTERNAL MEDICINE

## 2022-09-06 ASSESSMENT — ENCOUNTER SYMPTOMS
NAUSEA: 0
WHEEZING: 0
ABDOMINAL PAIN: 0
VOMITING: 0
BACK PAIN: 0
RHINORRHEA: 0
SHORTNESS OF BREATH: 0

## 2022-09-06 NOTE — PROGRESS NOTES
Subjective:      Patient ID: Ubaldo Rodrigues is a 80 y.o. male. HPI  Patient is here for follow up diabetes, hypertension, hyperlipidemia, GERD, OA and hepatitis C. Diabetes Mellitus Type 2: Current symptoms/problems include none. Medication compliance:  compliant most of the time  Diabetic diet compliance:  compliant most of the time,  Weight trend:down 3 lbs  Current exercise: walks 3 time(s) per week    Home blood sugar records: fasting range:  mg/dl  Any episodes of hypoglycemia? none  Eye exam current (within one year): yes   reports that he quit smoking about 32 years ago. His smoking use included cigarettes. He has a 15.00 pack-year smoking history. He quit smokeless tobacco use about 44 years ago. Daily Aspirin? No  Known diabetic complications: nephropathy and retinopathy    Hypertension:  Blood pressure typically runs normal outside of the office. He is adherent to a low sodium diet. Patient denies chest pain, dry cough, fatigue. He has chronic leg edema. Antihypertensive medication side effects: no medication side effects noted. Use of agents associated with hypertension: none. Hyperlipidemia:  No new myalgias or GI upset on atorvastatin (Lipitor). Lab Results   Component Value Date    LABA1C 7.1 08/04/2022    LABA1C 6.5 04/01/2022    LABA1C 6.3 12/08/2021     Lab Results   Component Value Date    LABMICR YES 01/09/2020    CREATININE 1.6 (H) 08/05/2022     Lab Results   Component Value Date    ALT 31 08/04/2022    AST 39 (H) 08/04/2022     No components found for: CHLPL  Lab Results   Component Value Date    TRIG 83 09/01/2021     Lab Results   Component Value Date    HDL 55 09/01/2021     Lab Results   Component Value Date/Time    LDLCALC 100 09/01/2021 08:56 AM     .  No heartburn. He has hypothyroidism. It is stable. No constipation or diarrhea. He has CKD from DM. He has diabetic retinopathy. Eyes stable. He takes Flomax for BPH with LUTS and it helps.      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 Vascular surgery was consulted and recommended ASA and mesenteric duplex."  Duplex unable to be completed inpatient.  Has vascular scheduled in 2 weeks.\par - referral for GI follow up\par - Repeat labs today. \par \par DM:\par - Nonadherent with meds and diet in the past\par - Now taking basaglar 20 units at night and metformin.  Will increased metformin to 1,000mg twice daily.\par - To schedule with endocrine\par - s/sx hypoglycemia discussed \par \par Diet and exercise discussed\par  has history of chronic hepatitis C. He has chronic atrial fibrillation. Rate controlled. No palpitations. No bleeding or bruising on Eliquis. Review of Systems   Constitutional:  Negative for activity change and appetite change. HENT:  Negative for postnasal drip and rhinorrhea. Respiratory:  Negative for shortness of breath and wheezing. Cardiovascular:  Positive for leg swelling. Negative for chest pain and palpitations. Gastrointestinal:  Negative for abdominal pain, nausea and vomiting. Genitourinary:  Negative for difficulty urinating, frequency and hematuria. Musculoskeletal:  Negative for back pain and joint swelling. Skin:  Negative for rash. Neurological:  Positive for tremors. Negative for light-headedness. Psychiatric/Behavioral:  Negative for sleep disturbance.       Current Outpatient Medications:     pioglitazone (ACTOS) 30 MG tablet, TAKE 1 TABLET DAILY, Disp: 90 tablet, Rfl: 3    dilTIAZem (CARDIZEM CD) 120 MG extended release capsule, Take 1 capsule by mouth at bedtime, Disp: 90 capsule, Rfl: 3    tamsulosin (FLOMAX) 0.4 MG capsule, Take 1 capsule by mouth at bedtime, Disp: 90 capsule, Rfl: 3    apixaban (ELIQUIS) 5 MG TABS tablet, TAKE 1 TABLET TWICE A DAY, Disp: 180 tablet, Rfl: 3    insulin glargine-yfgn (SEMGLEE, YFGN,) 100 UNIT/ML injection vial, Inject 70 Units into the skin nightly (Patient taking differently: Inject 45 Units into the skin nightly), Disp: 7 each, Rfl: 3    furosemide (LASIX) 40 MG tablet, Take 1 tablet by mouth daily (Patient taking differently: Take 20 mg by mouth daily), Disp: 30 tablet, Rfl: 2    Multiple Vitamins-Minerals (MULTIVITAMIN ADULTS PO), Take by mouth daily, Disp: , Rfl:     levothyroxine (SYNTHROID) 100 MCG tablet, TAKE 1 TABLET DAILY, Disp: 90 tablet, Rfl: 1    empagliflozin (JARDIANCE) 25 MG tablet, Take 1 tablet by mouth daily, Disp: 90 tablet, Rfl: 1    blood glucose test strips (ONETOUCH ULTRA) strip, USE TO TEST TWICE A DAY, Disp: 200 strip, Rfl: 3    Blood Glucose Monitoring Suppl (EMBRACE BLOOD GLUCOSE MONITOR) PARAS, TEST TWICE DAILY. DX: E11.9, Disp: 1 Device, Rfl: 0    EMBRACE LANCETS ULTRA THIN 30G MISC, TEST TWICE DAILY. DX: E11.9, Disp: 200 each, Rfl: 3    rosuvastatin (CRESTOR) 10 MG tablet, Take 10 mg by mouth daily, Disp: , Rfl:     B-D INS SYR ULTRAFINE 1CC/30G 30G X 1/2\" 1 ML MISC, USE WITH LANTUS NIGHTLY, Disp: 90 each, Rfl: 11    TRUETEST TEST strip, TEST DAILY, Disp: 100 strip, Rfl: 3        There are no changes to past medical history, family history, social history or review of systems(except as noted in the history section) since prior note (all reviewed with patient). /80 (Site: Right Upper Arm, Position: Sitting, Cuff Size: Medium Adult)   Pulse 70   Resp 12   Ht 5' 11\" (1.803 m)   Wt 247 lb (112 kg)   BMI 34.45 kg/m²        Objective:   Physical Exam  Constitutional:       Appearance: He is well-developed. HENT:      Head: Normocephalic. Eyes:      Conjunctiva/sclera: Conjunctivae normal.      Pupils: Pupils are equal, round, and reactive to light. Neck:      Thyroid: No thyroid mass or thyromegaly. Vascular: No carotid bruit or JVD. Trachea: Trachea normal.   Cardiovascular:      Rate and Rhythm: Normal rate. Rhythm irregularly irregular. Heart sounds: Normal heart sounds. No murmur heard. No gallop. Pulmonary:      Effort: Pulmonary effort is normal. No respiratory distress. Breath sounds: Normal breath sounds. No wheezing or rales. Abdominal:      General: Bowel sounds are normal. There is no distension. Palpations: Abdomen is soft. There is no hepatomegaly, splenomegaly or mass. Tenderness: There is no abdominal tenderness. Musculoskeletal:      Cervical back: Normal range of motion and neck supple. Right lower leg: Edema present. Left lower leg: Edema present. Lymphadenopathy:      Cervical: No cervical adenopathy.    Skin:     General: Skin is warm and dry. Findings: No rash. Neurological:      Mental Status: He is alert and oriented to person, place, and time. Motor: Tremor (right upper extremity at rest) present. Psychiatric:         Behavior: Behavior normal.         Thought Content: Thought content normal.         Judgment: Judgment normal.       Assessment:       Diagnosis Orders   1. Type 2 diabetes mellitus with stage 3a chronic kidney disease, without long-term current use of insulin (HCC)  Hemoglobin A1C    CBC with Auto Differential    Comprehensive Metabolic Panel      2. Need for influenza vaccination  Influenza, FLUZONE HIGH-DOSE, (age 72 y+), IM, Preservative Free, 0.7 mL      3. Type 2 diabetes mellitus with both eyes affected by mild nonproliferative retinopathy without macular edema, with long-term current use of insulin ([address]        4. Benign essential tremor        5. Stage 3a chronic kidney disease ([facility]        6. Benign essential HTN        7. Acquired hypothyroidism        8. Hyperlipidemia associated with type 2 diabetes mellitus ([facility]        9. PAF (paroxysmal atrial fibrillation) ([facility]        10. Chronic hepatitis C without hepatic coma (HCC)               Plan:      Check labs. DM type 2: controlled. Stressed diet and exercise. On Lantus and Novolog. Check labs. Doing well. Hypertension: at goal.  He is on Prinivil and HCTZ. Tremor: unchanged. Not on any medication. Hyperlipidemia: at goal. Check lipids  Hypothyroidism:  Patient's hypothyroidism is stable on replacement therapy. A fib: On Eliquis. Morbid Obesity  - Body mass index is 34.45 kg/m². - Complicating assessment and treatment. Placing patient at risk for multiple co-morbidities as well as early death and contributing to the patient's presentation.   - Counseled on weight loss. MGUS unchanged. Discussed use, benefit, and side effects of prescribed medications. Barriers to medication compliance addressed.   All patient questions answered. Pt voiced understanding. Decrease calorie intake. Exercise,weight loss recommended. The current medical regimen is effective;  continue present plan and medications. See orders.

## 2022-09-08 ENCOUNTER — HOSPITAL ENCOUNTER (OUTPATIENT)
Age: 82
Discharge: HOME | End: 2022-09-08
Payer: MEDICARE

## 2022-09-08 DIAGNOSIS — N18.31 TYPE 2 DIABETES MELLITUS WITH STAGE 3A CHRONIC KIDNEY DISEASE, WITHOUT LONG-TERM CURRENT USE OF INSULIN (HCC): ICD-10-CM

## 2022-09-08 DIAGNOSIS — E11.22 TYPE 2 DIABETES MELLITUS WITH STAGE 3A CHRONIC KIDNEY DISEASE, WITHOUT LONG-TERM CURRENT USE OF INSULIN (HCC): ICD-10-CM

## 2022-09-08 LAB
A/G RATIO: 1 (ref 1.1–2.2)
ALBUMIN SERPL-MCNC: 3.6 G/DL (ref 3.4–5)
ALP BLD-CCNC: 99 U/L (ref 40–129)
ALT SERPL-CCNC: 21 U/L (ref 10–40)
ANION GAP SERPL CALCULATED.3IONS-SCNC: 15 MMOL/L (ref 3–16)
AST SERPL-CCNC: 30 U/L (ref 15–37)
BASOPHILS ABSOLUTE: 0.1 K/UL (ref 0–0.2)
BASOPHILS RELATIVE PERCENT: 0.8 %
BILIRUB SERPL-MCNC: 0.4 MG/DL (ref 0–1)
BUN BLDV-MCNC: 38 MG/DL (ref 7–20)
CALCIUM SERPL-MCNC: 9.6 MG/DL (ref 8.3–10.6)
CHLORIDE BLD-SCNC: 97 MMOL/L (ref 99–110)
CO2: 34 MMOL/L (ref 21–32)
CREAT SERPL-MCNC: 2.2 MG/DL (ref 0.8–1.3)
EOSINOPHILS ABSOLUTE: 0.3 K/UL (ref 0–0.6)
EOSINOPHILS RELATIVE PERCENT: 4.3 %
GFR AFRICAN AMERICAN: 35
GFR NON-AFRICAN AMERICAN: 29
GLUCOSE BLD-MCNC: 57 MG/DL (ref 70–99)
HCT VFR BLD CALC: 44.2 % (ref 40.5–52.5)
HEMOGLOBIN: 14.5 G/DL (ref 13.5–17.5)
LYMPHOCYTES ABSOLUTE: 2.4 K/UL (ref 1–5.1)
LYMPHOCYTES RELATIVE PERCENT: 29.7 %
MCH RBC QN AUTO: 29.9 PG (ref 26–34)
MCHC RBC AUTO-ENTMCNC: 32.8 G/DL (ref 31–36)
MCV RBC AUTO: 91.3 FL (ref 80–100)
MONOCYTES ABSOLUTE: 0.7 K/UL (ref 0–1.3)
MONOCYTES RELATIVE PERCENT: 8.9 %
NEUTROPHILS ABSOLUTE: 4.5 K/UL (ref 1.7–7.7)
NEUTROPHILS RELATIVE PERCENT: 56.3 %
PDW BLD-RTO: 14.5 % (ref 12.4–15.4)
PLATELET # BLD: 252 K/UL (ref 135–450)
PMV BLD AUTO: 7.6 FL (ref 5–10.5)
POTASSIUM SERPL-SCNC: 4.7 MMOL/L (ref 3.5–5.1)
RBC # BLD: 4.85 M/UL (ref 4.2–5.9)
SODIUM BLD-SCNC: 146 MMOL/L (ref 136–145)
TOTAL PROTEIN: 7.3 G/DL (ref 6.4–8.2)
WBC # BLD: 8 K/UL (ref 4–11)

## 2022-09-08 PROCEDURE — 83036 HEMOGLOBIN GLYCOSYLATED A1C: CPT

## 2022-09-08 PROCEDURE — 36415 COLL VENOUS BLD VENIPUNCTURE: CPT

## 2022-09-08 PROCEDURE — 85025 COMPLETE CBC W/AUTO DIFF WBC: CPT

## 2022-09-08 PROCEDURE — 80053 COMPREHEN METABOLIC PANEL: CPT

## 2022-09-09 LAB
ESTIMATED AVERAGE GLUCOSE: 157.1 MG/DL
HBA1C MFR BLD: 7.1 %

## 2022-09-12 ENCOUNTER — HOSPITAL ENCOUNTER (OUTPATIENT)
Dept: WOUND CARE | Age: 82
Discharge: HOME | End: 2022-09-12
Payer: COMMERCIAL

## 2022-09-12 VITALS
HEART RATE: 86 BPM | BODY MASS INDEX: 33.85 KG/M2 | WEIGHT: 241.8 LBS | SYSTOLIC BLOOD PRESSURE: 141 MMHG | DIASTOLIC BLOOD PRESSURE: 80 MMHG | RESPIRATION RATE: 18 BRPM | TEMPERATURE: 97.9 F | HEIGHT: 71 IN

## 2022-09-12 DIAGNOSIS — L97.822 ULCER OF LEFT PRETIBIAL REGION WITH FAT LAYER EXPOSED (HCC): Primary | ICD-10-CM

## 2022-09-12 DIAGNOSIS — N18.31 STAGE 3A CHRONIC KIDNEY DISEASE (HCC): Primary | ICD-10-CM

## 2022-09-12 PROCEDURE — 99212 OFFICE O/P EST SF 10 MIN: CPT

## 2022-09-12 RX ORDER — LIDOCAINE HYDROCHLORIDE 40 MG/ML
SOLUTION TOPICAL ONCE
Status: CANCELLED | OUTPATIENT
Start: 2022-09-12 | End: 2022-09-12

## 2022-09-12 RX ORDER — LIDOCAINE 40 MG/G
CREAM TOPICAL ONCE
Status: DISCONTINUED | OUTPATIENT
Start: 2022-09-12 | End: 2022-09-13 | Stop reason: HOSPADM

## 2022-09-12 RX ORDER — LIDOCAINE 50 MG/G
OINTMENT TOPICAL ONCE
Status: CANCELLED | OUTPATIENT
Start: 2022-09-12 | End: 2022-09-12

## 2022-09-12 RX ORDER — LIDOCAINE 40 MG/G
CREAM TOPICAL ONCE
Status: CANCELLED | OUTPATIENT
Start: 2022-09-12 | End: 2022-09-12

## 2022-09-12 RX ORDER — BACITRACIN ZINC AND POLYMYXIN B SULFATE 500; 1000 [USP'U]/G; [USP'U]/G
OINTMENT TOPICAL ONCE
Status: CANCELLED | OUTPATIENT
Start: 2022-09-12 | End: 2022-09-12

## 2022-09-12 NOTE — PLAN OF CARE
Pt to the [facility] for follow up appointment. Wound measuring smaller. Pt to continue with collagen and dry dressing to wound. Pt to continue to wear compression stockings. Will start process to try to get compression pumps approved. Pt to follow up in the [facility] in 1 week. Discharge instructions reviewed with patient, all questions answered, copy given to patient. Dressings were applied to all wounds per M.D. Instructions at this visit.

## 2022-09-13 NOTE — DISCHARGE INSTRUCTIONS
19[facility] Physician Orders and Discharge 800 [facility]  [address], [address]  [place], [facility]  Telephone: [phone]      Fax: [phone] home care company:       Your wound-care supplies will be provided by:     NAME:  Yvrose Garcia   YOB: 1940  PRIMARY DIAGNOSIS FOR WOUND CARE CENTER: Lymphedema     Wound cleansing:  Do not scrub or use excessive force. Wash hands with soap and water before and after dressing changes. Prior to applying a clean dressing, cleanse wound with normal saline, wound cleanser, or mild soap and water. Ask your physician or nurse before getting the wound(s) wet in the shower. Wound care for home:      Left lower leg wound:      Wash wound with soap and water with dressing changes      Benzoin to periwound      collagen ag      mepilex border      Leave on all week. Wear compression stockings daily       Right lower leg    Medium spandagrip or compression stocking toes to knees on right lower leg. May put on in the morning and take off at bed time. Please note, all wounds (unless stated otherwise here) were mechanically debrided at the time of cleansing here in the wound-care center today, so a small amount of pain, drainage or bleeding from that process might be expected, and is normal.     All products for home use, including multiple products for a single wound if applicable, are medically necessary in order to achieve the best chance at timely wound healing. See provider documentation for details if needed. Substituted dressings applied in the [facility] today, if applicable:           New orders for this week (labs, imaging, medications, etc.):      Talk with PCP about your water pills     Additional instructions for specific diagnoses:     General comments for venous / lymphedema ulcers:   *  Elevate your legs to the level of your heart for at least 30 minutes, several times daily.  *  Walk as much as you can tolerate. Avoid standing for long periods of time, but if you must stand, regularly do heel-raises and calf-pump exercises. *  If you have compression wraps designed to remain in place all week, be sure to keep them from getting wet. *  If you have compression garments that can be changed regularly, apply them first thing in the morning, and remove them when you go to bed. Moisturize your skin at bedtime, with Vaseline, Aquaphor, Aveeno, CeraVe, Cetaphil, Eucerin, Lubriderm, etc; but keep the skin between your toes dry. *  If you smoke, your wound can not heal properly -- please talk with us when you're ready to quit. *  Be sure to adhere to any recommendations from your PCP about diuretics (water pills), diet, exercise, and maintaining a healthy weight. If you have questions, please ask. *  If you have a compression pump, aim for at least two hours of use daily. F/U Appointment is with Dr. Sissy Ramires in 1 week on                                   at                       .     Your nurse  is Alexandra Lara RN. If we applied slip-resistant hospital socks today, be sure to remove them at least once a day to inspect your toes or feet, even if you're not changing the wraps or dressings underneath. If you see anything concerning (redness, excess moisture, etc), please call and let us know right away. Should you experience any significant changes in your wound(s) (including redness, increased warmth, increased pain, increased drainage, odor, or fever) or have questions about your wound care, please [facility] at [phone] Monday-Thursday from 8:00 am - 4:30 pm, or Friday from 8:00 am - 2:30 pm.  If you need help with your wound outside these hours and cannot wait until we are again available, contact your home-care company (if applicable), your PCP, or go to the nearest emergency room.

## 2022-09-15 NOTE — PROGRESS NOTES
[facility] Progress Note      Khanh Campbell     : 1940    DATE OF VISIT:  8/15/2022    Subjective:     Khanh Campbell is a 80 y.o. male who has a chief complaint of a venous ulcer located on the left leg. States doing well with wound. Doing well with compression stockings. Feeling well this week. Swelling is improving. Mr. Chelsi Beyer has a past medical history of A-fib [facility]), Atrial fibrillation ([address] Chronic hepatitis C without hepatic coma ([address] Closed displaced fracture of right patella, Closed nondisplaced fracture of styloid process of left radius, Dental disease, Dizziness, 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 ([address] Esophageal reflux, Fracture of nasal bone, Headache, Hearing loss, Hyperlipidemia, Hyperlipidemia associated with type 2 diabetes mellitus ([address] Hypertension, Hypertrophy of prostate without urinary obstruction and other lower urinary tract symptoms (LUTS), Hypothyroidism, Insufficiency fracture of tibia, Osteoarthrosis, not specified whether generalized/localized, lower leg, Pain in joint, lower leg, Right knee pain, Status post total left knee replacement, Tinnitus, Type 2 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 He has a past surgical history that includes eye surgery; Colonoscopy (07); Esophagus surgery; Knee arthroscopy; Cholecystectomy, laparoscopic; other surgical history (Left, 2016); joint replacement (Left, 2017); Tonsillectomy; and Spine surgery. His family history includes Breast Cancer in his sister; Cancer in his mother; Diabetes type 2  in his brother; Heart Disease in his brother; Kidney Disease in his brother. Mr. Chelsi Beyer reports that he quit smoking about 32 years ago.  His smoking use included cigarettes. He has a 15.00 pack-year smoking history. He quit smokeless tobacco use about 44 years ago. He reports current alcohol use. He reports that he does not use drugs. His current medication list consists of Embrace Blood Glucose Monitor, Embrace Lancets Ultra Thin 30G, Insulin Syringe-Needle U-100, Multiple Vitamins-Minerals, apixaban, blood glucose test strips, dilTIAZem, empagliflozin, furosemide, hydroCHLOROthiazide, insulin glargine-yfgn, levothyroxine, pioglitazone, rosuvastatin, and tamsulosin. Allergies: Patient has no known allergies. Pertinent items from the review of systems are discussed in the HPI; the remainder of the ROS was reviewed and is negative. Objective:     /62   Pulse 75   Temp 98.2 °F (36.8 °C) (Oral)   Resp 18   Ht 5' 11\" (1.803 m)   Wt 241 lb 1.3 oz (109.4 kg)   BMI 33.62 kg/m²       Dorsalis pedis pulse left palpable  Posterior tibial pulse left palpable  Dorsalis pedis pulse right palpable  Posterior tibial pulse right palpable      Ulcer on the anterior aspect left lower leg with very minimal fibrotic tissue, red granulation tissue, mild serous drainage, no hyperkeratotic rim, no undermining, no tunneling, no purulence, no malodor, no eschar, no periwound maceration, mild periwound erythema, mild edema,  no crepitus, no increase in skin temperature, ulcer probes to soft tissue only    Edema bilateral LE. Venous dermatitis bilateral LE. Today's ulcer measurements are in the wound documentation flowsheet.      Wound measurements:  Wound 06/13/22 #1, Left Medial Leg, Lymphedema, Full Thickness, Onset Unknown (several years)-Wound Length (cm): 0.7 cm    Wound 06/13/22 #1, Left Medial Leg, Lymphedema, Full Thickness, Onset Unknown (several years)-Wound Width (cm): 2 cm    Wound 06/13/22 #1, Left Medial Leg, Lymphedema, Full Thickness, Onset Unknown (several years)-Wound Depth (cm): 0.1 cm    LABS  Lab Results   Component Value Date    LABA1C 7.1 CeraVe, Cetaphil, Eucerin, Lubriderm, etc for dry skin. Dressing type for home: Mepilex border and compression stocking. Written discharge instructions given to patient. Offload ulcer(s) as directed. Elevate leg(s) as directed. Exercise daily. Could benefit from compression pumps. Follow up in 1 week.              Electronically signed by Juani Sandoval DPM on 9/15/2022 at 12:20 PM.

## 2022-09-19 ENCOUNTER — HOSPITAL ENCOUNTER (OUTPATIENT)
Age: 82
Discharge: HOME | End: 2022-09-19
Payer: MEDICARE

## 2022-09-19 ENCOUNTER — HOSPITAL ENCOUNTER (OUTPATIENT)
Dept: WOUND CARE | Age: 82
Discharge: HOME | End: 2022-09-19
Payer: MEDICARE

## 2022-09-19 VITALS
SYSTOLIC BLOOD PRESSURE: 118 MMHG | RESPIRATION RATE: 20 BRPM | TEMPERATURE: 97.9 F | WEIGHT: 247.2 LBS | HEIGHT: 71 IN | BODY MASS INDEX: 34.61 KG/M2 | HEART RATE: 61 BPM | DIASTOLIC BLOOD PRESSURE: 70 MMHG

## 2022-09-19 DIAGNOSIS — L97.822 ULCER OF LEFT PRETIBIAL REGION WITH FAT LAYER EXPOSED (HCC): Primary | ICD-10-CM

## 2022-09-19 DIAGNOSIS — N18.31 STAGE 3A CHRONIC KIDNEY DISEASE (HCC): ICD-10-CM

## 2022-09-19 LAB
ANION GAP SERPL CALCULATED.3IONS-SCNC: 9 MMOL/L (ref 3–16)
BUN BLDV-MCNC: 36 MG/DL (ref 7–20)
CALCIUM SERPL-MCNC: 9.4 MG/DL (ref 8.3–10.6)
CHLORIDE BLD-SCNC: 102 MMOL/L (ref 99–110)
CO2: 34 MMOL/L (ref 21–32)
CREAT SERPL-MCNC: 1.6 MG/DL (ref 0.8–1.3)
GFR AFRICAN AMERICAN: 51
GFR NON-AFRICAN AMERICAN: 42
GLUCOSE BLD-MCNC: 50 MG/DL (ref 70–99)
POTASSIUM SERPL-SCNC: 4.6 MMOL/L (ref 3.5–5.1)
SODIUM BLD-SCNC: 145 MMOL/L (ref 136–145)

## 2022-09-19 PROCEDURE — 80048 BASIC METABOLIC PNL TOTAL CA: CPT

## 2022-09-19 PROCEDURE — 36415 COLL VENOUS BLD VENIPUNCTURE: CPT

## 2022-09-19 PROCEDURE — 99212 OFFICE O/P EST SF 10 MIN: CPT

## 2022-09-19 RX ORDER — LIDOCAINE 40 MG/G
CREAM TOPICAL ONCE
Status: CANCELLED | OUTPATIENT
Start: 2022-09-19 | End: 2022-09-19

## 2022-09-19 RX ORDER — LIDOCAINE 50 MG/G
OINTMENT TOPICAL ONCE
Status: CANCELLED | OUTPATIENT
Start: 2022-09-19 | End: 2022-09-19

## 2022-09-19 RX ORDER — BACITRACIN ZINC AND POLYMYXIN B SULFATE 500; 1000 [USP'U]/G; [USP'U]/G
OINTMENT TOPICAL ONCE
Status: CANCELLED | OUTPATIENT
Start: 2022-09-19 | End: 2022-09-19

## 2022-09-19 RX ORDER — LIDOCAINE HYDROCHLORIDE 40 MG/ML
SOLUTION TOPICAL ONCE
Status: CANCELLED | OUTPATIENT
Start: 2022-09-19 | End: 2022-09-19

## 2022-09-19 RX ORDER — LIDOCAINE 40 MG/G
CREAM TOPICAL ONCE
Status: DISCONTINUED | OUTPATIENT
Start: 2022-09-19 | End: 2022-09-20 | Stop reason: HOSPADM

## 2022-09-19 NOTE — PLAN OF CARE
Pt to the [address] [place] [address],[address] for follow up appointment. Pt has been off of diuretics due to kidney function. Pt having labs drawn today. Pt to continue with collagen and bordered gauze to wound. Pt to continue to wear compression stockings and follow up in the [address] [place] [address],[address] in 1 week. Discharge instructions reviewed with patient, all questions answered, copy given to patient. Dressings were applied to all wounds per M.D. Instructions at this visit.

## 2022-09-20 NOTE — DISCHARGE INSTRUCTIONS
[address] [place] [facility] Orders [facility] Discharge 800 [facility]  [address], [address]  [place], [facility]  Telephone: [phone]      Fax: [phone] home care company:       Your wound-care supplies will be provided by:     NAME:  Ester Booth   YOB: 1940  PRIMARY DIAGNOSIS FOR WOUND CARE CENTER: Lymphedema     Wound cleansing:  Do not scrub or use excessive force. Wash hands with soap and water before and after dressing changes. Prior to applying a clean dressing, cleanse wound with normal saline, wound cleanser, or mild soap and water. Ask your physician or nurse before getting the wound(s) wet in the shower. Wound care for home:      Left lower leg wound:      Wash wound with soap and water with dressing changes      Benzoin to periwound      collagen ag      mepilex border      Leave on all week. Wear compression stockings daily       Right lower leg    Medium spandagrip or compression stocking toes to knees on right lower leg. May put on in the morning and take off at bed time. Please note, all wounds (unless stated otherwise here) were mechanically debrided at the time of cleansing here in the wound-care center today, so a small amount of pain, drainage or bleeding from that process might be expected, and is normal.     All products for home use, including multiple products for a single wound if applicable, are medically necessary in order to achieve the best chance at timely wound healing. See provider documentation for details if needed. Substituted dressings applied in the [address] [place] [address],[address] today, if applicable:           New orders for this week (labs, imaging, medications, etc.):      Talk with PCP about your water pills     Additional instructions for specific diagnoses:     General comments for venous / lymphedema ulcers:   *  Elevate your legs to the level of your heart for at least 30 minutes, several times daily.  *  Walk as much as you can tolerate. Avoid standing for long periods of time, but if you must stand, regularly do heel-raises and calf-pump exercises. *  If you have compression wraps designed to remain in place all week, be sure to keep them from getting wet. *  If you have compression garments that can be changed regularly, apply them first thing in the morning, and remove them when you go to bed. Moisturize your skin at bedtime, with Vaseline, Aquaphor, Aveeno, CeraVe, Cetaphil, Eucerin, Lubriderm, etc; but keep the skin between your toes dry. *  If you smoke, your wound can not heal properly -- please talk with us when you're ready to quit. *  Be sure to adhere to any recommendations from your PCP about diuretics (water pills), diet, exercise, and maintaining a healthy weight. If you have questions, please ask. *  If you have a compression pump, aim for at least two hours of use daily. F/U Appointment is with Dr. Flower Young in 1 week on                                   at                       .     Your nurse  is Alessandra Masters RN. If we applied slip-resistant hospital socks today, be sure to remove them at least once a day to inspect your toes or feet, even if you're not changing the wraps or dressings underneath. If you see anything concerning (redness, excess moisture, etc), please call and let us know right away. Should you experience any significant changes in your wound(s) (including redness, increased warmth, increased pain, increased drainage, odor, or fever) or have questions about your wound care, please contact the [employer] at [phone] Monday-Thursday from 8:00 am - 4:30 pm, or Friday from 8:00 am - 2:30 pm.  If you need help with your wound outside these hours and cannot wait until we are again available, contact your home-care company (if applicable), your PCP, or go to the nearest emergency room.

## 2022-09-22 ENCOUNTER — TELEPHONE (OUTPATIENT)
Dept: INTERNAL MEDICINE CLINIC | Age: 82
End: 2022-09-22

## 2022-09-22 DIAGNOSIS — I10 BENIGN ESSENTIAL HTN: Primary | ICD-10-CM

## 2022-09-22 DIAGNOSIS — R79.89 CREATININE ELEVATION: ICD-10-CM

## 2022-09-22 NOTE — TELEPHONE ENCOUNTER
----- Message from Jaime De Leon sent at 9/21/2022  4:17 PM EDT -----  Per Dr Alpesh Bang every other day. Repeat BMP in 2 weeks\"    Patient's spouse to have patient return call.

## 2022-09-26 ENCOUNTER — HOSPITAL ENCOUNTER (OUTPATIENT)
Dept: WOUND CARE | Age: 82
Discharge: HOME | End: 2022-09-26
Payer: COMMERCIAL

## 2022-09-26 VITALS
BODY MASS INDEX: 34.48 KG/M2 | HEIGHT: 71 IN | TEMPERATURE: 97.2 F | HEART RATE: 66 BPM | DIASTOLIC BLOOD PRESSURE: 72 MMHG | RESPIRATION RATE: 20 BRPM | SYSTOLIC BLOOD PRESSURE: 129 MMHG

## 2022-09-26 DIAGNOSIS — L97.822 ULCER OF LEFT PRETIBIAL REGION WITH FAT LAYER EXPOSED (HCC): Primary | ICD-10-CM

## 2022-09-26 PROCEDURE — 99212 OFFICE O/P EST SF 10 MIN: CPT

## 2022-09-26 RX ORDER — LIDOCAINE 50 MG/G
OINTMENT TOPICAL ONCE
Status: CANCELLED | OUTPATIENT
Start: 2022-09-26 | End: 2022-09-26

## 2022-09-26 RX ORDER — LIDOCAINE HYDROCHLORIDE 40 MG/ML
SOLUTION TOPICAL ONCE
Status: CANCELLED | OUTPATIENT
Start: 2022-09-26 | End: 2022-09-26

## 2022-09-26 RX ORDER — LIDOCAINE 40 MG/G
CREAM TOPICAL ONCE
Status: CANCELLED | OUTPATIENT
Start: 2022-09-26 | End: 2022-09-26

## 2022-09-26 RX ORDER — BACITRACIN ZINC AND POLYMYXIN B SULFATE 500; 1000 [USP'U]/G; [USP'U]/G
OINTMENT TOPICAL ONCE
Status: CANCELLED | OUTPATIENT
Start: 2022-09-26 | End: 2022-09-26

## 2022-09-26 RX ORDER — LIDOCAINE 40 MG/G
CREAM TOPICAL ONCE
Status: DISCONTINUED | OUTPATIENT
Start: 2022-09-26 | End: 2022-09-27 | Stop reason: HOSPADM

## 2022-09-26 NOTE — PLAN OF CARE
Pt to the [address] [place] [address],[address] for follow up appointment. Wound not debrided today. Pt to continue with collagen to wound. PCP restarted patient on diuretics. Pt to continue with compression stockings. Pt to follow up in the [address] [place] [address],[facility] Floor in 1 week. Discharge instructions reviewed with patient, all questions answered, copy given to patient. Dressings were applied to all wounds per M.D. Instructions at this visit.

## 2022-09-27 NOTE — DISCHARGE INSTRUCTIONS
215 [facility] Physician Orders and Discharge 800 [facility]  [address], [address]  [place], [facility]  Telephone: [phone]      Fax: [phone] home care company:       Your wound-care supplies will be provided by:     NAME:  Swetha Abdi   YOB: 1940  PRIMARY DIAGNOSIS FOR WOUND CARE CENTER: Lymphedema     Wound cleansing:  Do not scrub or use excessive force. Wash hands with soap and water before and after dressing changes. Prior to applying a clean dressing, cleanse wound with normal saline, wound cleanser, or mild soap and water. Ask your physician or nurse before getting the wound(s) wet in the shower. Wound care for home:      Left lower leg wound:      Wash wound with soap and water with dressing changes      Benzoin to periwound      collagen ag      mepilex border      Leave on all week. Wear compression stockings daily       Right lower leg    Medium spandagrip or compression stocking toes to knees on right lower leg. May put on in the morning and take off at bed time. Please note, all wounds (unless stated otherwise here) were mechanically debrided at the time of cleansing here in the wound-care center today, so a small amount of pain, drainage or bleeding from that process might be expected, and is normal.     All products for home use, including multiple products for a single wound if applicable, are medically necessary in order to achieve the best chance at timely wound healing. See provider documentation for details if needed. Substituted dressings applied in the [facility] today, if applicable:           New orders for this week (labs, imaging, medications, etc.):       [facility] to Order Compression pumps with [employer]     Additional instructions for specific diagnoses:     General comments for venous / lymphedema ulcers:   *  Elevate your legs to the level of your heart for at least 30 minutes, several times daily. *  Walk as much as you can tolerate. Avoid standing for long periods of time, but if you must stand, regularly do heel-raises and calf-pump exercises. *  If you have compression wraps designed to remain in place all week, be sure to keep them from getting wet. *  If you have compression garments that can be changed regularly, apply them first thing in the morning, and remove them when you go to bed. Moisturize your skin at bedtime, with Vaseline, Aquaphor, Aveeno, CeraVe, Cetaphil, Eucerin, Lubriderm, etc; but keep the skin between your toes dry. *  If you smoke, your wound can not heal properly -- please talk with us when you're ready to quit. *  Be sure to adhere to any recommendations from your PCP about diuretics (water pills), diet, exercise, and maintaining a healthy weight. If you have questions, please ask. *  If you have a compression pump, aim for at least two hours of use daily. F/U Appointment is with Dr. Brittnee Snow in 1 week on                                   at                       .     Your nurse  is Wendy Mai RN. If we applied slip-resistant hospital socks today, be sure to remove them at least once a day to inspect your toes or feet, even if you're not changing the wraps or dressings underneath. If you see anything concerning (redness, excess moisture, etc), please call and let us know right away. Should you experience any significant changes in your wound(s) (including redness, increased warmth, increased pain, increased drainage, odor, or fever) or have questions about your wound care, please contact the [email or web address] at [phone] Monday-Thursday from 8:00 am - 4:30 pm, or Friday from 8:00 am - 2:30 pm.  If you need help with your wound outside these hours and cannot wait until we are again available, contact your home-care company (if applicable), your PCP, or go to the nearest emergency room.

## 2022-10-03 ENCOUNTER — HOSPITAL ENCOUNTER (OUTPATIENT)
Dept: WOUND CARE | Age: 82
Discharge: HOME | End: 2022-10-03
Payer: MEDICARE

## 2022-10-03 VITALS
BODY MASS INDEX: 34.77 KG/M2 | TEMPERATURE: 97.5 F | RESPIRATION RATE: 20 BRPM | HEIGHT: 71 IN | SYSTOLIC BLOOD PRESSURE: 134 MMHG | DIASTOLIC BLOOD PRESSURE: 67 MMHG | HEART RATE: 73 BPM | WEIGHT: 248.4 LBS

## 2022-10-03 DIAGNOSIS — L97.822 ULCER OF LEFT PRETIBIAL REGION WITH FAT LAYER EXPOSED (HCC): Primary | ICD-10-CM

## 2022-10-03 PROCEDURE — 99212 OFFICE O/P EST SF 10 MIN: CPT

## 2022-10-03 RX ORDER — BACITRACIN ZINC AND POLYMYXIN B SULFATE 500; 1000 [USP'U]/G; [USP'U]/G
OINTMENT TOPICAL ONCE
Status: CANCELLED | OUTPATIENT
Start: 2022-10-03 | End: 2022-10-03

## 2022-10-03 RX ORDER — LIDOCAINE 40 MG/G
CREAM TOPICAL ONCE
Status: DISCONTINUED | OUTPATIENT
Start: 2022-10-03 | End: 2022-10-04 | Stop reason: HOSPADM

## 2022-10-03 RX ORDER — LIDOCAINE HYDROCHLORIDE 40 MG/ML
SOLUTION TOPICAL ONCE
Status: CANCELLED | OUTPATIENT
Start: 2022-10-03 | End: 2022-10-03

## 2022-10-03 RX ORDER — LIDOCAINE 40 MG/G
CREAM TOPICAL ONCE
Status: CANCELLED | OUTPATIENT
Start: 2022-10-03 | End: 2022-10-03

## 2022-10-03 RX ORDER — LIDOCAINE 50 MG/G
OINTMENT TOPICAL ONCE
Status: CANCELLED | OUTPATIENT
Start: 2022-10-03 | End: 2022-10-03

## 2022-10-03 NOTE — PROGRESS NOTES
88 [facility] Progress Note      Bill Lobo     : 1940    DATE OF VISIT:  10/3/2022    Subjective:     Bill Lobo is a 80 y.o. male who has a chief complaint of a venous/lymphedema ulcer located on the left leg. States doing well with wound. Doing well with compression stockings. States stays active. Mr. 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 ([address] Esophageal reflux, Fracture of nasal bone, Headache, Hearing loss, Hyperlipidemia, Hyperlipidemia associated with type 2 diabetes mellitus ([address] Hypertension, Hypertrophy of prostate without urinary obstruction and other lower urinary tract symptoms (LUTS), Hypothyroidism, Insufficiency fracture of tibia, Osteoarthrosis, not specified whether generalized/localized, lower leg, Pain in joint, lower leg, Right knee pain, Status post total left knee replacement, Tinnitus, Type 2 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 He has a past surgical history that includes eye surgery; Colonoscopy (07); Esophagus surgery; Knee arthroscopy; Cholecystectomy, laparoscopic; other surgical history (Left, 2016); joint replacement (Left, 2017); Tonsillectomy; and Spine surgery. His family history includes Breast Cancer in his sister; Cancer in his mother; Diabetes type 2  in his brother; Heart Disease in his brother; Kidney Disease in his brother. Mr. Kymberly Davila reports that he quit smoking about 32 years ago. His smoking use included cigarettes.  He has a 15.00 pack-year smoking history. He quit smokeless tobacco use about 45 years ago. He reports current alcohol use. He reports that he does not use drugs. His current medication list consists of Embrace Blood Glucose Monitor, Embrace Lancets Ultra Thin 30G, Insulin Syringe-Needle U-100, Multiple Vitamins-Minerals, apixaban, blood glucose test strips, dilTIAZem, empagliflozin, furosemide, hydroCHLOROthiazide, insulin glargine-yfgn, levothyroxine, pioglitazone, rosuvastatin, and tamsulosin. Allergies: Patient has no known allergies. Pertinent items from the review of systems are discussed in the HPI; the remainder of the ROS was reviewed and is negative. Objective:     /67   Pulse 73   Temp 97.5 °F (36.4 °C) (Oral)   Resp 20   Ht 5' 11\" (1.803 m)   Wt 248 lb 6.4 oz (112.7 kg)   BMI 34.64 kg/m²       Dorsalis pedis pulse left palpable  Posterior tibial pulse left palpable  Dorsalis pedis pulse right palpable  Posterior tibial pulse right palpable      Ulcer on the anterior aspect left lower leg with very minimal fibrotic tissue, red granulation tissue, mild serous drainage, no hyperkeratotic rim, no undermining, no tunneling, no purulence, no malodor, no eschar, no periwound maceration, mild periwound erythema, mild edema,  no crepitus, no increase in skin temperature, ulcer probes to soft tissue only    Multiple small skin bridges and islands noted. Edema bilateral LE with thickening of the lower leg skin noted. Venous dermatitis bilateral LE. Today's ulcer measurements are in the wound documentation flowsheet.      Wound measurements:  Wound 06/13/22 #1, Left Medial Leg, Lymphedema, Full Thickness, Onset Unknown (several years)-Wound Length (cm): 1.5 cm    Wound 06/13/22 #1, Left Medial Leg, Lymphedema, Full Thickness, Onset Unknown (several years)-Wound Width (cm): 2 cm    Wound 06/13/22 #1, Left Medial Leg, Lymphedema, Full Thickness, Onset Unknown (several years)-Wound Depth (cm): 0.1 cm    LABS  Lab Results   Component Value Date    LABA1C 7.1 09/08/2022         Assessment:     Patient Active Problem List   Diagnosis Code    Benign essential HTN I10    Osteoarthrosis involving lower leg [id number]    Pain in joint, lower leg M25.569    Hepatitis C virus infection without hepatic coma B19.20    Benign essential tremor G25.0    Hypertrophy of prostate without urinary obstruction and other lower urinary tract symptoms (LUTS) N40.0    Hypothyroidism E03.9    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) E11.22, N18.30    Chronic hepatitis C without hepatic coma (HCC) B18.2    Hyperlipidemia associated with type 2 diabetes mellitus (HCC) E11.69, E78.5    Type 2 diabetes mellitus 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 edema (HCC) R61.0399    Degenerative tear of medial meniscus of left knee M23.204    Primary osteoarthritis of left knee M17.12    PAF (paroxysmal atrial fibrillation) (HCC) I48.0    MGUS (monoclonal gammopathy of unknown significance) D47.2    Kidney lesion N28.9    Premature atrial contraction I49.1    Nasal polyposis J33.9    Ulcer of left pretibial region with fat layer exposed ([facility] [address] [facility] [address] J75.615    Syncope and collapse R55    Stage 3a chronic kidney disease (HCC) N18.31    Longstanding persistent atrial fibrillation (HCC) I48.11       Assessment of today's active condition(s): lymphedema/venous leg ulcer left. Factors contributing to occurrence and/or persistence of the chronic ulcer include edema, venous stasis, and lymphedema. Sharp debridement is not indicated today, based upon the exam findings in the ulcer(s) above. Discharge plan:     Treatment in the wound care center today:  . Home treatment: good handwashing before and after any dressing changes. Cleanse ulcer with saline or soap & water before dressing change. May use Vaseline (petrolatum), Aquaphor, Aveeno, CeraVe, Cetaphil, Eucerin, Lubriderm, etc for dry skin.      Dressing type for home: Mepilex border and compression stocking. Written discharge instructions given to patient. Offload ulcer(s) as directed. Elevate leg(s) as directed. Exercise daily. Could benefit from compression pumps to help control edema. Follow up in 1 week.              Electronically signed by Bebeto Wong DPM on 10/3/2022 at 1:18 PM.

## 2022-10-03 NOTE — PROGRESS NOTES
[facility] Progress Note      Shazia Newton     : 1940    DATE OF VISIT:  2022    Subjective:     Shazia Newton is a 80 y.o. male who has a chief complaint of a venous/lymphedema ulcer located on the left leg. States doing well with wound. Doing well with compression stockings. States stays active. Mr. 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 ([address] Esophageal reflux, Fracture of nasal bone, Headache, Hearing loss, Hyperlipidemia, Hyperlipidemia associated with type 2 diabetes mellitus ([address] Hypertension, Hypertrophy of prostate without urinary obstruction and other lower urinary tract symptoms (LUTS), Hypothyroidism, Insufficiency fracture of tibia, Osteoarthrosis, not specified whether generalized/localized, lower leg, Pain in joint, lower leg, Right knee pain, Status post total left knee replacement, Tinnitus, Type 2 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 He has a past surgical history that includes eye surgery; Colonoscopy (07); Esophagus surgery; Knee arthroscopy; Cholecystectomy, laparoscopic; other surgical history (Left, 2016); joint replacement (Left, 2017); Tonsillectomy; and Spine surgery. His family history includes Breast Cancer in his sister; Cancer in his mother; Diabetes type 2  in his brother; Heart Disease in his brother; Kidney Disease in his brother. Mr. Wil Newton reports that he quit smoking about 32 years ago. His smoking use included cigarettes.  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) E11.22, N18.30    Chronic hepatitis C without hepatic coma (HCC) B18.2    Hyperlipidemia associated with type 2 diabetes mellitus (HCC) E11.69, E78.5    Type 2 diabetes mellitus 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 edema (HCC) M83.7000    Degenerative tear of medial meniscus of left knee M23.204    Primary osteoarthritis of left knee M17.12    PAF (paroxysmal atrial fibrillation) (HCC) I48.0    MGUS (monoclonal gammopathy of unknown significance) D47.2    Kidney lesion N28.9    Premature atrial contraction I49.1    Nasal polyposis J33.9    Ulcer of left pretibial region with fat layer exposed ([facility] [address] W55.774    Syncope and collapse R55    Stage 3a chronic kidney disease (HCC) N18.31    Longstanding persistent atrial fibrillation (HCC) I48.11       Assessment of today's active condition(s): lymphedema/venous leg ulcer left. Factors contributing to occurrence and/or persistence of the chronic ulcer include edema, venous stasis, and lymphedema. Sharp debridement is not indicated today, based upon the exam findings in the ulcer(s) above. Discharge plan:     Treatment in the wound care center today: Wound 06/13/22 #1, Left Medial Leg, Lymphedema, Full Thickness, Onset Unknown (several years)-Dressing/Treatment: Other (comment) (collagen benzoin bordered gauze). Home treatment: good handwashing before and after any dressing changes.  Cleanse ulcer with saline or soap & water before dressing change. May use Vaseline (petrolatum), Aquaphor, Aveeno, CeraVe, Cetaphil, Eucerin, Lubriderm, etc for dry skin. Dressing type for home: Mepilex border and compression stocking. Written discharge instructions given to patient. Offload ulcer(s) as directed. Elevate leg(s) as directed. Exercise daily. Could benefit from compression pumps. Follow up in 1 week.              Electronically signed by Terri Babb DPM on 10/3/2022 at 1:22 PM.

## 2022-10-03 NOTE — PLAN OF CARE
Pt to the [address] [place] [address],[address] for follow up appointment. Wound measuring smaller. Pt to continue with collagen and mepilex border to wound. Pt to continue to wear compression stockings. Paperwork sent to [facility] for compression pumps. Pt to follow up in the [address] [place] [address],[facility] Floor in 1 week. Discharge instructions reviewed with patient, all questions answered, copy given to patient. Dressings were applied to all wounds per M.D. Instructions at this visit.

## 2022-10-03 NOTE — PROGRESS NOTES
88 [facility] Progress Note      Ester Booth     : 1940    DATE OF VISIT:  2022    Subjective:     Ester Booth is a 80 y.o. male who has a chief complaint of a venous/lymphedema ulcer located on the left leg. States doing well with wound. Doing well with compression stockings. States stays active. Mr. 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 ([address] Esophageal reflux, Fracture of nasal bone, Headache, Hearing loss, Hyperlipidemia, Hyperlipidemia associated with type 2 diabetes mellitus ([address] Hypertension, Hypertrophy of prostate without urinary obstruction and other lower urinary tract symptoms (LUTS), Hypothyroidism, Insufficiency fracture of tibia, Osteoarthrosis, not specified whether generalized/localized, lower leg, Pain in joint, lower leg, Right knee pain, Status post total left knee replacement, Tinnitus, Type 2 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 He has a past surgical history that includes eye surgery; Colonoscopy (07); Esophagus surgery; Knee arthroscopy; Cholecystectomy, laparoscopic; other surgical history (Left, 2016); joint replacement (Left, 2017); Tonsillectomy; and Spine surgery. His family history includes Breast Cancer in his sister; Cancer in his mother; Diabetes type 2  in his brother; Heart Disease in his brother; Kidney Disease in his brother. Mr. Kathleen Moraes reports that he quit smoking about 32 years ago. His smoking use included cigarettes.  He has a 15.00 pack-year smoking history. He quit smokeless tobacco use about 45 years ago. He reports current alcohol use. He reports that he does not use drugs. His current medication list consists of Embrace Blood Glucose Monitor, Embrace Lancets Ultra Thin 30G, Insulin Syringe-Needle U-100, Multiple Vitamins-Minerals, apixaban, blood glucose test strips, dilTIAZem, empagliflozin, furosemide, hydroCHLOROthiazide, insulin glargine-yfgn, levothyroxine, pioglitazone, rosuvastatin, and tamsulosin. Allergies: Patient has no known allergies. Pertinent items from the review of systems are discussed in the HPI; the remainder of the ROS was reviewed and is negative. Objective:     /72   Pulse 66   Temp 97.2 °F (36.2 °C) (Oral)   Resp 20   Ht 5' 11\" (1.803 m)   BMI 34.48 kg/m²       Dorsalis pedis pulse left palpable  Posterior tibial pulse left palpable  Dorsalis pedis pulse right palpable  Posterior tibial pulse right palpable      Ulcer on the anterior aspect left lower leg with very minimal fibrotic tissue, red granulation tissue, mild serous drainage, no hyperkeratotic rim, no undermining, no tunneling, no purulence, no malodor, no eschar, no periwound maceration, mild periwound erythema, mild edema,  no crepitus, no increase in skin temperature, ulcer probes to soft tissue only    Multiple small skin bridges and islands noted. Edema bilateral LE with thickening of lower leg skin noted. Venous dermatitis bilateral LE. Today's ulcer measurements are in the wound documentation flowsheet.      Wound measurements:  Wound 06/13/22 #1, Left Medial Leg, Lymphedema, Full Thickness, Onset Unknown (several years)-Wound Length (cm): 2.1 cm    Wound 06/13/22 #1, Left Medial Leg, Lymphedema, Full Thickness, Onset Unknown (several years)-Wound Width (cm): 2.4 cm    Wound 06/13/22 #1, Left Medial Leg, Lymphedema, Full Thickness, Onset Unknown (several years)-Wound Depth (cm): 0.1 cm    LABS  Lab Results Component Value Date    LABA1C 7.1 09/08/2022         Assessment:     Patient Active Problem List   Diagnosis Code    Benign essential HTN I10    Osteoarthrosis involving lower leg [id number]    Pain in joint, lower leg M25.569    Hepatitis C virus infection without hepatic coma B19.20    Benign essential tremor G25.0    Hypertrophy of prostate without urinary obstruction and other lower urinary tract symptoms (LUTS) N40.0    Hypothyroidism E03.9    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) E11.22, N18.30    Chronic hepatitis C without hepatic coma (HCC) B18.2    Hyperlipidemia associated with type 2 diabetes mellitus (HCC) E11.69, E78.5    Type 2 diabetes mellitus 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 edema (HCC) C98.4703    Degenerative tear of medial meniscus of left knee M23.204    Primary osteoarthritis of left knee M17.12    PAF (paroxysmal atrial fibrillation) (HCC) I48.0    MGUS (monoclonal gammopathy of unknown significance) D47.2    Kidney lesion N28.9    Premature atrial contraction I49.1    Nasal polyposis J33.9    Ulcer of left pretibial region with fat layer exposed ([address] G68.251    Syncope and collapse R55    Stage 3a chronic kidney disease (HCC) N18.31    Longstanding persistent atrial fibrillation (HCC) I48.11       Assessment of today's active condition(s): lymphedema/venous leg ulcer left. Factors contributing to occurrence and/or persistence of the chronic ulcer include edema, venous stasis, and lymphedema. Sharp debridement is not indicated today, based upon the exam findings in the ulcer(s) above. Discharge plan:     Treatment in the wound care center today: Wound 06/13/22 #1, Left Medial Leg, Lymphedema, Full Thickness, Onset Unknown (several years)-Dressing/Treatment: Other (comment) (collagen bordered gauze). Home treatment: good handwashing before and after any dressing changes. Cleanse ulcer with saline or soap & water before dressing change.  May use Vaseline (petrolatum), Aquaphor, Aveeno, CeraVe, Cetaphil, Eucerin, Lubriderm, etc for dry skin. Dressing type for home: Mepilex border and compression stocking. Written discharge instructions given to patient. Offload ulcer(s) as directed. Elevate leg(s) as directed. Exercise daily. Could benefit from compression pumps to help control edema. Follow up in 1 week.              Electronically signed by Rafi Thayer DPM on 10/3/2022 at 1:21 PM.

## 2022-10-03 NOTE — PROGRESS NOTES
88 [facility] Progress Note      Kyra Galloway     : 1940    DATE OF VISIT:  2022    Subjective:     Kyra Galloway is a 80 y.o. male who has a chief complaint of a venous/lymphedema ulcer located on the left leg. States doing well with wound. Doing well with compression stockings. States stays active. Mr. 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 ([address] Esophageal reflux, Fracture of nasal bone, Headache, Hearing loss, Hyperlipidemia, Hyperlipidemia associated with type 2 diabetes mellitus ([address] Hypertension, Hypertrophy of prostate without urinary obstruction and other lower urinary tract symptoms (LUTS), Hypothyroidism, Insufficiency fracture of tibia, Osteoarthrosis, not specified whether generalized/localized, lower leg, Pain in joint, lower leg, Right knee pain, Status post total left knee replacement, Tinnitus, Type 2 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 He has a past surgical history that includes eye surgery; Colonoscopy (07); Esophagus surgery; Knee arthroscopy; Cholecystectomy, laparoscopic; other surgical history (Left, 2016); joint replacement (Left, 2017); Tonsillectomy; and Spine surgery. His family history includes Breast Cancer in his sister; Cancer in his mother; Diabetes type 2  in his brother; Heart Disease in his brother; Kidney Disease in his brother. Mr. Fausto Conley reports that he quit smoking about 32 years ago. His smoking use included cigarettes.  He has a 15.00 pack-year smoking history. He quit smokeless tobacco use about 45 years ago. He reports current alcohol use. He reports that he does not use drugs. His current medication list consists of Embrace Blood Glucose Monitor, Embrace Lancets Ultra Thin 30G, Insulin Syringe-Needle U-100, Multiple Vitamins-Minerals, apixaban, blood glucose test strips, dilTIAZem, empagliflozin, furosemide, hydroCHLOROthiazide, insulin glargine-yfgn, levothyroxine, pioglitazone, rosuvastatin, and tamsulosin. Allergies: Patient has no known allergies. Pertinent items from the review of systems are discussed in the HPI; the remainder of the ROS was reviewed and is negative. Objective:     BP (!) 141/80   Pulse 86   Temp 97.9 °F (36.6 °C)   Resp 18   Ht 5' 11\" (1.803 m)   Wt 241 lb 12.8 oz (109.7 kg)   BMI 33.72 kg/m²       Dorsalis pedis pulse left palpable  Posterior tibial pulse left palpable  Dorsalis pedis pulse right palpable  Posterior tibial pulse right palpable      Ulcer on the anterior aspect left lower leg with very minimal fibrotic tissue, red granulation tissue, mild serous drainage, no hyperkeratotic rim, no undermining, no tunneling, no purulence, no malodor, no eschar, no periwound maceration, mild periwound erythema, mild edema,  no crepitus, no increase in skin temperature, ulcer probes to soft tissue only    Multiple small skin bridges and islands noted. Edema bilateral LE with thickening of the left lower leg skin. Venous dermatitis bilateral LE. Today's ulcer measurements are in the wound documentation flowsheet.      Wound measurements:  Wound 06/13/22 #1, Left Medial Leg, Lymphedema, Full Thickness, Onset Unknown (several years)-Wound Length (cm): 0.5 cm    Wound 06/13/22 #1, Left Medial Leg, Lymphedema, Full Thickness, Onset Unknown (several years)-Wound Width (cm): 1.6 cm    Wound 06/13/22 #1, Left Medial Leg, Lymphedema, Full Thickness, Onset Unknown (several years)-Wound Depth (cm): 0.1 cm    LABS  Lab Results   Component Value Date    LABA1C 7.1 09/08/2022         Assessment:     Patient Active Problem List   Diagnosis Code    Benign essential HTN I10    Osteoarthrosis involving lower leg [id number]    Pain in joint, lower leg M25.569    Hepatitis C virus infection without hepatic coma B19.20    Benign essential tremor G25.0    Hypertrophy of prostate without urinary obstruction and other lower urinary tract symptoms (LUTS) N40.0    Hypothyroidism E03.9    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) E11.22, N18.30    Chronic hepatitis C without hepatic coma (HCC) B18.2    Hyperlipidemia associated with type 2 diabetes mellitus (HCC) E11.69, E78.5    Type 2 diabetes mellitus 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 edema (HCC) Q69.7788    Degenerative tear of medial meniscus of left knee M23.204    Primary osteoarthritis of left knee M17.12    PAF (paroxysmal atrial fibrillation) (HCC) I48.0    MGUS (monoclonal gammopathy of unknown significance) D47.2    Kidney lesion N28.9    Premature atrial contraction I49.1    Nasal polyposis J33.9    Ulcer of left pretibial region with fat layer exposed ([address] G20.607    Syncope and collapse R55    Stage 3a chronic kidney disease (HCC) N18.31    Longstanding persistent atrial fibrillation (HCC) I48.11       Assessment of today's active condition(s): lymphedema/venous leg ulcer left. Factors contributing to occurrence and/or persistence of the chronic ulcer include edema, venous stasis, and lymphedema. Sharp debridement is not indicated today, based upon the exam findings in the ulcer(s) above. Discharge plan:     Treatment in the wound care center today: Wound 06/13/22 #1, Left Medial Leg, Lymphedema, Full Thickness, Onset Unknown (several years)-Dressing/Treatment: Other (comment) (collagen ag bordered gauze compression garment). Home treatment: good handwashing before and after any dressing changes.  Cleanse ulcer with saline or soap & water before dressing change. May use Vaseline (petrolatum), Aquaphor, Aveeno, CeraVe, Cetaphil, Eucerin, Lubriderm, etc for dry skin. Dressing type for home: Mepilex border and compression stocking. Written discharge instructions given to patient. Offload ulcer(s) as directed. Elevate leg(s) as directed. Exercise daily. Could benefit from compression pumps. Follow up in 1 week.              Electronically signed by Charles Ivory DPM on 10/3/2022 at 1:23 PM.

## 2022-10-04 NOTE — DISCHARGE INSTRUCTIONS
[facility] Physician Orders and Discharge [facility]  [address], [address]  [place], [facility] [place]  Telephone: [phone]      Fax: [phone] home care company:       Your wound-care supplies will be provided by:     NAME:  Jake Jenkins   YOB: 1940  PRIMARY DIAGNOSIS FOR WOUND CARE CENTER: Lymphedema     Wound cleansing:  Do not scrub or use excessive force. Wash hands with soap and water before and after dressing changes. Prior to applying a clean dressing, cleanse wound with normal saline, wound cleanser, or mild soap and water. Ask your physician or nurse before getting the wound(s) wet in the shower. Wound care for home:      Left lower leg wound:      Wash wound with soap and water with dressing changes      Benzoin to periwound      collagen ag      mepilex border      Leave on all week. Wear compression stockings daily       Right lower leg    Medium spandagrip or compression stocking toes to knees on right lower leg. May put on in the morning and take off at bed time. Please note, all wounds (unless stated otherwise here) were mechanically debrided at the time of cleansing here in the wound-care center today, so a small amount of pain, drainage or bleeding from that process might be expected, and is normal.     All products for home use, including multiple products for a single wound if applicable, are medically necessary in order to achieve the best chance at timely wound healing. See provider documentation for details if needed. Substituted dressings applied in the [facility] today, if applicable:           New orders for this week (labs, imaging, medications, etc.):       [facility] to Order Compression pumps with [employer]     Additional instructions for specific diagnoses:     General comments for venous / lymphedema ulcers:   *  Elevate your legs to the level of your heart for at least 30 minutes, several times daily. *  Walk as much as you can tolerate. Avoid standing for long periods of time, but if you must stand, regularly do heel-raises and calf-pump exercises. *  If you have compression wraps designed to remain in place all week, be sure to keep them from getting wet. *  If you have compression garments that can be changed regularly, apply them first thing in the morning, and remove them when you go to bed. Moisturize your skin at bedtime, with Vaseline, Aquaphor, Aveeno, CeraVe, Cetaphil, Eucerin, Lubriderm, etc; but keep the skin between your toes dry. *  If you smoke, your wound can not heal properly -- please talk with us when you're ready to quit. *  Be sure to adhere to any recommendations from your PCP about diuretics (water pills), diet, exercise, and maintaining a healthy weight. If you have questions, please ask. *  If you have a compression pump, aim for at least two hours of use daily. F/U Appointment is with Dr. Karley Everett in 1 week on                                   at                       .     Your nurse  is Sharlene Berman RN. If we applied slip-resistant hospital socks today, be sure to remove them at least once a day to inspect your toes or feet, even if you're not changing the wraps or dressings underneath. If you see anything concerning (redness, excess moisture, etc), please call and let us know right away. Should you experience any significant changes in your wound(s) (including redness, increased warmth, increased pain, increased drainage, odor, or fever) or have questions about your wound care, please contact the [facility] at [phone] Monday-Thursday from 8:00 am - 4:30 pm, or Friday from 8:00 am - 2:30 pm.  If you need help with your wound outside these hours and cannot wait until we are again available, contact your home-care company (if applicable), your PCP, or go to the nearest emergency room.

## 2022-10-10 ENCOUNTER — HOSPITAL ENCOUNTER (OUTPATIENT)
Dept: WOUND CARE | Age: 82
Discharge: HOME | End: 2022-10-10
Payer: MEDICARE

## 2022-10-10 VITALS
HEART RATE: 67 BPM | WEIGHT: 247.2 LBS | RESPIRATION RATE: 18 BRPM | HEIGHT: 71 IN | TEMPERATURE: 97.5 F | DIASTOLIC BLOOD PRESSURE: 75 MMHG | SYSTOLIC BLOOD PRESSURE: 138 MMHG | BODY MASS INDEX: 34.61 KG/M2

## 2022-10-10 DIAGNOSIS — L97.822 ULCER OF LEFT PRETIBIAL REGION WITH FAT LAYER EXPOSED (HCC): Primary | ICD-10-CM

## 2022-10-10 PROCEDURE — 99212 OFFICE O/P EST SF 10 MIN: CPT

## 2022-10-10 RX ORDER — LIDOCAINE HYDROCHLORIDE 40 MG/ML
SOLUTION TOPICAL ONCE
Status: CANCELLED | OUTPATIENT
Start: 2022-10-10 | End: 2022-10-10

## 2022-10-10 RX ORDER — LIDOCAINE 40 MG/G
CREAM TOPICAL ONCE
Status: DISCONTINUED | OUTPATIENT
Start: 2022-10-10 | End: 2022-10-11 | Stop reason: HOSPADM

## 2022-10-10 RX ORDER — LIDOCAINE 50 MG/G
OINTMENT TOPICAL ONCE
Status: CANCELLED | OUTPATIENT
Start: 2022-10-10 | End: 2022-10-10

## 2022-10-10 RX ORDER — BACITRACIN ZINC AND POLYMYXIN B SULFATE 500; 1000 [USP'U]/G; [USP'U]/G
OINTMENT TOPICAL ONCE
Status: CANCELLED | OUTPATIENT
Start: 2022-10-10 | End: 2022-10-10

## 2022-10-10 RX ORDER — LIDOCAINE 40 MG/G
CREAM TOPICAL ONCE
Status: CANCELLED | OUTPATIENT
Start: 2022-10-10 | End: 2022-10-10

## 2022-10-10 NOTE — PLAN OF CARE
Pt to the [facility] for follow up appointment. Wound measuring smaller. Pt to continue with collagen and bordered gauze to wound. Pt to continue to wear compression stockings. Waiting for approval on compression pumps. Pt to follow up in the [facility] in 1 week. Discharge instructions reviewed with patient, all questions answered, copy given to patient. Dressings were applied to all wounds per M.D. Instructions at this visit.

## 2022-10-10 NOTE — PROGRESS NOTES
88 [facility] Progress Note      Devin Simmons     : 1940    DATE OF VISIT:  10/10/2022    Subjective:     Devin Simmons is a 80 y.o. male who has a chief complaint of a venous/lymphedema ulcer located on the left leg. States doing well with wound. Doing well with compression stockings. States stays active. Mr. 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 ([address] Esophageal reflux, Fracture of nasal bone, Headache, Hearing loss, Hyperlipidemia, Hyperlipidemia associated with type 2 diabetes mellitus ([address] Hypertension, Hypertrophy of prostate without urinary obstruction and other lower urinary tract symptoms (LUTS), Hypothyroidism, Insufficiency fracture of tibia, Osteoarthrosis, not specified whether generalized/localized, lower leg, Pain in joint, lower leg, Right knee pain, Status post total left knee replacement, Tinnitus, Type 2 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 He has a past surgical history that includes eye surgery; Colonoscopy (07); Esophagus surgery; Knee arthroscopy; Cholecystectomy, laparoscopic; other surgical history (Left, 2016); joint replacement (Left, 2017); Tonsillectomy; and Spine surgery. His family history includes Breast Cancer in his sister; Cancer in his mother; Diabetes type 2  in his brother; Heart Disease in his brother; Kidney Disease in his brother. Mr. Rudi Mcmullen reports that he quit smoking about 32 years ago. His smoking use included cigarettes.  He has a 15.00 pack-year smoking history. He quit smokeless tobacco use about 45 years ago. He reports current alcohol use. He reports that he does not use drugs. His current medication list consists of Embrace Blood Glucose Monitor, Embrace Lancets Ultra Thin 30G, Insulin Syringe-Needle U-100, Multiple Vitamins-Minerals, apixaban, blood glucose test strips, dilTIAZem, empagliflozin, furosemide, hydroCHLOROthiazide, insulin glargine-yfgn, levothyroxine, pioglitazone, rosuvastatin, and tamsulosin. Allergies: Patient has no known allergies. Pertinent items from the review of systems are discussed in the HPI; the remainder of the ROS was reviewed and is negative. Objective:     /75   Pulse 67   Temp 97.5 °F (36.4 °C) (Oral)   Resp 18   Ht 5' 11\" (1.803 m)   Wt 247 lb 3.2 oz (112.1 kg)   BMI 34.48 kg/m²       Dorsalis pedis pulse left palpable  Posterior tibial pulse left palpable  Dorsalis pedis pulse right palpable  Posterior tibial pulse right palpable      Ulcer on the anterior aspect left lower leg with very minimal fibrotic tissue, red granulation tissue, mild serous drainage, no hyperkeratotic rim, no undermining, no tunneling, no purulence, no malodor, no eschar, no periwound maceration, mild periwound erythema, mild edema,  no crepitus, no increase in skin temperature, ulcer probes to soft tissue only    Multiple small skin bridges and islands noted. Edema bilateral LE with thickening of the lower leg skin noted. Venous dermatitis bilateral LE. Today's ulcer measurements are in the wound documentation flowsheet.      Wound measurements:  Wound 06/13/22 #1, Left Medial Leg, Lymphedema, Full Thickness, Onset Unknown (several years)-Wound Length (cm): 1.3 cm    Wound 06/13/22 #1, Left Medial Leg, Lymphedema, Full Thickness, Onset Unknown (several years)-Wound Width (cm): 2 cm    Wound 06/13/22 #1, Left Medial Leg, Lymphedema, Full Thickness, Onset Unknown (several years)-Wound Depth (cm): 0.1 cm    LABS  Lab Results   Component Value Date    LABA1C 7.1 09/08/2022         Assessment:     Patient Active Problem List   Diagnosis Code    Benign essential HTN I10    Osteoarthrosis involving lower leg [id number]    Pain in joint, lower leg M25.569    Hepatitis C virus infection without hepatic coma B19.20    Benign essential tremor G25.0    Hypertrophy of prostate without urinary obstruction and other lower urinary tract symptoms (LUTS) N40.0    Hypothyroidism E03.9    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) E11.22, N18.30    Chronic hepatitis C without hepatic coma (HCC) B18.2    Hyperlipidemia associated with type 2 diabetes mellitus (HCC) E11.69, E78.5    Type 2 diabetes mellitus 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 edema (HCC) S08.3678    Degenerative tear of medial meniscus of left knee M23.204    Primary osteoarthritis of left knee M17.12    PAF (paroxysmal atrial fibrillation) (HCC) I48.0    MGUS (monoclonal gammopathy of unknown significance) D47.2    Kidney lesion N28.9    Premature atrial contraction I49.1    Nasal polyposis J33.9    Ulcer of left pretibial region with fat layer exposed ([facility] [address] [facility] K02.808    Syncope and collapse R55    Stage 3a chronic kidney disease (HCC) N18.31    Longstanding persistent atrial fibrillation (HCC) I48.11       Assessment of today's active condition(s): lymphedema/venous leg ulcer left. Factors contributing to occurrence and/or persistence of the chronic ulcer include edema, venous stasis, and lymphedema. Sharp debridement is not indicated today, based upon the exam findings in the ulcer(s) above. Discharge plan:     Treatment in the wound care center today:  . Home treatment: good handwashing before and after any dressing changes. Cleanse ulcer with saline or soap & water before dressing change. May use Vaseline (petrolatum), Aquaphor, Aveeno, CeraVe, Cetaphil, Eucerin, Lubriderm, etc for dry skin.      Dressing type for home: Mepilex border and compression stocking. Written discharge instructions given to patient. Offload ulcer(s) as directed. Elevate leg(s) as directed. Exercise daily. Could benefit from compression pumps to help control edema. Follow up in 1 week.              Electronically signed by Eddie Rose DPM on 10/10/2022 at 12:51 PM.

## 2022-10-11 NOTE — DISCHARGE INSTRUCTIONS
215 [facility] Physician Orders and Discharge 800 [facility]  [address], [address]  [place], [facility]  Telephone: [phone]      Fax: [phone] home care company:       Your wound-care supplies will be provided by:     NAME:  Lesley Conde   YOB: 1940  PRIMARY DIAGNOSIS FOR WOUND CARE CENTER: Lymphedema     Wound cleansing:  Do not scrub or use excessive force. Wash hands with soap and water before and after dressing changes. Prior to applying a clean dressing, cleanse wound with normal saline, wound cleanser, or mild soap and water. Ask your physician or nurse before getting the wound(s) wet in the shower. Wound care for home:      Left lower leg wound:      Wash wound with soap and water with dressing changes      Benzoin to periwound      collagen ag      mepilex border      Leave on all week. Wear compression stockings daily       Right lower leg    Medium spandagrip or compression stocking toes to knees on right lower leg. May put on in the morning and take off at bed time. Please note, all wounds (unless stated otherwise here) were mechanically debrided at the time of cleansing here in the wound-care center today, so a small amount of pain, drainage or bleeding from that process might be expected, and is normal.     All products for home use, including multiple products for a single wound if applicable, are medically necessary in order to achieve the best chance at timely wound healing. See provider documentation for details if needed. Substituted dressings applied in the [address] [place] [address],[address] today, if applicable:           New orders for this week (labs, imaging, medications, etc.):       [facility] to Order Compression pumps with [facility]     Additional instructions for specific diagnoses:     General comments for venous / lymphedema ulcers:   *  Elevate your legs to the level of your heart for at least 30 minutes, several times daily. *  Walk as much as you can tolerate. Avoid standing for long periods of time, but if you must stand, regularly do heel-raises and calf-pump exercises. *  If you have compression wraps designed to remain in place all week, be sure to keep them from getting wet. *  If you have compression garments that can be changed regularly, apply them first thing in the morning, and remove them when you go to bed. Moisturize your skin at bedtime, with Vaseline, Aquaphor, Aveeno, CeraVe, Cetaphil, Eucerin, Lubriderm, etc; but keep the skin between your toes dry. *  If you smoke, your wound can not heal properly -- please talk with us when you're ready to quit. *  Be sure to adhere to any recommendations from your PCP about diuretics (water pills), diet, exercise, and maintaining a healthy weight. If you have questions, please ask. *  If you have a compression pump, aim for at least two hours of use daily. F/U Appointment is with Dr. Romel Moncada in 1 week on                                   at                       .     Your nurse  is Amol Ho RN. If we applied slip-resistant hospital socks today, be sure to remove them at least once a day to inspect your toes or feet, even if you're not changing the wraps or dressings underneath. If you see anything concerning (redness, excess moisture, etc), please call and let us know right away. Should you experience any significant changes in your wound(s) (including redness, increased warmth, increased pain, increased drainage, odor, or fever) or have questions about your wound care, please contact the [address] [place] [address] at [phone] Monday-Thursday from 8:00 am - 4:30 pm, or Friday from 8:00 am - 2:30 pm.  If you need help with your wound outside these hours and cannot wait until we are again available, contact your home-care company (if applicable), your PCP, or go to the nearest emergency room.

## 2022-10-17 ENCOUNTER — TELEPHONE (OUTPATIENT)
Dept: INTERNAL MEDICINE CLINIC | Age: 82
End: 2022-10-17

## 2022-10-17 ENCOUNTER — HOSPITAL ENCOUNTER (OUTPATIENT)
Dept: WOUND CARE | Age: 82
Discharge: HOME | End: 2022-10-17
Payer: MEDICARE

## 2022-10-17 VITALS
BODY MASS INDEX: 33.74 KG/M2 | SYSTOLIC BLOOD PRESSURE: 115 MMHG | RESPIRATION RATE: 20 BRPM | DIASTOLIC BLOOD PRESSURE: 69 MMHG | HEIGHT: 71 IN | WEIGHT: 241 LBS | HEART RATE: 68 BPM | TEMPERATURE: 97.8 F

## 2022-10-17 DIAGNOSIS — L97.822 ULCER OF LEFT PRETIBIAL REGION WITH FAT LAYER EXPOSED (HCC): Primary | ICD-10-CM

## 2022-10-17 PROCEDURE — 99213 OFFICE O/P EST LOW 20 MIN: CPT

## 2022-10-17 RX ORDER — LIDOCAINE 40 MG/G
CREAM TOPICAL ONCE
Status: CANCELLED | OUTPATIENT
Start: 2022-10-17 | End: 2022-10-17

## 2022-10-17 RX ORDER — LIDOCAINE 50 MG/G
OINTMENT TOPICAL ONCE
Status: CANCELLED | OUTPATIENT
Start: 2022-10-17 | End: 2022-10-17

## 2022-10-17 RX ORDER — LIDOCAINE HYDROCHLORIDE 40 MG/ML
SOLUTION TOPICAL ONCE
Status: CANCELLED | OUTPATIENT
Start: 2022-10-17 | End: 2022-10-17

## 2022-10-17 RX ORDER — BACITRACIN ZINC AND POLYMYXIN B SULFATE 500; 1000 [USP'U]/G; [USP'U]/G
OINTMENT TOPICAL ONCE
Status: CANCELLED | OUTPATIENT
Start: 2022-10-17 | End: 2022-10-17

## 2022-10-17 RX ORDER — LIDOCAINE 40 MG/G
CREAM TOPICAL ONCE
Status: DISCONTINUED | OUTPATIENT
Start: 2022-10-17 | End: 2022-10-18 | Stop reason: HOSPADM

## 2022-10-17 NOTE — TELEPHONE ENCOUNTER
----- Message from [place] sent at 10/17/2022  2:39 PM EDT -----  Contact: [phone] (H)  Refill    empagliflozin (JARDIANCE) 25 MG tablet       12/12/2022 11:20 AM Jez [address] [place] [address] [phone] (Ph: [phone])

## 2022-10-17 NOTE — PLAN OF CARE
Pt to the [address] [place] [address] for follow up appointment. Wound showing improvement. Collagen and bordered gauze applied to wound. Pt to continue to wear compression stockings. [facility] at the [address] [place] [address] to measure patient for compression garments. Pt to follow up in the [address] [place] [address] in 1 week. Discharge instructions reviewed with patient, all questions answered, copy given to patient. Dressings were applied to all wounds per M.D. Instructions at this visit.

## 2022-10-18 NOTE — DISCHARGE INSTRUCTIONS
215 [facility] Physician Orders and Discharge 800 [facility]  [address], [address]  [place], [facility]  Telephone: [phone]      Fax: [phone] home care company:       Your wound-care supplies will be provided by:     NAME:  Swetha Abdi   YOB: 1940  PRIMARY DIAGNOSIS FOR WOUND CARE CENTER: Lymphedema     Wound cleansing:  Do not scrub or use excessive force. Wash hands with soap and water before and after dressing changes. Prior to applying a clean dressing, cleanse wound with normal saline, wound cleanser, or mild soap and water. Ask your physician or nurse before getting the wound(s) wet in the shower. Wound care for home:      Left lower leg wound:      Wash wound with soap and water with dressing changes      Benzoin to periwound      collagen ag      mepilex border      Leave on all week. Wear compression stockings daily       Right lower leg    Medium spandagrip or compression stocking toes to knees on right lower leg. May put on in the morning and take off at bed time. Please note, all wounds (unless stated otherwise here) were mechanically debrided at the time of cleansing here in the wound-care center today, so a small amount of pain, drainage or bleeding from that process might be expected, and is normal.     All products for home use, including multiple products for a single wound if applicable, are medically necessary in order to achieve the best chance at timely wound healing. See provider documentation for details if needed. Substituted dressings applied in the [facility] today, if applicable:           New orders for this week (labs, imaging, medications, etc.):          Additional instructions for specific diagnoses:     General comments for venous / lymphedema ulcers: *  Elevate your legs to the level of your heart for at least 30 minutes, several times daily.   *  Walk as much as you can

## 2022-10-19 NOTE — PROGRESS NOTES
[facility] Progress Note      Brad Farias     : 1940    DATE OF VISIT:  10/17/2022    Subjective:     Brad Farias is a 80 y.o. male who has a chief complaint of a venous/lymphedema ulcer located on the left leg. States doing well with wound. Doing well with compression stockings. States stays active. Mr. Wilmar Serrato has a past medical history of A-fib 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 ([address] Esophageal reflux, Fracture of nasal bone, Headache, Hearing loss, Hyperlipidemia, Hyperlipidemia associated with type 2 diabetes mellitus ([address] Hypertension, Hypertrophy of prostate without urinary obstruction and other lower urinary tract symptoms (LUTS), Hypothyroidism, Insufficiency fracture of tibia, Osteoarthrosis, not specified whether generalized/localized, lower leg, Pain in joint, lower leg, Right knee pain, Status post total left knee replacement, Tinnitus, Type 2 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 He has a past surgical history that includes eye surgery; Colonoscopy (07); Esophagus surgery; Knee arthroscopy; Cholecystectomy, laparoscopic; other surgical history (Left, 2016); joint replacement (Left, 2017); Tonsillectomy; and Spine surgery. His family history includes Breast Cancer in his sister; Cancer in his mother; Diabetes type 2  in his brother; Heart Disease in his brother; Kidney Disease in his brother. Mr. Wilmar Serrato reports that he quit smoking about 32 years ago. His smoking use included cigarettes.  He has a 15.00 pack-year smoking history. He quit smokeless tobacco use about 45 years ago. He reports current alcohol use. He reports that he does not use drugs. His current medication list consists of Embrace Blood Glucose Monitor, Embrace Lancets Ultra Thin 30G, Insulin Syringe-Needle U-100, Multiple Vitamins-Minerals, apixaban, blood glucose test strips, dilTIAZem, empagliflozin, furosemide, hydroCHLOROthiazide, insulin glargine-yfgn, levothyroxine, pioglitazone, rosuvastatin, and tamsulosin. Allergies: Patient has no known allergies. Pertinent items from the review of systems are discussed in the HPI; the remainder of the ROS was reviewed and is negative. Objective:     /69   Pulse 68   Temp 97.8 °F (36.6 °C) (Oral)   Resp 20   Ht 5' 11\" (1.803 m)   Wt 241 lb (109.3 kg)   BMI 33.61 kg/m²       Dorsalis pedis pulse left palpable  Posterior tibial pulse left palpable  Dorsalis pedis pulse right palpable  Posterior tibial pulse right palpable      Ulcer on the anterior aspect left lower leg with very minimal fibrotic tissue, red granulation tissue, mild serous drainage, no hyperkeratotic rim, no undermining, no tunneling, no purulence, no malodor, no eschar, no periwound maceration, mild periwound erythema, mild edema,  no crepitus, no increase in skin temperature, ulcer probes to soft tissue only    Multiple small skin bridges and islands noted. Edema bilateral LE with thickening of the lower leg skin noted. Venous dermatitis bilateral LE. Today's ulcer measurements are in the wound documentation flowsheet.      Wound measurements:  Wound 06/13/22 #1, Left Medial Leg, Lymphedema, Full Thickness, Onset Unknown (several years)-Wound Length (cm): 1.3 cm    Wound 06/13/22 #1, Left Medial Leg, Lymphedema, Full Thickness, Onset Unknown (several years)-Wound Width (cm): 2 cm    Wound 06/13/22 #1, Left Medial Leg, Lymphedema, Full Thickness, Onset Unknown (several years)-Wound Depth (cm): 0.2 cm    LABS  Lab Results   Component Value Date    LABA1C 7.1 09/08/2022         Assessment:     Patient Active Problem List   Diagnosis Code    Benign essential HTN I10    Osteoarthrosis involving lower leg [id number]    Pain in joint, lower leg M25.569    Hepatitis C virus infection without hepatic coma B19.20    Benign essential tremor G25.0    Hypertrophy of prostate without urinary obstruction and other lower urinary tract symptoms (LUTS) N40.0    Hypothyroidism E03.9    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) E11.22, N18.30    Chronic hepatitis C without hepatic coma (HCC) B18.2    Hyperlipidemia associated with type 2 diabetes mellitus (HCC) E11.69, E78.5    Type 2 diabetes mellitus 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 edema (HCC) K53.3343    Degenerative tear of medial meniscus of left knee M23.204    Primary osteoarthritis of left knee M17.12    PAF (paroxysmal atrial fibrillation) (HCC) I48.0    MGUS (monoclonal gammopathy of unknown significance) D47.2    Kidney lesion N28.9    Premature atrial contraction I49.1    Nasal polyposis J33.9    Ulcer of left pretibial region with fat layer exposed ([address] N86.783    Syncope and collapse R55    Stage 3a chronic kidney disease (HCC) N18.31    Longstanding persistent atrial fibrillation (HCC) I48.11       Assessment of today's active condition(s): lymphedema/venous leg ulcer left. Factors contributing to occurrence and/or persistence of the chronic ulcer include edema, venous stasis, and lymphedema. Sharp debridement is not indicated today, based upon the exam findings in the ulcer(s) above. Discharge plan:     Treatment in the wound care center today: Wound 06/13/22 #1, Left Medial Leg, Lymphedema, Full Thickness, Onset Unknown (several years)-Dressing/Treatment: Other (comment) (collagen bordered gauze). Home treatment: good handwashing before and after any dressing changes.  Cleanse ulcer with saline or soap & water before dressing change. May use Vaseline (petrolatum), Aquaphor, Aveeno, CeraVe, Cetaphil, Eucerin, Lubriderm, etc for dry skin. Dressing type for home: Mepilex border and compression stocking. Written discharge instructions given to patient. Offload ulcer(s) as directed. Elevate leg(s) as directed. Exercise daily. Could benefit from compression pumps to help control edema. Follow up in 1 week.              Electronically signed by Annamaria Pratt DPM on 10/19/2022 at 2:32 PM.

## 2022-10-24 ENCOUNTER — HOSPITAL ENCOUNTER (OUTPATIENT)
Dept: WOUND CARE | Age: 82
Discharge: HOME | End: 2022-10-24
Payer: MEDICARE

## 2022-10-24 VITALS
SYSTOLIC BLOOD PRESSURE: 132 MMHG | BODY MASS INDEX: 32.83 KG/M2 | HEIGHT: 71 IN | TEMPERATURE: 98.6 F | HEART RATE: 71 BPM | DIASTOLIC BLOOD PRESSURE: 78 MMHG | WEIGHT: 234.5 LBS | RESPIRATION RATE: 20 BRPM

## 2022-10-24 DIAGNOSIS — I89.0 LYMPHEDEMA: ICD-10-CM

## 2022-10-24 DIAGNOSIS — L97.822 ULCER OF LEFT PRETIBIAL REGION WITH FAT LAYER EXPOSED (HCC): Primary | ICD-10-CM

## 2022-10-24 PROCEDURE — 99212 OFFICE O/P EST SF 10 MIN: CPT

## 2022-10-24 RX ORDER — BLOOD SUGAR DIAGNOSTIC
STRIP MISCELLANEOUS
Qty: 200 STRIP | Refills: 3 | Status: SHIPPED | OUTPATIENT
Start: 2022-10-24

## 2022-10-24 RX ORDER — LIDOCAINE 40 MG/G
CREAM TOPICAL ONCE
Status: DISCONTINUED | OUTPATIENT
Start: 2022-10-24 | End: 2022-10-25 | Stop reason: HOSPADM

## 2022-10-24 RX ORDER — BACITRACIN ZINC AND POLYMYXIN B SULFATE 500; 1000 [USP'U]/G; [USP'U]/G
OINTMENT TOPICAL ONCE
Status: CANCELLED | OUTPATIENT
Start: 2022-10-24 | End: 2022-10-24

## 2022-10-24 RX ORDER — LIDOCAINE 50 MG/G
OINTMENT TOPICAL ONCE
Status: CANCELLED | OUTPATIENT
Start: 2022-10-24 | End: 2022-10-24

## 2022-10-24 RX ORDER — LIDOCAINE HYDROCHLORIDE 40 MG/ML
SOLUTION TOPICAL ONCE
Status: CANCELLED | OUTPATIENT
Start: 2022-10-24 | End: 2022-10-24

## 2022-10-24 RX ORDER — LIDOCAINE 40 MG/G
CREAM TOPICAL ONCE
Status: CANCELLED | OUTPATIENT
Start: 2022-10-24 | End: 2022-10-24

## 2022-10-24 NOTE — PROGRESS NOTES
[facility] Progress Note      Shayne Lundberg     : 1940    DATE OF VISIT:  10/24/2022    Subjective:     Shayne Lundberg is a 80 y.o. male who has a chief complaint of a venous/lymphedema ulcer located on the left leg. States doing well with wound. Doing well with compression stockings. States stays active. Mr. 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 ([address] Esophageal reflux, Fracture of nasal bone, Headache, Hearing loss, Hyperlipidemia, Hyperlipidemia associated with type 2 diabetes mellitus ([address] Hypertension, Hypertrophy of prostate without urinary obstruction and other lower urinary tract symptoms (LUTS), Hypothyroidism, Insufficiency fracture of tibia, Osteoarthrosis, not specified whether generalized/localized, lower leg, Pain in joint, lower leg, Right knee pain, Status post total left knee replacement, Tinnitus, Type 2 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 He has a past surgical history that includes eye surgery; Colonoscopy (07); Esophagus surgery; Knee arthroscopy; Cholecystectomy, laparoscopic; other surgical history (Left, 2016); joint replacement (Left, 2017); Tonsillectomy; and Spine surgery. His family history includes Breast Cancer in his sister; Cancer in his mother; Diabetes type 2  in his brother; Heart Disease in his brother; Kidney Disease in his brother. Mr. Yaya Reynoso reports that he quit smoking about 32 years ago. His smoking use included cigarettes.  He has a 15.00 pack-year smoking history. He quit smokeless tobacco use about 45 years ago. He reports current alcohol use. He reports that he does not use drugs. His current medication list consists of Embrace Blood Glucose Monitor, Embrace Lancets Ultra Thin 30G, Insulin Syringe-Needle U-100, Multiple Vitamins-Minerals, apixaban, blood glucose test strips, dilTIAZem, empagliflozin, furosemide, hydroCHLOROthiazide, insulin glargine-yfgn, levothyroxine, pioglitazone, rosuvastatin, and tamsulosin. Allergies: Patient has no known allergies. Pertinent items from the review of systems are discussed in the HPI; the remainder of the ROS was reviewed and is negative. Objective:     /78   Pulse 71   Temp 98.6 °F (37 °C) (Oral)   Resp 20   Ht 5' 11\" (1.803 m)   Wt 234 lb 8 oz (106.4 kg)   BMI 32.71 kg/m²       Dorsalis pedis pulse left palpable  Posterior tibial pulse left palpable  Dorsalis pedis pulse right palpable  Posterior tibial pulse right palpable      Ulcer on the anterior aspect left lower leg with very minimal fibrotic tissue, red granulation tissue, mild serous drainage, no hyperkeratotic rim, no undermining, no tunneling, no purulence, no malodor, no eschar, no periwound maceration, mild periwound erythema, mild edema,  no crepitus, no increase in skin temperature, ulcer probes to soft tissue only    Multiple small skin bridges and islands noted. Edema bilateral LE with thickening of the lower leg skin noted. Venous dermatitis bilateral LE. Hyperpigmentation of lower legs noted. Thickening of skin on bilateral LE. Today's ulcer measurements are in the wound documentation flowsheet.      Wound measurements:  Wound 06/13/22 #1, Left Medial Leg, Lymphedema, Full Thickness, Onset Unknown (several years)-Wound Length (cm): 0.9 cm    Wound 06/13/22 #1, Left Medial Leg, Lymphedema, Full Thickness, Onset Unknown (several years)-Wound Width (cm): 2.2 cm    Wound 06/13/22 #1, Left Medial Leg, Lymphedema, Full Thickness, Onset Unknown (several years)-Wound Depth (cm): 0.1 cm    LABS  Lab Results   Component Value Date    LABA1C 7.1 09/08/2022         Assessment:     Patient Active Problem List   Diagnosis Code    Benign essential HTN I10    Osteoarthrosis involving lower leg [id number]    Pain in joint, lower leg M25.569    Hepatitis C virus infection without hepatic coma B19.20    Benign essential tremor G25.0    Hypertrophy of prostate without urinary obstruction and other lower urinary tract symptoms (LUTS) N40.0    Hypothyroidism E03.9    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) E11.22, N18.30    Chronic hepatitis C without hepatic coma (HCC) B18.2    Hyperlipidemia associated with type 2 diabetes mellitus (HCC) E11.69, E78.5    Type 2 diabetes mellitus 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 edema (HCC) P71.3588    Degenerative tear of medial meniscus of left knee M23.204    Primary osteoarthritis of left knee M17.12    PAF (paroxysmal atrial fibrillation) (HCC) I48.0    MGUS (monoclonal gammopathy of unknown significance) D47.2    Kidney lesion N28.9    Premature atrial contraction I49.1    Nasal polyposis J33.9    Ulcer of left pretibial region with fat layer exposed ([address] I73.250    Syncope and collapse R55    Stage 3a chronic kidney disease (HCC) N18.31    Longstanding persistent atrial fibrillation (HCC) I48.11    Lymphedema I89.0       Assessment of today's active condition(s): lymphedema/venous leg ulcer left, Lymphedema, lymphorrhea. Factors contributing to occurrence and/or persistence of the chronic ulcer include edema, venous stasis, and lymphedema. Sharp debridement is not indicated today, based upon the exam findings in the ulcer(s) above. Discharge plan:     Treatment in the wound care center today: Wound 06/13/22 #1, Left Medial Leg, Lymphedema, Full Thickness, Onset Unknown (several years)-Dressing/Treatment: Other (comment) (collagen bordered gauze).     Home treatment: good handwashing before and after any dressing changes. Cleanse ulcer with saline or soap & water before dressing change. May use Vaseline (petrolatum), Aquaphor, Aveeno, CeraVe, Cetaphil, Eucerin, Lubriderm, etc for dry skin. Dressing type for home: Mepilex border and compression stocking. He has been using his compression stockings for greater than 4 weeks. Written discharge instructions given to patient. Offload ulcer(s) as directed. Elevate leg(s) as directed. Exercise daily. Could benefit from compression pumps to help control edema. Follow up in 1 week. Patient would benefit from compression pumps in order to help control the lower extremity edema and ulcerations. This would help control his lymphorrhea as well and allow the wound to resolve.          Electronically signed by Edith Obrien DPM on 10/28/2022 at 8:50 AM.

## 2022-10-24 NOTE — PLAN OF CARE
Pt to the [address] [place] [address],[address] for follow up appointment. Wound improving and lower leg edema is decreased. Pt received new compression stockings. Pt to continue with weekly dressing to wound. Pt to follow up in the [address] [place] [address],[address] [place] [address] in 1 week. Discharge instructions reviewed with patient, all questions answered, copy given to patient. Dressings were applied to all wounds per M.D. Instructions at this visit.

## 2022-10-25 NOTE — DISCHARGE INSTRUCTIONS
215 [facility] Physician Orders and Discharge 800 [facility]  [address], [address]  [place], [facility]  Telephone: [phone]      Fax: [phone] home care company:       Your wound-care supplies will be provided by:     NAME:  Farida Torres   YOB: 1940  PRIMARY DIAGNOSIS FOR WOUND CARE CENTER: Lymphedema     Wound cleansing:  Do not scrub or use excessive force. Wash hands with soap and water before and after dressing changes. Prior to applying a clean dressing, cleanse wound with normal saline, wound cleanser, or mild soap and water. Ask your physician or nurse before getting the wound(s) wet in the shower. Wound care for home:      Left lower leg wound:      Wash wound with soap and water with dressing changes      Benzoin to periwound      collagen ag      mepilex border      Leave on all week. Wear compression stockings daily       Right lower leg    Medium spandagrip or compression stocking toes to knees on right lower leg. May put on in the morning and take off at bed time. Please note, all wounds (unless stated otherwise here) were mechanically debrided at the time of cleansing here in the wound-care center today, so a small amount of pain, drainage or bleeding from that process might be expected, and is normal.     All products for home use, including multiple products for a single wound if applicable, are medically necessary in order to achieve the best chance at timely wound healing. See provider documentation for details if needed. Substituted dressings applied in the [address] [place] [address],[address] today, if applicable:           New orders for this week (labs, imaging, medications, etc.):           Additional instructions for specific diagnoses:     General comments for venous / lymphedema ulcers: *  Elevate your legs to the level of your heart for at least 30 minutes, several times daily.   *  Walk as much as you can tolerate. Avoid standing for long periods of time, but if you must stand, regularly do heel-raises and calf-pump exercises. *  If you have compression wraps designed to remain in place all week, be sure to keep them from getting wet. *  If you have compression garments that can be changed regularly, apply them first thing in the morning, and remove them when you go to bed. Moisturize your skin at bedtime, with Vaseline, Aquaphor, Aveeno, CeraVe, Cetaphil, Eucerin, Lubriderm, etc; but keep the skin between your toes dry. *  If you smoke, your wound can not heal properly -- please talk with us when you're ready to quit. *  Be sure to adhere to any recommendations from your PCP about diuretics (water pills), diet, exercise, and maintaining a healthy weight. If you have questions, please ask. *  If you have a compression pump, aim for at least two hours of use daily. F/U Appointment is with Dr. Stuart Green in 1 week on                                   at                       .     Your nurse  is Ap Ibarra RN. If we applied slip-resistant hospital socks today, be sure to remove them at least once a day to inspect your toes or feet, even if you're not changing the wraps or dressings underneath. If you see anything concerning (redness, excess moisture, etc), please call and let us know right away. Should you experience any significant changes in your wound(s) (including redness, increased warmth, increased pain, increased drainage, odor, or fever) or have questions about your wound care, please contact the [address] [place] [address] at [phone] Monday-Thursday from 8:00 am - 4:30 pm, or Friday from 8:00 am - 2:30 pm.  If you need help with your wound outside these hours and cannot wait until we are again available, contact your home-care company (if applicable), your PCP, or go to the nearest emergency room.

## 2022-10-28 PROBLEM — I89.0 LYMPHEDEMA: Status: ACTIVE | Noted: 2022-10-28

## 2022-10-31 ENCOUNTER — HOSPITAL ENCOUNTER (OUTPATIENT)
Dept: WOUND CARE | Age: 82
Discharge: HOME | End: 2022-10-31
Payer: MEDICARE

## 2022-10-31 VITALS
WEIGHT: 232.8 LBS | BODY MASS INDEX: 32.59 KG/M2 | HEIGHT: 71 IN | HEART RATE: 71 BPM | DIASTOLIC BLOOD PRESSURE: 66 MMHG | RESPIRATION RATE: 20 BRPM | SYSTOLIC BLOOD PRESSURE: 116 MMHG | TEMPERATURE: 97.8 F

## 2022-10-31 DIAGNOSIS — L97.822 ULCER OF LEFT PRETIBIAL REGION WITH FAT LAYER EXPOSED (HCC): Primary | ICD-10-CM

## 2022-10-31 PROCEDURE — 99212 OFFICE O/P EST SF 10 MIN: CPT

## 2022-10-31 RX ORDER — LIDOCAINE 40 MG/G
CREAM TOPICAL ONCE
Status: DISCONTINUED | OUTPATIENT
Start: 2022-10-31 | End: 2022-11-01 | Stop reason: HOSPADM

## 2022-10-31 RX ORDER — LIDOCAINE 50 MG/G
OINTMENT TOPICAL ONCE
Status: CANCELLED | OUTPATIENT
Start: 2022-10-31 | End: 2022-10-31

## 2022-10-31 RX ORDER — LIDOCAINE 40 MG/G
CREAM TOPICAL ONCE
Status: CANCELLED | OUTPATIENT
Start: 2022-10-31 | End: 2022-10-31

## 2022-10-31 RX ORDER — LIDOCAINE HYDROCHLORIDE 40 MG/ML
SOLUTION TOPICAL ONCE
Status: CANCELLED | OUTPATIENT
Start: 2022-10-31 | End: 2022-10-31

## 2022-10-31 RX ORDER — BACITRACIN ZINC AND POLYMYXIN B SULFATE 500; 1000 [USP'U]/G; [USP'U]/G
OINTMENT TOPICAL ONCE
Status: CANCELLED | OUTPATIENT
Start: 2022-10-31 | End: 2022-10-31

## 2022-10-31 NOTE — PLAN OF CARE
Pt to the [facility] for follow up appointment. Wound measuring smaller. Pt to continue with collagen and bordered gauze to wound. Pt to continue with compression stockings and is waiting on insurance approval for compression pumps. Patient's weight is down again this week. Pt to follow up in the [facility] in 1 week. Discharge instructions reviewed with patient, all questions answered, copy given to patient. Dressings were applied to all wounds per M.D. Instructions at this visit.

## 2022-10-31 NOTE — PROGRESS NOTES
88 [facility] Progress Note      Brad Farias     : 1940    DATE OF VISIT:  10/31/2022    Subjective:     Brad Farias is a 80 y.o. male who has a chief complaint of a venous/lymphedema ulcer located on the left leg. States doing well with wound. Doing well with compression stockings. States stays active. Mr. 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 ([address] Esophageal reflux, Fracture of nasal bone, Headache, Hearing loss, Hyperlipidemia, Hyperlipidemia associated with type 2 diabetes mellitus ([address] Hypertension, Hypertrophy of prostate without urinary obstruction and other lower urinary tract symptoms (LUTS), Hypothyroidism, Insufficiency fracture of tibia, Osteoarthrosis, not specified whether generalized/localized, lower leg, Pain in joint, lower leg, Right knee pain, Status post total left knee replacement, Tinnitus, Type 2 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 He has a past surgical history that includes eye surgery; Colonoscopy (07); Esophagus surgery; Knee arthroscopy; Cholecystectomy, laparoscopic; other surgical history (Left, 2016); joint replacement (Left, 2017); Tonsillectomy; and Spine surgery. His family history includes Breast Cancer in his sister; Cancer in his mother; Diabetes type 2  in his brother; Heart Disease in his brother; Kidney Disease in his brother. Mr. Wilmar Serrato reports that he quit smoking about 32 years ago. His smoking use included cigarettes.  He has a 15.00 pack-year smoking history. He quit smokeless tobacco use about 45 years ago. He reports current alcohol use. He reports that he does not use drugs. His current medication list consists of Embrace Blood Glucose Monitor, Embrace Lancets Ultra Thin 30G, Insulin Syringe-Needle U-100, Multiple Vitamins-Minerals, apixaban, blood glucose test strips, dilTIAZem, empagliflozin, furosemide, hydroCHLOROthiazide, insulin glargine-yfgn, levothyroxine, pioglitazone, rosuvastatin, and tamsulosin. Allergies: Patient has no known allergies. Pertinent items from the review of systems are discussed in the HPI; the remainder of the ROS was reviewed and is negative. Objective:     /66   Pulse 71   Temp 97.8 °F (36.6 °C) (Oral)   Resp 20   Ht 5' 11\" (1.803 m)   Wt 232 lb 12.8 oz (105.6 kg)   BMI 32.47 kg/m²       Dorsalis pedis pulse left palpable  Posterior tibial pulse left palpable  Dorsalis pedis pulse right palpable  Posterior tibial pulse right palpable      Ulcer on the anterior aspect left lower leg with very minimal fibrotic tissue, red granulation tissue, mild serous drainage, no hyperkeratotic rim, no undermining, no tunneling, no purulence, no malodor, no eschar, no periwound maceration, mild periwound erythema, mild edema,  no crepitus, no increase in skin temperature, ulcer probes to soft tissue only    Weeping of skin noted at ulcer site. Multiple skin bridges and islands noted. Edema bilateral LE with thickening of the lower leg skin noted. Venous dermatitis bilateral LE. Hyperpigmentation of lower legs noted. Thickening of skin on bilateral LE. Today's ulcer measurements are in the wound documentation flowsheet.      Wound measurements:  Wound 06/13/22 #1, Left Medial Leg, Lymphedema, Full Thickness, Onset Unknown (several years)-Wound Length (cm): 1 cm    Wound 06/13/22 #1, Left Medial Leg, Lymphedema, Full Thickness, Onset Unknown (several years)-Wound Width (cm): 1.7 cm    Wound 06/13/22 #1, Left Medial Leg, Lymphedema, Full Thickness, Onset Unknown (several years)-Wound Depth (cm): 0.2 cm    LABS  Lab Results   Component Value Date    LABA1C 7.1 09/08/2022         Assessment:     Patient Active Problem List   Diagnosis Code    Benign essential HTN I10    Osteoarthrosis involving lower leg [id number]    Pain in joint, lower leg M25.569    Hepatitis C virus infection without hepatic coma B19.20    Benign essential tremor G25.0    Hypertrophy of prostate without urinary obstruction and other lower urinary tract symptoms (LUTS) N40.0    Hypothyroidism E03.9    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) E11.22, N18.30    Chronic hepatitis C without hepatic coma (HCC) B18.2    Hyperlipidemia associated with type 2 diabetes mellitus (HCC) E11.69, E78.5    Type 2 diabetes mellitus 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 edema (HCC) K32.8573    Degenerative tear of medial meniscus of left knee M23.204    Primary osteoarthritis of left knee M17.12    PAF (paroxysmal atrial fibrillation) (HCC) I48.0    MGUS (monoclonal gammopathy of unknown significance) D47.2    Kidney lesion N28.9    Premature atrial contraction I49.1    Nasal polyposis J33.9    Ulcer of left pretibial region with fat layer exposed ([facility] [address] M24.523    Syncope and collapse R55    Stage 3a chronic kidney disease (HCC) N18.31    Longstanding persistent atrial fibrillation (HCC) I48.11    Lymphedema I89.0       Assessment of today's active condition(s): lymphedema/venous leg ulcer left, Lymphedema, lymphorrhea. Factors contributing to occurrence and/or persistence of the chronic ulcer include edema, venous stasis, and lymphedema. Sharp debridement is not indicated today, based upon the exam findings in the ulcer(s) above.       Discharge plan:     Treatment in the wound care center today: Wound 06/13/22 #1, Left Medial Leg, Lymphedema, Full Thickness, Onset Unknown (several years)-Dressing/Treatment: Other (comment) (Benzoin collagen bordered gauze). Home treatment: good handwashing before and after any dressing changes. Cleanse ulcer with saline or soap & water before dressing change. May use Vaseline (petrolatum), Aquaphor, Aveeno, CeraVe, Cetaphil, Eucerin, Lubriderm, etc for dry skin. Dressing type for home: Mepilex border and compression stocking. He has been using his compression stockings for greater than 4 weeks. Written discharge instructions given to patient. Offload ulcer(s) as directed. Elevate leg(s) as directed. Exercise daily. Could benefit from compression pumps to help control edema. Follow up in 1 week. Patient would benefit from compression pumps in order to help control the lower extremity edema and ulcerations. This would help control his lymphorrhea as well and allow the wound to resolve.          Electronically signed by Lashawn Torrez DPM on 10/31/2022 at 3:14 PM.

## 2022-11-01 NOTE — DISCHARGE INSTRUCTIONS
215 [facility] Physician Orders and Discharge 800 [facility]  [address], [address]  [place], [facility]  Telephone: [phone]      Fax: [phone] home care company:       Your wound-care supplies will be provided by:     NAME:  Brenda He   YOB: 1940  PRIMARY DIAGNOSIS FOR WOUND CARE CENTER: Lymphedema     Wound cleansing:  Do not scrub or use excessive force. Wash hands with soap and water before and after dressing changes. Prior to applying a clean dressing, cleanse wound with normal saline, wound cleanser, or mild soap and water. Ask your physician or nurse before getting the wound(s) wet in the shower. Wound care for home:      Left lower leg wound:      Wash wound with soap and water with dressing changes      Benzoin to periwound      collagen ag      mepilex border      Leave on all week. Wear compression stockings daily       Right lower leg    Medium spandagrip or compression stocking toes to knees on right lower leg. May put on in the morning and take off at bed time. Please note, all wounds (unless stated otherwise here) were mechanically debrided at the time of cleansing here in the wound-care center today, so a small amount of pain, drainage or bleeding from that process might be expected, and is normal.     All products for home use, including multiple products for a single wound if applicable, are medically necessary in order to achieve the best chance at timely wound healing. See provider documentation for details if needed. Substituted dressings applied in the [facility] today, if applicable:           New orders for this week (labs, imaging, medications, etc.):           Additional instructions for specific diagnoses:     General comments for venous / lymphedema ulcers: *  Elevate your legs to the level of your heart for at least 30 minutes, several times daily.   *  Walk as much as you can tolerate. Avoid standing for long periods of time, but if you must stand, regularly do heel-raises and calf-pump exercises. *  If you have compression wraps designed to remain in place all week, be sure to keep them from getting wet. *  If you have compression garments that can be changed regularly, apply them first thing in the morning, and remove them when you go to bed. Moisturize your skin at bedtime, with Vaseline, Aquaphor, Aveeno, CeraVe, Cetaphil, Eucerin, Lubriderm, etc; but keep the skin between your toes dry. *  If you smoke, your wound can not heal properly -- please talk with us when you're ready to quit. *  Be sure to adhere to any recommendations from your PCP about diuretics (water pills), diet, exercise, and maintaining a healthy weight. If you have questions, please ask. *  If you have a compression pump, aim for at least two hours of use daily. F/U Appointment is with Dr. Zahira Zeng in 1 week on                                   at                       .     Your nurse  is Alexandre Aragon RN. If we applied slip-resistant hospital socks today, be sure to remove them at least once a day to inspect your toes or feet, even if you're not changing the wraps or dressings underneath. If you see anything concerning (redness, excess moisture, etc), please call and let us know right away. Should you experience any significant changes in your wound(s) (including redness, increased warmth, increased pain, increased drainage, odor, or fever) or have questions about your wound care, please [facility] at [phone] Monday-Thursday from 8:00 am - 4:30 pm, or Friday from 8:00 am - 2:30 pm.  If you need help with your wound outside these hours and cannot wait until we are again available, contact your home-care company (if applicable), your PCP, or go to the nearest emergency room.

## 2022-11-07 ENCOUNTER — HOSPITAL ENCOUNTER (OUTPATIENT)
Dept: WOUND CARE | Age: 82
Discharge: HOME | End: 2022-11-07

## 2022-11-08 NOTE — DISCHARGE INSTRUCTIONS
215 [facility] Physician Orders and Discharge 800 [facility]  [address], [address]  [place] [facility]  Telephone: [phone]      Fax: [phone] home care company:       Your wound-care supplies will be provided by:     NAME:  Juan Garcia   YOB: 1940  PRIMARY DIAGNOSIS FOR WOUND CARE CENTER: Lymphedema     Wound cleansing:  Do not scrub or use excessive force. Wash hands with soap and water before and after dressing changes. Prior to applying a clean dressing, cleanse wound with normal saline, wound cleanser, or mild soap and water. Ask your physician or nurse before getting the wound(s) wet in the shower. Wound care for home:      Left lower leg wound:      Wash wound with soap and water with dressing changes      Benzoin to periwound      collagen ag      mepilex border      Leave on all week. Change dressing in 1 week at home. Wear compression stockings daily       Right lower leg    Medium spandagrip or compression stocking toes to knees on right lower leg. May put on in the morning and take off at bed time. Please note, all wounds (unless stated otherwise here) were mechanically debrided at the time of cleansing here in the wound-care center today, so a small amount of pain, drainage or bleeding from that process might be expected, and is normal.     All products for home use, including multiple products for a single wound if applicable, are medically necessary in order to achieve the best chance at timely wound healing. See provider documentation for details if needed. Substituted dressings applied in the [address] [place] [address],[address] today, if applicable:           New orders for this week (labs, imaging, medications, etc.):           Additional instructions for specific diagnoses:     General comments for venous / lymphedema ulcers:   *  Elevate your legs to the level of your heart for at least 30 minutes, several times daily. *  Walk as much as you can tolerate. Avoid standing for long periods of time, but if you must stand, regularly do heel-raises and calf-pump exercises. *  If you have compression wraps designed to remain in place all week, be sure to keep them from getting wet. *  If you have compression garments that can be changed regularly, apply them first thing in the morning, and remove them when you go to bed. Moisturize your skin at bedtime, with Vaseline, Aquaphor, Aveeno, CeraVe, Cetaphil, Eucerin, Lubriderm, etc; but keep the skin between your toes dry. *  If you smoke, your wound can not heal properly -- please talk with us when you're ready to quit. *  Be sure to adhere to any recommendations from your PCP about diuretics (water pills), diet, exercise, and maintaining a healthy weight. If you have questions, please ask. *  If you have a compression pump, aim for at least two hours of use daily. F/U Appointment is with Dr. Geraldo Vilchis in 2 weeks on                                   at                       .     Your nurse  is Giselle Jain RN. If we applied slip-resistant hospital socks today, be sure to remove them at least once a day to inspect your toes or feet, even if you're not changing the wraps or dressings underneath. If you see anything concerning (redness, excess moisture, etc), please call and let us know right away. Should you experience any significant changes in your wound(s) (including redness, increased warmth, increased pain, increased drainage, odor, or fever) or have questions about your wound care, please contact the [facility] at [phone] Monday-Thursday from 8:00 am - 4:30 pm, or Friday from 8:00 am - 2:30 pm.  If you need help with your wound outside these hours and cannot wait until we are again available, contact your home-care company (if applicable), your PCP, or go to the nearest emergency room.

## 2022-11-14 ENCOUNTER — HOSPITAL ENCOUNTER (OUTPATIENT)
Dept: WOUND CARE | Age: 82
Discharge: HOME | End: 2022-11-14
Payer: MEDICARE

## 2022-11-14 VITALS
TEMPERATURE: 96 F | DIASTOLIC BLOOD PRESSURE: 75 MMHG | SYSTOLIC BLOOD PRESSURE: 126 MMHG | HEART RATE: 74 BPM | RESPIRATION RATE: 18 BRPM

## 2022-11-14 DIAGNOSIS — L97.822 ULCER OF LEFT PRETIBIAL REGION WITH FAT LAYER EXPOSED (HCC): Primary | ICD-10-CM

## 2022-11-14 PROCEDURE — 99212 OFFICE O/P EST SF 10 MIN: CPT

## 2022-11-14 RX ORDER — LIDOCAINE 50 MG/G
OINTMENT TOPICAL ONCE
OUTPATIENT
Start: 2022-11-14 | End: 2022-11-14

## 2022-11-14 RX ORDER — LIDOCAINE HYDROCHLORIDE 40 MG/ML
SOLUTION TOPICAL ONCE
OUTPATIENT
Start: 2022-11-14 | End: 2022-11-14

## 2022-11-14 RX ORDER — LIDOCAINE 40 MG/G
CREAM TOPICAL ONCE
Status: CANCELLED | OUTPATIENT
Start: 2022-11-14 | End: 2022-11-14

## 2022-11-14 RX ORDER — LIDOCAINE 40 MG/G
CREAM TOPICAL ONCE
Status: DISCONTINUED | OUTPATIENT
Start: 2022-11-14 | End: 2022-11-15 | Stop reason: HOSPADM

## 2022-11-14 RX ORDER — BACITRACIN ZINC AND POLYMYXIN B SULFATE 500; 1000 [USP'U]/G; [USP'U]/G
OINTMENT TOPICAL ONCE
OUTPATIENT
Start: 2022-11-14 | End: 2022-11-14

## 2022-11-14 NOTE — PLAN OF CARE
Pt to the [facility] for follow up appointment. Wound was not debrided today. Collagen and bordered gauze applied to wound. Pt to continue to wear compression stockings. Pt to change dressing next week and follow up in the [facility] in 2 weeks. Awaiting approval for compression pumps. Discharge instructions reviewed with patient, all questions answered, copy given to patient. Dressings were applied to all wounds per M.D. Instructions at this visit.

## 2022-11-15 NOTE — PROGRESS NOTES
[facility] Progress Note      Anabella Shane     : 1940    DATE OF VISIT:  2022    Subjective:     Anabella Shane is a 80 y.o. male who has a chief complaint of a venous/lymphedema ulcer located on the left leg. States doing well with wound. Doing well with compression stockings. States stays active. Mr. 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 ([address] Esophageal reflux, Fracture of nasal bone, Headache, Hearing loss, Hyperlipidemia, Hyperlipidemia associated with type 2 diabetes mellitus ([address] Hypertension, Hypertrophy of prostate without urinary obstruction and other lower urinary tract symptoms (LUTS), Hypothyroidism, Insufficiency fracture of tibia, Osteoarthrosis, not specified whether generalized/localized, lower leg, Pain in joint, lower leg, Right knee pain, Status post total left knee replacement, Tinnitus, Type 2 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 He has a past surgical history that includes eye surgery; Colonoscopy (07); Esophagus surgery; Knee arthroscopy; Cholecystectomy, laparoscopic; other surgical history (Left, 2016); joint replacement (Left, 2017); Tonsillectomy; and Spine surgery. His family history includes Breast Cancer in his sister; Cancer in his mother; Diabetes type 2  in his brother; Heart Disease in his brother; Kidney Disease in his brother. Mr. Jessica Grover reports that he quit smoking about 32 years ago. His smoking use included cigarettes.  He has a 15.00 pack-year smoking history. He quit smokeless tobacco use about 45 years ago. He reports current alcohol use. He reports that he does not use drugs. His current medication list consists of Embrace Blood Glucose Monitor, Embrace Lancets Ultra Thin 30G, Insulin Syringe-Needle U-100, Multiple Vitamins-Minerals, apixaban, blood glucose test strips, dilTIAZem, empagliflozin, furosemide, hydroCHLOROthiazide, insulin glargine-yfgn, levothyroxine, pioglitazone, rosuvastatin, and tamsulosin. Allergies: Patient has no known allergies. Pertinent items from the review of systems are discussed in the HPI; the remainder of the ROS was reviewed and is negative. Objective:     /75   Pulse 74   Temp (!) 96 °F (35.6 °C) (Oral)   Resp 18       Dorsalis pedis pulse left palpable  Posterior tibial pulse left palpable  Dorsalis pedis pulse right palpable  Posterior tibial pulse right palpable      Ulcer on the anterior aspect left lower leg with very minimal fibrotic tissue, red granulation tissue, mild serous drainage, no hyperkeratotic rim, no undermining, no tunneling, no purulence, no malodor, no eschar, no periwound maceration, mild periwound erythema, mild edema,  no crepitus, no increase in skin temperature, ulcer probes to soft tissue only    Weeping of skin noted at ulcer site. Multiple skin bridges and islands noted. Edema bilateral LE with thickening of the lower leg skin noted. Venous dermatitis bilateral LE. Hyperpigmentation of lower legs noted. Thickening of skin on bilateral LE. Today's ulcer measurements are in the wound documentation flowsheet.      Wound measurements:  Wound 06/13/22 #1, Left Medial Leg, Lymphedema, Full Thickness, Onset Unknown (several years)-Wound Length (cm): 0.7 cm    Wound 06/13/22 #1, Left Medial Leg, Lymphedema, Full Thickness, Onset Unknown (several years)-Wound Width (cm): 2 cm    Wound 06/13/22 #1, Left Medial Leg, Lymphedema, Full Thickness, Onset Unknown (several years)-Wound Depth (cm): 0.2 cm    LABS  Lab Results   Component Value Date    LABA1C 7.1 09/08/2022         Assessment:     Patient Active Problem List   Diagnosis Code    Benign essential HTN I10    Osteoarthrosis involving lower leg [id number]    Pain in joint, lower leg M25.569    Hepatitis C virus infection without hepatic coma B19.20    Benign essential tremor G25.0    Hypertrophy of prostate without urinary obstruction and other lower urinary tract symptoms (LUTS) N40.0    Hypothyroidism E03.9    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) E11.22, N18.30    Chronic hepatitis C without hepatic coma (HCC) B18.2    Hyperlipidemia associated with type 2 diabetes mellitus (HCC) E11.69, E78.5    Type 2 diabetes mellitus 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 edema (HCC) L77.3165    Degenerative tear of medial meniscus of left knee M23.204    Primary osteoarthritis of left knee M17.12    PAF (paroxysmal atrial fibrillation) (HCC) I48.0    MGUS (monoclonal gammopathy of unknown significance) D47.2    Kidney lesion N28.9    Premature atrial contraction I49.1    Nasal polyposis J33.9    Ulcer of left pretibial region with fat layer exposed ([facility] [address] [facility] D01.419    Syncope and collapse R55    Stage 3a chronic kidney disease (HCC) N18.31    Longstanding persistent atrial fibrillation (HCC) I48.11    Lymphedema I89.0       Assessment of today's active condition(s): lymphedema/venous leg ulcer left, Lymphedema, lymphorrhea. Factors contributing to occurrence and/or persistence of the chronic ulcer include edema, venous stasis, and lymphedema. Sharp debridement is not indicated today, based upon the exam findings in the ulcer(s) above. Discharge plan:     Treatment in the wound care center today: Wound 06/13/22 #1, Left Medial Leg, Lymphedema, Full Thickness, Onset Unknown (several years)-Dressing/Treatment: Other (comment) (collagen bordered gauze).     Home treatment: good handwashing before and after any dressing changes. Cleanse ulcer with saline or soap & water before dressing change. May use Vaseline (petrolatum), Aquaphor, Aveeno, CeraVe, Cetaphil, Eucerin, Lubriderm, etc for dry skin. Dressing type for home: Mepilex border and compression stocking. He has been using his compression stockings for greater than 4 weeks. Written discharge instructions given to patient. Offload ulcer(s) as directed. Elevate leg(s) as directed. Exercise daily. Could benefit from compression pumps to help control edema. Follow up in 2 weeks. Patient would benefit from compression pumps in order to help control the lower extremity edema and ulcerations. This would help control his lymphorrhea as well and allow the wound to resolve.          Electronically signed by Pura Curtis DPM on 11/14/2022 at 8:30 PM.

## 2022-11-18 ENCOUNTER — TELEPHONE (OUTPATIENT)
Dept: INTERNAL MEDICINE CLINIC | Age: 82
End: 2022-11-18

## 2022-11-22 NOTE — DISCHARGE INSTRUCTIONS
[facility] Physician Orders and Discharge 800 [facility]  [address], [address]  [place], [facility]  Telephone: [phone]      Fax: [phone] home care company:       Your wound-care supplies will be provided by:     NAME:  Deejaymimi Nurse   YOB: 1940  PRIMARY DIAGNOSIS FOR WOUND CARE CENTER: Lymphedema     Wound cleansing:  Do not scrub or use excessive force. Wash hands with soap and water before and after dressing changes. Prior to applying a clean dressing, cleanse wound with normal saline, wound cleanser, or mild soap and water. Ask your physician or nurse before getting the wound(s) wet in the shower. Wound care for home:      Left lower leg wound:      Wash wound with soap and water with dressing changes      Benzoin to periwound      collagen ag      mepilex border      Leave on all week. Change dressing in 1 week at home. Wear compression stockings daily       Right lower leg    Medium spandagrip or compression stocking toes to knees on right lower leg. May put on in the morning and take off at bed time. Please note, all wounds (unless stated otherwise here) were mechanically debrided at the time of cleansing here in the wound-care center today, so a small amount of pain, drainage or bleeding from that process might be expected, and is normal.     All products for home use, including multiple products for a single wound if applicable, are medically necessary in order to achieve the best chance at timely wound healing. See provider documentation for details if needed. Substituted dressings applied in the [address] [place] [address],[address] today, if applicable:           New orders for this week (labs, imaging, medications, etc.):           Additional instructions for specific diagnoses:     General comments for venous / lymphedema ulcers:   *  Elevate your legs to the level of your heart for at least 30 minutes, several times daily. *  Walk as much as you can tolerate. Avoid standing for long periods of time, but if you must stand, regularly do heel-raises and calf-pump exercises. *  If you have compression wraps designed to remain in place all week, be sure to keep them from getting wet. *  If you have compression garments that can be changed regularly, apply them first thing in the morning, and remove them when you go to bed. Moisturize your skin at bedtime, with Vaseline, Aquaphor, Aveeno, CeraVe, Cetaphil, Eucerin, Lubriderm, etc; but keep the skin between your toes dry. *  If you smoke, your wound can not heal properly -- please talk with us when you're ready to quit. *  Be sure to adhere to any recommendations from your PCP about diuretics (water pills), diet, exercise, and maintaining a healthy weight. If you have questions, please ask. *  If you have a compression pump, aim for at least two hours of use daily. F/U Appointment is with Dr. Brittnee Snow in 1 week on                                   at                       .     Your nurse  is Wendy Mai RN. If we applied slip-resistant hospital socks today, be sure to remove them at least once a day to inspect your toes or feet, even if you're not changing the wraps or dressings underneath. If you see anything concerning (redness, excess moisture, etc), please call and let us know right away. Should you experience any significant changes in your wound(s) (including redness, increased warmth, increased pain, increased drainage, odor, or fever) or have questions about your wound care, please contact the [address] [place] [address] at [phone] Monday-Thursday from 8:00 am - 4:30 pm, or Friday from 8:00 am - 2:30 pm.  If you need help with your wound outside these hours and cannot wait until we are again available, contact your home-care company (if applicable), your PCP, or go to the nearest emergency room.

## 2022-11-28 ENCOUNTER — HOSPITAL ENCOUNTER (OUTPATIENT)
Dept: WOUND CARE | Age: 82
Discharge: HOME | End: 2022-11-28
Payer: MEDICARE

## 2022-11-28 VITALS
HEIGHT: 71 IN | RESPIRATION RATE: 18 BRPM | TEMPERATURE: 97.7 F | DIASTOLIC BLOOD PRESSURE: 72 MMHG | SYSTOLIC BLOOD PRESSURE: 123 MMHG | WEIGHT: 230 LBS | HEART RATE: 79 BPM | BODY MASS INDEX: 32.2 KG/M2

## 2022-11-28 DIAGNOSIS — L97.822 ULCER OF LEFT PRETIBIAL REGION WITH FAT LAYER EXPOSED (HCC): Primary | ICD-10-CM

## 2022-11-28 PROCEDURE — 99212 OFFICE O/P EST SF 10 MIN: CPT

## 2022-11-28 RX ORDER — LIDOCAINE 50 MG/G
OINTMENT TOPICAL ONCE
OUTPATIENT
Start: 2022-11-28 | End: 2022-11-28

## 2022-11-28 RX ORDER — BACITRACIN ZINC AND POLYMYXIN B SULFATE 500; 1000 [USP'U]/G; [USP'U]/G
OINTMENT TOPICAL ONCE
OUTPATIENT
Start: 2022-11-28 | End: 2022-11-28

## 2022-11-28 RX ORDER — LIDOCAINE 40 MG/G
CREAM TOPICAL ONCE
OUTPATIENT
Start: 2022-11-28 | End: 2022-11-28

## 2022-11-28 RX ORDER — LIDOCAINE 40 MG/G
CREAM TOPICAL ONCE
Status: DISCONTINUED | OUTPATIENT
Start: 2022-11-28 | End: 2022-11-29 | Stop reason: HOSPADM

## 2022-11-28 RX ORDER — LIDOCAINE HYDROCHLORIDE 40 MG/ML
SOLUTION TOPICAL ONCE
OUTPATIENT
Start: 2022-11-28 | End: 2022-11-28

## 2022-11-28 RX ORDER — LEVOTHYROXINE SODIUM 0.1 MG/1
TABLET ORAL
Qty: 90 TABLET | Refills: 3 | Status: SHIPPED | OUTPATIENT
Start: 2022-11-28

## 2022-11-28 NOTE — PLAN OF CARE
Pt to the [address] [place] [address],[address] for follow up appointment. Wound showing improvement. Pt to continue with collagen and mepilex border to wound. Pt to continue to wear compression stockings. Pt to follow up in the [address] [place] [address],[address] [place] [address] in 1 week. Waiting on approval for compression garments. Discharge instructions reviewed with patient, all questions answered, copy given to patient. Dressings were applied to all wounds per M.D. Instructions at this visit.

## 2022-11-29 NOTE — PROGRESS NOTES
[facility] Progress Note      Curt Harding     : 1940    DATE OF VISIT:  2022    Subjective:     Curt Harding is a 80 y.o. male who has a chief complaint of a venous/lymphedema ulcer located on the left leg. States doing well with wound. Doing well with compression stockings. States stays active. Mr. 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 ([address] Esophageal reflux, Fracture of nasal bone, Headache, Hearing loss, Hyperlipidemia, Hyperlipidemia associated with type 2 diabetes mellitus ([address] Hypertension, Hypertrophy of prostate without urinary obstruction and other lower urinary tract symptoms (LUTS), Hypothyroidism, Insufficiency fracture of tibia, Osteoarthrosis, not specified whether generalized/localized, lower leg, Pain in joint, lower leg, Right knee pain, Status post total left knee replacement, Tinnitus, Type 2 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 He has a past surgical history that includes eye surgery; Colonoscopy (07); Esophagus surgery; Knee arthroscopy; Cholecystectomy, laparoscopic; other surgical history (Left, 2016); joint replacement (Left, 2017); Tonsillectomy; and Spine surgery. His family history includes Breast Cancer in his sister; Cancer in his mother; Diabetes type 2  in his brother; Heart Disease in his brother; Kidney Disease in his brother. Mr. Charles Churchill reports that he quit smoking about 32 years ago. His smoking use included cigarettes.  He has a 15.00 pack-year smoking history. He quit smokeless tobacco use about 45 years ago. He reports current alcohol use. He reports that he does not use drugs. His current medication list consists of Embrace Blood Glucose Monitor, Embrace Lancets Ultra Thin 30G, Handicap Placard, Insulin Syringe-Needle U-100, Multiple Vitamins-Minerals, apixaban, blood glucose test strips, dilTIAZem, empagliflozin, furosemide, hydroCHLOROthiazide, insulin glargine-yfgn, levothyroxine, pioglitazone, rosuvastatin, and tamsulosin. Allergies: Patient has no known allergies. Pertinent items from the review of systems are discussed in the HPI; the remainder of the ROS was reviewed and is negative. Objective:     /72   Pulse 79   Temp 97.7 °F (36.5 °C) (Oral)   Resp 18   Ht 5' 11\" (1.803 m)   Wt 230 lb (104.3 kg)   BMI 32.08 kg/m²       Dorsalis pedis pulse left palpable  Posterior tibial pulse left palpable  Dorsalis pedis pulse right palpable  Posterior tibial pulse right palpable      Ulcer on the anterior aspect left lower leg with very minimal fibrotic tissue, red granulation tissue, mild serous drainage, no hyperkeratotic rim, no undermining, no tunneling, no purulence, no malodor, no eschar, no periwound maceration, mild periwound erythema, mild edema,  no crepitus, no increase in skin temperature, ulcer probes to soft tissue only    Weeping of skin noted at ulcer site. Multiple skin bridges and islands noted. Edema bilateral LE with thickening of the lower leg skin noted. Venous dermatitis bilateral LE. Hyperpigmentation of lower legs noted. Thickening of skin on bilateral LE. Today's ulcer measurements are in the wound documentation flowsheet.      Wound measurements:  Wound 06/13/22 #1, Left Medial Leg, Lymphedema, Full Thickness, Onset Unknown (several years)-Wound Length (cm): 1 cm    Wound 06/13/22 #1, Left Medial Leg, Lymphedema, Full Thickness, Onset Unknown (several years)-Wound Width (cm): 2 cm    Wound 06/13/22 #1, Left Medial Leg, Lymphedema, Full Thickness, Onset Unknown (several years)-Wound Depth (cm): 0.1 cm    LABS  Lab Results   Component Value Date    LABA1C 7.1 09/08/2022         Assessment:     Patient Active Problem List   Diagnosis Code    Benign essential HTN I10    Osteoarthrosis involving lower leg [id number]    Pain in joint, lower leg M25.569    Hepatitis C virus infection without hepatic coma B19.20    Benign essential tremor G25.0    Hypertrophy of prostate without urinary obstruction and other lower urinary tract symptoms (LUTS) N40.0    Hypothyroidism E03.9    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) E11.22, N18.30    Chronic hepatitis C without hepatic coma (HCC) B18.2    Hyperlipidemia associated with type 2 diabetes mellitus (HCC) E11.69, E78.5    Type 2 diabetes mellitus 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 edema (HCC) G70.0993    Degenerative tear of medial meniscus of left knee M23.204    Primary osteoarthritis of left knee M17.12    PAF (paroxysmal atrial fibrillation) (HCC) I48.0    MGUS (monoclonal gammopathy of unknown significance) D47.2    Kidney lesion N28.9    Premature atrial contraction I49.1    Nasal polyposis J33.9    Ulcer of left pretibial region with fat layer exposed ([facility] [address] P56.003    Syncope and collapse R55    Stage 3a chronic kidney disease (HCC) N18.31    Longstanding persistent atrial fibrillation (HCC) I48.11    Lymphedema I89.0       Assessment of today's active condition(s): lymphedema/venous leg ulcer left, Lymphedema, lymphorrhea. Factors contributing to occurrence and/or persistence of the chronic ulcer include edema, venous stasis, and lymphedema. Sharp debridement is not indicated today, based upon the exam findings in the ulcer(s) above.       Discharge plan:     Treatment in the wound care center today: Wound 06/13/22 #1, Left Medial Leg, Lymphedema, Full Thickness, Onset Unknown (several years)-Dressing/Treatment: Other (comment) (collagen ag bordered gauze). Home treatment: good handwashing before and after any dressing changes. Cleanse ulcer with saline or soap & water before dressing change. May use Vaseline (petrolatum), Aquaphor, Aveeno, CeraVe, Cetaphil, Eucerin, Lubriderm, etc for dry skin. Dressing type for home: Mepilex border and compression stocking. He has been using his compression stockings for greater than 4 weeks. Written discharge instructions given to patient. Offload ulcer(s) as directed. Elevate leg(s) as directed. Exercise daily. Could benefit from compression pumps to help control edema. Follow up in 1 week. Patient would benefit from compression pumps in order to help control the lower extremity edema and ulcerations. This would help control his lymphorrhea as well and allow the wound to resolve.          Electronically signed by Cheri Zhao DPM on 11/28/2022 at 7:18 PM.

## 2022-11-29 NOTE — DISCHARGE INSTRUCTIONS
215 [facility] Physician Orders and Discharge [address]  [address], [address]  [place], [facility]  Telephone: [phone]      Fax: [phone] home care company:       Your wound-care supplies will be provided by:     NAME:  Boris Bray   YOB: 1940  PRIMARY DIAGNOSIS FOR WOUND CARE CENTER: Lymphedema     Wound cleansing:  Do not scrub or use excessive force. Wash hands with soap and water before and after dressing changes. Prior to applying a clean dressing, cleanse wound with normal saline, wound cleanser, or mild soap and water. Ask your physician or nurse before getting the wound(s) wet in the shower. Wound care for home:      Left lower leg wound:      Wash wound with soap and water with dressing changes      Benzoin to periwound      collagen ag      mepilex border      Leave on all week. Change dressing in 1 week at home. Wear compression stockings daily       Right lower leg    Medium spandagrip or compression stocking toes to knees on right lower leg. May put on in the morning and take off at bed time. Please note, all wounds (unless stated otherwise here) were mechanically debrided at the time of cleansing here in the wound-care center today, so a small amount of pain, drainage or bleeding from that process might be expected, and is normal.     All products for home use, including multiple products for a single wound if applicable, are medically necessary in order to achieve the best chance at timely wound healing. See provider documentation for details if needed. Substituted dressings applied in the [facility] today, if applicable:           New orders for this week (labs, imaging, medications, etc.):           Additional instructions for specific diagnoses:     General comments for venous / lymphedema ulcers:   *  Elevate your legs to the level of your heart for at least 30 minutes, several times daily. *  Walk as much as you can tolerate. Avoid standing for long periods of time, but if you must stand, regularly do heel-raises and calf-pump exercises. *  If you have compression wraps designed to remain in place all week, be sure to keep them from getting wet. *  If you have compression garments that can be changed regularly, apply them first thing in the morning, and remove them when you go to bed. Moisturize your skin at bedtime, with Vaseline, Aquaphor, Aveeno, CeraVe, Cetaphil, Eucerin, Lubriderm, etc; but keep the skin between your toes dry. *  If you smoke, your wound can not heal properly -- please talk with us when you're ready to quit. *  Be sure to adhere to any recommendations from your PCP about diuretics (water pills), diet, exercise, and maintaining a healthy weight. If you have questions, please ask. *  If you have a compression pump, aim for at least two hours of use daily. F/U Appointment is with Dr. Sourav Carter in 1 week on      12-12-22                             at        1000               . Your nurse  is Gene Guillen RN. If we applied slip-resistant hospital socks today, be sure to remove them at least once a day to inspect your toes or feet, even if you're not changing the wraps or dressings underneath. If you see anything concerning (redness, excess moisture, etc), please call and let us know right away. Should you experience any significant changes in your wound(s) (including redness, increased warmth, increased pain, increased drainage, odor, or fever) or have questions about your wound care, please contact the [address] [place] [address] at [phone] Monday-Thursday from 8:00 am - 4:30 pm, or Friday from 8:00 am - 2:30 pm.  If you need help with your wound outside these hours and cannot wait until we are again available, contact your home-care company (if applicable), your PCP, or go to the nearest emergency room.

## 2022-12-05 ENCOUNTER — HOSPITAL ENCOUNTER (OUTPATIENT)
Dept: WOUND CARE | Age: 82
Discharge: HOME | End: 2022-12-05
Payer: MEDICARE

## 2022-12-05 VITALS
WEIGHT: 229.4 LBS | TEMPERATURE: 97.5 F | SYSTOLIC BLOOD PRESSURE: 108 MMHG | HEIGHT: 71 IN | RESPIRATION RATE: 18 BRPM | BODY MASS INDEX: 32.11 KG/M2 | DIASTOLIC BLOOD PRESSURE: 69 MMHG | HEART RATE: 67 BPM

## 2022-12-05 DIAGNOSIS — L97.822 ULCER OF LEFT PRETIBIAL REGION WITH FAT LAYER EXPOSED (HCC): Primary | ICD-10-CM

## 2022-12-05 PROCEDURE — 99212 OFFICE O/P EST SF 10 MIN: CPT

## 2022-12-05 RX ORDER — BACITRACIN ZINC AND POLYMYXIN B SULFATE 500; 1000 [USP'U]/G; [USP'U]/G
OINTMENT TOPICAL ONCE
OUTPATIENT
Start: 2022-12-05 | End: 2022-12-05

## 2022-12-05 RX ORDER — LIDOCAINE 40 MG/G
CREAM TOPICAL ONCE
OUTPATIENT
Start: 2022-12-05 | End: 2022-12-05

## 2022-12-05 RX ORDER — LIDOCAINE 50 MG/G
OINTMENT TOPICAL ONCE
OUTPATIENT
Start: 2022-12-05 | End: 2022-12-05

## 2022-12-05 RX ORDER — LIDOCAINE HYDROCHLORIDE 40 MG/ML
SOLUTION TOPICAL ONCE
OUTPATIENT
Start: 2022-12-05 | End: 2022-12-05

## 2022-12-05 RX ORDER — LIDOCAINE 40 MG/G
CREAM TOPICAL ONCE
Status: DISCONTINUED | OUTPATIENT
Start: 2022-12-05 | End: 2022-12-06 | Stop reason: HOSPADM

## 2022-12-05 NOTE — PROGRESS NOTES
88 [facility] Progress Note      Anum Singh     : 1940    DATE OF VISIT:  2022    Subjective:     Anum Singh is a 80 y.o. male who has a chief complaint of a venous/lymphedema ulcer located on the left leg. States doing well with wound. Doing well with compression stockings. States stays active. Mr. 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 ([address] Esophageal reflux, Fracture of nasal bone, Headache, Hearing loss, Hyperlipidemia, Hyperlipidemia associated with type 2 diabetes mellitus ([address] Hypertension, Hypertrophy of prostate without urinary obstruction and other lower urinary tract symptoms (LUTS), Hypothyroidism, Insufficiency fracture of tibia, Osteoarthrosis, not specified whether generalized/localized, lower leg, Pain in joint, lower leg, Right knee pain, Status post total left knee replacement, Tinnitus, Type 2 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 He has a past surgical history that includes eye surgery; Colonoscopy (07); Esophagus surgery; Knee arthroscopy; Cholecystectomy, laparoscopic; other surgical history (Left, 2016); joint replacement (Left, 2017); Tonsillectomy; and Spine surgery. His family history includes Breast Cancer in his sister; Cancer in his mother; Diabetes type 2  in his brother; Heart Disease in his brother; Kidney Disease in his brother. Mr. Astrid Lee reports that he quit smoking about 32 years ago. His smoking use included cigarettes.  He has a 15.00 pack-year smoking history. He quit smokeless tobacco use about 45 years ago. He reports current alcohol use. He reports that he does not use drugs. His current medication list consists of Embrace Blood Glucose Monitor, Embrace Lancets Ultra Thin 30G, Handicap Placard, Insulin Syringe-Needle U-100, Multiple Vitamins-Minerals, apixaban, blood glucose test strips, dilTIAZem, empagliflozin, furosemide, hydroCHLOROthiazide, insulin glargine-yfgn, levothyroxine, pioglitazone, rosuvastatin, and tamsulosin. Allergies: Patient has no known allergies. Pertinent items from the review of systems are discussed in the HPI; the remainder of the ROS was reviewed and is negative. Objective:     /69   Pulse 67   Temp 97.5 °F (36.4 °C)   Resp 18   Ht 5' 11\" (1.803 m)   Wt 229 lb 6.4 oz (104.1 kg)   BMI 31.99 kg/m²       Dorsalis pedis pulse left palpable  Posterior tibial pulse left palpable  Dorsalis pedis pulse right palpable  Posterior tibial pulse right palpable      Ulcer on the anterior aspect left lower leg with very minimal fibrotic tissue, red granulation tissue, mild serous drainage, no hyperkeratotic rim, no undermining, no tunneling, no purulence, no malodor, no eschar, no periwound maceration, mild periwound erythema, mild edema,  no crepitus, no increase in skin temperature, ulcer probes to soft tissue only    Weeping of skin noted at ulcer site. Multiple skin bridges and islands noted. Edema bilateral LE with thickening of the lower leg skin noted. Venous dermatitis bilateral LE. Hyperpigmentation of lower legs noted. Thickening of skin on bilateral LE. Today's ulcer measurements are in the wound documentation flowsheet.      Wound measurements:  Wound 06/13/22 #1, Left Medial Leg, Lymphedema, Full Thickness, Onset Unknown (several years)-Wound Length (cm): 1 cm    Wound 06/13/22 #1, Left Medial Leg, Lymphedema, Full Thickness, Onset Unknown (several years)-Wound Width (cm): 2 cm    Wound 06/13/22 #1, Left Medial Leg, Lymphedema, Full Thickness, Onset Unknown (several years)-Wound Depth (cm): 0.1 cm    LABS  Lab Results   Component Value Date    LABA1C 7.1 09/08/2022         Assessment:     Patient Active Problem List   Diagnosis Code    Benign essential HTN I10    Osteoarthrosis involving lower leg [id number]    Pain in joint, lower leg M25.569    Hepatitis C virus infection without hepatic coma B19.20    Benign essential tremor G25.0    Hypertrophy of prostate without urinary obstruction and other lower urinary tract symptoms (LUTS) N40.0    Hypothyroidism E03.9    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) E11.22, N18.30    Chronic hepatitis C without hepatic coma (HCC) B18.2    Hyperlipidemia associated with type 2 diabetes mellitus (HCC) E11.69, E78.5    Type 2 diabetes mellitus 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 edema (HCC) L20.6025    Degenerative tear of medial meniscus of left knee M23.204    Primary osteoarthritis of left knee M17.12    PAF (paroxysmal atrial fibrillation) (HCC) I48.0    MGUS (monoclonal gammopathy of unknown significance) D47.2    Kidney lesion N28.9    Premature atrial contraction I49.1    Nasal polyposis J33.9    Ulcer of left pretibial region with fat layer exposed ([facility] B18.389    Syncope and collapse R55    Stage 3a chronic kidney disease (HCC) N18.31    Longstanding persistent atrial fibrillation (HCC) I48.11    Lymphedema I89.0       Assessment of today's active condition(s): lymphedema/venous leg ulcer left, Lymphedema, lymphorrhea. Factors contributing to occurrence and/or persistence of the chronic ulcer include edema, venous stasis, and lymphedema. Sharp debridement is not indicated today, based upon the exam findings in the ulcer(s) above.       Discharge plan:     Treatment in the wound care center today: Wound 06/13/22 #1, Left Medial Leg, Lymphedema, Full Thickness, Onset Unknown (several years)-Dressing/Treatment: Other (comment) (Benzoin yeni-wound, puracol, silicone bordered dressing, compression stocking). Home treatment: good handwashing before and after any dressing changes. Cleanse ulcer with saline or soap & water before dressing change. May use Vaseline (petrolatum), Aquaphor, Aveeno, CeraVe, Cetaphil, Eucerin, Lubriderm, etc for dry skin. Dressing type for home: Mepilex border and compression stocking. He has been using his compression stockings for greater than 4 weeks. Written discharge instructions given to patient. Offload ulcer(s) as directed. Elevate leg(s) as directed. Exercise daily. Could benefit from compression pumps to help control edema. Follow up in 1 week. Patient would benefit from compression pumps in order to help control the lower extremity edema and ulcerations. This would help control his lymphorrhea as well and allow the wound to resolve.          Electronically signed by Benigno Robins DPM on 12/5/2022 at 12:24 PM.

## 2022-12-05 NOTE — PLAN OF CARE
Pt seen in [address] [place] [address],[address] - Wound close to healed - no debride - cont treatment as follows   Left lower leg wound:      Wash wound with soap and water with dressing changes      Benzoin to periwound      collagen ag      mepilex border      Leave on all week. Change dressing in 1 week at home. Wear compression stockings daily       Right lower leg    Medium spandagrip or compression stocking toes to knees on right lower leg. May put on in the morning and take off at bed time.     Reviewed AVS - f/u in 1 week

## 2022-12-06 NOTE — DISCHARGE INSTRUCTIONS
215 [facility] Physician Orders and Discharge 800 [facility]  [address], [address]  [place], [facility]  Telephone: [phone]      Fax: [phone] home care company:       Your wound-care supplies will be provided by:     NAME:  Lesley Conde   YOB: 1940  PRIMARY DIAGNOSIS FOR WOUND CARE CENTER: Lymphedema     Wound cleansing:  Do not scrub or use excessive force. Wash hands with soap and water before and after dressing changes. Prior to applying a clean dressing, cleanse wound with normal saline, wound cleanser, or mild soap and water. Ask your physician or nurse before getting the wound(s) wet in the shower. Wound care for home:      Left lower leg wound:      Wash wound with soap and water with dressing changes      Benzoin to periwound      collagen ag      mepilex border      Leave on all week. Change dressing in 1 week at home. Wear compression stockings daily       Right lower leg    Medium spandagrip or compression stocking toes to knees on right lower leg. May put on in the morning and take off at bed time. Please note, all wounds (unless stated otherwise here) were mechanically debrided at the time of cleansing here in the wound-care center today, so a small amount of pain, drainage or bleeding from that process might be expected, and is normal.     All products for home use, including multiple products for a single wound if applicable, are medically necessary in order to achieve the best chance at timely wound healing. See provider documentation for details if needed. Substituted dressings applied in the [address] [place] [address],[address] today, if applicable:           New orders for this week (labs, imaging, medications, etc.):           Additional instructions for specific diagnoses:     General comments for venous / lymphedema ulcers:   *  Elevate your legs to the level of your heart for at least 30 minutes, several times daily. *  Walk as much as you can tolerate. Avoid standing for long periods of time, but if you must stand, regularly do heel-raises and calf-pump exercises. *  If you have compression wraps designed to remain in place all week, be sure to keep them from getting wet. *  If you have compression garments that can be changed regularly, apply them first thing in the morning, and remove them when you go to bed. Moisturize your skin at bedtime, with Vaseline, Aquaphor, Aveeno, CeraVe, Cetaphil, Eucerin, Lubriderm, etc; but keep the skin between your toes dry. *  If you smoke, your wound can not heal properly -- please talk with us when you're ready to quit. *  Be sure to adhere to any recommendations from your PCP about diuretics (water pills), diet, exercise, and maintaining a healthy weight. If you have questions, please ask. *  If you have a compression pump, aim for at least two hours of use daily. F/U Appointment is with Dr. Sourav Carter in 1 week on                                   at                       .     Your nurse  is Gene Guillen RN. If we applied slip-resistant hospital socks today, be sure to remove them at least once a day to inspect your toes or feet, even if you're not changing the wraps or dressings underneath. If you see anything concerning (redness, excess moisture, etc), please call and let us know right away. Should you experience any significant changes in your wound(s) (including redness, increased warmth, increased pain, increased drainage, odor, or fever) or have questions about your wound care, please contact the [address] [place] [address] at [phone] Monday-Thursday from 8:00 am - 4:30 pm, or Friday from 8:00 am - 2:30 pm.  If you need help with your wound outside these hours and cannot wait until we are again available, contact your home-care company (if applicable), your PCP, or go to the nearest emergency room.

## 2022-12-12 ENCOUNTER — HOSPITAL ENCOUNTER (OUTPATIENT)
Dept: WOUND CARE | Age: 82
Discharge: HOME | End: 2022-12-12
Payer: MEDICARE

## 2022-12-12 VITALS
WEIGHT: 228.6 LBS | HEART RATE: 84 BPM | DIASTOLIC BLOOD PRESSURE: 74 MMHG | BODY MASS INDEX: 32 KG/M2 | HEIGHT: 71 IN | SYSTOLIC BLOOD PRESSURE: 133 MMHG | RESPIRATION RATE: 18 BRPM | TEMPERATURE: 97.6 F

## 2022-12-12 DIAGNOSIS — L97.822 ULCER OF LEFT PRETIBIAL REGION WITH FAT LAYER EXPOSED (HCC): Primary | ICD-10-CM

## 2022-12-12 PROCEDURE — 99212 OFFICE O/P EST SF 10 MIN: CPT

## 2022-12-12 RX ORDER — LIDOCAINE 50 MG/G
OINTMENT TOPICAL ONCE
OUTPATIENT
Start: 2022-12-12 | End: 2022-12-12

## 2022-12-12 RX ORDER — LIDOCAINE 40 MG/G
CREAM TOPICAL ONCE
Status: DISCONTINUED | OUTPATIENT
Start: 2022-12-12 | End: 2022-12-13 | Stop reason: HOSPADM

## 2022-12-12 RX ORDER — BACITRACIN ZINC AND POLYMYXIN B SULFATE 500; 1000 [USP'U]/G; [USP'U]/G
OINTMENT TOPICAL ONCE
OUTPATIENT
Start: 2022-12-12 | End: 2022-12-12

## 2022-12-12 RX ORDER — LIDOCAINE HYDROCHLORIDE 40 MG/ML
SOLUTION TOPICAL ONCE
OUTPATIENT
Start: 2022-12-12 | End: 2022-12-12

## 2022-12-12 RX ORDER — LIDOCAINE 40 MG/G
CREAM TOPICAL ONCE
OUTPATIENT
Start: 2022-12-12 | End: 2022-12-12

## 2022-12-12 NOTE — PLAN OF CARE
Pt to the [facility] for follow up appointment. Wound was not debrided today. Pt to continue with collagen and bordered gauze to wound and to wear compression stockings. Waiting for approval of compression pumps. Pt to follow up in the [facility] in 1 week. Discharge instructions reviewed with patient, all questions answered, copy given to patient. Dressings were applied to all wounds per M.D. Instructions at this visit.

## 2022-12-13 ENCOUNTER — TELEPHONE (OUTPATIENT)
Dept: INTERNAL MEDICINE CLINIC | Age: 82
End: 2022-12-13

## 2022-12-13 NOTE — TELEPHONE ENCOUNTER
----- Message from Satya Navarro sent at 12/13/2022 12:54 PM EST -----  Per Dr Leticia Vanegas  ----- Message -----  From: Satya Navarro  Sent: 12/13/2022  10:59 AM EST  To: Rozina Mccann MD    Patient wanting 2 handicap placards. 1 for the license plate and one placard they can use in case someone is transporting them. Ok to do for both?

## 2022-12-13 NOTE — TELEPHONE ENCOUNTER
----- Message from BOBBY FREEMAN sent at 12/13/2022 12:54 PM EST -----  Per Dr Denice Parekh  ----- Message -----  From: BOBBY FREEMAN  Sent: 12/13/2022  10:59 AM EST  To: Luther Cisse MD    Patient wanting 2 handicap placards. 1 for the license plate and one placard they can use in case someone is transporting them. Ok to do for both?

## 2022-12-13 NOTE — DISCHARGE INSTRUCTIONS
215 [facility] Physician Orders and Discharge 800 [facility]  [address], [address]  [place], [facility]  Telephone: [phone]      Fax: [phone] home care company:       Your wound-care supplies will be provided by:     NAME:  Brenda He   YOB: 1940  PRIMARY DIAGNOSIS FOR WOUND CARE CENTER: Lymphedema     Wound cleansing:  Do not scrub or use excessive force. Wash hands with soap and water before and after dressing changes. Prior to applying a clean dressing, cleanse wound with normal saline, wound cleanser, or mild soap and water. Ask your physician or nurse before getting the wound(s) wet in the shower. Wound care for home:      Left lower leg wound:      Wash wound with soap and water with dressing changes      Benzoin to periwound      collagen ag      mepilex border      Leave on all week. Change dressing in 1 week at home until seen in the [facility] in 3 weeks      Wear compression stockings daily       Right lower leg    Medium spandagrip or compression stocking toes to knees on right lower leg. May put on in the morning and take off at bed time. Please note, all wounds (unless stated otherwise here) were mechanically debrided at the time of cleansing here in the wound-care center today, so a small amount of pain, drainage or bleeding from that process might be expected, and is normal.     All products for home use, including multiple products for a single wound if applicable, are medically necessary in order to achieve the best chance at timely wound healing. See provider documentation for details if needed. Substituted dressings applied in the [facility] today, if applicable:           New orders for this week (labs, imaging, medications, etc.):      Use compression pumps 2 times a day     Additional instructions for specific diagnoses:     General comments for venous / lymphedema ulcers:   *  Elevate your the nearest emergency room.

## 2022-12-13 NOTE — PROGRESS NOTES
88 [facility] Progress Note      Lesley Conde     : 1940    DATE OF VISIT:  2022    Subjective:     Lesley Conde is a 80 y.o. male who has a chief complaint of a venous/lymphedema ulcer located on the left leg. States doing well with wound. Doing well with compression stockings. States stays active. Mr. 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 ([address] Esophageal reflux, Fracture of nasal bone, Headache, Hearing loss, Hyperlipidemia, Hyperlipidemia associated with type 2 diabetes mellitus ([address] Hypertension, Hypertrophy of prostate without urinary obstruction and other lower urinary tract symptoms (LUTS), Hypothyroidism, Insufficiency fracture of tibia, Osteoarthrosis, not specified whether generalized/localized, lower leg, Pain in joint, lower leg, Right knee pain, Status post total left knee replacement, Tinnitus, Type 2 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 He has a past surgical history that includes eye surgery; Colonoscopy (07); Esophagus surgery; Knee arthroscopy; Cholecystectomy, laparoscopic; other surgical history (Left, 2016); joint replacement (Left, 2017); Tonsillectomy; and Spine surgery. His family history includes Breast Cancer in his sister; Cancer in his mother; Diabetes type 2  in his brother; Heart Disease in his brother; Kidney Disease in his brother. Mr. Jesse Walker reports that he quit smoking about 32 years ago. His smoking use included cigarettes.  He has a 15.00 pack-year smoking history. He quit smokeless tobacco use about 45 years ago. He reports current alcohol use. He reports that he does not use drugs. His current medication list consists of Embrace Blood Glucose Monitor, Embrace Lancets Ultra Thin 30G, Handicap Placard, Insulin Syringe-Needle U-100, Multiple Vitamins-Minerals, apixaban, blood glucose test strips, dilTIAZem, empagliflozin, furosemide, hydroCHLOROthiazide, insulin glargine-yfgn, levothyroxine, pioglitazone, rosuvastatin, and tamsulosin. Allergies: Patient has no known allergies. Pertinent items from the review of systems are discussed in the HPI; the remainder of the ROS was reviewed and is negative. Objective:     /74   Pulse 84   Temp 97.6 °F (36.4 °C) (Oral)   Resp 18   Ht 5' 11\" (1.803 m)   Wt 228 lb 9.6 oz (103.7 kg)   BMI 31.88 kg/m²       Dorsalis pedis pulse left palpable  Posterior tibial pulse left palpable  Dorsalis pedis pulse right palpable  Posterior tibial pulse right palpable      Ulcer on the anterior aspect left lower leg with very minimal fibrotic tissue, red granulation tissue, mild serous drainage, no hyperkeratotic rim, no undermining, no tunneling, no purulence, no malodor, no eschar, no periwound maceration, mild periwound erythema, mild edema,  no crepitus, no increase in skin temperature, ulcer probes to soft tissue only      Weeping of skin noted at ulcer site. Multiple skin bridges and islands noted. Edema bilateral LE with thickening of the lower leg skin noted. Venous dermatitis bilateral LE. Hyperpigmentation of lower legs noted. Thickening of skin on bilateral LE. Today's ulcer measurements are in the wound documentation flowsheet.      Wound measurements:  Wound 06/13/22 #1, Left Medial Leg, Lymphedema, Full Thickness, Onset Unknown (several years)-Wound Length (cm): 1 cm    Wound 06/13/22 #1, Left Medial Leg, Lymphedema, Full Thickness, Onset Unknown (several years)-Wound Width (cm): 2 cm    Wound 06/13/22 #1, Left Medial Leg, Lymphedema, Full Thickness, Onset Unknown (several years)-Wound Depth (cm): 0.1 cm    LABS  Lab Results   Component Value Date    LABA1C 7.1 09/08/2022         Assessment:     Patient Active Problem List   Diagnosis Code    Benign essential HTN I10    Osteoarthrosis involving lower leg [id number]    Pain in joint, lower leg M25.569    Hepatitis C virus infection without hepatic coma B19.20    Benign essential tremor G25.0    Hypertrophy of prostate without urinary obstruction and other lower urinary tract symptoms (LUTS) N40.0    Hypothyroidism E03.9    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) E11.22, N18.30    Chronic hepatitis C without hepatic coma (HCC) B18.2    Hyperlipidemia associated with type 2 diabetes mellitus (HCC) E11.69, E78.5    Type 2 diabetes mellitus 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 edema (HCC) V19.3450    Degenerative tear of medial meniscus of left knee M23.204    Primary osteoarthritis of left knee M17.12    PAF (paroxysmal atrial fibrillation) (HCC) I48.0    MGUS (monoclonal gammopathy of unknown significance) D47.2    Kidney lesion N28.9    Premature atrial contraction I49.1    Nasal polyposis J33.9    Ulcer of left pretibial region with fat layer exposed ([facility] [address] O02.570    Syncope and collapse R55    Stage 3a chronic kidney disease (HCC) N18.31    Longstanding persistent atrial fibrillation (HCC) I48.11    Lymphedema I89.0       Assessment of today's active condition(s): lymphedema/venous leg ulcer left, Lymphedema, lymphorrhea. Factors contributing to occurrence and/or persistence of the chronic ulcer include edema, venous stasis, and lymphedema. Sharp debridement is not indicated today, based upon the exam findings in the ulcer(s) above.       Discharge plan:     Treatment in the wound care center today: Wound 06/13/22 #1, Left Medial Leg, Lymphedema, Full Thickness, Onset Unknown (several years)-Dressing/Treatment: Other (comment) (betadine collagen bordered gauze). Home treatment: good handwashing before and after any dressing changes. Cleanse ulcer with saline or soap & water before dressing change. May use Vaseline (petrolatum), Aquaphor, Aveeno, CeraVe, Cetaphil, Eucerin, Lubriderm, etc for dry skin. Dressing type for home: Mepilex border and compression stocking. He has been using his compression stockings for greater than 4 weeks. Written discharge instructions given to patient. Offload ulcer(s) as directed. Elevate leg(s) as directed. Exercise daily. Could benefit from compression pumps to help control edema. Follow up in 1 week. Patient would benefit from compression pumps in order to help control the lower extremity edema and ulcerations. This would help control his lymphorrhea as well and allow the wound to resolve.          Electronically signed by Carter Alvarado DPM on 12/12/2022 at 8:14 PM.

## 2022-12-19 ENCOUNTER — HOSPITAL ENCOUNTER (OUTPATIENT)
Dept: WOUND CARE | Age: 82
Discharge: HOME | End: 2022-12-19
Payer: MEDICARE

## 2022-12-19 VITALS
TEMPERATURE: 97.1 F | BODY MASS INDEX: 32.4 KG/M2 | WEIGHT: 231.4 LBS | SYSTOLIC BLOOD PRESSURE: 121 MMHG | DIASTOLIC BLOOD PRESSURE: 73 MMHG | HEIGHT: 71 IN | HEART RATE: 90 BPM | RESPIRATION RATE: 18 BRPM

## 2022-12-19 PROCEDURE — 99212 OFFICE O/P EST SF 10 MIN: CPT

## 2022-12-19 NOTE — PROGRESS NOTES
[facility] Progress Note      Bruna Conley     : 1940    DATE OF VISIT:  2022    Subjective:     Bruna Conley is a 80 y.o. male who has a chief complaint of a venous/lymphedema ulcer located on the left leg. States doing well with wound. Doing well with compression stockings. States stays active. Did get his pumps and started to use them. Makes his legs feel better. Mr. 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 ([address] Esophageal reflux, Fracture of nasal bone, Headache, Hearing loss, Hyperlipidemia, Hyperlipidemia associated with type 2 diabetes mellitus ([address] Hypertension, Hypertrophy of prostate without urinary obstruction and other lower urinary tract symptoms (LUTS), Hypothyroidism, Insufficiency fracture of tibia, Osteoarthrosis, not specified whether generalized/localized, lower leg, Pain in joint, lower leg, Right knee pain, Status post total left knee replacement, Tinnitus, Type 2 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 He has a past surgical history that includes eye surgery; Colonoscopy (07); Esophagus surgery; Knee arthroscopy; Cholecystectomy, laparoscopic; other surgical history (Left, 2016); joint replacement (Left, 2017); Tonsillectomy; and Spine surgery. His family history includes Breast Cancer in his sister; Cancer in his mother; Diabetes type 2  in his brother; Heart Disease in his brother; Kidney Disease in his brother.      Mr. Katty Sampson reports that he quit smoking about 32 years ago. His smoking use included cigarettes. He has a 15.00 pack-year smoking history. He quit smokeless tobacco use about 45 years ago. He reports current alcohol use. He reports that he does not use drugs. His current medication list consists of Embrace Blood Glucose Monitor, Embrace Lancets Ultra Thin 30G, Handicap Placard, Insulin Syringe-Needle U-100, Multiple Vitamins-Minerals, apixaban, blood glucose test strips, dilTIAZem, empagliflozin, furosemide, hydroCHLOROthiazide, insulin glargine-yfgn, levothyroxine, pioglitazone, rosuvastatin, and tamsulosin. Allergies: Patient has no known allergies. Pertinent items from the review of systems are discussed in the HPI; the remainder of the ROS was reviewed and is negative. Objective:     /73   Pulse 90   Temp 97.1 °F (36.2 °C) (Oral)   Resp 18   Ht 5' 11\" (1.803 m)   Wt 231 lb 6.4 oz (105 kg)   BMI 32.27 kg/m²       Dorsalis pedis pulse left palpable  Posterior tibial pulse left palpable  Dorsalis pedis pulse right palpable  Posterior tibial pulse right palpable      Ulcer on the anterior aspect left lower leg with very minimal fibrotic tissue, red granulation tissue, mild serous drainage, no hyperkeratotic rim, no undermining, no tunneling, no purulence, no malodor, no eschar, no periwound maceration, mild periwound erythema, mild edema,  no crepitus, no increase in skin temperature, ulcer probes to soft tissue only      Weeping of skin noted at ulcer site. Multiple skin bridges and islands noted. Edema bilateral LE with thickening of the lower leg skin noted. Venous dermatitis bilateral LE. Hyperpigmentation of lower legs noted. Thickening of skin on bilateral LE. Today's ulcer measurements are in the wound documentation flowsheet.      Wound measurements:  Wound 06/13/22 #1, Left Medial Leg, Lymphedema, Full Thickness, Onset Unknown (several years)-Wound Length (cm): 1.2 cm    Wound 06/13/22 #1, Left Medial Leg, Lymphedema, Full Thickness, Onset Unknown (several years)-Wound Width (cm): 2 cm    Wound 06/13/22 #1, Left Medial Leg, Lymphedema, Full Thickness, Onset Unknown (several years)-Wound Depth (cm): 0.1 cm    LABS  Lab Results   Component Value Date    LABA1C 7.1 09/08/2022         Assessment:     Patient Active Problem List   Diagnosis Code    Benign essential HTN I10    Osteoarthrosis involving lower leg [id number]    Pain in joint, lower leg M25.569    Hepatitis C virus infection without hepatic coma B19.20    Benign essential tremor G25.0    Hypertrophy of prostate without urinary obstruction and other lower urinary tract symptoms (LUTS) N40.0    Hypothyroidism E03.9    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) E11.22, N18.30    Chronic hepatitis C without hepatic coma (HCC) B18.2    Hyperlipidemia associated with type 2 diabetes mellitus (HCC) E11.69, E78.5    Type 2 diabetes mellitus 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 edema (HCC) V84.6233    Degenerative tear of medial meniscus of left knee M23.204    Primary osteoarthritis of left knee M17.12    PAF (paroxysmal atrial fibrillation) (HCC) I48.0    MGUS (monoclonal gammopathy of unknown significance) D47.2    Kidney lesion N28.9    Premature atrial contraction I49.1    Nasal polyposis J33.9    Ulcer of left pretibial region with fat layer exposed ([facility] R87.520    Syncope and collapse R55    Stage 3a chronic kidney disease (HCC) N18.31    Longstanding persistent atrial fibrillation (HCC) I48.11    Lymphedema I89.0       Assessment of today's active condition(s): lymphedema/venous leg ulcer left, Lymphedema, lymphorrhea. Factors contributing to occurrence and/or persistence of the chronic ulcer include edema, venous stasis, and lymphedema. Sharp debridement is not indicated today, based upon the exam findings in the ulcer(s) above.       Discharge plan:     Treatment in the wound care center today: Wound 06/13/22 #1, Left Medial Leg, Lymphedema, Full Thickness, Onset Unknown (several years)-Dressing/Treatment: Other (comment) (collagen bordered gauze). Home treatment: good handwashing before and after any dressing changes. Cleanse ulcer with saline or soap & water before dressing change. May use Vaseline (petrolatum), Aquaphor, Aveeno, CeraVe, Cetaphil, Eucerin, Lubriderm, etc for dry skin. Dressing type for home: Mepilex border and compression stocking. Continue compression stockings. Written discharge instructions given to patient. Offload ulcer(s) as directed. Elevate leg(s) as directed. Exercise daily. Follow up in 3 weeks due to holiday schedule. Continue to use compression pumps and increase frequency.          Electronically signed by Dimitrios Martinez DPM on 12/19/2022 at 5:20 PM.

## 2022-12-19 NOTE — PLAN OF CARE
Pt to the [address] [place] [address],[address] [facility] for follow up appointment. Wound was not debrided today. Pt to continue with collagen and bordered gauze to wound. Pt received compression pumps this week and has started using them. Pt to follow up in the [address] [place] [address],[address] [place] [address] in 3 weDischarge instructions reviewed with patient, all questions answered, copy given to patient. Dressings were applied to all wounds per M.D. Instructions at this visit. eks due to holiday.

## 2023-01-03 NOTE — DISCHARGE INSTRUCTIONS
215 [facility] Physician Orders and Discharge 800 [facility]  [address], [address]  [place], [facility]  Telephone: [phone]      Fax: [phone] home care company:       Your wound-care supplies will be provided by:     NAME:  Neva Castellanos   YOB: 1940  PRIMARY DIAGNOSIS FOR WOUND CARE CENTER: Lymphedema     Wound cleansing:  Do not scrub or use excessive force. Wash hands with soap and water before and after dressing changes. Prior to applying a clean dressing, cleanse wound with normal saline, wound cleanser, or mild soap and water. Ask your physician or nurse before getting the wound(s) wet in the shower. Wound care for home:      Left lower leg wound:      Wash wound with soap and water with dressing changes      Benzoin to periwound      collagen ag      mepilex border      Leave on all week. Wear compression stockings daily     Left foot wound:     Wash with soap and water      Betadine, mepilex border and coban today in the [facility]      Change in 1-2 days     At Home:  wash with soap and water, cover with dry dressing. May apply coban if needed. Right lower leg    Medium spandagrip or compression stocking toes to knees on right lower leg. May put on in the morning and take off at bed time. Please note, all wounds (unless stated otherwise here) were mechanically debrided at the time of cleansing here in the wound-care center today, so a small amount of pain, drainage or bleeding from that process might be expected, and is normal.     All products for home use, including multiple products for a single wound if applicable, are medically necessary in order to achieve the best chance at timely wound healing. See provider documentation for details if needed.      Substituted dressings applied in the [facility] today, if applicable:           New orders for this week (labs, imaging, medications, etc.):      Use compression pumps 2 times a day     Additional instructions for specific diagnoses:     General comments for venous / lymphedema ulcers: *  Elevate your legs to the level of your heart for at least 30 minutes, several times daily. *  Walk as much as you can tolerate. Avoid standing for long periods of time, but if you must stand, regularly do heel-raises and calf-pump exercises. *  If you have compression wraps designed to remain in place all week, be sure to keep them from getting wet. *  If you have compression garments that can be changed regularly, apply them first thing in the morning, and remove them when you go to bed. Moisturize your skin at bedtime, with Vaseline, Aquaphor, Aveeno, CeraVe, Cetaphil, Eucerin, Lubriderm, etc; but keep the skin between your toes dry. *  If you smoke, your wound can not heal properly -- please talk with us when you're ready to quit. *  Be sure to adhere to any recommendations from your PCP about diuretics (water pills), diet, exercise, and maintaining a healthy weight. If you have questions, please ask. *  If you have a compression pump, aim for at least two hours of use daily. F/U Appointment is with Dr. Trena Lomax in 1 week on                                   at                       .     Your nurse  is Korina Villalta RN. If we applied slip-resistant hospital socks today, be sure to remove them at least once a day to inspect your toes or feet, even if you're not changing the wraps or dressings underneath. If you see anything concerning (redness, excess moisture, etc), please call and let us know right away.      Should you experience any significant changes in your wound(s) (including redness, increased warmth, increased pain, increased drainage, odor, or fever) or have questions about your wound care, please contact the [facility] 30 at [phone] Monday-Thursday from 8:00 am - 4:30 pm, or Friday from 8:00 am - 2:30 pm.  If you need help with your wound outside these hours and cannot wait until we are again available, contact your home-care company (if applicable), your PCP, or go to the nearest emergency room.

## 2023-01-09 ENCOUNTER — HOSPITAL ENCOUNTER (OUTPATIENT)
Dept: WOUND CARE | Age: 83
Discharge: HOME | End: 2023-01-09
Payer: MEDICARE

## 2023-01-09 VITALS
TEMPERATURE: 97.1 F | SYSTOLIC BLOOD PRESSURE: 134 MMHG | RESPIRATION RATE: 20 BRPM | DIASTOLIC BLOOD PRESSURE: 77 MMHG | BODY MASS INDEX: 31.58 KG/M2 | HEIGHT: 71 IN | WEIGHT: 225.6 LBS | HEART RATE: 67 BPM

## 2023-01-09 DIAGNOSIS — L97.822 ULCER OF LEFT PRETIBIAL REGION WITH FAT LAYER EXPOSED (HCC): Primary | ICD-10-CM

## 2023-01-09 PROCEDURE — 11042 DBRDMT SUBQ TIS 1ST 20SQCM/<: CPT

## 2023-01-09 RX ORDER — LIDOCAINE 40 MG/G
CREAM TOPICAL ONCE
OUTPATIENT
Start: 2023-01-09 | End: 2023-01-09

## 2023-01-09 RX ORDER — LIDOCAINE 40 MG/G
CREAM TOPICAL ONCE
Status: DISCONTINUED | OUTPATIENT
Start: 2023-01-09 | End: 2023-01-10 | Stop reason: HOSPADM

## 2023-01-09 RX ORDER — LIDOCAINE 50 MG/G
OINTMENT TOPICAL ONCE
OUTPATIENT
Start: 2023-01-09 | End: 2023-01-09

## 2023-01-09 RX ORDER — BACITRACIN ZINC AND POLYMYXIN B SULFATE 500; 1000 [USP'U]/G; [USP'U]/G
OINTMENT TOPICAL ONCE
OUTPATIENT
Start: 2023-01-09 | End: 2023-01-09

## 2023-01-09 RX ORDER — LIDOCAINE HYDROCHLORIDE 40 MG/ML
SOLUTION TOPICAL ONCE
OUTPATIENT
Start: 2023-01-09 | End: 2023-01-09

## 2023-01-09 ASSESSMENT — PAIN DESCRIPTION - LOCATION: LOCATION: OTHER (COMMENT)

## 2023-01-09 ASSESSMENT — PAIN SCALES - GENERAL: PAINLEVEL_OUTOF10: 3

## 2023-01-09 ASSESSMENT — PAIN DESCRIPTION - ORIENTATION: ORIENTATION: LEFT

## 2023-01-09 ASSESSMENT — PAIN - FUNCTIONAL ASSESSMENT: PAIN_FUNCTIONAL_ASSESSMENT: ACTIVITIES ARE NOT PREVENTED

## 2023-01-09 ASSESSMENT — PAIN DESCRIPTION - PAIN TYPE: TYPE: CHRONIC PAIN

## 2023-01-09 NOTE — PLAN OF CARE
Pt to the [facility] for follow up appointment. Wound on left lower leg improving. Pt dropped iron skillet on left foot. Left foot with hematoma. Hematoma cleaned out per Dr Sandra Alonzo. Dressings applied to wounds. Pt using compression pumps. Pt to follow up in the [facility] in 1 week. Discharge instructions reviewed with patient, all questions answered, copy given to patient. Dressings were applied to all wounds per M.D. Instructions at this visit.

## 2023-01-10 NOTE — DISCHARGE INSTRUCTIONS
215 [facility] Physician Orders and Discharge 800 [facility]  [address], [address]  [place], [facility]  Telephone: [phone]      Fax: [phone] home care company:       Your wound-care supplies will be provided by:     NAME:  Ivonne Latif   YOB: 1940  PRIMARY DIAGNOSIS FOR WOUND CARE CENTER: Lymphedema     Wound cleansing:  Do not scrub or use excessive force. Wash hands with soap and water before and after dressing changes. Prior to applying a clean dressing, cleanse wound with normal saline, wound cleanser, or mild soap and water. Ask your physician or nurse before getting the wound(s) wet in the shower. Wound care for home:      Left lower leg wound:      Wash wound with soap and water with dressing changes      Benzoin to periwound      collagen ag      mepilex border      Leave on all week. Wear compression stockings daily      Left foot wound:     Wash with soap and water     Iodoform 1/4\" into wound    Cover with 4x4's romero    Change daily. Right lower leg    Medium spandagrip or compression stocking toes to knees on right lower leg. May put on in the morning and take off at bed time. Please note, all wounds (unless stated otherwise here) were mechanically debrided at the time of cleansing here in the wound-care center today, so a small amount of pain, drainage or bleeding from that process might be expected, and is normal.     All products for home use, including multiple products for a single wound if applicable, are medically necessary in order to achieve the best chance at timely wound healing. See provider documentation for details if needed. Substituted dressings applied in the [facility] today, if applicable:           New orders for this week (labs, imaging, medications, etc.):      Use compression pumps 2 times a day     Stop by the pharmacy and  antibiotics.   Take as prescribed. Additional instructions for specific diagnoses:     General comments for venous / lymphedema ulcers: *  Elevate your legs to the level of your heart for at least 30 minutes, several times daily. *  Walk as much as you can tolerate. Avoid standing for long periods of time, but if you must stand, regularly do heel-raises and calf-pump exercises. *  If you have compression wraps designed to remain in place all week, be sure to keep them from getting wet. *  If you have compression garments that can be changed regularly, apply them first thing in the morning, and remove them when you go to bed. Moisturize your skin at bedtime, with Vaseline, Aquaphor, Aveeno, CeraVe, Cetaphil, Eucerin, Lubriderm, etc; but keep the skin between your toes dry. *  If you smoke, your wound can not heal properly -- please talk with us when you're ready to quit. *  Be sure to adhere to any recommendations from your PCP about diuretics (water pills), diet, exercise, and maintaining a healthy weight. If you have questions, please ask. *  If you have a compression pump, aim for at least two hours of use daily. F/U Appointment is with Dr. Juana Espana in 1 week on                                   at                       .     Your nurse  is Amanda Navarro RN. If we applied slip-resistant hospital socks today, be sure to remove them at least once a day to inspect your toes or feet, even if you're not changing the wraps or dressings underneath. If you see anything concerning (redness, excess moisture, etc), please call and let us know right away.      Should you experience any significant changes in your wound(s) (including redness, increased warmth, increased pain, increased drainage, odor, or fever) or have questions about your wound care, please contact the [facility] 30 at [phone] Monday-Thursday from 8:00 am - 4:30 pm, or Friday from 8:00 am - 2:30 pm.  If you need help with your wound outside these hours and cannot wait until we are again available, contact your home-care company (if applicable), your PCP, or go to the nearest emergency room.

## 2023-01-16 ENCOUNTER — HOSPITAL ENCOUNTER (OUTPATIENT)
Dept: WOUND CARE | Age: 83
Discharge: HOME | End: 2023-01-16
Payer: MEDICARE

## 2023-01-16 VITALS
RESPIRATION RATE: 20 BRPM | DIASTOLIC BLOOD PRESSURE: 79 MMHG | BODY MASS INDEX: 31.44 KG/M2 | HEART RATE: 79 BPM | SYSTOLIC BLOOD PRESSURE: 131 MMHG | TEMPERATURE: 98 F | HEIGHT: 71 IN | WEIGHT: 224.6 LBS

## 2023-01-16 DIAGNOSIS — L97.822 ULCER OF LEFT PRETIBIAL REGION WITH FAT LAYER EXPOSED (HCC): Primary | ICD-10-CM

## 2023-01-16 PROCEDURE — 99213 OFFICE O/P EST LOW 20 MIN: CPT

## 2023-01-16 RX ORDER — BACITRACIN ZINC AND POLYMYXIN B SULFATE 500; 1000 [USP'U]/G; [USP'U]/G
OINTMENT TOPICAL ONCE
OUTPATIENT
Start: 2023-01-16 | End: 2023-01-16

## 2023-01-16 RX ORDER — LIDOCAINE 50 MG/G
OINTMENT TOPICAL ONCE
OUTPATIENT
Start: 2023-01-16 | End: 2023-01-16

## 2023-01-16 RX ORDER — LIDOCAINE 40 MG/G
CREAM TOPICAL ONCE
OUTPATIENT
Start: 2023-01-16 | End: 2023-01-16

## 2023-01-16 RX ORDER — AMOXICILLIN AND CLAVULANATE POTASSIUM 500; 125 MG/1; MG/1
1 TABLET, FILM COATED ORAL 2 TIMES DAILY
Qty: 28 TABLET | Refills: 0 | Status: SHIPPED | OUTPATIENT
Start: 2023-01-16 | End: 2023-01-30

## 2023-01-16 RX ORDER — LIDOCAINE HYDROCHLORIDE 40 MG/ML
SOLUTION TOPICAL ONCE
OUTPATIENT
Start: 2023-01-16 | End: 2023-01-16

## 2023-01-16 RX ORDER — LIDOCAINE 40 MG/G
CREAM TOPICAL ONCE
Status: DISCONTINUED | OUTPATIENT
Start: 2023-01-16 | End: 2023-01-17 | Stop reason: HOSPADM

## 2023-01-16 ASSESSMENT — PAIN SCALES - GENERAL: PAINLEVEL_OUTOF10: 4

## 2023-01-16 ASSESSMENT — PAIN DESCRIPTION - ORIENTATION: ORIENTATION: LEFT

## 2023-01-16 ASSESSMENT — PAIN - FUNCTIONAL ASSESSMENT: PAIN_FUNCTIONAL_ASSESSMENT: ACTIVITIES ARE NOT PREVENTED

## 2023-01-16 ASSESSMENT — PAIN DESCRIPTION - LOCATION: LOCATION: FOOT

## 2023-01-16 ASSESSMENT — PAIN DESCRIPTION - FREQUENCY: FREQUENCY: INTERMITTENT

## 2023-01-16 ASSESSMENT — PAIN DESCRIPTION - PAIN TYPE: TYPE: CHRONIC PAIN

## 2023-01-16 ASSESSMENT — PAIN DESCRIPTION - ONSET: ONSET: ON-GOING

## 2023-01-16 ASSESSMENT — PAIN DESCRIPTION - DESCRIPTORS: DESCRIPTORS: ACHING

## 2023-01-16 NOTE — PLAN OF CARE
Pt to the [address] [place] [address] for follow up appointment. Dr Deretha Habermann cleaned more hematoma out of the patient's left foot. Pt to place Iodoform into foot wound and collagen to left lower leg. Pt to continue to wear compression stockings and use compression pumps. Pt to  antibiotics and take as prescribed. Pt to follow up in the [address] [place] [address],[address] in 1 week. Discharge instructions reviewed with patient, all questions answered, copy given to patient. Dressings were applied to all wounds per M.D. Instructions at this visit.

## 2023-01-17 RX ORDER — FUROSEMIDE 40 MG/1
40 TABLET ORAL DAILY
Qty: 90 TABLET | Refills: 0 | Status: SHIPPED | OUTPATIENT
Start: 2023-01-17

## 2023-01-17 NOTE — DISCHARGE INSTRUCTIONS
Wound Care Center Physician Orders and Discharge Instructions  [facility]  [address], [address]  [place] [address]  Telephone: [phone]      Fax: [phone]        Your home care company:  N/a     Your wound-care supplies will be provided by: N/a     NAME:  Nimesh Ng   YOB: 1940  PRIMARY DIAGNOSIS FOR WOUND CARE CENTER: Lymphedema     Wound cleansing:  Do not scrub or use excessive force.  Wash hands with soap and water before and after dressing changes.  Prior to applying a clean dressing, cleanse wound with normal saline, wound cleanser, or mild soap and water. Ask your physician or nurse before getting the wound(s) wet in the shower.                Wound care for home:      Left lower leg wound:      Wash wound with soap and water with dressing changes      Benzoin to periwound      collagen ag      mepilex border      Leave on all week.      Wear compression stockings daily      Left foot wound:     Wash with soap and water     Iodoform 1/4\" into wound    Cover with 4x4's romero    Change daily.          Right lower leg    Medium spandagrip or compression stocking toes to knees on right lower leg.  May put on in the morning and take off at bed time.        Please note, all wounds (unless stated otherwise here) were mechanically debrided at the time of cleansing here in the wound-care center today, so a small amount of pain, drainage or bleeding from that process might be expected, and is normal.     All products for home use, including multiple products for a single wound if applicable, are medically necessary in order to achieve the best chance at timely wound healing. See provider documentation for details if needed.     Substituted dressings applied in the [facility] today, if applicable:           New orders for this week (labs, imaging, medications, etc.):      Use compression pumps 2 times a day     Continue antibiotics as ordered until gone    Additional instructions for specific diagnoses:     General comments for venous / lymphedema ulcers: *  Elevate your legs to the level of your heart for at least 30 minutes, several times daily. *  Walk as much as you can tolerate. Avoid standing for long periods of time, but if you must stand, regularly do heel-raises and calf-pump exercises. *  If you have compression wraps designed to remain in place all week, be sure to keep them from getting wet. *  If you have compression garments that can be changed regularly, apply them first thing in the morning, and remove them when you go to bed. Moisturize your skin at bedtime, with Vaseline, Aquaphor, Aveeno, CeraVe, Cetaphil, Eucerin, Lubriderm, etc; but keep the skin between your toes dry. *  If you smoke, your wound can not heal properly -- please talk with us when you're ready to quit. *  Be sure to adhere to any recommendations from your PCP about diuretics (water pills), diet, exercise, and maintaining a healthy weight. If you have questions, please ask. *  If you have a compression pump, aim for at least two hours of use daily. F/U Appointment is with Dr. Claudia Carreon in 1 week on                                   at                       .     Your nurse  is Yamilex Duran, RN. If we applied slip-resistant hospital socks today, be sure to remove them at least once a day to inspect your toes or feet, even if you're not changing the wraps or dressings underneath. If you see anything concerning (redness, excess moisture, etc), please call and let us know right away.      Should you experience any significant changes in your wound(s) (including redness, increased warmth, increased pain, increased drainage, odor, or fever) or have questions about your wound care, please contact the [facility] 30 at [phone] Monday-Thursday from 8:00 am - 4:30 pm, or Friday from 8:00 am - 2:30 pm.  If you need help with your wound outside these hours and cannot wait until we are again available, contact your home-care company (if applicable), your PCP, or go to the nearest emergency room.

## 2023-01-17 NOTE — TELEPHONE ENCOUNTER
----- Message from Erik vIan sent at 1/17/2023 10:17 AM EST -----  Contact: Patient [phone]  Patient is requesting a refill of furosemide (LASIX) 40 MG tablet     [address], [facility] [place] [address]   Phone:  [phone]  Fax:  [phone]

## 2023-01-22 NOTE — PROGRESS NOTES
[facility] Progress Note      Clemencia Wylie     : 1940    DATE OF VISIT:  2023    Subjective:     Clemencia Wylie is a [de-identified] y.o. male who has a chief complaint of a venous/lymphedema ulcer located on the left leg. States doing well with wound. Doing well with compression stockings. States stays active. Mr. Mauricio Manzanares has a past medical history of A-fib 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 ([address] Esophageal reflux, Fracture of nasal bone, Headache, Hearing loss, Hyperlipidemia, Hyperlipidemia associated with type 2 diabetes mellitus ([address] Hypertension, Hypertrophy of prostate without urinary obstruction and other lower urinary tract symptoms (LUTS), Hypothyroidism, Insufficiency fracture of tibia, Osteoarthrosis, not specified whether generalized/localized, lower leg, Pain in joint, lower leg, Right knee pain, Status post total left knee replacement, Tinnitus, Type 2 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 He has a past surgical history that includes eye surgery; Colonoscopy (07); Esophagus surgery; Knee arthroscopy; Cholecystectomy, laparoscopic; other surgical history (Left, 2016); joint replacement (Left, 2017); Tonsillectomy; and Spine surgery. His family history includes Breast Cancer in his sister; Cancer in his mother; Diabetes type 2  in his brother; Heart Disease in his brother; Kidney Disease in his brother. Mr. Mauricio Manzanares reports that he quit smoking about 33 years ago. His smoking use included cigarettes.  He has a 15.00 pack-year smoking history. He quit smokeless tobacco use about 45 years ago. He reports current alcohol use. He reports that he does not use drugs. His current medication list consists of Embrace Blood Glucose Monitor, Embrace Lancets Ultra Thin 30G, Handicap Placard, Insulin Syringe-Needle U-100, Multiple Vitamins-Minerals, amoxicillin-clavulanate, apixaban, blood glucose test strips, dilTIAZem, empagliflozin, furosemide, hydroCHLOROthiazide, insulin glargine-yfgn, levothyroxine, pioglitazone, rosuvastatin, and tamsulosin. Allergies: Patient has no known allergies. Pertinent items from the review of systems are discussed in the HPI; the remainder of the ROS was reviewed and is negative. Objective:     /79   Pulse 79   Temp 98 °F (36.7 °C)   Resp 20   Ht 5' 11\" (1.803 m)   Wt 224 lb 9.6 oz (101.9 kg)   BMI 31.33 kg/m²       Dorsalis pedis pulse left palpable  Posterior tibial pulse left palpable  Dorsalis pedis pulse right palpable  Posterior tibial pulse right palpable    Ulcer on the dorsal aspect left foot with mild fibrotic tissue, red granulation tissue, mild to moderate serous drainage, no hyperkeratotic rim, + undermining, no tunneling, no purulence, no malodor, no eschar,  no periwound maceration, moderate to large periwound erythema, moderate edema, no crepitus, no increase in skin temperature, ulcer probes to soft tissue only, + hematoma in wound bed    Ecchymosis dorsal aspect forefoot      Ulcer on the anterior aspect left lower leg with very minimal fibrotic tissue, red granulation tissue, mild serous drainage, no hyperkeratotic rim, no undermining, no tunneling, no purulence, no malodor, no eschar, no periwound maceration, mild periwound erythema, mild edema,  no crepitus, no increase in skin temperature, ulcer probes to soft tissue only    Weeping of skin noted at ulcer site. Multiple skin bridges and islands noted.      Edema bilateral LE with thickening of the lower leg skin noted. Venous dermatitis bilateral LE. Hyperpigmentation of lower legs noted. Thickening of skin on bilateral LE. Today's ulcer measurements are in the wound documentation flowsheet. Wound measurements:  Wound 01/09/23 Anterior; Left #2, left dorsal foot, traumatic, partial thickness, (onset 12/25/2022)-Wound Length (cm): 0.7 cm  Wound 06/13/22 #1, Left Medial Leg, Lymphedema, Full Thickness, Onset Unknown (several years)-Wound Length (cm): 1.2 cm    Wound 01/09/23 Anterior; Left #2, left dorsal foot, traumatic, partial thickness, (onset 12/25/2022)-Wound Width (cm): 0.7 cm  Wound 06/13/22 #1, Left Medial Leg, Lymphedema, Full Thickness, Onset Unknown (several years)-Wound Width (cm): 1.6 cm    Wound 01/09/23 Anterior; Left #2, left dorsal foot, traumatic, partial thickness, (onset 12/25/2022)-Wound Depth (cm): 1.2 cm  Wound 06/13/22 #1, Left Medial Leg, Lymphedema, Full Thickness, Onset Unknown (several years)-Wound Depth (cm): 0.1 cm    LABS  Lab Results   Component Value Date    LABA1C 7.1 09/08/2022         Assessment:     Patient Active Problem List   Diagnosis Code    Benign essential HTN I10    Osteoarthrosis involving lower leg [id number]    Pain in joint, lower leg M25.569    Hepatitis C virus infection without hepatic coma B19.20    Benign essential tremor G25.0    Hypertrophy of prostate without urinary obstruction and other lower urinary tract symptoms (LUTS) N40.0    Hypothyroidism E03.9    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) E11.22, N18.30    Chronic hepatitis C without hepatic coma (HCC) B18.2    Hyperlipidemia associated with type 2 diabetes mellitus (HCC) E11.69, E78.5    Type 2 diabetes mellitus 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 edema (HCC) Q67.7722    Degenerative tear of medial meniscus of left knee M23.204    Primary osteoarthritis of left knee M17.12    PAF (paroxysmal atrial fibrillation) (HCC) I48.0    MGUS (monoclonal gammopathy of unknown significance) D47.2    Kidney lesion N28.9    Premature atrial contraction I49.1    Nasal polyposis J33.9    Ulcer of left pretibial region with fat layer exposed ([facility] S27.086    Syncope and collapse R55    Stage 3a chronic kidney disease (HCC) N18.31    Longstanding persistent atrial fibrillation (HCC) I48.11    Lymphedema I89.0       Assessment of today's active condition(s): Cellulitis left foot, diabetic foot ulcer left foot secondary to trauma, diabetes mellitus with peripheral neuropathy, lymphedema/venous leg ulcer left, Lymphedema, lymphorrhea. Factors contributing to occurrence and/or persistence of the chronic ulcer include edema, venous stasis, and lymphedema. Sharp debridement is indicated today, based upon the exam findings in the ulcer(s) above. Procedure note:     Consent obtained. Time out taken. Anesthetic  Anesthetic:  (post debridement)     After application of topical 4% lidocaine plain to the ulcer, the ulcer was debrided of all fibrotic, necrotic, hyperkeratotic tissue, nonviable and viable tissue through the subcutaneous tissue. This excised skin and subcutaneous tissue with a scalpel. Total Surface Area Debrided:  1 sq cm. The ulcer(s) were then irrigated with normal saline solution. The procedure was completed with a moderate amount of bleeding, and hemostasis was by pressure. The patient tolerated the procedure well, with no significant complications. The patient's level of pain during and after the procedure was monitored, and is noted in the wound documentation flowsheet. Discharge plan:     Treatment in the wound care center today: Wound 01/09/23 Anterior; Left #2, left dorsal foot, traumatic, partial thickness, (onset 12/25/2022)-Dressing/Treatment:  (1/4\" iodoform, 4x4, romero, F-tubi)  Wound 06/13/22 #1, Left Medial Leg, Lymphedema, Full Thickness, Onset Unknown (several years)-Dressing/Treatment:  (benzoin yeni, collagen ag, optifoam gentle ex).     Home treatment: good handwashing before and after any dressing changes. Cleanse ulcer with saline or soap & water before dressing change. May use Vaseline (petrolatum), Aquaphor, Aveeno, CeraVe, Cetaphil, Eucerin, Lubriderm, etc for dry skin. Dressing type for home: Collagen and Mepilex border and compression stocking. Iodoform to foot ulcer and dry dressing. Continue compression stockings. Written discharge instructions given to patient. Offload ulcer(s) as directed. Elevate leg(s) as directed. Exercise daily. Follow up in 1 week. Continue to use compression pumps and increase frequency. Discussed potential complications in regards to the hematoma with patient. If problems occur proceed to ER. Rx Augmentin 500mg one tablet BID.        Electronically signed by Tootie Kumar DPM on 1/22/2023 at 11:48 AM.

## 2023-01-23 ENCOUNTER — HOSPITAL ENCOUNTER (OUTPATIENT)
Dept: WOUND CARE | Age: 83
Discharge: HOME | End: 2023-01-23
Payer: MEDICARE

## 2023-01-23 VITALS
HEIGHT: 71 IN | DIASTOLIC BLOOD PRESSURE: 79 MMHG | WEIGHT: 226 LBS | SYSTOLIC BLOOD PRESSURE: 133 MMHG | HEART RATE: 91 BPM | RESPIRATION RATE: 20 BRPM | BODY MASS INDEX: 31.64 KG/M2 | TEMPERATURE: 97.7 F

## 2023-01-23 DIAGNOSIS — L97.822 ULCER OF LEFT PRETIBIAL REGION WITH FAT LAYER EXPOSED (HCC): Primary | ICD-10-CM

## 2023-01-23 PROCEDURE — 99212 OFFICE O/P EST SF 10 MIN: CPT

## 2023-01-23 RX ORDER — LIDOCAINE HYDROCHLORIDE 40 MG/ML
SOLUTION TOPICAL ONCE
OUTPATIENT
Start: 2023-01-23 | End: 2023-01-23

## 2023-01-23 RX ORDER — LIDOCAINE 50 MG/G
OINTMENT TOPICAL ONCE
OUTPATIENT
Start: 2023-01-23 | End: 2023-01-23

## 2023-01-23 RX ORDER — LIDOCAINE 40 MG/G
CREAM TOPICAL ONCE
OUTPATIENT
Start: 2023-01-23 | End: 2023-01-23

## 2023-01-23 RX ORDER — BACITRACIN ZINC AND POLYMYXIN B SULFATE 500; 1000 [USP'U]/G; [USP'U]/G
OINTMENT TOPICAL ONCE
OUTPATIENT
Start: 2023-01-23 | End: 2023-01-23

## 2023-01-23 RX ORDER — LIDOCAINE 40 MG/G
CREAM TOPICAL ONCE
Status: DISCONTINUED | OUTPATIENT
Start: 2023-01-23 | End: 2023-01-24 | Stop reason: HOSPADM

## 2023-01-23 NOTE — PLAN OF CARE
Pt to the [facility] for follow up appointment. Wound on foot measuring smaller. Pt to continue with weekly dressing to left lower leg and packing into left foot wound. Pt to continue to use compression pumps and wear compression stockings. Pt to follow up in the [facility] in 1 week. Discharge instructions reviewed with patient, all questions answered, copy given to patient. Dressings were applied to all wounds per M.D. Instructions at this visit.

## 2023-01-23 NOTE — PROGRESS NOTES
[facility] Progress Note      Nimesh Ng     : 1940    DATE OF VISIT:  2023    Subjective:     Nimesh Ng is a 82 y.o. male who has a chief complaint of a venous/lymphedema ulcer located on the left leg. States doing well with wound. Doing well with compression stockings. Has been alternating stocking with tubigrip.   States stays active.   Doing well with dressing changes.             Mr. 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), Esophageal reflux, Fracture of nasal bone, Headache, Hearing loss, Hyperlipidemia, Hyperlipidemia associated with type 2 diabetes mellitus (HCC), Hypertension, Hypertrophy of prostate without urinary obstruction and other lower urinary tract symptoms (LUTS), Hypothyroidism, Insufficiency fracture of tibia, Osteoarthrosis, not specified whether generalized/localized, lower leg, Pain in joint, lower leg, Right knee pain, Status post total left knee replacement, Tinnitus, 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    He has a past surgical history that includes eye surgery; Colonoscopy (07); Esophagus surgery; Knee arthroscopy; Cholecystectomy, laparoscopic; other surgical history (Left, 2016); joint replacement (Left, 2017); Tonsillectomy; and Spine surgery.    His family history includes Breast Cancer in his sister; Cancer in his mother; Diabetes type 2  in his brother; Heart Disease in his brother; Kidney Disease in his brother.     Mr. Ng reports that he  quit smoking about 33 years ago. His smoking use included cigarettes. He has a 15.00 pack-year smoking history. He quit smokeless tobacco use about 45 years ago. He reports current alcohol use. He reports that he does not use drugs. His current medication list consists of Embrace Blood Glucose Monitor, Embrace Lancets Ultra Thin 30G, Handicap Placard, Insulin Syringe-Needle U-100, Multiple Vitamins-Minerals, amoxicillin-clavulanate, apixaban, blood glucose test strips, dilTIAZem, empagliflozin, furosemide, hydroCHLOROthiazide, insulin glargine-yfgn, levothyroxine, pioglitazone, rosuvastatin, and tamsulosin. Allergies: Patient has no known allergies. Pertinent items from the review of systems are discussed in the HPI; the remainder of the ROS was reviewed and is negative.        Objective:     /79   Pulse 91   Temp 97.7 °F (36.5 °C) (Oral)   Resp 20   Ht 5' 11\" (1.803 m)   Wt 226 lb (102.5 kg)   BMI 31.52 kg/m²       Dorsalis pedis pulse left palpable  Posterior tibial pulse left palpable  Dorsalis pedis pulse right palpable  Posterior tibial pulse right palpable    Ulcer on the dorsal aspect left foot with mild fibrotic tissue, red granulation tissue, mild serous drainage, no hyperkeratotic rim, + undermining but decreasing in length and area, no tunneling, no purulence, no malodor, no eschar, no periwound maceration, mild periwound erythema, mild edema, no crepitus, no increase in skin temperature, ulcer probes to soft tissue only, no hematoma noted    Ecchymosis dorsal aspect forefoot - decreasing in intensity    Ulcer on the anterior aspect left lower leg with very minimal fibrotic tissue, red granulation tissue, mild serous drainage, no hyperkeratotic rim, no undermining, no tunneling, no purulence, no malodor, no eschar, no periwound maceration, mild periwound erythema, mild edema,  no crepitus, no increase in skin temperature, ulcer probes to soft tissue only    Weeping of skin noted at ulcer site. Multiple skin bridges and islands noted. Edema bilateral LE with thickening of the lower leg skin noted. Venous dermatitis bilateral LE. Hyperpigmentation of lower legs noted. Thickening of skin on bilateral LE. Today's ulcer measurements are in the wound documentation flowsheet.      Wound measurements:  Wound 06/13/22 #1, Left Medial Leg, Lymphedema, Full Thickness, Onset Unknown (several years)-Wound Length (cm): 1.2 cm  Wound 01/09/23 #2, left dorsal foot, traumatic, partial thickness, (onset 12/25/2022)-Wound Length (cm): 0.9 cm    Wound 06/13/22 #1, Left Medial Leg, Lymphedema, Full Thickness, Onset Unknown (several years)-Wound Width (cm): 1.9 cm  Wound 01/09/23 #2, left dorsal foot, traumatic, partial thickness, (onset 12/25/2022)-Wound Width (cm): 0.7 cm    Wound 06/13/22 #1, Left Medial Leg, Lymphedema, Full Thickness, Onset Unknown (several years)-Wound Depth (cm): 0.1 cm  Wound 01/09/23 #2, left dorsal foot, traumatic, partial thickness, (onset 12/25/2022)-Wound Depth (cm): 1 cm    LABS  Lab Results   Component Value Date    LABA1C 7.1 09/08/2022         Assessment:     Patient Active Problem List   Diagnosis Code    Benign essential HTN I10    Osteoarthrosis involving lower leg [id number]    Pain in joint, lower leg M25.569    Hepatitis C virus infection without hepatic coma B19.20    Benign essential tremor G25.0    Hypertrophy of prostate without urinary obstruction and other lower urinary tract symptoms (LUTS) N40.0    Hypothyroidism E03.9    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) E11.22, N18.30    Chronic hepatitis C without hepatic coma (HCC) B18.2    Hyperlipidemia associated with type 2 diabetes mellitus (HCC) E11.69, E78.5    Type 2 diabetes mellitus 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 edema (HCC) M30.5109    Degenerative tear of medial meniscus of left knee M23.204    Primary osteoarthritis of left knee M17.12    PAF (paroxysmal atrial fibrillation) (HCC) I48.0    MGUS (monoclonal gammopathy of unknown significance) D47.2    Kidney lesion N28.9    Premature atrial contraction I49.1    Nasal polyposis J33.9    Ulcer of left pretibial region with fat layer exposed ([facility] W65.725    Syncope and collapse R55    Stage 3a chronic kidney disease (HCC) N18.31    Longstanding persistent atrial fibrillation (HCC) I48.11    Lymphedema I89.0       Assessment of today's active condition(s): Cellulitis left foot - improving, diabetic foot ulcer left foot secondary to trauma, diabetes mellitus with peripheral neuropathy, lymphedema/venous leg ulcer left, Lymphedema, lymphorrhea. Factors contributing to occurrence and/or persistence of the chronic ulcer include edema, venous stasis, and lymphedema. Sharp debridement is not indicated today, based upon the exam findings in the ulcer(s) above. Discharge plan:     Treatment in the wound care center today:  . Home treatment: good handwashing before and after any dressing changes. Cleanse ulcer with saline or soap & water before dressing change. May use Vaseline (petrolatum), Aquaphor, Aveeno, CeraVe, Cetaphil, Eucerin, Lubriderm, etc for dry skin. Dressing type for home: Collagen and Mepilex border and compression stocking. Iodoform to foot ulcer and dry dressing. Continue compression stockings. Written discharge instructions given to patient. Offload ulcer(s) as directed. Elevate leg(s) as directed. Exercise daily. Follow up in 1 week. Continue to use compression pumps and increase frequency. Discussed potential complications in regards to the hematoma with patient. If problems occur proceed to ER. Finish his antibiotics.      Once he is able to wear his compression stocking more his edema decrease as it has in the past.       Electronically signed by Brad Ibarra DPM on 1/23/2023 at 1:08 PM.

## 2023-01-30 ENCOUNTER — HOSPITAL ENCOUNTER (OUTPATIENT)
Dept: WOUND CARE | Age: 83
Discharge: HOME | End: 2023-01-30
Payer: MEDICARE

## 2023-01-30 VITALS
HEART RATE: 79 BPM | SYSTOLIC BLOOD PRESSURE: 126 MMHG | WEIGHT: 224 LBS | DIASTOLIC BLOOD PRESSURE: 75 MMHG | RESPIRATION RATE: 18 BRPM | HEIGHT: 71 IN | TEMPERATURE: 97.2 F | BODY MASS INDEX: 31.36 KG/M2

## 2023-01-30 DIAGNOSIS — L97.822 ULCER OF LEFT PRETIBIAL REGION WITH FAT LAYER EXPOSED (HCC): Primary | ICD-10-CM

## 2023-01-30 PROCEDURE — 99212 OFFICE O/P EST SF 10 MIN: CPT

## 2023-01-30 RX ORDER — LIDOCAINE 50 MG/G
OINTMENT TOPICAL ONCE
OUTPATIENT
Start: 2023-01-30 | End: 2023-01-30

## 2023-01-30 RX ORDER — BACITRACIN ZINC AND POLYMYXIN B SULFATE 500; 1000 [USP'U]/G; [USP'U]/G
OINTMENT TOPICAL ONCE
OUTPATIENT
Start: 2023-01-30 | End: 2023-01-30

## 2023-01-30 RX ORDER — LIDOCAINE HYDROCHLORIDE 40 MG/ML
SOLUTION TOPICAL ONCE
OUTPATIENT
Start: 2023-01-30 | End: 2023-01-30

## 2023-01-30 RX ORDER — LIDOCAINE 40 MG/G
CREAM TOPICAL ONCE
OUTPATIENT
Start: 2023-01-30 | End: 2023-01-30

## 2023-01-30 RX ORDER — LIDOCAINE 40 MG/G
CREAM TOPICAL ONCE
Status: DISCONTINUED | OUTPATIENT
Start: 2023-01-30 | End: 2023-01-31 | Stop reason: HOSPADM

## 2023-01-30 NOTE — PLAN OF CARE
Pt to the [address] [place] [address],[address] for follow up appointment. Wounds measuring smaller. Pt to continue with collagen to lower leg wound and iodoform to left foot wound. Pt to continue to wear compression stockings and to use compression pumps. Pt to follow up in the [address] [place] [address],[address] [place] [address] in 1 week. Discharge instructions reviewed with patient, all questions answered, copy given to patient. Dressings were applied to all wounds per M.D. Instructions at this visit.

## 2023-02-02 NOTE — DISCHARGE INSTRUCTIONS
[address] [facility] Physician Orders and Discharge [address] [facility]  [address], [place]  [place], [facility]  Telephone: [phone]      Fax: [phone] home care company:  N/a     Your wound-care supplies will be provided by: N/a     NAME:  Swetha Jacob   YOB: 1940  PRIMARY DIAGNOSIS FOR WOUND CARE CENTER: Lymphedema     Wound cleansing:  Do not scrub or use excessive force. Wash hands with soap and water before and after dressing changes. Prior to applying a clean dressing, cleanse wound with normal saline, wound cleanser, or mild soap and water. Ask your physician or nurse before getting the wound(s) wet in the shower. Wound care for home:      Left lower leg wound:      Wash wound with soap and water with dressing changes      Benzoin to periwound      collagen ag      mepilex border      Leave on all week. Wear compression stockings daily      Left foot wound:     Wash with soap and water     Iodoform 1/4\" into wound    Cover with 4x4's romero    Change daily. Right lower leg    Medium spandagrip or compression stocking toes to knees on right lower leg. May put on in the morning and take off at bed time. Please note, all wounds (unless stated otherwise here) were mechanically debrided at the time of cleansing here in the wound-care center today, so a small amount of pain, drainage or bleeding from that process might be expected, and is normal.     All products for home use, including multiple products for a single wound if applicable, are medically necessary in order to achieve the best chance at timely wound healing. See provider documentation for details if needed.      Substituted dressings applied in the [address] [place] [address],[address] today, if applicable:           New orders for this week (labs, imaging, medications, etc.):      Use compression pumps 2 times a day     Continue antibiotics as ordered until gone Additional instructions for specific diagnoses:     General comments for venous / lymphedema ulcers: *  Elevate your legs to the level of your heart for at least 30 minutes, several times daily. *  Walk as much as you can tolerate. Avoid standing for long periods of time, but if you must stand, regularly do heel-raises and calf-pump exercises. *  If you have compression wraps designed to remain in place all week, be sure to keep them from getting wet. *  If you have compression garments that can be changed regularly, apply them first thing in the morning, and remove them when you go to bed. Moisturize your skin at bedtime, with Vaseline, Aquaphor, Aveeno, CeraVe, Cetaphil, Eucerin, Lubriderm, etc; but keep the skin between your toes dry. *  If you smoke, your wound can not heal properly -- please talk with us when you're ready to quit. *  Be sure to adhere to any recommendations from your PCP about diuretics (water pills), diet, exercise, and maintaining a healthy weight. If you have questions, please ask. *  If you have a compression pump, aim for at least two hours of use daily. F/U Appointment is with Dr. Davon Pendleton in 1 week on                                   at                       .     Your nurse  is Scott Ryan RN. If we applied slip-resistant hospital socks today, be sure to remove them at least once a day to inspect your toes or feet, even if you're not changing the wraps or dressings underneath. If you see anything concerning (redness, excess moisture, etc), please call and let us know right away.      Should you experience any significant changes in your wound(s) (including redness, increased warmth, increased pain, increased drainage, odor, or fever) or have questions about your wound care, please contact the [facility] at [phone] Monday-Thursday from 8:00 am - 4:30 pm, or Friday from 8:00 am - 2:30 pm.  If you need help with your wound outside these hours and cannot wait until we are again available, contact your home-care company (if applicable), your PCP, or go to the nearest emergency room.

## 2023-02-06 ENCOUNTER — HOSPITAL ENCOUNTER (OUTPATIENT)
Dept: WOUND CARE | Age: 83
Discharge: HOME | End: 2023-02-06
Payer: MEDICARE

## 2023-02-06 VITALS
WEIGHT: 223.2 LBS | TEMPERATURE: 97.5 F | DIASTOLIC BLOOD PRESSURE: 67 MMHG | HEIGHT: 71 IN | HEART RATE: 82 BPM | BODY MASS INDEX: 31.25 KG/M2 | SYSTOLIC BLOOD PRESSURE: 113 MMHG | RESPIRATION RATE: 18 BRPM

## 2023-02-06 DIAGNOSIS — L97.822 ULCER OF LEFT PRETIBIAL REGION WITH FAT LAYER EXPOSED (HCC): Primary | ICD-10-CM

## 2023-02-06 PROCEDURE — 99212 OFFICE O/P EST SF 10 MIN: CPT

## 2023-02-06 RX ORDER — LIDOCAINE 50 MG/G
OINTMENT TOPICAL ONCE
OUTPATIENT
Start: 2023-02-06 | End: 2023-02-06

## 2023-02-06 RX ORDER — LIDOCAINE HYDROCHLORIDE 40 MG/ML
SOLUTION TOPICAL ONCE
OUTPATIENT
Start: 2023-02-06 | End: 2023-02-06

## 2023-02-06 RX ORDER — LIDOCAINE 40 MG/G
CREAM TOPICAL ONCE
OUTPATIENT
Start: 2023-02-06 | End: 2023-02-06

## 2023-02-06 RX ORDER — BACITRACIN ZINC AND POLYMYXIN B SULFATE 500; 1000 [USP'U]/G; [USP'U]/G
OINTMENT TOPICAL ONCE
OUTPATIENT
Start: 2023-02-06 | End: 2023-02-06

## 2023-02-06 RX ORDER — LIDOCAINE 40 MG/G
CREAM TOPICAL ONCE
Status: DISCONTINUED | OUTPATIENT
Start: 2023-02-06 | End: 2023-02-07 | Stop reason: HOSPADM

## 2023-02-06 NOTE — PLAN OF CARE
Pt to the [address] [place] [address],[address] for follow up appointment. Wounds measuring smaller. Pt to continue with collagen to lower leg wound and iodoform to left dorsal foot wound. Pt to continue to wear compression stockings and use compression pumps. Pt to follow up in the [address] [place] [address],[address] [place] [address] in 1 week. Discharge instructions reviewed with patient, all questions answered, copy given to patient. Dressings were applied to all wounds per M.D. Instructions at this visit.

## 2023-02-07 NOTE — PROGRESS NOTES
[facility] Progress Note      Ag Villa     : 1940    DATE OF VISIT:  2023    Subjective:     Ag Villa is a [de-identified] y.o. male who has a chief complaint of a venous/lymphedema ulcer located on the left leg. States doing well with wound. Doing well with compression stockings. Has been alternating stocking with tubigrip. States stays active. Doing well with dressing changes. 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 of A-fib [facility]), Atrial fibrillation ([address] Chronic hepatitis C without hepatic coma ([address] Closed displaced fracture of right patella, Closed nondisplaced fracture of styloid process of left radius, Dental disease, Dizziness, 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 ([address] Esophageal reflux, Fracture of nasal bone, Headache, Hearing loss, Hyperlipidemia, Hyperlipidemia associated with type 2 diabetes mellitus ([address] Hypertension, Hypertrophy of prostate without urinary obstruction and other lower urinary tract symptoms (LUTS), Hypothyroidism, Insufficiency fracture of tibia, Osteoarthrosis, not specified whether generalized/localized, lower leg, Pain in joint, lower leg, Right knee pain, Status post total left knee replacement, Tinnitus, Type 2 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 He has a past surgical history that includes eye surgery; Colonoscopy (07); Esophagus surgery; Knee arthroscopy; Cholecystectomy, laparoscopic; other surgical history (Left, 2016); joint replacement (Left, 2017); Tonsillectomy; and Spine surgery. His family history includes Breast Cancer in his sister; Cancer in his mother; Diabetes type 2  in his brother; Heart Disease in his brother; Kidney Disease in his brother.      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 His smoking use included cigarettes. He has a 15.00 pack-year smoking history. He quit smokeless tobacco use about 45 years ago. He reports current alcohol use. He reports that he does not use drugs. His current medication list consists of Embrace Blood Glucose Monitor, Embrace Lancets Ultra Thin 30G, Handicap Placard, Insulin Syringe-Needle U-100, Multiple Vitamins-Minerals, apixaban, blood glucose test strips, dilTIAZem, empagliflozin, furosemide, hydroCHLOROthiazide, insulin glargine-yfgn, levothyroxine, pioglitazone, rosuvastatin, and tamsulosin. Allergies: Patient has no known allergies. Pertinent items from the review of systems are discussed in the HPI; the remainder of the ROS was reviewed and is negative. Objective:     /67   Pulse 82   Temp 97.5 °F (36.4 °C) (Oral)   Resp 18   Ht 5' 11\" (1.803 m)   Wt 223 lb 3.2 oz (101.2 kg)   BMI 31.13 kg/m²       Dorsalis pedis pulse left palpable  Posterior tibial pulse left palpable  Dorsalis pedis pulse right palpable  Posterior tibial pulse right palpable    Ulcer on the dorsal aspect left foot with mild fibrotic tissue, red granulation tissue, mild serous drainage, no hyperkeratotic rim, + undermining but decreasing in length and area, no tunneling, no purulence, no malodor, no eschar, no periwound maceration, mild periwound erythema, mild edema, no crepitus, no increase in skin temperature, ulcer probes to soft tissue only, no hematoma noted    Ecchymosis dorsal aspect forefoot - decreasing in intensity    Ulcer on the anterior aspect left lower leg with very minimal fibrotic tissue, red granulation tissue, mild serous drainage, no hyperkeratotic rim, no undermining, no tunneling, no purulence, no malodor, no eschar, no periwound maceration, mild periwound erythema, mild edema,  no crepitus, no increase in skin temperature, ulcer probes to soft tissue only    Weeping of skin noted at ulcer site.      Edema bilateral LE with thickening of the lower leg skin noted. Venous dermatitis bilateral LE. Hyperpigmentation of lower legs noted. Thickening of skin on bilateral LE. Today's ulcer measurements are in the wound documentation flowsheet.      Wound measurements:  Wound 06/13/22 #1, Left Medial Leg, Lymphedema, Full Thickness, Onset Unknown (several years)-Wound Length (cm): 1.5 cm  Wound 01/09/23 #2, left dorsal foot, traumatic, partial thickness, (onset 12/25/2022)-Wound Length (cm): 0.7 cm    Wound 06/13/22 #1, Left Medial Leg, Lymphedema, Full Thickness, Onset Unknown (several years)-Wound Width (cm): 0.7 cm  Wound 01/09/23 #2, left dorsal foot, traumatic, partial thickness, (onset 12/25/2022)-Wound Width (cm): 0.5 cm    Wound 06/13/22 #1, Left Medial Leg, Lymphedema, Full Thickness, Onset Unknown (several years)-Wound Depth (cm): 0.2 cm  Wound 01/09/23 #2, left dorsal foot, traumatic, partial thickness, (onset 12/25/2022)-Wound Depth (cm): 1 cm    LABS  Lab Results   Component Value Date    LABA1C 7.1 09/08/2022         Assessment:     Patient Active Problem List   Diagnosis Code    Benign essential HTN I10    Osteoarthrosis involving lower leg [id number]    Pain in joint, lower leg M25.569    Hepatitis C virus infection without hepatic coma B19.20    Benign essential tremor G25.0    Hypertrophy of prostate without urinary obstruction and other lower urinary tract symptoms (LUTS) N40.0    Hypothyroidism E03.9    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) E11.22, N18.30    Chronic hepatitis C without hepatic coma (HCC) B18.2    Hyperlipidemia associated with type 2 diabetes mellitus (HCC) E11.69, E78.5    Type 2 diabetes mellitus 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 edema (HCC) M92.1537    Degenerative tear of medial meniscus of left knee M23.204    Primary osteoarthritis of left knee M17.12    PAF (paroxysmal atrial fibrillation) (HCC) I48.0    MGUS (monoclonal gammopathy of unknown significance) D47.2    Kidney lesion N28.9    Premature atrial contraction I49.1    Nasal polyposis J33.9    Ulcer of left pretibial region with fat layer exposed ([address] T29.175    Syncope and collapse R55    Stage 3a chronic kidney disease (HCC) N18.31    Longstanding persistent atrial fibrillation (HCC) I48.11    Lymphedema I89.0       Assessment of today's active condition(s): diabetic foot ulcer left foot secondary to trauma, diabetes mellitus with peripheral neuropathy, lymphedema/venous leg ulcer left, Lymphedema, lymphorrhea. Factors contributing to occurrence and/or persistence of the chronic ulcer include edema, venous stasis, and lymphedema. Sharp debridement is not indicated today, based upon the exam findings in the ulcer(s) above. Discharge plan:     Treatment in the wound care center today: Wound 06/13/22 #1, Left Medial Leg, Lymphedema, Full Thickness, Onset Unknown (several years)-Dressing/Treatment: Other (comment) (collagen bordered gauze)  Wound 01/09/23 #2, left dorsal foot, traumatic, partial thickness, (onset 12/25/2022)-Dressing/Treatment: Other (comment) (1/4\" iodoform packing bordered gauze). Home treatment: good handwashing before and after any dressing changes. Cleanse ulcer with saline or soap & water before dressing change. May use Vaseline (petrolatum), Aquaphor, Aveeno, CeraVe, Cetaphil, Eucerin, Lubriderm, etc for dry skin. Dressing type for home: Collagen and Mepilex border and compression stocking. Iodoform to foot ulcer and dry dressing. Continue compression stockings. Written discharge instructions given to patient. Offload ulcer(s) as directed. Elevate leg(s) as directed. Exercise daily. Follow up in 1 week. Continue to use compression pumps and increase frequency. Discussed potential complications in regards to the hematoma with patient. If problems occur proceed to ER.            Electronically signed by Cheri Daniels DPM on 2/6/2023 at 7:22 PM.

## 2023-02-07 NOTE — DISCHARGE INSTRUCTIONS
[address] [facility] Physician Orders and Discharge [address]  [address]  [place], [facility]  Telephone: [phone]      Fax: [phone] home care company:  N/a     Your wound-care supplies will be provided by: N/a     NAME:  Stephanie Tolentino   YOB: 1940  PRIMARY DIAGNOSIS FOR WOUND CARE CENTER: Lymphedema     Wound cleansing:  Do not scrub or use excessive force. Wash hands with soap and water before and after dressing changes. Prior to applying a clean dressing, cleanse wound with normal saline, wound cleanser, or mild soap and water. Ask your physician or nurse before getting the wound(s) wet in the shower. Wound care for home:      Left lower leg wound:      Wash wound with soap and water with dressing changes      Benzoin to periwound      collagen ag      mepilex border      Leave on all week. Wear compression stockings daily      Left foot wound:     Wash with soap and water     Iodoform 1/4\" into wound    Cover with 4x4's romero    Change daily. Right lower leg    Medium spandagrip or compression stocking toes to knees on right lower leg. May put on in the morning and take off at bed time. Please note, all wounds (unless stated otherwise here) were mechanically debrided at the time of cleansing here in the wound-care center today, so a small amount of pain, drainage or bleeding from that process might be expected, and is normal.     All products for home use, including multiple products for a single wound if applicable, are medically necessary in order to achieve the best chance at timely wound healing. See provider documentation for details if needed.      Substituted dressings applied in the [address] [place] [address],[address] today, if applicable:           New orders for this week (labs, imaging, medications, etc.):      Use compression pumps 2 times a day     Continue antibiotics as ordered until gone Additional instructions for specific diagnoses:     General comments for venous / lymphedema ulcers: *  Elevate your legs to the level of your heart for at least 30 minutes, several times daily. *  Walk as much as you can tolerate. Avoid standing for long periods of time, but if you must stand, regularly do heel-raises and calf-pump exercises. *  If you have compression wraps designed to remain in place all week, be sure to keep them from getting wet. *  If you have compression garments that can be changed regularly, apply them first thing in the morning, and remove them when you go to bed. Moisturize your skin at bedtime, with Vaseline, Aquaphor, Aveeno, CeraVe, Cetaphil, Eucerin, Lubriderm, etc; but keep the skin between your toes dry. *  If you smoke, your wound can not heal properly -- please talk with us when you're ready to quit. *  Be sure to adhere to any recommendations from your PCP about diuretics (water pills), diet, exercise, and maintaining a healthy weight. If you have questions, please ask. *  If you have a compression pump, aim for at least two hours of use daily. F/U Appointment is with Dr. Sissy Ramires in 1 week on                                   at                       .     Your nurse  is Alexandra Lara RN. If we applied slip-resistant hospital socks today, be sure to remove them at least once a day to inspect your toes or feet, even if you're not changing the wraps or dressings underneath. If you see anything concerning (redness, excess moisture, etc), please call and let us know right away.      Should you experience any significant changes in your wound(s) (including redness, increased warmth, increased pain, increased drainage, odor, or fever) or have questions about your wound care, please contact the [facility] at [phone] Monday-Thursday from 8:00 am - 4:30 pm, or Friday from 8:00 am - 2:30 pm.  If you need help with your wound outside these hours and cannot wait until we are again available, contact your home-care company (if applicable), your PCP, or go to the nearest emergency room.

## 2023-02-13 ENCOUNTER — HOSPITAL ENCOUNTER (OUTPATIENT)
Dept: WOUND CARE | Age: 83
Discharge: HOME | End: 2023-02-13
Payer: MEDICARE

## 2023-02-13 VITALS
TEMPERATURE: 97 F | HEIGHT: 71 IN | DIASTOLIC BLOOD PRESSURE: 82 MMHG | BODY MASS INDEX: 30.98 KG/M2 | SYSTOLIC BLOOD PRESSURE: 143 MMHG | HEART RATE: 91 BPM | RESPIRATION RATE: 20 BRPM | WEIGHT: 221.25 LBS

## 2023-02-13 DIAGNOSIS — L97.822 ULCER OF LEFT PRETIBIAL REGION WITH FAT LAYER EXPOSED (HCC): Primary | ICD-10-CM

## 2023-02-13 PROCEDURE — 99212 OFFICE O/P EST SF 10 MIN: CPT

## 2023-02-13 RX ORDER — LIDOCAINE 50 MG/G
OINTMENT TOPICAL ONCE
OUTPATIENT
Start: 2023-02-13 | End: 2023-02-13

## 2023-02-13 RX ORDER — BACITRACIN ZINC AND POLYMYXIN B SULFATE 500; 1000 [USP'U]/G; [USP'U]/G
OINTMENT TOPICAL ONCE
OUTPATIENT
Start: 2023-02-13 | End: 2023-02-13

## 2023-02-13 RX ORDER — LIDOCAINE HYDROCHLORIDE 40 MG/ML
SOLUTION TOPICAL ONCE
OUTPATIENT
Start: 2023-02-13 | End: 2023-02-13

## 2023-02-13 RX ORDER — LIDOCAINE 40 MG/G
CREAM TOPICAL ONCE
OUTPATIENT
Start: 2023-02-13 | End: 2023-02-13

## 2023-02-13 RX ORDER — LIDOCAINE 40 MG/G
CREAM TOPICAL ONCE
Status: DISCONTINUED | OUTPATIENT
Start: 2023-02-13 | End: 2023-02-14 | Stop reason: HOSPADM

## 2023-02-13 NOTE — PLAN OF CARE
Pt to the [address] [place] [address],[address] for follow up appointment. Wound measuring smaller. Dr Jessica Muir placed silver nitrate to left lower leg wound. Pt to continue to place iodoform into left foot wound and collagen to lower leg wound. Pt to continue to wear compression stockings and follow up in the [address] [place] [address],[address] in 1 week. Discharge instructions reviewed with patient, all questions answered, copy given to patient. Dressings were applied to all wounds per M.D. Instructions at this visit.

## 2023-02-14 NOTE — DISCHARGE INSTRUCTIONS
[address] [facility] Physician Orders and Discharge [address] [facility]  [address], [address]  [place], [facility]  Telephone: [phone]      Fax: [phone] home care company:  N/a     Your wound-care supplies will be provided by: N/a     NAME:  Constantino Joseph   YOB: 1940  PRIMARY DIAGNOSIS FOR WOUND CARE CENTER: Lymphedema     Wound cleansing:  Do not scrub or use excessive force. Wash hands with soap and water before and after dressing changes. Prior to applying a clean dressing, cleanse wound with normal saline, wound cleanser, or mild soap and water. Ask your physician or nurse before getting the wound(s) wet in the shower. Wound care for home:      Left lower leg wound:      Wash wound with soap and water with dressing changes      Benzoin to periwound      collagen ag      mepilex border      Leave on all week. Wear compression stockings daily      Left foot wound:     Wash with soap and water     Collagen to foot wound    Cover with 4x4's romero or bordered gauze    Change daily. Right lower leg    Medium spandagrip or compression stocking toes to knees on right lower leg. May put on in the morning and take off at bed time. Please note, all wounds (unless stated otherwise here) were mechanically debrided at the time of cleansing here in the wound-care center today, so a small amount of pain, drainage or bleeding from that process might be expected, and is normal.     All products for home use, including multiple products for a single wound if applicable, are medically necessary in order to achieve the best chance at timely wound healing. See provider documentation for details if needed.      Substituted dressings applied in the [facility] today, if applicable:           New orders for this week (labs, imaging, medications, etc.):      Use compression pumps 2 times a day     Continue antibiotics as ordered until gone     Additional instructions for specific diagnoses:     General comments for venous / lymphedema ulcers: *  Elevate your legs to the level of your heart for at least 30 minutes, several times daily. *  Walk as much as you can tolerate. Avoid standing for long periods of time, but if you must stand, regularly do heel-raises and calf-pump exercises. *  If you have compression wraps designed to remain in place all week, be sure to keep them from getting wet. *  If you have compression garments that can be changed regularly, apply them first thing in the morning, and remove them when you go to bed. Moisturize your skin at bedtime, with Vaseline, Aquaphor, Aveeno, CeraVe, Cetaphil, Eucerin, Lubriderm, etc; but keep the skin between your toes dry. *  If you smoke, your wound can not heal properly -- please talk with us when you're ready to quit. *  Be sure to adhere to any recommendations from your PCP about diuretics (water pills), diet, exercise, and maintaining a healthy weight. If you have questions, please ask. *  If you have a compression pump, aim for at least two hours of use daily. F/U Appointment is with Dr. Adolfo Ramirez in 1 week on                                   at                       .     Your nurse  is Callie Montemayor RN. If we applied slip-resistant hospital socks today, be sure to remove them at least once a day to inspect your toes or feet, even if you're not changing the wraps or dressings underneath. If you see anything concerning (redness, excess moisture, etc), please call and let us know right away.      Should you experience any significant changes in your wound(s) (including redness, increased warmth, increased pain, increased drainage, odor, or fever) or have questions about your wound care, please contact the [employer] [facility] at [phone] Monday-Thursday from 8:00 am - 4:30 pm, or Friday from 8:00 am - 2:30 pm.  If you need help with your wound outside these hours and cannot wait until we are again available, contact your home-care company (if applicable), your PCP, or go to the nearest emergency room.

## 2023-02-19 NOTE — PROGRESS NOTES
[facility] Progress Note      Marco Antonio Khoury     : 1940    DATE OF VISIT:  2023    Subjective:     Marco Antonio Khoury is a [de-identified] y.o. male who has a chief complaint of a venous/lymphedema ulcer located on the left leg. States doing well with wound. Doing well with compression stockings. Has been alternating stocking with tubigrip. States stays active. Doing well with dressing changes. Mr. 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 ([address] Esophageal reflux, Fracture of nasal bone, Headache, Hearing loss, Hyperlipidemia, Hyperlipidemia associated with type 2 diabetes mellitus ([address] Hypertension, Hypertrophy of prostate without urinary obstruction and other lower urinary tract symptoms (LUTS), Hypothyroidism, Insufficiency fracture of tibia, Osteoarthrosis, not specified whether generalized/localized, lower leg, Pain in joint, lower leg, Right knee pain, Status post total left knee replacement, Tinnitus, Type 2 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 He has a past surgical history that includes eye surgery; Colonoscopy (07); Esophagus surgery; Knee arthroscopy; Cholecystectomy, laparoscopic; other surgical history (Left, 2016); joint replacement (Left, 2017); Tonsillectomy; and Spine surgery. His family history includes Breast Cancer in his sister; Cancer in his mother; Diabetes type 2  in his brother; Heart Disease in his brother; Kidney Disease in his brother.      Mr. Benjamin Obrien reports that he quit smoking about 33 years ago. His smoking use included cigarettes. He has a 15.00 pack-year smoking history. He quit smokeless tobacco use about 45 years ago. He reports current alcohol use. He reports that he does not use drugs. His current medication list consists of Embrace Blood Glucose Monitor, Embrace Lancets Ultra Thin 30G, Handicap Placard, Insulin Syringe-Needle U-100, Multiple Vitamins-Minerals, apixaban, blood glucose test strips, dilTIAZem, empagliflozin, furosemide, hydroCHLOROthiazide, insulin glargine-yfgn, levothyroxine, pioglitazone, rosuvastatin, and tamsulosin. Allergies: Patient has no known allergies. Pertinent items from the review of systems are discussed in the HPI; the remainder of the ROS was reviewed and is negative. Objective:     BP (!) 143/82   Pulse 91   Temp 97 °F (36.1 °C) (Oral)   Resp 20   Ht 5' 11\" (1.803 m)   Wt 221 lb 4 oz (100.4 kg)   BMI 30.86 kg/m²       Dorsalis pedis pulse left palpable  Posterior tibial pulse left palpable  Dorsalis pedis pulse right palpable  Posterior tibial pulse right palpable    Ulcer on the dorsal aspect left foot with mild fibrotic tissue, red granulation tissue, mild serous drainage, no hyperkeratotic rim, + undermining but decreasing in length and area, no tunneling, no purulence, no malodor, no eschar, no periwound maceration, mild periwound erythema, mild edema, no crepitus, no increase in skin temperature, ulcer probes to soft tissue only, no hematoma noted    Ecchymosis dorsal aspect forefoot - decreasing in intensity    Ulcer on the anterior aspect left lower leg with very minimal fibrotic tissue, red granulation tissue, mild serous drainage, no hyperkeratotic rim, no undermining, no tunneling, no purulence, no malodor, no eschar, no periwound maceration, mild periwound erythema, mild edema,  no crepitus, no increase in skin temperature, ulcer probes to soft tissue only    Weeping of skin noted at ulcer site.      Edema bilateral LE with thickening of the lower leg skin noted. Venous dermatitis bilateral LE. Hyperpigmentation of lower legs noted. Thickening of skin on bilateral LE. Today's ulcer measurements are in the wound documentation flowsheet.      Wound measurements:  Wound 06/13/22 #1, Left Medial Leg, Lymphedema, Full Thickness, Onset Unknown (several years)-Wound Length (cm): 1.1 cm  Wound 01/09/23 #2, left dorsal foot, traumatic, partial thickness, (onset 12/25/2022)-Wound Length (cm): 0.5 cm    Wound 06/13/22 #1, Left Medial Leg, Lymphedema, Full Thickness, Onset Unknown (several years)-Wound Width (cm): 1.6 cm  Wound 01/09/23 #2, left dorsal foot, traumatic, partial thickness, (onset 12/25/2022)-Wound Width (cm): 0.4 cm    Wound 06/13/22 #1, Left Medial Leg, Lymphedema, Full Thickness, Onset Unknown (several years)-Wound Depth (cm): 0.2 cm  Wound 01/09/23 #2, left dorsal foot, traumatic, partial thickness, (onset 12/25/2022)-Wound Depth (cm): 0.8 cm    LABS  Lab Results   Component Value Date    LABA1C 7.1 09/08/2022         Assessment:     Patient Active Problem List   Diagnosis Code    Benign essential HTN I10    Osteoarthrosis involving lower leg [id number]    Pain in joint, lower leg M25.569    Hepatitis C virus infection without hepatic coma B19.20    Benign essential tremor G25.0    Hypertrophy of prostate without urinary obstruction and other lower urinary tract symptoms (LUTS) N40.0    Hypothyroidism E03.9    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) E11.22, N18.30    Chronic hepatitis C without hepatic coma (HCC) B18.2    Hyperlipidemia associated with type 2 diabetes mellitus (HCC) E11.69, E78.5    Type 2 diabetes mellitus 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 edema (HCC) L45.0027    Degenerative tear of medial meniscus of left knee M23.204    Primary osteoarthritis of left knee M17.12    PAF (paroxysmal atrial fibrillation) (HCC) I48.0    MGUS (monoclonal gammopathy of unknown significance) D47.2    Kidney lesion N28.9    Premature atrial contraction I49.1    Nasal polyposis J33.9    Ulcer of left pretibial region with fat layer exposed ([facility] [address] L68.554    Syncope and collapse R55    Stage 3a chronic kidney disease (HCC) N18.31    Longstanding persistent atrial fibrillation (HCC) I48.11    Lymphedema I89.0       Assessment of today's active condition(s): diabetic foot ulcer left foot secondary to trauma, diabetes mellitus with peripheral neuropathy, lymphedema/venous leg ulcer left, Lymphedema, lymphorrhea. Factors contributing to occurrence and/or persistence of the chronic ulcer include edema, venous stasis, and lymphedema. Sharp debridement is not indicated today, based upon the exam findings in the ulcer(s) above. Discharge plan:     Treatment in the wound care center today: Wound 06/13/22 #1, Left Medial Leg, Lymphedema, Full Thickness, Onset Unknown (several years)-Dressing/Treatment: Other (comment) (collagen bordered gauze)  Wound 01/09/23 #2, left dorsal foot, traumatic, partial thickness, (onset 12/25/2022)-Dressing/Treatment: Other (comment) (iodoform bordered gauze). Home treatment: good handwashing before and after any dressing changes. Cleanse ulcer with saline or soap & water before dressing change. May use Vaseline (petrolatum), Aquaphor, Aveeno, CeraVe, Cetaphil, Eucerin, Lubriderm, etc for dry skin. Dressing type for home: Collagen and Mepilex border and compression stocking. Iodoform to foot ulcer and dry dressing. Continue compression stockings. Written discharge instructions given to patient. Offload ulcer(s) as directed. Elevate leg(s) as directed. Exercise daily. Follow up in 1 week. Continue to use compression pumps and increase frequency. Discussed potential complications in regards to the hematoma with patient. If problems occur proceed to ER.            Electronically signed by Alba Ordaz DPM on 2/19/2023 at 1:40 PM.

## 2023-02-20 ENCOUNTER — HOSPITAL ENCOUNTER (OUTPATIENT)
Dept: WOUND CARE | Age: 83
Discharge: HOME | End: 2023-02-20
Payer: MEDICARE

## 2023-02-20 VITALS
RESPIRATION RATE: 18 BRPM | HEIGHT: 71 IN | BODY MASS INDEX: 30.63 KG/M2 | WEIGHT: 218.8 LBS | SYSTOLIC BLOOD PRESSURE: 117 MMHG | DIASTOLIC BLOOD PRESSURE: 76 MMHG | TEMPERATURE: 97.4 F | HEART RATE: 89 BPM

## 2023-02-20 DIAGNOSIS — L97.822 ULCER OF LEFT PRETIBIAL REGION WITH FAT LAYER EXPOSED (HCC): Primary | ICD-10-CM

## 2023-02-20 PROCEDURE — 99212 OFFICE O/P EST SF 10 MIN: CPT

## 2023-02-20 RX ORDER — LIDOCAINE HYDROCHLORIDE 40 MG/ML
SOLUTION TOPICAL ONCE
OUTPATIENT
Start: 2023-02-20 | End: 2023-02-20

## 2023-02-20 RX ORDER — BACITRACIN ZINC AND POLYMYXIN B SULFATE 500; 1000 [USP'U]/G; [USP'U]/G
OINTMENT TOPICAL ONCE
OUTPATIENT
Start: 2023-02-20 | End: 2023-02-20

## 2023-02-20 RX ORDER — LIDOCAINE 50 MG/G
OINTMENT TOPICAL ONCE
OUTPATIENT
Start: 2023-02-20 | End: 2023-02-20

## 2023-02-20 RX ORDER — LIDOCAINE 40 MG/G
CREAM TOPICAL ONCE
OUTPATIENT
Start: 2023-02-20 | End: 2023-02-20

## 2023-02-20 RX ORDER — LIDOCAINE 40 MG/G
CREAM TOPICAL ONCE
Status: DISCONTINUED | OUTPATIENT
Start: 2023-02-20 | End: 2023-02-21 | Stop reason: HOSPADM

## 2023-02-20 NOTE — PLAN OF CARE
Pt to the [facility] for follow up appointment. Wounds were not debrided today. Pt to place collagen and dry dressing to left pretib wound and left dorsal foot wound. Pt to follow up in the [facility] in 1 week. Pt to continue to wear compression stockings. Discharge instructions reviewed with patient, all questions answered, copy given to patient. Dressings were applied to all wounds per M.D. Instructions at this visit.

## 2023-02-21 NOTE — DISCHARGE INSTRUCTIONS
[address] [facility] Physician Orders and Discharge [address]  [address], [address]  [place], [facility]  Telephone: [phone]      Fax: [phone] home care company:  N/a     Your wound-care supplies will be provided by: N/a     NAME:  Aydee Medrano   YOB: 1940  PRIMARY DIAGNOSIS FOR WOUND CARE CENTER: Lymphedema     Wound cleansing:  Do not scrub or use excessive force. Wash hands with soap and water before and after dressing changes. Prior to applying a clean dressing, cleanse wound with normal saline, wound cleanser, or mild soap and water. Ask your physician or nurse before getting the wound(s) wet in the shower. Wound care for home:      Left lower leg wound:      Wash wound with soap and water with dressing changes      Benzoin to periwound      collagen ag      mepilex border      Leave on all week. Wear compression stockings daily      Left foot wound:     Wash with soap and water     Collagen to foot wound    Cover with 4x4's romero or bordered gauze    Change daily. Right lower leg    Medium spandagrip or compression stocking toes to knees on right lower leg. May put on in the morning and take off at bed time. Please note, all wounds (unless stated otherwise here) were mechanically debrided at the time of cleansing here in the wound-care center today, so a small amount of pain, drainage or bleeding from that process might be expected, and is normal.     All products for home use, including multiple products for a single wound if applicable, are medically necessary in order to achieve the best chance at timely wound healing. See provider documentation for details if needed.      Substituted dressings applied in the [facility] today, if applicable:           New orders for this week (labs, imaging, medications, etc.):      Use compression pumps 2 times a day        Additional instructions for specific diagnoses:     General comments for venous / lymphedema ulcers: *  Elevate your legs to the level of your heart for at least 30 minutes, several times daily. *  Walk as much as you can tolerate. Avoid standing for long periods of time, but if you must stand, regularly do heel-raises and calf-pump exercises. *  If you have compression wraps designed to remain in place all week, be sure to keep them from getting wet. *  If you have compression garments that can be changed regularly, apply them first thing in the morning, and remove them when you go to bed. Moisturize your skin at bedtime, with Vaseline, Aquaphor, Aveeno, CeraVe, Cetaphil, Eucerin, Lubriderm, etc; but keep the skin between your toes dry. *  If you smoke, your wound can not heal properly -- please talk with us when you're ready to quit. *  Be sure to adhere to any recommendations from your PCP about diuretics (water pills), diet, exercise, and maintaining a healthy weight. If you have questions, please ask. *  If you have a compression pump, aim for at least two hours of use daily. F/U Appointment is with Dr. Brandie Doyle in 1 week on                                   at                       .     Your nurse  is Wong Rider RN. If we applied slip-resistant hospital socks today, be sure to remove them at least once a day to inspect your toes or feet, even if you're not changing the wraps or dressings underneath. If you see anything concerning (redness, excess moisture, etc), please call and let us know right away.      Should you experience any significant changes in your wound(s) (including redness, increased warmth, increased pain, increased drainage, odor, or fever) or have questions about your wound care, please contact the [facility] at [phone] Monday-Thursday from 8:00 am - 4:30 pm, or Friday from 8:00 am - 2:30 pm.  If you need help with your wound outside these hours and cannot wait until we are again available, contact your home-care company (if applicable), your PCP, or go to the nearest emergency room.

## 2023-02-21 NOTE — PROGRESS NOTES
[facility] Progress Note      Shayne Lundberg     : 1940    DATE OF VISIT:  2023    Subjective:     Shayne Lundberg is a [de-identified] y.o. male who has a chief complaint of a venous/lymphedema ulcer located on the left leg. States doing well with wound. Doing well with compression stockings. States stays active. Doing well with dressing changes. Mr. 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 ([address] Esophageal reflux, Fracture of nasal bone, Headache, Hearing loss, Hyperlipidemia, Hyperlipidemia associated with type 2 diabetes mellitus ([address] Hypertension, Hypertrophy of prostate without urinary obstruction and other lower urinary tract symptoms (LUTS), Hypothyroidism, Insufficiency fracture of tibia, Osteoarthrosis, not specified whether generalized/localized, lower leg, Pain in joint, lower leg, Right knee pain, Status post total left knee replacement, Tinnitus, Type 2 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 He has a past surgical history that includes eye surgery; Colonoscopy (07); Esophagus surgery; Knee arthroscopy; Cholecystectomy, laparoscopic; other surgical history (Left, 2016); joint replacement (Left, 2017); Tonsillectomy; and Spine surgery. His family history includes Breast Cancer in his sister; Cancer in his mother; Diabetes type 2  in his brother; Heart Disease in his brother; Kidney Disease in his brother. Mr. Yaya Reynoso reports that he quit smoking about 33 years ago.  His smoking use included cigarettes. He has a 15.00 pack-year smoking history. He quit smokeless tobacco use about 45 years ago. He reports current alcohol use. He reports that he does not use drugs. His current medication list consists of Embrace Blood Glucose Monitor, Embrace Lancets Ultra Thin 30G, Handicap Placard, Insulin Syringe-Needle U-100, Multiple Vitamins-Minerals, apixaban, blood glucose test strips, dilTIAZem, empagliflozin, furosemide, hydroCHLOROthiazide, insulin glargine-yfgn, levothyroxine, pioglitazone, rosuvastatin, and tamsulosin. Allergies: Patient has no known allergies. Pertinent items from the review of systems are discussed in the HPI; the remainder of the ROS was reviewed and is negative. Objective:     /76   Pulse 89   Temp 97.4 °F (36.3 °C) (Oral)   Resp 18   Ht 5' 11\" (1.803 m)   Wt 218 lb 12.8 oz (99.2 kg)   BMI 30.52 kg/m²       Dorsalis pedis pulse left palpable  Posterior tibial pulse left palpable  Dorsalis pedis pulse right palpable  Posterior tibial pulse right palpable    Ulcer on the dorsal aspect left foot with mild fibrotic tissue, red granulation tissue, mild serous drainage, no hyperkeratotic rim, + mild undermining, no tunneling, no purulence, no malodor, no eschar, no periwound maceration, mild periwound erythema, mild edema, no crepitus, no increase in skin temperature, ulcer probes to soft tissue only, no hematoma noted    Ecchymosis dorsal aspect forefoot - decreasing in intensity    Ulcer on the anterior aspect left lower leg with very minimal fibrotic tissue, red granulation tissue, mild serous drainage, no hyperkeratotic rim, no undermining, no tunneling, no purulence, no malodor, no eschar, no periwound maceration, mild periwound erythema, mild edema,  no crepitus, no increase in skin temperature, ulcer probes to soft tissue only    Weeping of skin noted at ulcer site. Edema bilateral LE with thickening of the lower leg skin noted.      Venous dermatitis bilateral LE. Hyperpigmentation of lower legs noted. Thickening of skin on bilateral LE. Today's ulcer measurements are in the wound documentation flowsheet.      Wound measurements:  Wound 01/09/23 #2, left dorsal foot, traumatic, partial thickness, (onset 12/25/2022)-Wound Length (cm): 0.6 cm  Wound 06/13/22 #1, Left Medial Leg, Lymphedema, Full Thickness, Onset Unknown (several years)-Wound Length (cm): 1.1 cm    Wound 01/09/23 #2, left dorsal foot, traumatic, partial thickness, (onset 12/25/2022)-Wound Width (cm): 0.6 cm  Wound 06/13/22 #1, Left Medial Leg, Lymphedema, Full Thickness, Onset Unknown (several years)-Wound Width (cm): 1.6 cm    Wound 01/09/23 #2, left dorsal foot, traumatic, partial thickness, (onset 12/25/2022)-Wound Depth (cm): 0.8 cm  Wound 06/13/22 #1, Left Medial Leg, Lymphedema, Full Thickness, Onset Unknown (several years)-Wound Depth (cm): 0.2 cm    LABS  Lab Results   Component Value Date    LABA1C 7.1 09/08/2022         Assessment:     Patient Active Problem List   Diagnosis Code    Benign essential HTN I10    Osteoarthrosis involving lower leg [id number]    Pain in joint, lower leg M25.569    Hepatitis C virus infection without hepatic coma B19.20    Benign essential tremor G25.0    Hypertrophy of prostate without urinary obstruction and other lower urinary tract symptoms (LUTS) N40.0    Hypothyroidism E03.9    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) E11.22, N18.30    Chronic hepatitis C without hepatic coma (HCC) B18.2    Hyperlipidemia associated with type 2 diabetes mellitus (HCC) E11.69, E78.5    Type 2 diabetes mellitus 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 edema (HCC) Y81.8194    Degenerative tear of medial meniscus of left knee M23.204    Primary osteoarthritis of left knee M17.12    PAF (paroxysmal atrial fibrillation) (HCC) I48.0    MGUS (monoclonal gammopathy of unknown significance) D47.2    Kidney lesion N28.9    Premature atrial contraction I49.1    Nasal polyposis J33.9    Ulcer of left pretibial region with fat layer exposed ([facility] [address] I09.019    Syncope and collapse R55    Stage 3a chronic kidney disease (HCC) N18.31    Longstanding persistent atrial fibrillation (HCC) I48.11    Lymphedema I89.0       Assessment of today's active condition(s): diabetic foot ulcer left foot secondary to trauma, diabetes mellitus with peripheral neuropathy, lymphedema/venous leg ulcer left, Lymphedema, lymphorrhea. Factors contributing to occurrence and/or persistence of the chronic ulcer include edema, venous stasis, and lymphedema. Sharp debridement is not indicated today, based upon the exam findings in the ulcer(s) above. Discharge plan:     Treatment in the wound care center today: Wound 01/09/23 #2, left dorsal foot, traumatic, partial thickness, (onset 12/25/2022)-Dressing/Treatment: Other (comment) (collagen bordered gauze)  Wound 06/13/22 #1, Left Medial Leg, Lymphedema, Full Thickness, Onset Unknown (several years)-Dressing/Treatment: Other (comment) (collagen bordered gauze). Home treatment: good handwashing before and after any dressing changes. Cleanse ulcer with saline or soap & water before dressing change. May use Vaseline (petrolatum), Aquaphor, Aveeno, CeraVe, Cetaphil, Eucerin, Lubriderm, etc for dry skin. Dressing type for home: Collagen and Mepilex border and compression stocking. Collagen to foot ulcer and dry dressing. Continue compression stockings. Written discharge instructions given to patient. Offload ulcer(s) as directed. Elevate leg(s) as directed. Exercise daily. Follow up in 1 week. Continue to use compression pumps and increase frequency.              Electronically signed by Angely Ojeda DPM on 2/20/2023 at 7:51 PM.

## 2023-02-27 ENCOUNTER — HOSPITAL ENCOUNTER (OUTPATIENT)
Dept: WOUND CARE | Age: 83
Discharge: HOME | End: 2023-02-27
Payer: MEDICARE

## 2023-02-27 VITALS
RESPIRATION RATE: 18 BRPM | DIASTOLIC BLOOD PRESSURE: 68 MMHG | BODY MASS INDEX: 30.94 KG/M2 | HEART RATE: 91 BPM | WEIGHT: 221 LBS | HEIGHT: 71 IN | SYSTOLIC BLOOD PRESSURE: 124 MMHG | TEMPERATURE: 97.9 F

## 2023-02-27 DIAGNOSIS — L97.822 ULCER OF LEFT PRETIBIAL REGION WITH FAT LAYER EXPOSED (HCC): Primary | ICD-10-CM

## 2023-02-27 PROCEDURE — 99212 OFFICE O/P EST SF 10 MIN: CPT

## 2023-02-27 RX ORDER — LIDOCAINE HYDROCHLORIDE 40 MG/ML
SOLUTION TOPICAL ONCE
OUTPATIENT
Start: 2023-02-27 | End: 2023-02-27

## 2023-02-27 RX ORDER — LIDOCAINE 50 MG/G
OINTMENT TOPICAL ONCE
OUTPATIENT
Start: 2023-02-27 | End: 2023-02-27

## 2023-02-27 RX ORDER — BACITRACIN ZINC AND POLYMYXIN B SULFATE 500; 1000 [USP'U]/G; [USP'U]/G
OINTMENT TOPICAL ONCE
OUTPATIENT
Start: 2023-02-27 | End: 2023-02-27

## 2023-02-27 RX ORDER — LIDOCAINE 40 MG/G
CREAM TOPICAL ONCE
Status: DISCONTINUED | OUTPATIENT
Start: 2023-02-27 | End: 2023-02-28 | Stop reason: HOSPADM

## 2023-02-27 RX ORDER — LIDOCAINE 40 MG/G
CREAM TOPICAL ONCE
OUTPATIENT
Start: 2023-02-27 | End: 2023-02-27

## 2023-02-27 NOTE — PLAN OF CARE
Pt to the [address] [place] [address] for follow up appointment. Wounds measuring smaller. Pt to continue with collagen and dry dressing to wounds. Pt to follow up in the [address] [place] [address],[address] in 1 week. Pt continues to use compression pumps and compression stockings. Discharge instructions reviewed with patient, all questions answered, copy given to patient. Dressings were applied to all wounds per M.D. Instructions at this visit.

## 2023-02-28 NOTE — DISCHARGE INSTRUCTIONS
[address] [facility]  [address], [address]  [place], [facility]  Telephone: [phone]      Fax: [phone] home care company:  N/a     Your wound-care supplies will be provided by: N/a     NAME:  Marion Gleason   YOB: 1940  PRIMARY DIAGNOSIS FOR WOUND CARE CENTER: Lymphedema     Wound cleansing:  Do not scrub or use excessive force. Wash hands with soap and water before and after dressing changes. Prior to applying a clean dressing, cleanse wound with normal saline, wound cleanser, or mild soap and water. Ask your physician or nurse before getting the wound(s) wet in the shower. Wound care for home:      Left lower leg wound:      Wash wound with soap and water with dressing changes      Benzoin to periwound      collagen ag      mepilex border      Leave on all week. Wear compression stockings daily      Left foot wound:     Wash with soap and water     Collagen to foot wound    Cover with 4x4's romero or bordered gauze    Change daily. Right lower leg    Medium spandagrip or compression stocking toes to knees on right lower leg. May put on in the morning and take off at bed time. Please note, all wounds (unless stated otherwise here) were mechanically debrided at the time of cleansing here in the wound-care center today, so a small amount of pain, drainage or bleeding from that process might be expected, and is normal.     All products for home use, including multiple products for a single wound if applicable, are medically necessary in order to achieve the best chance at timely wound healing. See provider documentation for details if needed.      Substituted dressings applied in the [facility] today, if applicable:           New orders for this week (labs, imaging, medications, etc.):      Use compression pumps 2 times a day     Biopsy done today        Additional instructions for specific diagnoses:     General comments for venous / lymphedema ulcers: *  Elevate your legs to the level of your heart for at least 30 minutes, several times daily. *  Walk as much as you can tolerate. Avoid standing for long periods of time, but if you must stand, regularly do heel-raises and calf-pump exercises. *  If you have compression wraps designed to remain in place all week, be sure to keep them from getting wet. *  If you have compression garments that can be changed regularly, apply them first thing in the morning, and remove them when you go to bed. Moisturize your skin at bedtime, with Vaseline, Aquaphor, Aveeno, CeraVe, Cetaphil, Eucerin, Lubriderm, etc; but keep the skin between your toes dry. *  If you smoke, your wound can not heal properly -- please talk with us when you're ready to quit. *  Be sure to adhere to any recommendations from your PCP about diuretics (water pills), diet, exercise, and maintaining a healthy weight. If you have questions, please ask. *  If you have a compression pump, aim for at least two hours of use daily. F/U Appointment is with Dr. Jose Olsen in 1 week on                                   at                       .     Your nurse  is Bassam Robertson RN. If we applied slip-resistant hospital socks today, be sure to remove them at least once a day to inspect your toes or feet, even if you're not changing the wraps or dressings underneath. If you see anything concerning (redness, excess moisture, etc), please call and let us know right away.      Should you experience any significant changes in your wound(s) (including redness, increased warmth, increased pain, increased drainage, odor, or fever) or have questions about your wound care, please contact the [facility] at [phone] Monday-Thursday from 8:00 am - 4:30 pm, or Friday from 8:00 am - 2:30 pm.  If you need help with your wound outside these hours and cannot wait until we are again available, contact your home-care company (if applicable), your PCP, or go to the nearest emergency room.

## 2023-03-02 ENCOUNTER — OFFICE VISIT (OUTPATIENT)
Dept: INTERNAL MEDICINE CLINIC | Age: 83
End: 2023-03-02

## 2023-03-02 VITALS
RESPIRATION RATE: 12 BRPM | BODY MASS INDEX: 30.24 KG/M2 | HEART RATE: 80 BPM | WEIGHT: 216 LBS | HEIGHT: 71 IN | DIASTOLIC BLOOD PRESSURE: 80 MMHG | SYSTOLIC BLOOD PRESSURE: 130 MMHG

## 2023-03-02 DIAGNOSIS — N18.31 TYPE 2 DIABETES MELLITUS WITH STAGE 3A CHRONIC KIDNEY DISEASE, WITHOUT LONG-TERM CURRENT USE OF INSULIN (HCC): ICD-10-CM

## 2023-03-02 DIAGNOSIS — E03.9 ACQUIRED HYPOTHYROIDISM: ICD-10-CM

## 2023-03-02 DIAGNOSIS — I10 BENIGN ESSENTIAL HTN: ICD-10-CM

## 2023-03-02 DIAGNOSIS — E11.22 TYPE 2 DIABETES MELLITUS WITH STAGE 3A CHRONIC KIDNEY DISEASE, WITHOUT LONG-TERM CURRENT USE OF INSULIN (HCC): ICD-10-CM

## 2023-03-02 DIAGNOSIS — B18.2 CHRONIC HEPATITIS C WITHOUT HEPATIC COMA (HCC): ICD-10-CM

## 2023-03-02 DIAGNOSIS — L97.822 ULCER OF LEFT PRETIBIAL REGION WITH FAT LAYER EXPOSED (HCC): ICD-10-CM

## 2023-03-02 DIAGNOSIS — E11.69 HYPERLIPIDEMIA ASSOCIATED WITH TYPE 2 DIABETES MELLITUS (HCC): ICD-10-CM

## 2023-03-02 DIAGNOSIS — D47.2 MGUS (MONOCLONAL GAMMOPATHY OF UNKNOWN SIGNIFICANCE): ICD-10-CM

## 2023-03-02 DIAGNOSIS — E11.3293 TYPE 2 DIABETES MELLITUS WITH BOTH EYES AFFECTED BY MILD NONPROLIFERATIVE RETINOPATHY WITHOUT MACULAR EDEMA, WITH LONG-TERM CURRENT USE OF INSULIN (HCC): ICD-10-CM

## 2023-03-02 DIAGNOSIS — G25.0 BENIGN ESSENTIAL TREMOR: ICD-10-CM

## 2023-03-02 DIAGNOSIS — Z00.00 MEDICARE ANNUAL WELLNESS VISIT, SUBSEQUENT: Primary | ICD-10-CM

## 2023-03-02 DIAGNOSIS — E78.5 HYPERLIPIDEMIA ASSOCIATED WITH TYPE 2 DIABETES MELLITUS (HCC): ICD-10-CM

## 2023-03-02 DIAGNOSIS — I48.0 PAF (PAROXYSMAL ATRIAL FIBRILLATION) (HCC): ICD-10-CM

## 2023-03-02 DIAGNOSIS — Z79.4 TYPE 2 DIABETES MELLITUS WITH BOTH EYES AFFECTED BY MILD NONPROLIFERATIVE RETINOPATHY WITHOUT MACULAR EDEMA, WITH LONG-TERM CURRENT USE OF INSULIN (HCC): ICD-10-CM

## 2023-03-02 LAB
A/G RATIO: 0.9 (ref 1.1–2.2)
ALBUMIN SERPL-MCNC: 3.7 G/DL (ref 3.4–5)
ALP BLD-CCNC: 89 U/L (ref 40–129)
ALT SERPL-CCNC: 33 U/L (ref 10–40)
ANION GAP SERPL CALCULATED.3IONS-SCNC: 22 MMOL/L (ref 3–16)
AST SERPL-CCNC: 43 U/L (ref 15–37)
BASOPHILS ABSOLUTE: 0 K/UL (ref 0–0.2)
BASOPHILS RELATIVE PERCENT: 0.6 %
BILIRUB SERPL-MCNC: 0.5 MG/DL (ref 0–1)
BILIRUBIN, POC: NORMAL
BLOOD URINE, POC: NORMAL
BUN BLDV-MCNC: 46 MG/DL (ref 7–20)
CALCIUM SERPL-MCNC: 9.9 MG/DL (ref 8.3–10.6)
CHLORIDE BLD-SCNC: 92 MMOL/L (ref 99–110)
CHOLESTEROL, TOTAL: 184 MG/DL (ref 0–199)
CLARITY, POC: NORMAL
CO2: 29 MMOL/L (ref 21–32)
COLOR, POC: NORMAL
CREAT SERPL-MCNC: 1.8 MG/DL (ref 0.8–1.3)
CREATININE URINE: 74.2 MG/DL (ref 39–259)
EOSINOPHILS ABSOLUTE: 0.2 K/UL (ref 0–0.6)
EOSINOPHILS RELATIVE PERCENT: 3 %
GFR SERPL CREATININE-BSD FRML MDRD: 38 ML/MIN/{1.73_M2}
GLUCOSE BLD-MCNC: 306 MG/DL (ref 70–99)
GLUCOSE URINE, POC: NORMAL
HCT VFR BLD CALC: 43.7 % (ref 40.5–52.5)
HDLC SERPL-MCNC: 56 MG/DL (ref 40–60)
HEMOGLOBIN: 14.4 G/DL (ref 13.5–17.5)
KETONES, POC: NORMAL
LDL CHOLESTEROL CALCULATED: 105 MG/DL
LEUKOCYTE EST, POC: NORMAL
LYMPHOCYTES ABSOLUTE: 1.5 K/UL (ref 1–5.1)
LYMPHOCYTES RELATIVE PERCENT: 25.3 %
MCH RBC QN AUTO: 30 PG (ref 26–34)
MCHC RBC AUTO-ENTMCNC: 32.9 G/DL (ref 31–36)
MCV RBC AUTO: 91.3 FL (ref 80–100)
MICROALBUMIN UR-MCNC: <1.2 MG/DL
MICROALBUMIN/CREAT UR-RTO: NORMAL MG/G (ref 0–30)
MONOCYTES ABSOLUTE: 0.4 K/UL (ref 0–1.3)
MONOCYTES RELATIVE PERCENT: 7.4 %
NEUTROPHILS ABSOLUTE: 3.8 K/UL (ref 1.7–7.7)
NEUTROPHILS RELATIVE PERCENT: 63.7 %
NITRITE, POC: NORMAL
PDW BLD-RTO: 14.7 % (ref 12.4–15.4)
PH, POC: NORMAL
PLATELET # BLD: 282 K/UL (ref 135–450)
PMV BLD AUTO: 7.9 FL (ref 5–10.5)
POTASSIUM SERPL-SCNC: 5 MMOL/L (ref 3.5–5.1)
PROTEIN, POC: NORMAL
RBC # BLD: 4.78 M/UL (ref 4.2–5.9)
SODIUM BLD-SCNC: 143 MMOL/L (ref 136–145)
SPECIFIC GRAVITY, POC: NORMAL
TOTAL PROTEIN: 7.7 G/DL (ref 6.4–8.2)
TRIGL SERPL-MCNC: 116 MG/DL (ref 0–150)
TSH REFLEX FT4: 0.37 UIU/ML (ref 0.27–4.2)
UROBILINOGEN, POC: NORMAL
VLDLC SERPL CALC-MCNC: 23 MG/DL
WBC # BLD: 5.9 K/UL (ref 4–11)

## 2023-03-02 PROCEDURE — 3079F DIAST BP 80-89 MM HG: CPT | Performed by: INTERNAL MEDICINE

## 2023-03-02 PROCEDURE — 81002 URINALYSIS NONAUTO W/O SCOPE: CPT | Performed by: INTERNAL MEDICINE

## 2023-03-02 PROCEDURE — 3075F SYST BP GE 130 - 139MM HG: CPT | Performed by: INTERNAL MEDICINE

## 2023-03-02 PROCEDURE — G0439 PPPS, SUBSEQ VISIT: HCPCS | Performed by: INTERNAL MEDICINE

## 2023-03-02 PROCEDURE — G8484 FLU IMMUNIZE NO ADMIN: HCPCS | Performed by: INTERNAL MEDICINE

## 2023-03-02 PROCEDURE — 1123F ACP DISCUSS/DSCN MKR DOCD: CPT | Performed by: INTERNAL MEDICINE

## 2023-03-02 RX ORDER — TAMSULOSIN HYDROCHLORIDE 0.4 MG/1
CAPSULE ORAL
Qty: 90 CAPSULE | Refills: 0 | Status: SHIPPED | OUTPATIENT
Start: 2023-03-02

## 2023-03-02 RX ORDER — DILTIAZEM HYDROCHLORIDE 120 MG/1
CAPSULE, COATED, EXTENDED RELEASE ORAL
Qty: 90 CAPSULE | Refills: 0 | Status: SHIPPED | OUTPATIENT
Start: 2023-03-02

## 2023-03-02 ASSESSMENT — PATIENT HEALTH QUESTIONNAIRE - PHQ9
SUM OF ALL RESPONSES TO PHQ QUESTIONS 1-9: 2
SUM OF ALL RESPONSES TO PHQ9 QUESTIONS 1 & 2: 2
SUM OF ALL RESPONSES TO PHQ QUESTIONS 1-9: 2
2. FEELING DOWN, DEPRESSED OR HOPELESS: 1
1. LITTLE INTEREST OR PLEASURE IN DOING THINGS: 1
SUM OF ALL RESPONSES TO PHQ QUESTIONS 1-9: 2
SUM OF ALL RESPONSES TO PHQ QUESTIONS 1-9: 2

## 2023-03-02 ASSESSMENT — LIFESTYLE VARIABLES
HOW MANY STANDARD DRINKS CONTAINING ALCOHOL DO YOU HAVE ON A TYPICAL DAY: 1 OR 2
HOW OFTEN DO YOU HAVE A DRINK CONTAINING ALCOHOL: 2-4 TIMES A MONTH

## 2023-03-02 NOTE — PATIENT INSTRUCTIONS
Preventing Falls: Care Instructions  Overview     Getting around your home safely can be a challenge if you have injuries or health problems that make it easy for you to fall. Loose rugs and furniture in walkways are among the dangers for many older people who have problems walking or who have poor eyesight. People who have conditions such as arthritis, osteoporosis, or dementia also have to be careful not to fall. You can make your home safer with a few simple measures. Follow-up care is a key part of your treatment and safety. Be sure to make and go to all appointments, and call your doctor if you are having problems. It's also a good idea to know your test results and keep a list of the medicines you take. How can you care for yourself at home? Taking care of yourself  Exercise regularly to improve your strength, muscle tone, and balance. Walk if you can. Swimming may be a good choice if you cannot walk easily. Have your vision and hearing checked each year or any time you notice a change. If you have trouble seeing and hearing, you might not be able to avoid objects and could lose your balance. Know the side effects of the medicines you take. Ask your doctor or pharmacist whether the medicines you take can affect your balance. Sleeping pills or sedatives can affect your balance. Limit the amount of alcohol you drink. Alcohol can impair your balance and other senses. Ask your doctor whether calluses or corns on your feet need to be removed. If you wear loose-fitting shoes because of calluses or corns, you can lose your balance and fall. Talk to your doctor if you have numbness in your feet. You may get dizzy if you do not drink enough water. To prevent dehydration, drink plenty of fluids. Choose water and other clear liquids. If you have kidney, heart, or liver disease and have to limit fluids, talk with your doctor before you increase the amount of fluids you drink.   Preventing falls at home  Remove raised doorway thresholds, throw rugs, and clutter. Repair loose carpet or raised areas in the floor. Move furniture and electrical cords to keep them out of walking paths. Use nonskid floor wax, and wipe up spills right away, especially on ceramic tile floors. If you use a walker or cane, put rubber tips on it. If you use crutches, clean the bottoms of them regularly with an abrasive pad, such as steel wool. Keep your house well lit, especially stairways, porches, and outside walkways. Use night-lights in areas such as hallways and bathrooms. Add extra light switches or use remote switches (such as switches that go on or off when you clap your hands) to make it easier to turn lights on if you have to get up during the night. Install sturdy handrails on stairways. Move items in your cabinets so that the things you use a lot are on the lower shelves (about waist level). Keep a cordless phone and a flashlight with new batteries by your bed. If possible, put a phone in each of the main rooms of your house, or carry a cell phone in case you fall and cannot reach a phone. Or, you can wear a device around your neck or wrist. You push a button that sends a signal for help. Wear low-heeled shoes that fit well and give your feet good support. Use footwear with nonskid soles. Check the heels and soles of your shoes for wear. Repair or replace worn heels or soles. Do not wear socks without shoes on smooth floors, such as wood. Walk on the grass when the sidewalks are slippery. If you live in an area that gets snow and ice in the winter, sprinkle salt on slippery steps and sidewalks. Or ask a family member or friend to do this for you. Preventing falls in the bath  Install grab bars and nonskid mats inside and outside your shower or tub and near the toilet and sinks. Use shower chairs and bath benches. Use a hand-held shower head that will allow you to sit while showering.   Get into a tub or shower by putting the weaker leg in first. Get out of a tub or shower with your strong side first.  Repair loose toilet seats and consider installing a raised toilet seat to make getting on and off the toilet easier. Keep your bathroom door unlocked while you are in the shower. Where can you learn more? Go to http://www.rosas.com/ and enter G117 to learn more about \"Preventing Falls: Care Instructions. \"  Current as of: May 4, 2022               Content Version: 13.5  © 2007-6837 Healthwise, [employer] ([place]). Care instructions adapted under license by [facility] ([facility]). If you have questions about a medical condition or this instruction, always ask your healthcare professional. [address] any warranty or liability for your use of this information. Fatigue: Care Instructions  Your Care Instructions     Fatigue is a feeling of tiredness, exhaustion, or lack of energy. You may feel fatigue because of too much or not enough activity. It can also come from stress, lack of sleep, boredom, and poor diet. Many medical problems, such as viral infections, can cause fatigue. Emotional problems, especially depression, are often the cause of fatigue. Fatigue is most often a symptom of another problem. Treatment for fatigue depends on the cause. For example, if you have fatigue because you have a certain health problem, treating this problem also treats your fatigue. If depression or anxiety is the cause, treatment may help. Follow-up care is a key part of your treatment and safety. Be sure to make and go to all appointments, and call your doctor if you are having problems. It's also a good idea to know your test results and keep a list of the medicines you take. How can you care for yourself at home? Get regular exercise. But don't overdo it. Go back and forth between rest and exercise. Get plenty of rest.  Eat a healthy diet.  Do not skip meals, especially breakfast.  Reduce your use of caffeine, tobacco, and alcohol. Caffeine is most often found in coffee, tea, cola drinks, and chocolate. Limit medicines that can cause fatigue. This includes tranquilizers and cold and allergy medicines. When should you call for help? Watch closely for changes in your health, and be sure to contact your doctor if:    You have new symptoms such as fever or a rash.     Your fatigue gets worse.     You have been feeling down, depressed, or hopeless. Or you may have lost interest in things that you usually enjoy.     You are not getting better as expected. Where can you learn more? Go to http://www.woods.com/ and enter G191 to learn more about \"Fatigue: Care Instructions. \"  Current as of: February 9, 2022               Content Version: 13.5  © 7203-5670 Healthwise, Incorporated. Care instructions adapted under license by [facility] ([facility]). If you have questions about a medical condition or this instruction, always ask your healthcare professional. [place] [address] any warranty or liability for your use of this information. Hearing Loss: Care Instructions  Overview     Hearing loss is a sudden or slow decrease in how well you hear. It can range from mild to severe. Permanent hearing loss can occur with aging. It also can happen when you are exposed long-term to loud noise. Examples include listening to loud music, riding motorcycles, or being around other loud machines. Hearing loss can affect your work and home life. It can make you feel lonely or depressed. You may feel that you have lost your independence. But hearing aids and other devices can help you hear better and feel connected to others. Follow-up care is a key part of your treatment and safety. Be sure to make and go to all appointments, and call your doctor if you are having problems. It's also a good idea to know your test results and keep a list of the medicines you take.   How can you care for yourself at home?  Avoid loud noises whenever possible. This helps keep your hearing from getting worse. Always wear hearing protection around loud noises. Wear a hearing aid as directed. See a professional who can help you pick a hearing aid that fits you. Have hearing tests as your doctor suggests. They can show whether your hearing has changed. Your hearing aid may need to be adjusted. Use other devices as needed. These may include:  Telephone amplifiers and hearing aids that can connect to a television, stereo, radio, or microphone. Devices that use lights or vibrations. These alert you to the doorbell, a ringing telephone, or a baby monitor. Television closed-captioning. This shows the words at the bottom of the screen. Most new TVs can do this. TTY (text telephone). This lets you type messages back and forth on the telephone instead of talking or listening. These devices are also called TDD. When messages are typed on the keyboard, they are sent over the phone line to a receiving TTY. The message is shown on a monitor. Use text messaging, social media, and email if it is hard for you to communicate by telephone. Try to learn a listening technique called speechreading. It is not lipreading. You pay attention to people's gestures, expressions, posture, and tone of voice. These clues can help you understand what a person is saying. Face the person you are talking to, and have them face you. Make sure the lighting is good. You need to see the other person's face clearly. Think about counseling if you need help to adjust to your hearing loss. When should you call for help? Watch closely for changes in your health, and be sure to contact your doctor if:    You think your hearing is getting worse.     You have new symptoms, such as dizziness or nausea. Where can you learn more? Go to http://www.rosas.com/ and enter J579 to learn more about \"Hearing Loss: Care Instructions. \"  Current as of:  May 4, 5265               MLYQNDO Version: 13.5  © 5868-2339 Healthwise, [employer]. Care instructions adapted under license by [facility] ([facility]). If you have questions about a medical condition or this instruction, always ask your healthcare professional. [address] any warranty or liability for your use of this information. Learning About Vision Tests  What are vision tests? The four most common vision tests are visual acuity tests, refraction, visual field tests, and color vision tests. Visual acuity (sharpness) tests  These tests are used: To see if you need glasses or contact lenses. To monitor an eye problem. To check an eye injury. Visual acuity tests are done as part of routine exams. You may also have this test when you get your 's license or apply for some types of jobs. Visual field tests  These tests are used: To check for vision loss in any area of your range of vision. To screen for certain eye diseases. To look for nerve damage after a stroke, head injury, or other problem that could reduce blood flow to the brain. Refraction and color tests  A refraction test is done to find the right prescription for glasses and contact lenses. A color vision test is done to check for color blindness. Color vision is often tested as part of a routine exam. You may also have this test when you apply for a job where recognizing different colors is important, such as , electronics, or the [employer]. How are vision tests done? Visual acuity test   You cover one eye at a time. You read aloud from a wall chart across the room. You read aloud from a small card that you hold in your hand. Refraction   You look into a special device. The device puts lenses of different strengths in front of each eye to see how strong your glasses or contact lenses need to be. Visual field tests   Your doctor may have you look through special machines.   Or your doctor may simply have you stare straight ahead while they move a finger into and out of your field of vision. Color vision test   You look at pieces of printed test patterns in various colors. You say what number or symbol you see. Your doctor may have you trace the number or symbol using a pointer. How do these tests feel? There is very little chance of having a problem from this test. If dilating drops are used for a vision test, they may make the eyes sting and cause a medicine taste in the mouth. Follow-up care is a key part of your treatment and safety. Be sure to make and go to all appointments, and call your doctor if you are having problems. It's also a good idea to know your test results and keep a list of the medicines you take. Where can you learn more? Go to http://www.rosas.com/ and enter G551 to learn more about \"Learning About Vision Tests. \"  Current as of: October 12, 2022               Content Version: 13.5  © 2006-2022 [employer]. Care instructions adapted under license by [facility] ([facility]). If you have questions about a medical condition or this instruction, always ask your healthcare professional. [place] [address] any warranty or liability for your use of this information. Advance Directives: Care Instructions  Overview  An advance directive is a legal way to state your wishes at the end of your life. It tells your family and your doctor what to do if you can't say what you want. There are two main types of advance directives. You can change them any time your wishes change. Living will. This form tells your family and your doctor your wishes about life support and other treatment. The form is also called a declaration. Medical power of . This form lets you name a person to make treatment decisions for you when you can't speak for yourself. This person is called a health care agent (health care proxy, health care surrogate).  The form is also called a durable power of  for health care. If you do not have an advance directive, decisions about your medical care may be made by a family member, or by a doctor or a  who doesn't know you. It may help to think of an advance directive as a gift to the people who care for you. If you have one, they won't have to make tough decisions by themselves. For more information, including forms for your state, see the [address] website (www.caringinfo.org/planning/advance-directives/). Follow-up care is a key part of your treatment and safety. Be sure to make and go to all appointments, and call your doctor if you are having problems. It's also a good idea to know your test results and keep a list of the medicines you take. What should you include in an advance directive? Many states have a unique advance directive form. (It may ask you to address specific issues.) Or you might use a universal form that's approved by many states. If your form doesn't tell you what to address, it may be hard to know what to include in your advance directive. Use the questions below to help you get started. Who do you want to make decisions about your medical care if you are not able to? What life-support measures do you want if you have a serious illness that gets worse over time or can't be cured? What are you most afraid of that might happen? (Maybe you're afraid of having pain, losing your independence, or being kept alive by machines.)  Where would you prefer to die? (Your home? A hospital? A nursing home?)  Do you want to donate your organs when you die? Do you want certain Scientology practices performed before you die? When should you call for help? Be sure to contact your doctor if you have any questions. Where can you learn more? Go to http://www.Bambisa.com/ and enter R264 to learn more about \"Advance Directives: Care Instructions. \"  Current as of: June 16, 5124               XGYSCPE Version: 13.5  © 8100-4574 Healthwise, Incorporated. Care instructions adapted under license by [facility] ([facility]). If you have questions about a medical condition or this instruction, always ask your healthcare professional. [address] any warranty or liability for your use of this information. Starting a Weight Loss Plan: Care Instructions  Overview     If you're thinking about losing weight, it can be hard to know where to start. Your doctor can help you set up a weight loss plan that best meets your needs. You may want to take a class on nutrition or exercise, or you could join a weight loss support group. If you have questions about how to make changes to your eating or exercise habits, ask your doctor about seeing a registered dietitian or an exercise specialist.  It can be a big challenge to lose weight. But you don't have to make huge changes at once. Make small changes, and stick with them. When those changes become habit, add a few more changes. If you don't think you're ready to make changes right now, try to pick a date in the future. Make an appointment to see your doctor to discuss whether the time is right for you to start a plan. Follow-up care is a key part of your treatment and safety. Be sure to make and go to all appointments, and call your doctor if you are having problems. It's also a good idea to know your test results and keep a list of the medicines you take. How can you care for yourself at home? Set realistic goals. Many people expect to lose much more weight than is likely. A weight loss of 5% to 10% of your body weight may be enough to improve your health. Get family and friends involved to provide support. Talk to them about why you are trying to lose weight, and ask them to help. They can help by participating in exercise and having meals with you, even if they may be eating something different. Find what works best for you.  If you do not have time or do not like to cook, a program that offers meal replacement bars or shakes may be better for you. Or if you like to prepare meals, finding a plan that includes daily menus and recipes may be best.  Ask your doctor about other health professionals who can help you achieve your weight loss goals.  A dietitian can help you make healthy changes in your diet.  An exercise specialist or  can help you develop a safe and effective exercise program.  A counselor or psychiatrist can help you cope with issues such as depression, anxiety, or family problems that can make it hard to focus on weight loss.  Consider joining a support group for people who are trying to lose weight. Your doctor can suggest groups in your area.  Where can you learn more?  Go to https://www.Bigfoot Networks.net/patientEd and enter U357 to learn more about \"Starting a Weight Loss Plan: Care Instructions.\"  Current as of: August 25, 2022               Content Version: 13.5  © 2006-2022 Natanael Ulien.   Care instructions adapted under license by [employer]. If you have questions about a medical condition or this instruction, always ask your healthcare professional. Natanael Ulien disclaims any warranty or liability for your use of this information.           A Healthy Heart: Care Instructions  Your Care Instructions     Coronary artery disease, also called heart disease, occurs when a substance called plaque builds up in the vessels that supply oxygen-rich blood to your heart muscle. This can narrow the blood vessels and reduce blood flow. A heart attack happens when blood flow is completely blocked. A high-fat diet, smoking, and other factors increase the risk of heart disease.  Your doctor has found that you have a chance of having heart disease. You can do lots of things to keep your heart healthy. It may not be easy, but you can change your diet, exercise more, and quit smoking. These steps really work to lower  your chance of heart disease. Follow-up care is a key part of your treatment and safety. Be sure to make and go to all appointments, and call your doctor if you are having problems. It's also a good idea to know your test results and keep a list of the medicines you take. How can you care for yourself at home? Diet    Use less salt when you cook and eat. This helps lower your blood pressure. Taste food before salting. Add only a little salt when you think you need it. With time, your taste buds will adjust to less salt.     Eat fewer snack items, fast foods, canned soups, and other high-salt, high-fat, processed foods.     Read food labels and try to avoid saturated and trans fats. They increase your risk of heart disease by raising cholesterol levels.     Limit the amount of solid fat-butter, margarine, and shortening-you eat. Use olive, peanut, or canola oil when you cook. Bake, broil, and steam foods instead of frying them.     Eat a variety of fruit and vegetables every day. Dark green, deep orange, red, or yellow fruits and vegetables are especially good for you. Examples include spinach, carrots, peaches, and berries.     Foods high in fiber can reduce your cholesterol and provide important vitamins and minerals. High-fiber foods include whole-grain cereals and breads, oatmeal, beans, brown rice, citrus fruits, and apples.     Eat lean proteins. Heart-healthy proteins include seafood, lean meats and poultry, eggs, beans, peas, nuts, seeds, and soy products.     Limit drinks and foods with added sugar. These include candy, desserts, and soda pop. Lifestyle changes    If your doctor recommends it, get more exercise. Walking is a good choice. Bit by bit, increase the amount you walk every day. Try for at least 30 minutes on most days of the week. You also may want to swim, bike, or do other activities.     Do not smoke. If you need help quitting, talk to your doctor about stop-smoking programs and medicines. These can increase your chances of quitting for good. Quitting smoking may be the most important step you can take to protect your heart. It is never too late to quit.     Limit alcohol to 2 drinks a day for men and 1 drink a day for women. Too much alcohol can cause health problems.     Manage other health problems such as diabetes, high blood pressure, and high cholesterol. If you think you may have a problem with alcohol or drug use, talk to your doctor. Medicines    Take your medicines exactly as prescribed. Call your doctor if you think you are having a problem with your medicine.     If your doctor recommends aspirin, take the amount directed each day. Make sure you take aspirin and not another kind of pain reliever, such as acetaminophen (Tylenol). When should you call for help? Call 911 if you have symptoms of a heart attack. These may include:    Chest pain or pressure, or a strange feeling in the chest.     Sweating.     Shortness of breath.     Pain, pressure, or a strange feeling in the back, neck, jaw, or upper belly or in one or both shoulders or arms.     Lightheadedness or sudden weakness.     A fast or irregular heartbeat. After you call 911, the  may tell you to chew 1 adult-strength or 2 to 4 low-dose aspirin. Wait for an ambulance. Do not try to drive yourself. Watch closely for changes in your health, and be sure to contact your doctor if you have any problems. Where can you learn more? Go to http://www.rosas.com/ and enter F075 to learn more about \"A Healthy Heart: Care Instructions. \"  Current as of: September 7, 2022               Content Version: 13.5  © 2006-2022 Healthwise, Incorporated. Care instructions adapted under license by [facility] ([facility]).  If you have questions about a medical condition or this instruction, always ask your healthcare professional. [place] [address] any warranty or liability for your use of this information. Personalized Preventive Plan for Vashti Guido - 3/2/2023  Medicare offers a range of preventive health benefits. Some of the tests and screenings are paid in full while other may be subject to a deductible, co-insurance, and/or copay. Some of these benefits include a comprehensive review of your medical history including lifestyle, illnesses that may run in your family, and various assessments and screenings as appropriate. After reviewing your medical record and screening and assessments performed today your provider may have ordered immunizations, labs, imaging, and/or referrals for you. A list of these orders (if applicable) as well as your Preventive Care list are included within your After Visit Summary for your review. Other Preventive Recommendations:    A preventive eye exam performed by an eye specialist is recommended every 1-2 years to screen for glaucoma; cataracts, macular degeneration, and other eye disorders. A preventive dental visit is recommended every 6 months. Try to get at least 150 minutes of exercise per week or 10,000 steps per day on a pedometer . Order or download the FREE \"Exercise & Physical Activity: Your Everyday Guide\" from [employer]. Call 7-402.957.3731 or search [employer] online. You need 1187-8588 mg of calcium and 7474-0319 IU of vitamin D per day. It is possible to meet your calcium requirement with diet alone, but a vitamin D supplement is usually necessary to meet this goal.  When exposed to the sun, use a sunscreen that protects against both UVA and UVB radiation with an SPF of 30 or greater. Reapply every 2 to 3 hours or after sweating, drying off with a towel, or swimming. Always wear a seat belt when traveling in a car. Always wear a helmet when riding a bicycle or motorcycle.

## 2023-03-02 NOTE — PROGRESS NOTES
Medicare Annual Wellness Visit    Zoe Cummings is here for Medicare AWV    Assessment & Plan   Medicare annual wellness visit, subsequent  Type 2 diabetes mellitus with both eyes affected by mild nonproliferative retinopathy without macular edema, with long-term current use of insulin (HCC)  PAF (paroxysmal atrial fibrillation) ([facility]  Hyperlipidemia associated with type 2 diabetes mellitus (HCC)  MGUS (monoclonal gammopathy of unknown significance)  Type 2 diabetes mellitus with stage 3a chronic kidney disease, without long-term current use of insulin (HCC)  -     Comprehensive Metabolic Panel; Future  -     CBC with Auto Differential; Future  -     Hemoglobin A1C; Future  -     Lipid Panel; Future  -     POCT Urinalysis no Micro  -     Microalbumin / Creatinine Urine Ratio  Benign essential tremor  Benign essential HTN  Acquired hypothyroidism  -     TSH with Reflex to FT4; Future  Ulcer of left pretibial region with fat layer exposed ([facility]  Chronic hepatitis C without hepatic coma ([facility]        -Annual medicare physical done  - DM type 2 with nephropathy and retinopathy. Check labs  - P a fib. In SR. On Eliquis  - HTN controlled. - Patient's hypothyroidism is stable on replacement therapy. - Mild tremor stable  - HLD on Crestor. Recommendations for Preventive Services Due: see orders and patient instructions/AVS.  Recommended screening schedule for the next 5-10 years is provided to the patient in written form: see Patient Instructions/AVS.     No follow-ups on file. Subjective   The following acute and/or chronic problems were also addressed today:    Patient is here for follow up diabetes, hypertension, hyperlipidemia, GERD, OA and hepatitis C. Diabetes Mellitus Type 2: Current symptoms/problems include polyuria.     Medication compliance:  compliant all of the time  Diabetic diet compliance:  compliant most of the time,  Weight trend: decreasing  Current exercise: walks 3 time(s) per week    Home blood sugar records: fasting range:  m/dl  Any episodes of hypoglycemia? no  Eye exam current (within one year): yes   reports that he quit smoking about 33 years ago. His smoking use included cigarettes. He has a 15.00 pack-year smoking history. He quit smokeless tobacco use about 45 years ago. Daily Aspirin? No: n/a  Known diabetic complications: nephropathy and retinopathy    Hypertension:  Blood pressure typically runs normal outside of the office. He is adherent to a low sodium diet. Patient denies chest pain, dry cough, fatigue. Antihypertensive medication side effects: no medication side effects noted. Use of agents associated with hypertension: none. Hyperlipidemia:  No new myalgias or GI upset on rosuvastatin (Crestor). Lab Results   Component Value Date    LABA1C 7.1 09/08/2022    LABA1C 7.1 08/04/2022    LABA1C 6.5 04/01/2022     Lab Results   Component Value Date    LABMICR YES 01/09/2020    CREATININE 1.6 (H) 09/19/2022     Lab Results   Component Value Date    ALT 21 09/08/2022    AST 30 09/08/2022     No components found for: CHLPL  Lab Results   Component Value Date    TRIG 83 09/01/2021     Lab Results   Component Value Date    HDL 55 09/01/2021     Lab Results   Component Value Date/Time    LDLCALC 100 09/01/2021 08:56 AM        No heartburn. He has hypothyroidism. It is stable. No constipation or diarrhea. He has CKD from DM. He has diabetic retinopathy. Eyes stable. He takes Flomax for BPH with LUTS and it helps. He has history of chronic hepatitis C. He has chronic atrial fibrillation. Rate controlled. No palpitations. No bleeding or bruising on Eliquis. Patient's complete Health Risk Assessment and screening values have been reviewed and are found in Flowsheets. The following problems were reviewed today and where indicated follow up appointments were made and/or referrals ordered.     Positive Risk Factor Screenings with Interventions:    Fall Risk:  Do you feel unsteady or are you worried about falling? : (!) yes  2 or more falls in past year?: no  Fall with injury in past year?: no     Interventions:    Safety tips            General HRA Questions:  Select all that apply: (!) New or Increased Fatigue    Fatigue Interventions:  He is more tired due to lack of sleep. 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       Weight and Activity:  Physical Activity: Sufficiently Active    Days of Exercise per Week: 7 days    Minutes of Exercise per Session: 150+ min     On average, how many days per week do you engage in moderate to strenuous exercise (like a brisk walk)?: 7 days  Have you lost any weight without trying in the past 3 months?: (!) Yes  Body mass index: (!) 30.12      Unintentional Weight Loss Interventions:  He is losing weight  Obesity Interventions:  Patient comments: he is losing weight            Vision Screen:  Do you have difficulty driving, watching TV, or doing any of your daily activities because of your eyesight?: (!) Yes  Have you had an eye exam within the past year?: Appointment is scheduled  No results found. Interventions:   Patient encouraged to make appointment with their eye specialist                  Objective   Vitals:    03/02/23 0812   BP: 130/80   Site: Right Upper Arm   Position: Sitting   Cuff Size: Large Adult   Pulse: 80   Resp: 12   Weight: 216 lb (98 kg)   Height: 5' 11\" (1.803 m)      Body mass index is 30.13 kg/m².       General Appearance: alert and oriented to person, place and time, well developed and well- nourished, in no acute distress  Skin: warm and dry, no rash or erythema  Head: normocephalic and atraumatic  Eyes: pupils equal, round, and reactive to light, extraocular eye movements intact, conjunctivae normal  ENT: tympanic membrane, external ear and ear canal normal bilaterally, nose without deformity, nasal mucosa and turbinates normal without polyps  Neck: supple and non-tender without mass, no thyromegaly or thyroid nodules, no cervical lymphadenopathy  Pulmonary/Chest: clear to auscultation bilaterally- no wheezes, rales or rhonchi, normal air movement, no respiratory distress  Cardiovascular: normal rate, regular rhythm, normal S1 and S2, no murmurs, rubs, clicks, or gallops, distal pulses intact, no carotid bruits  Abdomen: soft, non-tender, non-distended, normal bowel sounds, no masses or organomegaly  Extremities: no cyanosis, clubbing + edema  Musculoskeletal: normal range of motion, no joint swelling, deformity or tenderness. Small ulcer left leg and foot. Neurologic: reflexes normal and symmetric, no cranial nerve deficit, gait, coordination and speech normal       No Known Allergies  Prior to Visit Medications    Medication Sig Taking?  Authorizing Provider   furosemide (LASIX) 40 MG tablet Take 1 tablet by mouth daily  Kyra Brown MD   levothyroxine (SYNTHROID) 100 MCG tablet TAKE 1 TABLET DAILY  MD Sidney Fontanez MISC by Does not apply route DURATION; 5 YEARS  Kyra Brown MD   ONETOUCH ULTRA strip USE TO TEST TWICE A DAY  Kyra Brown MD   empagliflozin (JARDIANCE) 25 MG tablet Take 1 tablet by mouth daily  Kyra Brown MD   pioglitazone (ACTOS) 30 MG tablet TAKE 1 TABLET DAILY  Kyra Brown MD   dilTIAZem (CARDIZEM CD) 120 MG extended release capsule Take 1 capsule by mouth at bedtime  Jesus Alicia MD   tamsulosin (FLOMAX) 0.4 MG capsule Take 1 capsule by mouth at bedtime  Jesus Alicia MD   apixaban (ELIQUIS) 5 MG TABS tablet TAKE 1 TABLET TWICE A DAY  Kyra Brown MD   insulin glargine-yfgn (SEMGLEE, YFGN,) 100 UNIT/ML injection vial Inject 70 Units into the skin nightly  Patient taking differently: Inject 50 Units into the skin nightly  Kyra Brown MD   Multiple Vitamins-Minerals (MULTIVITAMIN ADULTS PO) Take by mouth daily  Historical Provider, MD hydroCHLOROthiazide (HYDRODIURIL) 12.5 MG tablet TAKE 1 TABLET DAILY  Oralia Macedo MD   Blood Glucose Monitoring Suppl ([employer]coACE BLOOD GLUCOSE MONITOR) PARAS TEST TWICE DAILY. DX: E11.9  Oralia Macedo MD   EMBRACE LANCETS ULTRA THIN 30G MISC TEST TWICE DAILY.  DX: E11.9  Oralia Macedo MD   rosuvastatin (CRESTOR) 10 MG tablet Take 10 mg by mouth daily  Historical Provider, MD   B-D INS SYR ULTRAFINE 1CC/30G 30G X 1/2\" 1 ML MISC USE WITH LANTUS NIGHTLY  Oralia Macedo MD       CareTeam (Including outside providers/suppliers regularly involved in providing care):   Patient Care Team:  Oralia Macedo MD as PCP - Cheri Garay MD as PCP - Hematology/Oncology (Hematology and Oncology)  Oralia Macedo MD as PCP - Empaneled Provider  Leandro Vega MD (Orthopedic Surgery)  Dwaine Olivares MD as Referring Physician (Gastroenterology)     Reviewed and updated this visit:  Tobacco  Allergies  Meds  Problems  Med Hx  Surg Hx  Soc Hx  Fam Hx               Bryanna Hutchinson MD

## 2023-03-03 LAB
ESTIMATED AVERAGE GLUCOSE: 200.1 MG/DL
HBA1C MFR BLD: 8.6 %

## 2023-03-06 ENCOUNTER — HOSPITAL ENCOUNTER (OUTPATIENT)
Dept: WOUND CARE | Age: 83
Discharge: HOME | End: 2023-03-06
Payer: MEDICARE

## 2023-03-06 VITALS
RESPIRATION RATE: 18 BRPM | DIASTOLIC BLOOD PRESSURE: 68 MMHG | BODY MASS INDEX: 30.27 KG/M2 | HEIGHT: 71 IN | TEMPERATURE: 97.8 F | SYSTOLIC BLOOD PRESSURE: 123 MMHG | HEART RATE: 81 BPM | WEIGHT: 216.2 LBS

## 2023-03-06 DIAGNOSIS — L97.822 ULCER OF LEFT PRETIBIAL REGION WITH FAT LAYER EXPOSED (HCC): Primary | ICD-10-CM

## 2023-03-06 PROCEDURE — 11104 PUNCH BX SKIN SINGLE LESION: CPT

## 2023-03-06 PROCEDURE — 88305 TISSUE EXAM BY PATHOLOGIST: CPT

## 2023-03-06 RX ORDER — LIDOCAINE 50 MG/G
OINTMENT TOPICAL ONCE
OUTPATIENT
Start: 2023-03-06 | End: 2023-03-06

## 2023-03-06 RX ORDER — BACITRACIN ZINC AND POLYMYXIN B SULFATE 500; 1000 [USP'U]/G; [USP'U]/G
OINTMENT TOPICAL ONCE
OUTPATIENT
Start: 2023-03-06 | End: 2023-03-06

## 2023-03-06 RX ORDER — LIDOCAINE 40 MG/G
CREAM TOPICAL ONCE
OUTPATIENT
Start: 2023-03-06 | End: 2023-03-06

## 2023-03-06 RX ORDER — LIDOCAINE 40 MG/G
CREAM TOPICAL ONCE
Status: DISCONTINUED | OUTPATIENT
Start: 2023-03-06 | End: 2023-03-07 | Stop reason: HOSPADM

## 2023-03-06 RX ORDER — LIDOCAINE HYDROCHLORIDE 40 MG/ML
SOLUTION TOPICAL ONCE
OUTPATIENT
Start: 2023-03-06 | End: 2023-03-06

## 2023-03-06 NOTE — PROGRESS NOTES
[facility] Progress Note      Seema Jackson     : 1940    DATE OF VISIT:  3/6/2023    Subjective:     Seema Jackson is a [de-identified] y.o. male who has a chief complaint of a venous/lymphedema ulcer located on the left leg. States doing well with wound. Doing well with compression stockings. States stays active. Doing well with dressing changes. Mr. Dulce Small has a past medical history of A-fib [facility]), Atrial fibrillation ([address] Chronic hepatitis C without hepatic coma ([address] Closed displaced fracture of right patella, Closed nondisplaced fracture of styloid process of left radius, Dental disease, Dizziness, 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 ([address] Esophageal reflux, Fracture of nasal bone, Headache, Hearing loss, Hyperlipidemia, Hyperlipidemia associated with type 2 diabetes mellitus ([address] Hypertension, Hypertrophy of prostate without urinary obstruction and other lower urinary tract symptoms (LUTS), Hypothyroidism, Insufficiency fracture of tibia, Osteoarthrosis, not specified whether generalized/localized, lower leg, Pain in joint, lower leg, Right knee pain, Status post total left knee replacement, Tinnitus, Type 2 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 He has a past surgical history that includes eye surgery; Colonoscopy (07); Esophagus surgery; Knee arthroscopy; Cholecystectomy, laparoscopic; other surgical history (Left, 2016); joint replacement (Left, 2017); Tonsillectomy; and Spine surgery. His family history includes Breast Cancer in his sister; Cancer in his mother; Diabetes type 2  in his brother; Heart Disease in his brother; Kidney Disease in his brother. Mr. Dulce Small reports that he quit smoking about 33 years ago.  His smoking use included cigarettes. He has a 15.00 pack-year smoking history. He quit smokeless tobacco use about 45 years ago. He reports current alcohol use. He reports that he does not use drugs. His current medication list consists of Embrace Blood Glucose Monitor, Embrace Lancets Ultra Thin 30G, Handicap Placard, Insulin Syringe-Needle U-100, Multiple Vitamins-Minerals, apixaban, blood glucose test strips, dilTIAZem, empagliflozin, furosemide, hydroCHLOROthiazide, insulin glargine-yfgn, levothyroxine, pioglitazone, rosuvastatin, and tamsulosin. Allergies: Patient has no known allergies. Pertinent items from the review of systems are discussed in the HPI; the remainder of the ROS was reviewed and is negative. Objective:     /68   Pulse 81   Temp 97.8 °F (36.6 °C) (Oral)   Resp 18   Ht 5' 11\" (1.803 m)   Wt 216 lb 3.2 oz (98.1 kg)   BMI 30.15 kg/m²       Dorsalis pedis pulse left palpable  Posterior tibial pulse left palpable  Dorsalis pedis pulse right palpable  Posterior tibial pulse right palpable    Ulcer on the dorsal aspect left foot with mild fibrotic tissue, red granulation tissue, mild serous drainage, no hyperkeratotic rim, no undermining, no tunneling, no purulence, no malodor, no eschar, no periwound maceration, mild periwound erythema, mild edema, no crepitus, no increase in skin temperature, ulcer probes to soft tissue only    Ulcer on the anterior aspect left lower leg with very minimal fibrotic tissue, red granulation tissue, mild serous drainage, no hyperkeratotic rim, no undermining, no tunneling, no purulence, no malodor, no eschar, no periwound maceration, mild periwound erythema, mild edema, no crepitus, no increase in skin temperature, ulcer probes to soft tissue only    Weeping of skin noted at ulcer site. Edema bilateral LE with thickening of the lower leg skin noted. Venous dermatitis bilateral LE. Hyperpigmentation of lower legs noted.  Thickening of skin on bilateral LE. Today's ulcer measurements are in the wound documentation flowsheet.      Wound measurements:  Wound 01/09/23 #2, left dorsal foot, traumatic, partial thickness, (onset 12/25/2022)-Wound Length (cm): 0.3 cm  Wound 06/13/22 #1, Left Medial Leg, Lymphedema, Full Thickness, Onset Unknown (several years)-Wound Length (cm): 1.3 cm    Wound 01/09/23 #2, left dorsal foot, traumatic, partial thickness, (onset 12/25/2022)-Wound Width (cm): 0.3 cm  Wound 06/13/22 #1, Left Medial Leg, Lymphedema, Full Thickness, Onset Unknown (several years)-Wound Width (cm): 1.9 cm    Wound 01/09/23 #2, left dorsal foot, traumatic, partial thickness, (onset 12/25/2022)-Wound Depth (cm): 0.1 cm  Wound 06/13/22 #1, Left Medial Leg, Lymphedema, Full Thickness, Onset Unknown (several years)-Wound Depth (cm): 0.1 cm    LABS  Lab Results   Component Value Date    LABA1C 8.6 03/02/2023         Assessment:     Patient Active Problem List   Diagnosis Code    Benign essential HTN I10    Osteoarthrosis involving lower leg [id number]    Pain in joint, lower leg M25.569    Hepatitis C virus infection without hepatic coma B19.20    Benign essential tremor G25.0    Hypertrophy of prostate without urinary obstruction and other lower urinary tract symptoms (LUTS) N40.0    Hypothyroidism E03.9    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) E11.22, N18.30    Chronic hepatitis C without hepatic coma (HCC) B18.2    Hyperlipidemia associated with type 2 diabetes mellitus (HCC) E11.69, E78.5    Type 2 diabetes mellitus 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 edema (HCC) O10.6607    Degenerative tear of medial meniscus of left knee M23.204    Primary osteoarthritis of left knee M17.12    PAF (paroxysmal atrial fibrillation) (HCC) I48.0    MGUS (monoclonal gammopathy of unknown significance) D47.2    Kidney lesion N28.9    Premature atrial contraction I49.1    Nasal polyposis J33.9    Ulcer of left pretibial region with fat layer exposed [facility] [id number]    Syncope and collapse R55    Stage 3a chronic kidney disease (HCC) N18.31    Longstanding persistent atrial fibrillation ([facility]) I48.11    Lymphedema I89.0       Assessment of today's active condition(s): diabetic foot ulcer left foot secondary to trauma, diabetes mellitus with peripheral neuropathy, lymphedema/venous leg ulcer left, Lymphedema, lymphorrhea. Factors contributing to occurrence and/or persistence of the chronic ulcer include edema, venous stasis, and lymphedema. Sharp debridement is not indicated today, based upon the exam findings in the ulcer(s) above. Discharge plan:     Treatment in the wound care center today: Wound 06/13/22 #1, Left Medial Leg, Lymphedema, Full Thickness, Onset Unknown (several years)-Dressing/Treatment:  (biopsy per Dr Suzie Whipple). Home treatment: good handwashing before and after any dressing changes. Cleanse ulcer with saline or soap & water before dressing change. May use Vaseline (petrolatum), Aquaphor, Aveeno, CeraVe, Cetaphil, Eucerin, Lubriderm, etc for dry skin. Dressing type for home: Collagen and Mepilex border and compression stocking. Collagen to foot ulcer and dry dressing. Continue compression stockings. Written discharge instructions given to patient. Offload ulcer(s) as directed. Elevate leg(s) as directed. Exercise daily. Follow up in 1 week. Continue to use compression pumps and increase frequency. Anesthetized left anterior tibial ulcer with 5cc 0.5% marcaine. Punch biopsy anterior left leg ulcer for pathological evaluation. Applied silver nitrate and compression dressing to control postop bleeding.        Electronically signed by Jazmin Carrasco DPM on 3/6/2023 at 3:21 PM.

## 2023-03-06 NOTE — PLAN OF CARE
Pt to the [address] [place] [address] for follow up appointment. Wound on left foot measuring smaller. Biopsy taken from left lower leg wound. Pt to continue with collagen to wounds. Pt to follow up in the [address] [place] [address],[address] in 1 week. Discharge instructions reviewed with patient, all questions answered, copy given to patient. Dressings were applied to all wounds per M.D. Instructions at this visit.

## 2023-03-13 ENCOUNTER — HOSPITAL ENCOUNTER (OUTPATIENT)
Dept: WOUND CARE | Age: 83
Discharge: HOME | End: 2023-03-13
Payer: MEDICARE

## 2023-03-13 VITALS
WEIGHT: 213.2 LBS | TEMPERATURE: 97.7 F | HEART RATE: 88 BPM | BODY MASS INDEX: 29.85 KG/M2 | SYSTOLIC BLOOD PRESSURE: 129 MMHG | DIASTOLIC BLOOD PRESSURE: 75 MMHG | RESPIRATION RATE: 18 BRPM | HEIGHT: 71 IN

## 2023-03-13 DIAGNOSIS — L97.822 ULCER OF LEFT PRETIBIAL REGION WITH FAT LAYER EXPOSED (HCC): Primary | ICD-10-CM

## 2023-03-13 PROCEDURE — 99213 OFFICE O/P EST LOW 20 MIN: CPT

## 2023-03-13 RX ORDER — LIDOCAINE 40 MG/G
CREAM TOPICAL ONCE
OUTPATIENT
Start: 2023-03-13 | End: 2023-03-13

## 2023-03-13 RX ORDER — LIDOCAINE 40 MG/G
CREAM TOPICAL ONCE
Status: DISCONTINUED | OUTPATIENT
Start: 2023-03-13 | End: 2023-03-14 | Stop reason: HOSPADM

## 2023-03-13 RX ORDER — LIDOCAINE 50 MG/G
OINTMENT TOPICAL ONCE
OUTPATIENT
Start: 2023-03-13 | End: 2023-03-13

## 2023-03-13 RX ORDER — BACITRACIN ZINC AND POLYMYXIN B SULFATE 500; 1000 [USP'U]/G; [USP'U]/G
OINTMENT TOPICAL ONCE
OUTPATIENT
Start: 2023-03-13 | End: 2023-03-13

## 2023-03-13 RX ORDER — LIDOCAINE HYDROCHLORIDE 40 MG/ML
SOLUTION TOPICAL ONCE
OUTPATIENT
Start: 2023-03-13 | End: 2023-03-13

## 2023-03-13 NOTE — DISCHARGE INSTRUCTIONS
[address] [facility] Physician Orders and Discharge [facility]  [address], [address]  [place], [facility]  Telephone: [phone]      Fax: [phone] home care company:  N/a     Your wound-care supplies will be provided by: N/a     NAME:  Juan Garcia   YOB: 1940  PRIMARY DIAGNOSIS FOR WOUND CARE CENTER: Lymphedema     Wound cleansing:  Do not scrub or use excessive force. Wash hands with soap and water before and after dressing changes. Prior to applying a clean dressing, cleanse wound with normal saline, wound cleanser, or mild soap and water. Ask your physician or nurse before getting the wound(s) wet in the shower. Wound care for home:      Left lower leg wound:      Wash wound with soap and water with dressing changes      Benzoin to periwound      collagen ag      mepilex border      Leave on all week. Wear compression stockings daily      Left foot wound:     Wash with soap and water     Collagen to foot wound    Cover with 4x4's roemro or bordered gauze    Change daily. Right lower leg    Medium spandagrip or compression stocking toes to knees on right lower leg. May put on in the morning and take off at bed time. Please note, all wounds (unless stated otherwise here) were mechanically debrided at the time of cleansing here in the wound-care center today, so a small amount of pain, drainage or bleeding from that process might be expected, and is normal.     All products for home use, including multiple products for a single wound if applicable, are medically necessary in order to achieve the best chance at timely wound healing. See provider documentation for details if needed.      Substituted dressings applied in the [facility] today, if applicable:           New orders for this week (labs, imaging, medications, etc.):      Use compression pumps 2 times a day     See Dr Calhoun/Dr oFrd regarding Basal cell carcinoma on left lower leg        Additional instructions for specific diagnoses:     General comments for venous / lymphedema ulcers: *  Elevate your legs to the level of your heart for at least 30 minutes, several times daily. *  Walk as much as you can tolerate. Avoid standing for long periods of time, but if you must stand, regularly do heel-raises and calf-pump exercises. *  If you have compression wraps designed to remain in place all week, be sure to keep them from getting wet. *  If you have compression garments that can be changed regularly, apply them first thing in the morning, and remove them when you go to bed. Moisturize your skin at bedtime, with Vaseline, Aquaphor, Aveeno, CeraVe, Cetaphil, Eucerin, Lubriderm, etc; but keep the skin between your toes dry. *  If you smoke, your wound can not heal properly -- please talk with us when you're ready to quit. *  Be sure to adhere to any recommendations from your PCP about diuretics (water pills), diet, exercise, and maintaining a healthy weight. If you have questions, please ask. *  If you have a compression pump, aim for at least two hours of use daily. F/U Appointment is with Dr. Iris Rodriguez in 1 week on                                   at                       .     Your nurse  is Luz Curran RN. If we applied slip-resistant hospital socks today, be sure to remove them at least once a day to inspect your toes or feet, even if you're not changing the wraps or dressings underneath. If you see anything concerning (redness, excess moisture, etc), please call and let us know right away.      Should you experience any significant changes in your wound(s) (including redness, increased warmth, increased pain, increased drainage, odor, or fever) or have questions about your wound care, please contact the [facility] at [phone] Monday-Thursday from 8:00 am - 4:30 pm, or Friday from 8:00 am - 2:30 pm.  If you need help with your wound outside these hours and cannot wait until we are again available, contact your home-care company (if applicable), your PCP, or go to the nearest emergency room.

## 2023-03-13 NOTE — PROGRESS NOTES
[facility] Progress Note      Nimesh Ng     : 1940    DATE OF VISIT:  3/13/2023    Subjective:     Nimesh Ng is a 82 y.o. male who has a chief complaint of a venous/lymphedema ulcer located on the left leg. States doing well with wound. Doing well with compression stockings.   States stays active.   Doing well with dressing changes.   Did not have bleeding from the biopsy site.             Mr. 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), Esophageal reflux, Fracture of nasal bone, Headache, Hearing loss, Hyperlipidemia, Hyperlipidemia associated with type 2 diabetes mellitus (HCC), Hypertension, Hypertrophy of prostate without urinary obstruction and other lower urinary tract symptoms (LUTS), Hypothyroidism, Insufficiency fracture of tibia, Osteoarthrosis, not specified whether generalized/localized, lower leg, Pain in joint, lower leg, Right knee pain, Status post total left knee replacement, Tinnitus, 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    He has a past surgical history that includes eye surgery; Colonoscopy (07); Esophagus surgery; Knee arthroscopy; Cholecystectomy, laparoscopic; other surgical history (Left, 2016); joint replacement (Left, 2017); Tonsillectomy; and Spine surgery.    His family history includes Breast Cancer in his sister; Cancer in his mother; Diabetes type 2  in his brother; Heart Disease in his brother; Kidney Disease in his brother.     Mr. Ng reports that he  quit smoking about 33 years ago. His smoking use included cigarettes. He has a 15.00 pack-year smoking history. He quit smokeless tobacco use about 45 years ago. He reports current alcohol use. He reports that he does not use drugs.    His current medication list consists of Embrace Blood Glucose Monitor, Embrace Lancets Ultra Thin 30G, Handicap Placard, Insulin Syringe-Needle U-100, Multiple Vitamins-Minerals, apixaban, blood glucose test strips, dilTIAZem, empagliflozin, furosemide, hydroCHLOROthiazide, insulin glargine-yfgn, levothyroxine, pioglitazone, rosuvastatin, and tamsulosin.    Allergies: Patient has no known allergies.    Pertinent items from the review of systems are discussed in the HPI; the remainder of the ROS was reviewed and is negative.       Objective:     /75   Pulse 88   Temp 97.7 °F (36.5 °C) (Oral)   Resp 18   Ht 5' 11\" (1.803 m)   Wt 213 lb 3.2 oz (96.7 kg)   BMI 29.74 kg/m²       Dorsalis pedis pulse left palpable  Posterior tibial pulse left palpable  Dorsalis pedis pulse right palpable  Posterior tibial pulse right palpable    Ulcer on the dorsal aspect left foot with no fibrotic tissue, red granulation tissue, very mild serous drainage, no hyperkeratotic rim, no undermining, no tunneling, no purulence, no malodor, no eschar, no periwound maceration, mild periwound erythema, mild edema, no crepitus, no increase in skin temperature, ulcer probes to soft tissue only    Ulcer on the anterior aspect left lower leg with mild fibrotic tissue, red granulation tissue, mild serous drainage, no hyperkeratotic rim, no undermining, no tunneling, no purulence, no malodor, no eschar, no periwound maceration, mild periwound erythema, mild edema, no crepitus, no increase in skin temperature, ulcer probes to soft tissue only    Weeping of skin noted at ulcer site.     Edema bilateral LE with thickening of the lower leg skin noted.     Venous dermatitis bilateral LE. Hyperpigmentation of lower  legs noted. Thickening of skin on bilateral LE. Today's ulcer measurements are in the wound documentation flowsheet. Wound measurements:  Wound 01/09/23 #2, left dorsal foot, traumatic, partial thickness, (onset 12/25/2022)-Wound Length (cm): 0.1 cm  Wound 06/13/22 #1, Left Medial Leg, Lymphedema, Full Thickness, Onset Unknown (several years)-Wound Length (cm): 1.4 cm    Wound 01/09/23 #2, left dorsal foot, traumatic, partial thickness, (onset 12/25/2022)-Wound Width (cm): 0.1 cm  Wound 06/13/22 #1, Left Medial Leg, Lymphedema, Full Thickness, Onset Unknown (several years)-Wound Width (cm): 1.9 cm    Wound 01/09/23 #2, left dorsal foot, traumatic, partial thickness, (onset 12/25/2022)-Wound Depth (cm): 0.1 cm  Wound 06/13/22 #1, Left Medial Leg, Lymphedema, Full Thickness, Onset Unknown (several years)-Wound Depth (cm): 0.1 cm    LABS  Lab Results   Component Value Date    LABA1C 8.6 03/02/2023     Pathology -   Left leg wound, biopsy:   - Basal cell carcinoma. COMMENT:    The tumor involves the peripheral edges of the biopsy   specimen.       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) E11.22, N18.30    Chronic hepatitis C without hepatic coma (HCC) B18.2    Hyperlipidemia associated with type 2 diabetes mellitus (HCC) E11.69, E78.5    Type 2 diabetes mellitus 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 edema (HCC) V89.6591    Degenerative tear of medial meniscus of left knee M23.204    Primary osteoarthritis of left knee M17.12    PAF (paroxysmal atrial fibrillation) (HCC) I48.0 MGUS (monoclonal gammopathy of unknown significance) D47.2    Kidney lesion N28.9    Premature atrial contraction I49.1    Nasal polyposis J33.9    Ulcer of left pretibial region with fat layer exposed ([facility] [address] B91.455    Syncope and collapse R55    Stage 3a chronic kidney disease (HCC) N18.31    Longstanding persistent atrial fibrillation (HCC) I48.11    Lymphedema I89.0       Assessment of today's active condition(s): Basal Cell carcinoma left lower leg ulcer, diabetic foot ulcer left foot secondary to trauma, diabetes mellitus with peripheral neuropathy, lymphedema/venous leg ulcer left, Lymphedema, lymphorrhea. Factors contributing to occurrence and/or persistence of the chronic ulcer include edema, venous stasis, and lymphedema. Sharp debridement is not indicated today, based upon the exam findings in the ulcer(s) above. Discharge plan:     Treatment in the wound care center today: Wound 01/09/23 #2, left dorsal foot, traumatic, partial thickness, (onset 12/25/2022)-Dressing/Treatment: Other (comment) (collagen, Mepilex Border)  Wound 06/13/22 #1, Left Medial Leg, Lymphedema, Full Thickness, Onset Unknown (several years)-Dressing/Treatment: Other (comment) (Benzoin to periwound      collagen ag      mepilex border). Home treatment: good handwashing before and after any dressing changes. Cleanse ulcer with saline or soap & water before dressing change. May use Vaseline (petrolatum), Aquaphor, Aveeno, CeraVe, Cetaphil, Eucerin, Lubriderm, etc for dry skin. Dressing type for home: Collagen and Mepilex border and compression stocking. Collagen to foot ulcer and dry dressing. Continue compression stockings. Written discharge instructions given to patient. Offload ulcer(s) as directed. Elevate leg(s) as directed. Exercise daily. Follow up in [facility] as needed. Patient to see Dr. Kayce Rollins on Monday. Continue to use compression pumps and increase frequency. Reviewed biopsy results with patient. Refer to Dr. Mcconnell for further treatment.     Right foot ulcer expected to be resolved by next week.       Electronically signed by Feng Ayers DPM on 3/13/2023 at 11:11 AM.

## 2023-03-13 NOTE — PLAN OF CARE
"Nursing Notes:   Sydney Caballero LPN  9/30/2019  8:26 AM  Signed  Pt presents to clinic today for medication management.      Medication Reconciliation: complete  Sydney Caballero LPN      SUBJECTIVE:  68 year old male presents for follow up on chronic low back pain.     Has follow up with Dr Reeder on October 3.  He is hoping that his back would feel better at this time and does currently.  Had surgery about a year ago and expectation at that time was for gradual improvement over time and that he would be weaned down further on hydrocodone when he was working with Dr. Roberts.  Cannot imagine taking less hydrocodone at this time.    Feels that gabapentin causes some \"prickly feeling\" in the feet. Was up to 1200 mg in the night and 300 mg twice daily. Reduced and the sensation improved. Now taking 300 mg in day and 600 mg at night.       REVIEW OF SYSTEMS:    Constitutional: negative      Past Medical History:   Diagnosis Date     Elevated prostate specific antigen (PSA)     4/10/2012     Encounter for other administrative examinations     1/31/2014     Esophagitis     No Comments Provided     Gastro-esophageal reflux disease without esophagitis     No Comments Provided     Low back pain     No Comments Provided     Malignant neoplasm of prostate (H)     9/6/2012     Nicotine dependence, uncomplicated     Quit - October 2007 with chantix     Other intervertebral disc degeneration, lumbosacral region     12/1/2008       Past Surgical History:   Procedure Laterality Date     APPENDECTOMY OPEN  783186    Ruptured - [place]     COLONOSCOPY  08/31/2006    next due in 2016     DISKECTOMY, LUMBAR, SINGLE SP  03/16/2009    L 3-4 microdiskectomy, [facility]     ESOPHAGOSCOPY, GASTROSCOPY, DUODENOSCOPY (EGD), COMBINED  03/2003          ESOPHAGOSCOPY, GASTROSCOPY, DUODENOSCOPY (EGD), COMBINED  08/31/2006          PROSTATECTOMY PERINEAL RADICAL  11/28/2012    Nerve Sparring, Dr Warner     SPINE SURGERY N/A 04/28/2017    " Pt to the [address] [place] [address],[address] [facility] for follow up appointment. Wounds were not debrided today. Left foot wound measuring smaller. Dr Robert Bennett discussed biopsy results with patient. Biopsy of left lower leg is positive for basal cell carcinoma. Pt to continue with present dressing regimen. Called Dr Bhupinder Kay and Dr Torres Pu office for patient. Patient to see Dr Madeline Iverson on 3/20/23 @t 2:00pm.  Pt to follow up in the [address] [place] [address],[address] [place] [address] in 1 week. Discharge instructions reviewed with patient, all questions answered, copy given to patient. Dressings were applied to all wounds per M.D. Instructions at this visit. L3 to S1 spinal decompression L4/L5 fusion     TONSILLECTOMY, ADENOIDECTOMY, COMBINED      as a child     VARICOCELECTOMY  1957    INCISION AND DRAINAGE,EXCISE VARICOCELE        Current Outpatient Medications   Medication Sig Dispense Refill     acetaminophen (TYLENOL) 325 MG tablet Take 650 mg by mouth       aspirin EC 81 MG EC tablet Take 81 mg by mouth daily with food       atorvastatin (LIPITOR) 20 MG tablet Take 1 tablet (20 mg) by mouth daily 90 tablet 3     Bee Pollen 500 MG CHEW Take 2 tablets by mouth daily       cyanocobalamin (RA VITAMIN B-12 TR) 1000 MCG TBCR Take 1,000 mcg by mouth daily       gabapentin (NEURONTIN) 100 MG capsule Take 1 capsule (100 mg) by mouth 3 times daily Along with 300 to 600 mg at night via 300 mg pills 90 capsule 5     gabapentin (NEURONTIN) 300 MG capsule Take 1-2 capsules (300-600 mg) by mouth At Bedtime 60 capsule 5     garlic (SM GARLIC) 150 MG TABS tablet Take 2 tablets by mouth daily       HYDROcodone-acetaminophen (NORCO)  MG per tablet Take 1-2 tablets by mouth every 6 hours as needed for moderate to severe pain or severe pain 180 tablet 0     IBUPROFEN Prn for back pain       lisinopril (PRINIVIL/ZESTRIL) 5 MG tablet Take 1 tablet (5 mg) by mouth daily 90 tablet 3     Omega-3 Fatty Acids (OMEGA 3 PO) Take 2 capsules by mouth daily       omeprazole (PRILOSEC) 40 MG DR capsule TAKE 1 CAPSULE BY MOUTH ONCE DAILY. 90 capsule 3     senna-docusate (SENOKOT-S;PERICOLACE) 8.6-50 MG per tablet Take 1-4 tablets by mouth as needed        sildenafil (REVATIO) 20 MG tablet TAKE 3 TO 5 TABLETS BY MOUTH 1 hour prior TO sexual activity. 100 tablet 1     No Known Allergies    OBJECTIVE:  BP (!) 158/83   Pulse 86   Temp 97.2  F (36.2  C) (Tympanic)   Resp 18   Wt 104.8 kg (231 lb)   SpO2 98%   BMI 34.11 kg/m      EXAM:  General Appearance: Pleasant, alert, appropriate appearance for age. No acute distress  Musculoskeletal: Tender in thoracic region  Psychiatric: Normal affect  and mentation      ASSESSMENT/PLAN:    ICD-10-CM    1. Spinal stenosis of lumbar region with neurogenic claudication M48.062 HYDROcodone-acetaminophen (NORCO)  MG per tablet     gabapentin (NEURONTIN) 300 MG capsule   2. Chronic, continuous use of opioids F11.90 HYDROcodone-acetaminophen (NORCO)  MG per tablet   3. Need for prophylactic vaccination and inoculation against influenza Z23 INFLUENZA (HIGH DOSE) 3 VALENT VACCINE [33128]     Vaccine Administration, Initial [99142]        not working today. Not reviewed. He is out of hydrocodone.  Due for refill as he refilled last on August 28, but that was a few days early.  Given hydrocodone 10/325 taking 6/day.  #180 for the next 30 days.  Waiting to have assessment with spine surgery before taking further steps.  Potentially some spinal injections may be indicated.  If not, we can consider using adjunct of medications like gabapentin, Lyrica, or Cymbalta along with a slow reduction in opioids.    He has been increasing gabapentin, but noticing side effects.  Discussed need to keep at a steady level.  Sounds like 900 mg causes no side effects and by 1800 mg daily he has side effects.  Recommend staying at 1200 mg daily by taking 300 mg twice daily and 600 mg at night.    Given flu vaccine    Follow up in a month  Wei Perez MD    This document was prepared using a combination of typing and voice generated software.  While every attempt was made for accuracy, spelling and grammatical errors may exist.

## 2023-03-14 NOTE — DISCHARGE INSTRUCTIONS
well-fitting shoes. Never walk barefoot. 6.   Maintain a nutritious diet, minimizing alcohol, caffeine, and excess sugar. We can give you recommendations for increasing the amount of protein in your diet, if you're interested. 7. Probably the best thing you can do to prevent wounds in the future is to stop smoking, if you are a current smoker. If we've not discussed this before, please ask about ways that we could help you quit. 8. If you are significantly overweight (a BMI of > 27), [facility] has several different treatment options that can help you. You can contact them at [phone].  ______________________________________________________________________     THINGS SPECIFIC TO YOUR WOUND(S):        VENOUS OR LYMPHEDEMA LEG ULCERS:  -- It's important to continue to elevate your legs a few times per day. -- Exercise is great for venous disease and lymphedema -- walking, cycling, swimming, etc; there are leg exercises that can be done while sitting too.  -- Your current compression therapy is: compression stockings. We normally advise people to apply their compression garments first thing in the morning, and take them off at bedtime. These will normally be need to replaced every 6 months or so   -- If you have a compression pump, we recommend that you use it for a total of 2 hours per day, with the pressure set to  mmHg. If your pump is not working properly, call your [employer] company right away:    .  ______________________________________________________________________    We strive for improvement. A survey will arrive in the mail from \"[facility]\". We appreciate your comments.       [de-identified] Signature_________________________________

## 2023-03-20 ENCOUNTER — INITIAL CONSULT (OUTPATIENT)
Dept: SURGERY | Age: 83
End: 2023-03-20
Payer: MEDICARE

## 2023-03-20 ENCOUNTER — HOSPITAL ENCOUNTER (OUTPATIENT)
Dept: WOUND CARE | Age: 83
Discharge: HOME | End: 2023-03-20
Payer: MEDICARE

## 2023-03-20 VITALS
TEMPERATURE: 97 F | DIASTOLIC BLOOD PRESSURE: 62 MMHG | RESPIRATION RATE: 18 BRPM | HEART RATE: 75 BPM | SYSTOLIC BLOOD PRESSURE: 102 MMHG

## 2023-03-20 VITALS
HEART RATE: 82 BPM | BODY MASS INDEX: 30.55 KG/M2 | DIASTOLIC BLOOD PRESSURE: 60 MMHG | SYSTOLIC BLOOD PRESSURE: 101 MMHG | HEIGHT: 71 IN | WEIGHT: 218.2 LBS

## 2023-03-20 DIAGNOSIS — L97.822 ULCER OF LEFT PRETIBIAL REGION WITH FAT LAYER EXPOSED (HCC): Primary | ICD-10-CM

## 2023-03-20 DIAGNOSIS — C44.91 CANCER OF THE SKIN, BASAL CELL: Primary | ICD-10-CM

## 2023-03-20 PROCEDURE — 3074F SYST BP LT 130 MM HG: CPT | Performed by: SURGERY

## 2023-03-20 PROCEDURE — 3078F DIAST BP <80 MM HG: CPT | Performed by: SURGERY

## 2023-03-20 PROCEDURE — 1123F ACP DISCUSS/DSCN MKR DOCD: CPT | Performed by: SURGERY

## 2023-03-20 PROCEDURE — G8484 FLU IMMUNIZE NO ADMIN: HCPCS | Performed by: SURGERY

## 2023-03-20 PROCEDURE — 99203 OFFICE O/P NEW LOW 30 MIN: CPT | Performed by: SURGERY

## 2023-03-20 PROCEDURE — G8417 CALC BMI ABV UP PARAM F/U: HCPCS | Performed by: SURGERY

## 2023-03-20 PROCEDURE — 1036F TOBACCO NON-USER: CPT | Performed by: SURGERY

## 2023-03-20 PROCEDURE — G8427 DOCREV CUR MEDS BY ELIG CLIN: HCPCS | Performed by: SURGERY

## 2023-03-20 PROCEDURE — 99212 OFFICE O/P EST SF 10 MIN: CPT

## 2023-03-20 RX ORDER — LIDOCAINE HYDROCHLORIDE 40 MG/ML
SOLUTION TOPICAL ONCE
Status: CANCELLED | OUTPATIENT
Start: 2023-03-20 | End: 2023-03-20

## 2023-03-20 RX ORDER — LIDOCAINE 40 MG/G
CREAM TOPICAL ONCE
Status: CANCELLED | OUTPATIENT
Start: 2023-03-20 | End: 2023-03-20

## 2023-03-20 RX ORDER — LIDOCAINE 50 MG/G
OINTMENT TOPICAL ONCE
Status: CANCELLED | OUTPATIENT
Start: 2023-03-20 | End: 2023-03-20

## 2023-03-20 RX ORDER — BACITRACIN ZINC AND POLYMYXIN B SULFATE 500; 1000 [USP'U]/G; [USP'U]/G
OINTMENT TOPICAL ONCE
Status: CANCELLED | OUTPATIENT
Start: 2023-03-20 | End: 2023-03-20

## 2023-03-20 RX ORDER — LIDOCAINE 40 MG/G
CREAM TOPICAL ONCE
Status: DISCONTINUED | OUTPATIENT
Start: 2023-03-20 | End: 2023-03-21 | Stop reason: HOSPADM

## 2023-03-20 NOTE — PLAN OF CARE
Pt to the [address] [place] [address] for follow up appointment. Wound on left foot healed. Pt to see Dr Elly Younger today for basal cell carcinoma on left lower leg. Pt to follow up in the [address] [place] [address],[address] as needed. Discharge instructions reviewed with patient, all questions answered, copy given to patient. Dressings were applied to all wounds per M.D. Instructions at this visit.

## 2023-03-21 DIAGNOSIS — C44.91 SKIN CANCER, BASAL CELL: ICD-10-CM

## 2023-03-21 NOTE — PROGRESS NOTES
PRE OP INSTRUCTION SHEET                                           3. Aspirin, Ibuprofen, Advil, Naproxen, Vitamin E, fish oil and other Anti-inflammatory products should be stopped for 5 days before surgery or as directed by your physician. 4. Check with your Doctor regarding stopping Plavix, Coumadin, Lovenox, Fragmin or other blood thinners   5. Do not smoke, and do not drink any alcoholic beverages 24 hours prior to surgery. This includes NA Beer. 6. You may brush your teeth and gargle the morning of surgery. DO NOT SWALLOW WATER   7. You MUST make arrangements for a responsible adult to take you home after your surgery. You will not be allowed to leave alone or drive yourself home. It is strongly suggested someone stay with you the first 24 hrs. Your surgery will be cancelled if you do not have a ride home. 8. A parent/legal guardian must accompany a child scheduled for surgery and plan to stay at the hospital until the child is discharged. Please do not bring other children with you. 9. Please wear simple, loose fitting clothing to the hospital.  Vianne Danger not bring valuables (money, credit cards, checkbooks, etc.) Do not wear any makeup (including no eye makeup) or nail polish on your fingers or toes. 10. DO NOT wear any jewelry or piercings on day of surgery. All body piercing jewelry must be removed. 11. If you have dentures,glasses, or contacts they will be removed before going to the OR; we will provide you a container. 12. Please see your family doctor/and cardiologist for a history & physical and/or concerning medications. Bring any test results/reports from your physician's office. Have history and labs faxed to [phone]. Remember to bring Blood Bank bracelet on the day of surgery. 14. If you have a Living Will and Durable Power of  for Healthcare, please bring in a copy.    13. Notify your Surgeon if you develop any illness between now and surgery  time, cough, cold, fever, sore throat, nausea, vomiting, etc.  Please notify your surgeon if you experience dizziness, shortness of breath or blurred vision between now & the time of your surgery   16. DO NOT shave your operative site 96 hours prior to surgery. For face & neck surgery, men may use an electric razor 48 hours prior to surgery. 17. Shower with _x__Antibacterial soap (x_chlorhexidine for total joint  Pt's) shower two times before surgery.(the morning of and the night before. 18. To provide excellent care visitors will be limited to one in the room at any given time.   Please call pre admission testing if you any further questions [phone] or [phone]

## 2023-03-22 ENCOUNTER — HOSPITAL ENCOUNTER (OUTPATIENT)
Age: 83
Setting detail: OUTPATIENT SURGERY
Discharge: HOME | End: 2023-03-22
Attending: SURGERY | Admitting: SURGERY
Payer: MEDICARE

## 2023-03-22 VITALS
BODY MASS INDEX: 30.38 KG/M2 | SYSTOLIC BLOOD PRESSURE: 125 MMHG | RESPIRATION RATE: 16 BRPM | WEIGHT: 217 LBS | HEIGHT: 71 IN | TEMPERATURE: 97.8 F | OXYGEN SATURATION: 96 % | DIASTOLIC BLOOD PRESSURE: 63 MMHG | HEART RATE: 77 BPM

## 2023-03-22 DIAGNOSIS — C44.719 BASAL CELL CARCINOMA (BCC) OF SKIN OF LEFT LOWER EXTREMITY INCLUDING HIP: ICD-10-CM

## 2023-03-22 LAB
GLUCOSE BLD-MCNC: 215 MG/DL (ref 70–99)
PERFORMED ON: ABNORMAL

## 2023-03-22 PROCEDURE — 2500000003 HC RX 250 WO HCPCS: Performed by: SURGERY

## 2023-03-22 PROCEDURE — 7100000011 HC PHASE II RECOVERY - ADDTL 15 MIN: Performed by: SURGERY

## 2023-03-22 PROCEDURE — 3600000002 HC SURGERY LEVEL 2 BASE: Performed by: SURGERY

## 2023-03-22 PROCEDURE — 7100000010 HC PHASE II RECOVERY - FIRST 15 MIN: Performed by: SURGERY

## 2023-03-22 PROCEDURE — 11604 EXC TR-EXT MAL+MARG 3.1-4 CM: CPT | Performed by: SURGERY

## 2023-03-22 PROCEDURE — 3600000012 HC SURGERY LEVEL 2 ADDTL 15MIN: Performed by: SURGERY

## 2023-03-22 PROCEDURE — A4217 STERILE WATER/SALINE, 500 ML: HCPCS | Performed by: SURGERY

## 2023-03-22 PROCEDURE — 88331 PATH CONSLTJ SURG 1 BLK 1SPC: CPT

## 2023-03-22 PROCEDURE — 2580000003 HC RX 258: Performed by: SURGERY

## 2023-03-22 PROCEDURE — 88305 TISSUE EXAM BY PATHOLOGIST: CPT

## 2023-03-22 PROCEDURE — 2709999900 HC NON-CHARGEABLE SUPPLY: Performed by: SURGERY

## 2023-03-22 RX ORDER — MAGNESIUM HYDROXIDE 1200 MG/15ML
LIQUID ORAL CONTINUOUS PRN
Status: COMPLETED | OUTPATIENT
Start: 2023-03-22 | End: 2023-03-22

## 2023-03-22 ASSESSMENT — PAIN - FUNCTIONAL ASSESSMENT: PAIN_FUNCTIONAL_ASSESSMENT: 0-10

## 2023-03-22 NOTE — PROGRESS NOTES
Patient admitted from OR to PACU. Bedside report received. Patient hooked up to vitals machine. Jenelle Gee RN

## 2023-03-22 NOTE — H&P
[facility] GENERAL SURGERY      The H&P was reviewed, the patient was examined, and no change has occurred in the patient's condition since the H&P was completed. The indications for the procedure were reviewed, and any questions were answered.      I updated the progress note from 3/20/2023 which is the H&P.

## 2023-03-22 NOTE — BRIEF OP NOTE
Brief Postoperative Note      Patient: Lauren Grady  YOB: 1940  MRN: [id number]    Date of Procedure: 3/22/2023    Pre-Op Diagnosis: Basal cell skin cancer LLE calf area  Post-Op Diagnosis: Same       Procedure(s):  EXCISION BASAL CELL SKIN CANCER LEFT LOWER EXTREMITY *FROZEN SECTION*    Surgeon(s):  Margretta Kawasaki, MD    Assistant:  Surgical Assistant: Elissa Padron    Anesthesia: Local    Estimated Blood Loss (mL): Minimal    Complications: None    Specimens:   ID Type Source Tests Collected by Time Destination   A : Lesion Left Lower Extremity Tissue Tissue SURGICAL PATHOLOGY Margretta Kawasaki, MD 3/22/2023 [id number]        Implants:  * No implants in log *      Drains: * No LDAs found *    Findings: As above    Electronically signed by Codi Sheldon MD on 3/22/2023 at 10:26 AM

## 2023-03-22 NOTE — DISCHARGE INSTRUCTIONS
[facility] AND [place]    Wilian House. Jose Manuel Montaño M.D. [address] [address] [facility]                [address] Starr M.D. [address] [facility]         [place], [address] Stephanie Judd M.D                         [phone]        [facility] Lex Sanders M.D. [facility]       POST-OPERATIVE INSTRUCTIONS FOLLOWING EXCISION OF A LESION    The office will call with biopsy results. Call the office if you have not heard any results in 1 week. Call the office for follow up appointment in 2-3 weeks. You can resume Eliquis tomorrow - Thursday, March 23, 2023. You may remove your dressing tomorrow. You may shower after you have removed the dressing. Wash incisions gently, and pat them dry. Do not rub your incisions.        Watch for signs of infection:       Fever over 100.5°     Excessive warmth, or bright redness around your incision    Leakage of bloody or cloudy fluid from you incisions

## 2023-03-22 NOTE — PROGRESS NOTES
DIRECT VIDEO ARTHROSCOPY, MEDIAL MENISECTOMY, CHONDROPLASTY, INTERNAL FIXATION OF MEDIAL TIBIAL INSUFFICIENCY FRACTURE WITH BONE SUBSTITUTE    SPINE SURGERY      x2 - discs removed (punctured discs per pt)    TONSILLECTOMY         Current Outpatient Medications on File Prior to Visit   Medication Sig Dispense Refill    dilTIAZem (CARDIZEM CD) 120 MG extended release capsule TAKE 1 CAPSULE DAILY 90 capsule 0    tamsulosin (FLOMAX) 0.4 MG capsule TAKE 1 CAPSULE DAILY 90 capsule 0    furosemide (LASIX) 40 MG tablet Take 1 tablet by mouth daily 90 tablet 0    levothyroxine (SYNTHROID) 100 MCG tablet TAKE 1 TABLET DAILY 90 tablet 3    Handicap Placard MISC by Does not apply route DURATION; 5 YEARS 1 each 0    ONETOUCH ULTRA strip USE TO TEST TWICE A  strip 3    empagliflozin (JARDIANCE) 25 MG tablet Take 1 tablet by mouth daily 90 tablet 1    pioglitazone (ACTOS) 30 MG tablet TAKE 1 TABLET DAILY 90 tablet 3    apixaban (ELIQUIS) 5 MG TABS tablet TAKE 1 TABLET TWICE A  tablet 3    insulin glargine-yfgn (SEMGLEE, YFGN,) 100 UNIT/ML injection vial Inject 70 Units into the skin nightly (Patient taking differently: Inject 50 Units into the skin nightly) 7 each 3    Multiple Vitamins-Minerals (MULTIVITAMIN ADULTS PO) Take by mouth daily      Blood Glucose Monitoring Suppl ([employer] BLOOD GLUCOSE MONITOR) PARAS TEST TWICE DAILY. DX: E11.9 1 Device 0    EMBRACE LANCETS ULTRA THIN 30G MISC TEST TWICE DAILY. DX: E11.9 200 each 3    rosuvastatin (CRESTOR) 10 MG tablet Take 10 mg by mouth daily      B-D INS SYR ULTRAFINE 1CC/30G 30G X 1/2\" 1 ML MISC USE WITH LANTUS NIGHTLY 90 each 11    [DISCONTINUED] hydroCHLOROthiazide (HYDRODIURIL) 12.5 MG tablet TAKE 1 TABLET DAILY 90 tablet 3     No current facility-administered medications on file prior to visit.        No Known Allergies    Social History     Socioeconomic History    Marital status:      Spouse name: Not on file    Number of children: Not on file    Years of

## 2023-03-23 ENCOUNTER — TELEPHONE (OUTPATIENT)
Dept: SURGERY | Age: 83
End: 2023-03-23

## 2023-03-23 NOTE — TELEPHONE ENCOUNTER
----- Message from Melody Chairez MD sent at 3/23/2023  1:08 PM EDT -----  Tell patient the lesion removed was a skin cancer. It was completely removed. Final microscopic report was clear margins. No further treatment needed.   Follow up 2-3 weeks for suture removal.

## 2023-03-27 NOTE — OP NOTE
edges.  They came together well. It was pulled  tight. A dressing was placed including ACE wrap from the toes up to the  knee. The patient went to recovery in stable condition.         Alma Thompson MD    D: 03/26/2023 22:08:38       T: 03/26/2023 22:12:21     [email or web address]  Job#: [id number]     Doc#: [id number]    CC:  Radha Person MD

## 2023-03-28 LAB
CATARACTS: NEGATIVE
DIABETIC RETINOPATHY: NORMAL
GLAUCOMA: NEGATIVE
INTRAOCULAR PRESSURE EYE: 12
INTRAOCULAR PRESSURE EYE: 8
VISUAL ACUITY DISTANCE LEFT EYE: NORMAL
VISUAL ACUITY DISTANCE RIGHT EYE: NORMAL

## 2023-04-18 ENCOUNTER — TELEPHONE (OUTPATIENT)
Dept: INTERNAL MEDICINE CLINIC | Age: 83
End: 2023-04-18

## 2023-04-18 RX ORDER — HYDROCHLOROTHIAZIDE 12.5 MG/1
12.5 TABLET ORAL DAILY
Qty: 90 TABLET | Refills: 3 | Status: SHIPPED | OUTPATIENT
Start: 2023-04-18

## 2023-04-18 NOTE — TELEPHONE ENCOUNTER
----- Message from Brandie Ford MD sent at 4/18/2023  3:41 PM EDT -----  Contact: patient [phone]  Ok to fill  ----- Message -----  From: Anand Jacobson  Sent: 4/18/2023   2:55 PM EDT  To: Brandie Ford MD    Patient states he is taking both. He takes 1/2 tablet of lasix. Hasn't taken any HCTZ since yesterday as he is out of the medication. Please advise.   ----- Message -----  From: Brandie Ford MD  Sent: 4/18/2023   1:34 PM EDT  To: Anand Jacobson    Is he taking both at present.   ----- Message -----  From: Dolly Dahl  Sent: 4/18/2023   9:17 AM EDT  To: Brandie Ford MD    Pt requesting a refill of hydroCHLOROthiazide (HYDRODIURIL) 12.5 MG tablet, is this appropriate due to pt taking furosemide (LASIX) 40 MG tablet? Please advise. Thank you.         [address], [address] [phone] - F [phone]   [place] [address]   Phone:  [phone]  Fax:  [phone]

## 2023-04-25 RX ORDER — EMPAGLIFLOZIN 25 MG/1
TABLET, FILM COATED ORAL
Qty: 90 TABLET | Refills: 1 | Status: SHIPPED | OUTPATIENT
Start: 2023-04-25

## 2023-04-26 ENCOUNTER — TELEPHONE (OUTPATIENT)
Dept: INTERNAL MEDICINE CLINIC | Age: 83
End: 2023-04-26

## 2023-04-26 RX ORDER — BLOOD-GLUCOSE METER
EACH MISCELLANEOUS
Qty: 200 EACH | Refills: 3 | Status: SHIPPED | OUTPATIENT
Start: 2023-04-26

## 2023-04-26 NOTE — TELEPHONE ENCOUNTER
----- Message from [facility] sent at 4/26/2023  9:35 AM EDT -----  Contact: patient [phone]  Pt is requesting EMBRACE LANCETS ULTRA THIN 30G MISC. Please advise.              [address], [place], [facility]  Phone: [phone]

## 2023-05-31 RX ORDER — DILTIAZEM HYDROCHLORIDE 120 MG/1
CAPSULE, COATED, EXTENDED RELEASE ORAL
Qty: 90 CAPSULE | Refills: 0 | Status: SHIPPED | OUTPATIENT
Start: 2023-05-31

## 2023-05-31 RX ORDER — TAMSULOSIN HYDROCHLORIDE 0.4 MG/1
CAPSULE ORAL
Qty: 90 CAPSULE | Refills: 0 | Status: SHIPPED | OUTPATIENT
Start: 2023-05-31

## 2023-06-19 ENCOUNTER — OFFICE VISIT (OUTPATIENT)
Dept: INTERNAL MEDICINE CLINIC | Age: 83
End: 2023-06-19

## 2023-06-19 VITALS
HEIGHT: 71 IN | SYSTOLIC BLOOD PRESSURE: 120 MMHG | DIASTOLIC BLOOD PRESSURE: 80 MMHG | HEART RATE: 70 BPM | BODY MASS INDEX: 31.78 KG/M2 | RESPIRATION RATE: 12 BRPM | WEIGHT: 227 LBS

## 2023-06-19 DIAGNOSIS — Z79.4 TYPE 2 DIABETES MELLITUS WITH BOTH EYES AFFECTED BY MILD NONPROLIFERATIVE RETINOPATHY WITHOUT MACULAR EDEMA, WITH LONG-TERM CURRENT USE OF INSULIN (HCC): ICD-10-CM

## 2023-06-19 DIAGNOSIS — E03.9 ACQUIRED HYPOTHYROIDISM: ICD-10-CM

## 2023-06-19 DIAGNOSIS — G25.0 BENIGN ESSENTIAL TREMOR: ICD-10-CM

## 2023-06-19 DIAGNOSIS — E11.3293 TYPE 2 DIABETES MELLITUS WITH BOTH EYES AFFECTED BY MILD NONPROLIFERATIVE RETINOPATHY WITHOUT MACULAR EDEMA, WITH LONG-TERM CURRENT USE OF INSULIN (HCC): ICD-10-CM

## 2023-06-19 DIAGNOSIS — E11.22 TYPE 2 DIABETES MELLITUS WITH STAGE 3A CHRONIC KIDNEY DISEASE, WITHOUT LONG-TERM CURRENT USE OF INSULIN (HCC): Primary | ICD-10-CM

## 2023-06-19 DIAGNOSIS — E11.69 HYPERLIPIDEMIA ASSOCIATED WITH TYPE 2 DIABETES MELLITUS (HCC): ICD-10-CM

## 2023-06-19 DIAGNOSIS — E11.22 TYPE 2 DIABETES MELLITUS WITH STAGE 3A CHRONIC KIDNEY DISEASE, WITHOUT LONG-TERM CURRENT USE OF INSULIN (HCC): ICD-10-CM

## 2023-06-19 DIAGNOSIS — N18.31 TYPE 2 DIABETES MELLITUS WITH STAGE 3A CHRONIC KIDNEY DISEASE, WITHOUT LONG-TERM CURRENT USE OF INSULIN (HCC): Primary | ICD-10-CM

## 2023-06-19 DIAGNOSIS — E78.5 HYPERLIPIDEMIA ASSOCIATED WITH TYPE 2 DIABETES MELLITUS (HCC): ICD-10-CM

## 2023-06-19 DIAGNOSIS — N18.31 TYPE 2 DIABETES MELLITUS WITH STAGE 3A CHRONIC KIDNEY DISEASE, WITHOUT LONG-TERM CURRENT USE OF INSULIN (HCC): ICD-10-CM

## 2023-06-19 DIAGNOSIS — I48.0 PAF (PAROXYSMAL ATRIAL FIBRILLATION) (HCC): ICD-10-CM

## 2023-06-19 DIAGNOSIS — N18.31 STAGE 3A CHRONIC KIDNEY DISEASE (HCC): ICD-10-CM

## 2023-06-19 DIAGNOSIS — D68.69 SECONDARY HYPERCOAGULABLE STATE (HCC): ICD-10-CM

## 2023-06-19 DIAGNOSIS — I10 BENIGN ESSENTIAL HTN: ICD-10-CM

## 2023-06-19 DIAGNOSIS — D47.2 MGUS (MONOCLONAL GAMMOPATHY OF UNKNOWN SIGNIFICANCE): ICD-10-CM

## 2023-06-19 PROCEDURE — 1036F TOBACCO NON-USER: CPT | Performed by: INTERNAL MEDICINE

## 2023-06-19 PROCEDURE — G8427 DOCREV CUR MEDS BY ELIG CLIN: HCPCS | Performed by: INTERNAL MEDICINE

## 2023-06-19 PROCEDURE — 3052F HG A1C>EQUAL 8.0%<EQUAL 9.0%: CPT | Performed by: INTERNAL MEDICINE

## 2023-06-19 PROCEDURE — 3079F DIAST BP 80-89 MM HG: CPT | Performed by: INTERNAL MEDICINE

## 2023-06-19 PROCEDURE — G8417 CALC BMI ABV UP PARAM F/U: HCPCS | Performed by: INTERNAL MEDICINE

## 2023-06-19 PROCEDURE — 99214 OFFICE O/P EST MOD 30 MIN: CPT | Performed by: INTERNAL MEDICINE

## 2023-06-19 PROCEDURE — 3074F SYST BP LT 130 MM HG: CPT | Performed by: INTERNAL MEDICINE

## 2023-06-19 PROCEDURE — 1123F ACP DISCUSS/DSCN MKR DOCD: CPT | Performed by: INTERNAL MEDICINE

## 2023-06-19 ASSESSMENT — ENCOUNTER SYMPTOMS
VOMITING: 0
SHORTNESS OF BREATH: 0
WHEEZING: 0
NAUSEA: 0
RHINORRHEA: 0
BACK PAIN: 0
ABDOMINAL PAIN: 0

## 2023-06-19 NOTE — PROGRESS NOTES
2017    LEFT TOTAL KNEE REPLACEMENT      KNEE ARTHROSCOPY      OTHER SURGICAL HISTORY Left 2016    LEFT KNEE DIRECT VIDEO ARTHROSCOPY, MEDIAL MENISECTOMY, CHONDROPLASTY, INTERNAL FIXATION OF MEDIAL TIBIAL INSUFFICIENCY FRACTURE WITH BONE SUBSTITUTE    SKIN LESION EXCISION Left 3/22/2023    EXCISION BASAL CELL SKIN CANCER LEFT LOWER EXTREMITY *FROZEN SECTION* performed by Jackie Vega MD at [address]      x2 - discs removed (punctured discs per pt)    TONSILLECTOMY           Family History   Problem Relation Age of Onset    Cancer Mother     Kidney Disease Brother         on dialasis    Diabetes type 2  Brother     Heart Disease Brother     Breast Cancer Sister        Social History     Tobacco Use    Smoking status: Former     Packs/day: 0.50     Years: 30.00     Pack years: 15.00     Types: Cigarettes     Quit date: 1990     Years since quittin.4    Smokeless tobacco: Former     Quit date: 10/3/1977   Vaping Use    Vaping Use: Never used   Substance Use Topics    Alcohol use: Yes     Comment: social    Drug use: No           /80 (Site: Left Upper Arm, Position: Sitting, Cuff Size: Large Adult)   Pulse 70   Resp 12   Ht 5' 11\" (1.803 m)   Wt 227 lb (103 kg)   BMI 31.66 kg/m²        Objective:   Physical Exam  Constitutional:       Appearance: He is well-developed. HENT:      Head: Normocephalic. Eyes:      Conjunctiva/sclera: Conjunctivae normal.      Pupils: Pupils are equal, round, and reactive to light. Neck:      Thyroid: No thyroid mass or thyromegaly. Vascular: No carotid bruit or JVD. Trachea: Trachea normal.   Cardiovascular:      Rate and Rhythm: Normal rate. Rhythm irregularly irregular. Heart sounds: Normal heart sounds. No murmur heard. No gallop. Pulmonary:      Effort: Pulmonary effort is normal. No respiratory distress. Breath sounds: Normal breath sounds. No wheezing or rales.    Abdominal:      General: Bowel sounds

## 2023-06-20 LAB
EST. AVERAGE GLUCOSE BLD GHB EST-MCNC: 157.1 MG/DL
HBA1C MFR BLD: 7.1 %

## 2023-06-26 RX ORDER — FUROSEMIDE 40 MG/1
TABLET ORAL
Qty: 90 TABLET | Refills: 0 | Status: SHIPPED | OUTPATIENT
Start: 2023-06-26

## 2023-06-28 RX ORDER — INSULIN GLARGINE-YFGN 100 [IU]/ML
INJECTION, SOLUTION SUBCUTANEOUS
Qty: 70 ML | Refills: 3 | Status: SHIPPED | OUTPATIENT
Start: 2023-06-28

## 2023-08-21 RX ORDER — PIOGLITAZONEHYDROCHLORIDE 30 MG/1
TABLET ORAL
Qty: 90 TABLET | Refills: 3 | Status: SHIPPED | OUTPATIENT
Start: 2023-08-21

## 2023-08-29 RX ORDER — TAMSULOSIN HYDROCHLORIDE 0.4 MG/1
CAPSULE ORAL
Qty: 90 CAPSULE | Refills: 0 | Status: SHIPPED | OUTPATIENT
Start: 2023-08-29

## 2023-08-29 RX ORDER — DILTIAZEM HYDROCHLORIDE 120 MG/1
CAPSULE, COATED, EXTENDED RELEASE ORAL
Qty: 90 CAPSULE | Refills: 0 | Status: SHIPPED | OUTPATIENT
Start: 2023-08-29

## 2023-10-17 ENCOUNTER — OFFICE VISIT (OUTPATIENT)
Dept: INTERNAL MEDICINE CLINIC | Age: 83
End: 2023-10-17

## 2023-10-17 VITALS
HEIGHT: 71 IN | BODY MASS INDEX: 30.38 KG/M2 | HEART RATE: 64 BPM | DIASTOLIC BLOOD PRESSURE: 70 MMHG | WEIGHT: 217 LBS | RESPIRATION RATE: 14 BRPM | SYSTOLIC BLOOD PRESSURE: 112 MMHG

## 2023-10-17 DIAGNOSIS — E11.22 TYPE 2 DIABETES MELLITUS WITH STAGE 3A CHRONIC KIDNEY DISEASE, WITHOUT LONG-TERM CURRENT USE OF INSULIN (HCC): Primary | ICD-10-CM

## 2023-10-17 DIAGNOSIS — I48.0 PAF (PAROXYSMAL ATRIAL FIBRILLATION) (HCC): ICD-10-CM

## 2023-10-17 DIAGNOSIS — D68.69 SECONDARY HYPERCOAGULABLE STATE (HCC): ICD-10-CM

## 2023-10-17 DIAGNOSIS — Z23 NEED FOR INFLUENZA VACCINATION: ICD-10-CM

## 2023-10-17 DIAGNOSIS — E11.69 HYPERLIPIDEMIA ASSOCIATED WITH TYPE 2 DIABETES MELLITUS (HCC): ICD-10-CM

## 2023-10-17 DIAGNOSIS — I10 BENIGN ESSENTIAL HTN: ICD-10-CM

## 2023-10-17 DIAGNOSIS — B18.2 CHRONIC HEPATITIS C WITHOUT HEPATIC COMA (HCC): ICD-10-CM

## 2023-10-17 DIAGNOSIS — N18.31 STAGE 3A CHRONIC KIDNEY DISEASE (HCC): ICD-10-CM

## 2023-10-17 DIAGNOSIS — E03.9 ACQUIRED HYPOTHYROIDISM: ICD-10-CM

## 2023-10-17 DIAGNOSIS — Z79.4 TYPE 2 DIABETES MELLITUS WITH BOTH EYES AFFECTED BY MILD NONPROLIFERATIVE RETINOPATHY WITHOUT MACULAR EDEMA, WITH LONG-TERM CURRENT USE OF INSULIN (HCC): ICD-10-CM

## 2023-10-17 DIAGNOSIS — N18.31 TYPE 2 DIABETES MELLITUS WITH STAGE 3A CHRONIC KIDNEY DISEASE, WITHOUT LONG-TERM CURRENT USE OF INSULIN (HCC): Primary | ICD-10-CM

## 2023-10-17 DIAGNOSIS — G25.0 BENIGN ESSENTIAL TREMOR: ICD-10-CM

## 2023-10-17 DIAGNOSIS — E11.3293 TYPE 2 DIABETES MELLITUS WITH BOTH EYES AFFECTED BY MILD NONPROLIFERATIVE RETINOPATHY WITHOUT MACULAR EDEMA, WITH LONG-TERM CURRENT USE OF INSULIN (HCC): ICD-10-CM

## 2023-10-17 DIAGNOSIS — D47.2 MGUS (MONOCLONAL GAMMOPATHY OF UNKNOWN SIGNIFICANCE): ICD-10-CM

## 2023-10-17 DIAGNOSIS — E78.5 HYPERLIPIDEMIA ASSOCIATED WITH TYPE 2 DIABETES MELLITUS (HCC): ICD-10-CM

## 2023-10-17 PROCEDURE — 1123F ACP DISCUSS/DSCN MKR DOCD: CPT | Performed by: NURSE PRACTITIONER

## 2023-10-17 PROCEDURE — 99214 OFFICE O/P EST MOD 30 MIN: CPT | Performed by: NURSE PRACTITIONER

## 2023-10-17 PROCEDURE — 3074F SYST BP LT 130 MM HG: CPT | Performed by: NURSE PRACTITIONER

## 2023-10-17 PROCEDURE — 3051F HG A1C>EQUAL 7.0%<8.0%: CPT | Performed by: NURSE PRACTITIONER

## 2023-10-17 PROCEDURE — G8427 DOCREV CUR MEDS BY ELIG CLIN: HCPCS | Performed by: NURSE PRACTITIONER

## 2023-10-17 PROCEDURE — 3078F DIAST BP <80 MM HG: CPT | Performed by: NURSE PRACTITIONER

## 2023-10-17 PROCEDURE — 90662 IIV NO PRSV INCREASED AG IM: CPT | Performed by: NURSE PRACTITIONER

## 2023-10-17 PROCEDURE — 1036F TOBACCO NON-USER: CPT | Performed by: NURSE PRACTITIONER

## 2023-10-17 PROCEDURE — G8484 FLU IMMUNIZE NO ADMIN: HCPCS | Performed by: NURSE PRACTITIONER

## 2023-10-17 PROCEDURE — G0008 ADMIN INFLUENZA VIRUS VAC: HCPCS | Performed by: NURSE PRACTITIONER

## 2023-10-17 PROCEDURE — G8417 CALC BMI ABV UP PARAM F/U: HCPCS | Performed by: NURSE PRACTITIONER

## 2023-10-17 RX ORDER — FUROSEMIDE 40 MG/1
40 TABLET ORAL DAILY
Qty: 90 TABLET | Refills: 1 | Status: SHIPPED | OUTPATIENT
Start: 2023-10-17

## 2023-10-17 RX ORDER — FUROSEMIDE 40 MG/1
40 TABLET ORAL DAILY
Qty: 90 TABLET | Refills: 1 | Status: SHIPPED | OUTPATIENT
Start: 2023-10-17 | End: 2023-10-17

## 2023-10-17 ASSESSMENT — ENCOUNTER SYMPTOMS
WHEEZING: 0
BACK PAIN: 0
SHORTNESS OF BREATH: 0
NAUSEA: 0
ABDOMINAL PAIN: 0
VOMITING: 0
RHINORRHEA: 0

## 2023-10-17 NOTE — PROGRESS NOTES
Normal heart sounds. No murmur heard. No gallop. Pulmonary:      Effort: Pulmonary effort is normal. No respiratory distress. Breath sounds: Normal breath sounds. No wheezing or rales. Abdominal:      General: Bowel sounds are normal. There is no distension. Palpations: Abdomen is soft. There is no hepatomegaly, splenomegaly or mass. Tenderness: There is no abdominal tenderness. Musculoskeletal:      Cervical back: Normal range of motion and neck supple. Right lower leg: Edema present. Left lower leg: Edema present. Lymphadenopathy:      Cervical: No cervical adenopathy. Skin:     General: Skin is warm and dry. Findings: No rash. Neurological:      Mental Status: He is alert and oriented to person, place, and time. Motor: Tremor (right upper extremity at rest) present. Psychiatric:         Behavior: Behavior normal.         Thought Content: Thought content normal.         Judgment: Judgment normal.         Assessment:       Diagnosis Orders   1. Type 2 diabetes mellitus with stage 3a chronic kidney disease, without long-term current use of insulin ([address])        2. PAF (paroxysmal atrial fibrillation) ([facility])        3. Stage 3a chronic kidney disease ([address])        4. Type 2 diabetes mellitus with both eyes affected by mild nonproliferative retinopathy without macular edema, with long-term current use of insulin ([facility])  Comprehensive Metabolic Panel    CBC with Auto Differential    Hemoglobin A1C      5. Hyperlipidemia associated with type 2 diabetes mellitus ([address])        6. MGUS (monoclonal gammopathy of unknown significance)        7. Benign essential HTN  Comprehensive Metabolic Panel    CBC with Auto Differential    Hemoglobin A1C      8. Benign essential tremor        9. Acquired hypothyroidism        10. Secondary hypercoagulable state ([address])        11. Chronic hepatitis C without hepatic coma ([address])        12.  Need for influenza vaccination  Influenza, FLUZONE

## 2023-11-20 RX ORDER — LEVOTHYROXINE SODIUM 0.1 MG/1
TABLET ORAL
Qty: 90 TABLET | Refills: 1 | Status: SHIPPED | OUTPATIENT
Start: 2023-11-20

## 2023-11-27 RX ORDER — TAMSULOSIN HYDROCHLORIDE 0.4 MG/1
CAPSULE ORAL
Qty: 90 CAPSULE | Refills: 3 | Status: SHIPPED | OUTPATIENT
Start: 2023-11-27

## 2023-11-27 RX ORDER — DILTIAZEM HYDROCHLORIDE 120 MG/1
CAPSULE, COATED, EXTENDED RELEASE ORAL
Qty: 90 CAPSULE | Refills: 3 | Status: SHIPPED | OUTPATIENT
Start: 2023-11-27

## 2024-01-10 ENCOUNTER — TELEPHONE (OUTPATIENT)
Dept: INTERNAL MEDICINE CLINIC | Age: 84
End: 2024-01-10

## 2024-01-10 NOTE — TELEPHONE ENCOUNTER
----- Message from Denisha Duke sent at 1/10/2024 10:47 AM EST -----  Pt requesting a copy of date and proof of flu shot. Pt can  tomorrow. Please advise

## 2024-01-19 ENCOUNTER — TELEPHONE (OUTPATIENT)
Dept: INTERNAL MEDICINE CLINIC | Age: 84
End: 2024-01-19

## 2024-01-19 RX ORDER — TRAZODONE HYDROCHLORIDE 50 MG/1
50 TABLET ORAL NIGHTLY
Qty: 90 TABLET | Refills: 0 | Status: SHIPPED | OUTPATIENT
Start: 2024-01-19

## 2024-01-19 NOTE — TELEPHONE ENCOUNTER
----- Message from Adi Lemons MD sent at 1/19/2024 11:18 AM EST -----  Contact: [phone]  Trazodone 50 mg at at bedtime  ----- Message -----  From: Denisha Duke  Sent: 1/18/2024   3:34 PM EST  To: Adi Lemons MD    Pt states he is not getting any sleep due to his mind racing. Pt requesting 2-4 pills to help him sleep like you have given him before. Pt is unsure of the name of medication but said it did help. Please advise     [facility]

## 2024-01-30 ENCOUNTER — TELEPHONE (OUTPATIENT)
Dept: INTERNAL MEDICINE CLINIC | Age: 84
End: 2024-01-30

## 2024-01-30 NOTE — TELEPHONE ENCOUNTER
----- Message from Betsy Laurent MA sent at 1/30/2024  9:09 AM EST -----  Contact: [phone]  Patient called and states that he has run out of his below medication and that he needs a 10 day supply to get him through until his mail pharmacy can refill and mail him the medication.       apixaban (ELIQUIS) 5 MG TABS tablet      Pharmacy:   [facility] [address] [place] [address] - P [phone] - F [phone]  [address] [place] [address]  Phone: [phone]  Fax: [phone]

## 2024-02-12 NOTE — TELEPHONE ENCOUNTER
----- Message from Anaid Oneal sent at 2/12/2024 11:00 AM EST -----  Contact: patient [phone]  Patient states needs short term (30day) supply apixaban (ELIQUIS) 5 MG TABS tablet to pharmacy below.  [facility] states [facility] him we haven't responded to them, and for him to call us.      [facility] - [place], [place] - [address] - P [phone] - F [phone]  [address] [place] [id number]  Phone: [phone]  Fax: [phone]           Long term supply of apixaban (ELIQUIS) 5 MG TABS tablet [facility]        [facility] - [place], [place] - [address] [place] [address] - P [phone] - F [phone]  [address] [place] [address]  Phone: [phone]  Fax: [phone]

## 2024-03-14 ENCOUNTER — TELEPHONE (OUTPATIENT)
Dept: INTERNAL MEDICINE CLINIC | Age: 84
End: 2024-03-14

## 2024-03-14 NOTE — TELEPHONE ENCOUNTER
----- Message from Denisha Duke sent at 3/14/2024  9:31 AM EDT -----  Contact: Luis Miguel- [phone]  Caller Dr. Hernandez's office,  requesting the last 12 months of labs for patient. Please Fax [phone]

## 2024-03-19 ENCOUNTER — HOSPITAL ENCOUNTER (OUTPATIENT)
Age: 84
Discharge: HOME | End: 2024-03-19
Payer: MEDICARE

## 2024-03-19 ENCOUNTER — TRANSCRIBE ORDERS (OUTPATIENT)
Dept: ADMINISTRATIVE | Age: 84
End: 2024-03-19

## 2024-03-19 DIAGNOSIS — N28.9 ABNORMAL RENAL FUNCTION: Primary | ICD-10-CM

## 2024-03-19 LAB
25(OH)D3 SERPL-MCNC: 45.9 NG/ML
ALBUMIN SERPL-MCNC: 3.9 G/DL (ref 3.4–5)
ANION GAP SERPL CALCULATED.3IONS-SCNC: 14 MMOL/L (ref 3–16)
BILIRUB UR QL STRIP.AUTO: NEGATIVE
BUN SERPL-MCNC: 49 MG/DL (ref 7–20)
CALCIUM SERPL-MCNC: 9.8 MG/DL (ref 8.3–10.6)
CHLORIDE SERPL-SCNC: 98 MMOL/L (ref 99–110)
CLARITY UR: CLEAR
CO2 SERPL-SCNC: 29 MMOL/L (ref 21–32)
COLOR UR: YELLOW
CREAT SERPL-MCNC: 1.7 MG/DL (ref 0.8–1.3)
CREAT UR-MCNC: 87 MG/DL (ref 39–259)
DEPRECATED RDW RBC AUTO: 13.6 % (ref 12.4–15.4)
GFR SERPLBLD CREATININE-BSD FMLA CKD-EPI: 40 ML/MIN/{1.73_M2}
GLUCOSE SERPL-MCNC: 170 MG/DL (ref 70–99)
GLUCOSE UR STRIP.AUTO-MCNC: >=1000 MG/DL
HCT VFR BLD AUTO: 42.8 % (ref 40.5–52.5)
HGB BLD-MCNC: 14.2 G/DL (ref 13.5–17.5)
HGB UR QL STRIP.AUTO: NEGATIVE
KETONES UR STRIP.AUTO-MCNC: NEGATIVE MG/DL
LEUKOCYTE ESTERASE UR QL STRIP.AUTO: NEGATIVE
MAGNESIUM SERPL-MCNC: 2 MG/DL (ref 1.8–2.4)
MCH RBC QN AUTO: 30.3 PG (ref 26–34)
MCHC RBC AUTO-ENTMCNC: 33.2 G/DL (ref 31–36)
MCV RBC AUTO: 91.3 FL (ref 80–100)
NITRITE UR QL STRIP.AUTO: NEGATIVE
PH UR STRIP.AUTO: 6 [PH] (ref 5–8)
PHOSPHATE SERPL-MCNC: 3.8 MG/DL (ref 2.5–4.9)
PLATELET # BLD AUTO: 276 K/UL (ref 135–450)
PMV BLD AUTO: 7.7 FL (ref 5–10.5)
POTASSIUM SERPL-SCNC: 5.4 MMOL/L (ref 3.5–5.1)
PROT UR STRIP.AUTO-MCNC: NEGATIVE MG/DL
PROT UR-MCNC: 10 MG/DL
PROT/CREAT UR-RTO: 0.1 MG/DL
PTH-INTACT SERPL-MCNC: 74.5 PG/ML (ref 14–72)
RBC # BLD AUTO: 4.68 M/UL (ref 4.2–5.9)
SODIUM SERPL-SCNC: 141 MMOL/L (ref 136–145)
SP GR UR STRIP.AUTO: 1.01 (ref 1–1.03)
UA DIPSTICK W REFLEX MICRO PNL UR: ABNORMAL
URATE SERPL-MCNC: 8.4 MG/DL (ref 3.5–7.2)
URN SPEC COLLECT METH UR: ABNORMAL
UROBILINOGEN UR STRIP-ACNC: 1 E.U./DL
WBC # BLD AUTO: 6 K/UL (ref 4–11)

## 2024-03-19 PROCEDURE — 84156 ASSAY OF PROTEIN URINE: CPT

## 2024-03-19 PROCEDURE — 83735 ASSAY OF MAGNESIUM: CPT

## 2024-03-19 PROCEDURE — 80069 RENAL FUNCTION PANEL: CPT

## 2024-03-19 PROCEDURE — 82306 VITAMIN D 25 HYDROXY: CPT

## 2024-03-19 PROCEDURE — 36415 COLL VENOUS BLD VENIPUNCTURE: CPT

## 2024-03-19 PROCEDURE — 82570 ASSAY OF URINE CREATININE: CPT

## 2024-03-19 PROCEDURE — 83970 ASSAY OF PARATHORMONE: CPT

## 2024-03-19 PROCEDURE — 81003 URINALYSIS AUTO W/O SCOPE: CPT

## 2024-03-19 PROCEDURE — 84550 ASSAY OF BLOOD/URIC ACID: CPT

## 2024-03-19 PROCEDURE — 85027 COMPLETE CBC AUTOMATED: CPT

## 2024-03-20 ENCOUNTER — HOSPITAL ENCOUNTER (OUTPATIENT)
Dept: ULTRASOUND IMAGING | Age: 84
Discharge: HOME | End: 2024-03-20
Attending: INTERNAL MEDICINE
Payer: MEDICARE

## 2024-03-20 DIAGNOSIS — N28.9 ABNORMAL RENAL FUNCTION: ICD-10-CM

## 2024-03-20 PROCEDURE — 76770 US EXAM ABDO BACK WALL COMP: CPT

## 2024-03-27 RX ORDER — FUROSEMIDE 40 MG/1
40 TABLET ORAL DAILY
Qty: 90 TABLET | Refills: 3 | Status: SHIPPED | OUTPATIENT
Start: 2024-03-27

## 2024-03-27 RX ORDER — EMPAGLIFLOZIN 25 MG/1
25 TABLET, FILM COATED ORAL DAILY
Qty: 90 TABLET | Refills: 3 | Status: SHIPPED | OUTPATIENT
Start: 2024-03-27

## 2024-04-12 RX ORDER — HYDROCHLOROTHIAZIDE 12.5 MG/1
12.5 TABLET ORAL DAILY
Qty: 90 TABLET | Refills: 3 | Status: SHIPPED | OUTPATIENT
Start: 2024-04-12

## 2024-04-30 ENCOUNTER — OFFICE VISIT (OUTPATIENT)
Dept: INTERNAL MEDICINE CLINIC | Age: 84
End: 2024-04-30

## 2024-04-30 VITALS
WEIGHT: 226 LBS | BODY MASS INDEX: 31.64 KG/M2 | DIASTOLIC BLOOD PRESSURE: 80 MMHG | HEIGHT: 71 IN | SYSTOLIC BLOOD PRESSURE: 110 MMHG | RESPIRATION RATE: 12 BRPM | HEART RATE: 70 BPM

## 2024-04-30 DIAGNOSIS — E11.3293 TYPE 2 DIABETES MELLITUS WITH BOTH EYES AFFECTED BY MILD NONPROLIFERATIVE RETINOPATHY WITHOUT MACULAR EDEMA, WITH LONG-TERM CURRENT USE OF INSULIN (HCC): ICD-10-CM

## 2024-04-30 DIAGNOSIS — E11.69 HYPERLIPIDEMIA ASSOCIATED WITH TYPE 2 DIABETES MELLITUS (HCC): ICD-10-CM

## 2024-04-30 DIAGNOSIS — I10 BENIGN ESSENTIAL HTN: ICD-10-CM

## 2024-04-30 DIAGNOSIS — D47.2 MGUS (MONOCLONAL GAMMOPATHY OF UNKNOWN SIGNIFICANCE): ICD-10-CM

## 2024-04-30 DIAGNOSIS — E78.5 HYPERLIPIDEMIA ASSOCIATED WITH TYPE 2 DIABETES MELLITUS (HCC): ICD-10-CM

## 2024-04-30 DIAGNOSIS — G25.0 BENIGN ESSENTIAL TREMOR: Chronic | ICD-10-CM

## 2024-04-30 DIAGNOSIS — E11.22 TYPE 2 DIABETES MELLITUS WITH STAGE 3A CHRONIC KIDNEY DISEASE, WITHOUT LONG-TERM CURRENT USE OF INSULIN (HCC): Primary | ICD-10-CM

## 2024-04-30 DIAGNOSIS — D68.69 SECONDARY HYPERCOAGULABLE STATE (HCC): ICD-10-CM

## 2024-04-30 DIAGNOSIS — N18.31 TYPE 2 DIABETES MELLITUS WITH STAGE 3A CHRONIC KIDNEY DISEASE, WITHOUT LONG-TERM CURRENT USE OF INSULIN (HCC): ICD-10-CM

## 2024-04-30 DIAGNOSIS — Z79.4 TYPE 2 DIABETES MELLITUS WITH BOTH EYES AFFECTED BY MILD NONPROLIFERATIVE RETINOPATHY WITHOUT MACULAR EDEMA, WITH LONG-TERM CURRENT USE OF INSULIN (HCC): ICD-10-CM

## 2024-04-30 DIAGNOSIS — B18.2 CHRONIC HEPATITIS C WITHOUT HEPATIC COMA (HCC): ICD-10-CM

## 2024-04-30 DIAGNOSIS — E03.9 ACQUIRED HYPOTHYROIDISM: ICD-10-CM

## 2024-04-30 DIAGNOSIS — E11.22 TYPE 2 DIABETES MELLITUS WITH STAGE 3A CHRONIC KIDNEY DISEASE, WITHOUT LONG-TERM CURRENT USE OF INSULIN (HCC): ICD-10-CM

## 2024-04-30 DIAGNOSIS — N18.31 TYPE 2 DIABETES MELLITUS WITH STAGE 3A CHRONIC KIDNEY DISEASE, WITHOUT LONG-TERM CURRENT USE OF INSULIN (HCC): Primary | ICD-10-CM

## 2024-04-30 DIAGNOSIS — I48.11 LONGSTANDING PERSISTENT ATRIAL FIBRILLATION (HCC): ICD-10-CM

## 2024-04-30 PROBLEM — L97.822 ULCER OF LEFT PRETIBIAL REGION WITH FAT LAYER EXPOSED (HCC): Status: RESOLVED | Noted: 2022-06-13 | Resolved: 2024-04-30

## 2024-04-30 LAB
BASOPHILS # BLD: 0 K/UL (ref 0–0.2)
BASOPHILS NFR BLD: 0.6 %
DEPRECATED RDW RBC AUTO: 13.8 % (ref 12.4–15.4)
EOSINOPHIL # BLD: 0.3 K/UL (ref 0–0.6)
EOSINOPHIL NFR BLD: 6.6 %
HCT VFR BLD AUTO: 41 % (ref 40.5–52.5)
HGB BLD-MCNC: 13.8 G/DL (ref 13.5–17.5)
LYMPHOCYTES # BLD: 1.6 K/UL (ref 1–5.1)
LYMPHOCYTES NFR BLD: 30 %
MCH RBC QN AUTO: 30.4 PG (ref 26–34)
MCHC RBC AUTO-ENTMCNC: 33.6 G/DL (ref 31–36)
MCV RBC AUTO: 90.6 FL (ref 80–100)
MONOCYTES # BLD: 0.6 K/UL (ref 0–1.3)
MONOCYTES NFR BLD: 11.4 %
NEUTROPHILS # BLD: 2.7 K/UL (ref 1.7–7.7)
NEUTROPHILS NFR BLD: 51.4 %
PLATELET # BLD AUTO: 274 K/UL (ref 135–450)
PMV BLD AUTO: 7.9 FL (ref 5–10.5)
RBC # BLD AUTO: 4.53 M/UL (ref 4.2–5.9)
WBC # BLD AUTO: 5.2 K/UL (ref 4–11)

## 2024-04-30 PROCEDURE — G8417 CALC BMI ABV UP PARAM F/U: HCPCS | Performed by: INTERNAL MEDICINE

## 2024-04-30 PROCEDURE — 1123F ACP DISCUSS/DSCN MKR DOCD: CPT | Performed by: INTERNAL MEDICINE

## 2024-04-30 PROCEDURE — G8427 DOCREV CUR MEDS BY ELIG CLIN: HCPCS | Performed by: INTERNAL MEDICINE

## 2024-04-30 PROCEDURE — 1036F TOBACCO NON-USER: CPT | Performed by: INTERNAL MEDICINE

## 2024-04-30 PROCEDURE — 3074F SYST BP LT 130 MM HG: CPT | Performed by: INTERNAL MEDICINE

## 2024-04-30 PROCEDURE — 99214 OFFICE O/P EST MOD 30 MIN: CPT | Performed by: INTERNAL MEDICINE

## 2024-04-30 PROCEDURE — 3079F DIAST BP 80-89 MM HG: CPT | Performed by: INTERNAL MEDICINE

## 2024-04-30 ASSESSMENT — ENCOUNTER SYMPTOMS
RHINORRHEA: 0
SHORTNESS OF BREATH: 0
ABDOMINAL PAIN: 0
WHEEZING: 0
BACK PAIN: 0
NAUSEA: 0
VOMITING: 0

## 2024-04-30 ASSESSMENT — PATIENT HEALTH QUESTIONNAIRE - PHQ9
SUM OF ALL RESPONSES TO PHQ QUESTIONS 1-9: 0
1. LITTLE INTEREST OR PLEASURE IN DOING THINGS: NOT AT ALL
2. FEELING DOWN, DEPRESSED OR HOPELESS: NOT AT ALL
SUM OF ALL RESPONSES TO PHQ QUESTIONS 1-9: 0
SUM OF ALL RESPONSES TO PHQ9 QUESTIONS 1 & 2: 0

## 2024-04-30 NOTE — PROGRESS NOTES
Subjective:      Patient ID: Nimesh Ng is a 83 y.o. male.    HPI  Patient is here for follow up diabetes, hypertension, hyperlipidemia, GERD, OA and hepatitis C.        Diabetes Mellitus Type 2: Current symptoms/problems include none.    Medication compliance:  compliant most of the time  Diabetic diet compliance:  compliant most of the time,  Weight trend:down 3 lbs  Current exercise: walks 3 time(s) per week    Home blood sugar records: fasting range:  mg/dl  Any episodes of hypoglycemia? none  Eye exam current (within one year): yes   reports that he quit smoking about 34 years ago. His smoking use included cigarettes. He started smoking about 64 years ago. He has a 15.0 pack-year smoking history. He quit smokeless tobacco use about 46 years ago.   Daily Aspirin? No  Known diabetic complications: nephropathy and retinopathy    Hypertension:  Blood pressure typically runs normal outside of the office.   He is adherent to a low sodium diet. Patient denies chest pain, dry cough, fatigue. He has chronic leg edema.  Antihypertensive medication side effects: no medication side effects noted.  Use of agents associated with hypertension: none.     Hyperlipidemia:  No new myalgias or GI upset on atorvastatin (Lipitor).       Lab Results   Component Value Date    LABA1C 6.4 10/17/2023    LABA1C 7.1 06/19/2023    LABA1C 8.6 03/02/2023     Lab Results   Component Value Date    CREATININE 1.7 (H) 03/19/2024     Lab Results   Component Value Date    ALT 37 10/17/2023    AST 44 (H) 10/17/2023     No components found for: \"CHLPL\"  Lab Results   Component Value Date    TRIG 116 03/02/2023     Lab Results   Component Value Date    HDL 56 03/02/2023     Lab Results   Component Value Date/Time    LDLCALC 105 03/02/2023 08:58 AM     .  No heartburn.  He has hypothyroidism. It is stable. No constipation or diarrhea.  He has CKD from DM.  He is seeing nephrology.  He has diabetic retinopathy. Eyes stable. He goes to [facility].    He

## 2024-05-01 LAB
ALBUMIN SERPL-MCNC: 3.9 G/DL (ref 3.4–5)
ALBUMIN/GLOB SERPL: 1.1 {RATIO} (ref 1.1–2.2)
ALP SERPL-CCNC: 94 U/L (ref 40–129)
ALT SERPL-CCNC: 42 U/L (ref 10–40)
ANION GAP SERPL CALCULATED.3IONS-SCNC: 11 MMOL/L (ref 3–16)
AST SERPL-CCNC: 54 U/L (ref 15–37)
BILIRUB SERPL-MCNC: 0.4 MG/DL (ref 0–1)
BUN SERPL-MCNC: 46 MG/DL (ref 7–20)
CALCIUM SERPL-MCNC: 9.1 MG/DL (ref 8.3–10.6)
CHLORIDE SERPL-SCNC: 95 MMOL/L (ref 99–110)
CHOLEST SERPL-MCNC: 163 MG/DL (ref 0–199)
CO2 SERPL-SCNC: 33 MMOL/L (ref 21–32)
CREAT SERPL-MCNC: 1.5 MG/DL (ref 0.8–1.3)
EST. AVERAGE GLUCOSE BLD GHB EST-MCNC: 119.8 MG/DL
GFR SERPLBLD CREATININE-BSD FMLA CKD-EPI: 46 ML/MIN/{1.73_M2}
GLUCOSE SERPL-MCNC: 109 MG/DL (ref 70–99)
HBA1C MFR BLD: 5.8 %
HDLC SERPL-MCNC: 72 MG/DL (ref 40–60)
LDLC SERPL CALC-MCNC: 80 MG/DL
POTASSIUM SERPL-SCNC: 5 MMOL/L (ref 3.5–5.1)
PROT SERPL-MCNC: 7.3 G/DL (ref 6.4–8.2)
SODIUM SERPL-SCNC: 139 MMOL/L (ref 136–145)
TRIGL SERPL-MCNC: 55 MG/DL (ref 0–150)
TSH SERPL DL<=0.005 MIU/L-ACNC: 0.5 UIU/ML (ref 0.27–4.2)
URATE SERPL-MCNC: 7.8 MG/DL (ref 3.5–7.2)
VLDLC SERPL CALC-MCNC: 11 MG/DL

## 2024-05-10 ENCOUNTER — TELEPHONE (OUTPATIENT)
Dept: INTERNAL MEDICINE CLINIC | Age: 84
End: 2024-05-10

## 2024-05-10 DIAGNOSIS — R39.9 UTI SYMPTOMS: Primary | ICD-10-CM

## 2024-05-10 LAB
BILIRUBIN, POC: NORMAL
BLOOD URINE, POC: NORMAL
CLARITY, POC: NORMAL
COLOR, POC: NORMAL
GLUCOSE URINE, POC: NORMAL
KETONES, POC: NORMAL
LEUKOCYTE EST, POC: NORMAL
NITRITE, POC: NORMAL
PH, POC: NORMAL
PROTEIN, POC: NORMAL
SPECIFIC GRAVITY, POC: NORMAL
UROBILINOGEN, POC: NORMAL

## 2024-05-10 NOTE — TELEPHONE ENCOUNTER
----- Message from SUNNY San CNP sent at 5/10/2024 10:55 AM EDT -----  Contact: [phone]  Check BMP, UA, send for culture if UA positive    ----- Message -----  From: Denisha Duke  Sent: 5/10/2024   9:18 AM EDT  To: SUNNY San CNP    Pt states he has a UTI. Symptoms include not much output and smell. Patient will be coming in today to do a urine sample. Please advise.           [facility] [place] [address] - P [phone] - F [phone] (Ph: [phone])

## 2024-05-11 ENCOUNTER — HOSPITAL ENCOUNTER (EMERGENCY)
Age: 84
Discharge: HOME | End: 2024-05-11
Payer: MEDICARE

## 2024-05-11 ENCOUNTER — APPOINTMENT (OUTPATIENT)
Dept: CT IMAGING | Age: 84
End: 2024-05-11
Payer: MEDICARE

## 2024-05-11 VITALS
TEMPERATURE: 97.1 F | OXYGEN SATURATION: 96 % | RESPIRATION RATE: 16 BRPM | HEART RATE: 71 BPM | SYSTOLIC BLOOD PRESSURE: 116 MMHG | DIASTOLIC BLOOD PRESSURE: 77 MMHG

## 2024-05-11 DIAGNOSIS — K57.90 DIVERTICULOSIS: ICD-10-CM

## 2024-05-11 DIAGNOSIS — N18.9 CHRONIC KIDNEY DISEASE, UNSPECIFIED CKD STAGE: ICD-10-CM

## 2024-05-11 DIAGNOSIS — R31.9 URINARY TRACT INFECTION WITH HEMATURIA, SITE UNSPECIFIED: Primary | ICD-10-CM

## 2024-05-11 DIAGNOSIS — N28.1 RENAL CYST: ICD-10-CM

## 2024-05-11 DIAGNOSIS — N39.0 URINARY TRACT INFECTION WITH HEMATURIA, SITE UNSPECIFIED: Primary | ICD-10-CM

## 2024-05-11 LAB
ALBUMIN SERPL-MCNC: 3.4 G/DL (ref 3.4–5)
ALBUMIN/GLOB SERPL: 0.9 {RATIO} (ref 1.1–2.2)
ALP SERPL-CCNC: 95 U/L (ref 40–129)
ALT SERPL-CCNC: 27 U/L (ref 10–40)
ANION GAP SERPL CALCULATED.3IONS-SCNC: 8 MMOL/L (ref 3–16)
AST SERPL-CCNC: 35 U/L (ref 15–37)
BACTERIA URNS QL MICRO: ABNORMAL /HPF
BASOPHILS # BLD: 0 K/UL (ref 0–0.2)
BASOPHILS NFR BLD: 0.1 %
BILIRUB SERPL-MCNC: 0.6 MG/DL (ref 0–1)
BILIRUB UR QL STRIP.AUTO: NEGATIVE
BUN SERPL-MCNC: 39 MG/DL (ref 7–20)
CALCIUM SERPL-MCNC: 9.1 MG/DL (ref 8.3–10.6)
CHLORIDE SERPL-SCNC: 99 MMOL/L (ref 99–110)
CLARITY UR: CLEAR
CO2 SERPL-SCNC: 31 MMOL/L (ref 21–32)
COLOR UR: YELLOW
CREAT SERPL-MCNC: 1.5 MG/DL (ref 0.8–1.3)
DEPRECATED RDW RBC AUTO: 13.9 % (ref 12.4–15.4)
EOSINOPHIL # BLD: 0.2 K/UL (ref 0–0.6)
EOSINOPHIL NFR BLD: 2.1 %
GFR SERPLBLD CREATININE-BSD FMLA CKD-EPI: 46 ML/MIN/{1.73_M2}
GLUCOSE SERPL-MCNC: 98 MG/DL (ref 70–99)
GLUCOSE UR STRIP.AUTO-MCNC: 100 MG/DL
HCT VFR BLD AUTO: 40 % (ref 40.5–52.5)
HGB BLD-MCNC: 13.3 G/DL (ref 13.5–17.5)
HGB UR QL STRIP.AUTO: ABNORMAL
KETONES UR STRIP.AUTO-MCNC: NEGATIVE MG/DL
LEUKOCYTE ESTERASE UR QL STRIP.AUTO: ABNORMAL
LYMPHOCYTES # BLD: 1.3 K/UL (ref 1–5.1)
LYMPHOCYTES NFR BLD: 11.6 %
MCH RBC QN AUTO: 30.1 PG (ref 26–34)
MCHC RBC AUTO-ENTMCNC: 33.4 G/DL (ref 31–36)
MCV RBC AUTO: 90.2 FL (ref 80–100)
MONOCYTES # BLD: 1 K/UL (ref 0–1.3)
MONOCYTES NFR BLD: 8.5 %
NEUTROPHILS # BLD: 8.8 K/UL (ref 1.7–7.7)
NEUTROPHILS NFR BLD: 77.7 %
NITRITE UR QL STRIP.AUTO: NEGATIVE
PH UR STRIP.AUTO: 6 [PH] (ref 5–8)
PLATELET # BLD AUTO: 182 K/UL (ref 135–450)
PMV BLD AUTO: 7.1 FL (ref 5–10.5)
POTASSIUM SERPL-SCNC: 4.5 MMOL/L (ref 3.5–5.1)
PROT SERPL-MCNC: 7.3 G/DL (ref 6.4–8.2)
PROT UR STRIP.AUTO-MCNC: NEGATIVE MG/DL
RBC # BLD AUTO: 4.43 M/UL (ref 4.2–5.9)
RBC #/AREA URNS HPF: ABNORMAL /HPF (ref 0–4)
RENAL EPI CELLS #/AREA UR COMP ASSIST: ABNORMAL /HPF (ref 0–1)
SODIUM SERPL-SCNC: 138 MMOL/L (ref 136–145)
SP GR UR STRIP.AUTO: <=1.005 (ref 1–1.03)
UA COMPLETE W REFLEX CULTURE PNL UR: YES
UA DIPSTICK W REFLEX MICRO PNL UR: YES
URN SPEC COLLECT METH UR: ABNORMAL
UROBILINOGEN UR STRIP-ACNC: 1 E.U./DL
WBC # BLD AUTO: 11.4 K/UL (ref 4–11)
WBC #/AREA URNS HPF: ABNORMAL /HPF (ref 0–5)

## 2024-05-11 PROCEDURE — 81001 URINALYSIS AUTO W/SCOPE: CPT

## 2024-05-11 PROCEDURE — 87086 URINE CULTURE/COLONY COUNT: CPT

## 2024-05-11 PROCEDURE — 74177 CT ABD & PELVIS W/CONTRAST: CPT

## 2024-05-11 PROCEDURE — 80053 COMPREHEN METABOLIC PANEL: CPT

## 2024-05-11 PROCEDURE — 6360000004 HC RX CONTRAST MEDICATION: Performed by: NURSE PRACTITIONER

## 2024-05-11 PROCEDURE — 36415 COLL VENOUS BLD VENIPUNCTURE: CPT

## 2024-05-11 PROCEDURE — 85025 COMPLETE CBC W/AUTO DIFF WBC: CPT

## 2024-05-11 PROCEDURE — 99285 EMERGENCY DEPT VISIT HI MDM: CPT

## 2024-05-11 RX ORDER — CEFUROXIME AXETIL 250 MG/1
250 TABLET ORAL ONCE
Status: DISCONTINUED | OUTPATIENT
Start: 2024-05-11 | End: 2024-05-11 | Stop reason: HOSPADM

## 2024-05-11 RX ORDER — CEFUROXIME AXETIL 250 MG/1
250 TABLET ORAL 2 TIMES DAILY
Qty: 14 TABLET | Refills: 0 | Status: SHIPPED | OUTPATIENT
Start: 2024-05-11 | End: 2024-05-18

## 2024-05-11 RX ADMIN — IOPAMIDOL 75 ML: 755 INJECTION, SOLUTION INTRAVENOUS at 11:20

## 2024-05-11 ASSESSMENT — ENCOUNTER SYMPTOMS
FACIAL SWELLING: 0
RHINORRHEA: 0
SHORTNESS OF BREATH: 0
COLOR CHANGE: 0
ABDOMINAL PAIN: 0
SORE THROAT: 0

## 2024-05-11 ASSESSMENT — PAIN - FUNCTIONAL ASSESSMENT: PAIN_FUNCTIONAL_ASSESSMENT: NONE - DENIES PAIN

## 2024-05-11 NOTE — ED PROVIDER NOTES
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    Disposition considerations: Was offered admission for treatment for IV antibiotics for UTI however elected to try p.o. and be discharged home.    Is this patient to be included in the SEP-1 Core Measure due to severe sepsis or septic shock?   No   Exclusion criteria - the patient is NOT to be included for SEP-1 Core Measure due to:  2+ SIRS criteria are not met         The patient tolerated their visit well. I have evaluated this patient. My supervising physician was available for consultation. The patient and / or the family were informed of the results of any tests, a time was given to answer questions, a plan was proposed and they agreed with plan.        FINAL IMPRESSION      1. Urinary tract infection with hematuria, site unspecified    2. Chronic kidney disease, unspecified CKD stage    3. Diverticulosis    4. Renal cyst          DISPOSITION/PLAN   DISPOSITION Decision To Discharge 05/11/2024 11:54:28 AM      PATIENT REFERRED TO:  [facility] ED  [address] [facility] [address]  [phone]    If symptoms worsen    Adi Lemons MD  [address]  [facility] [address]  [phone]    Schedule an appointment as soon as possible for a visit in 2 days  for re-evaluation      DISCHARGE MEDICATIONS:  Discharge Medication List as of 5/11/2024 12:11 PM        START taking these medications    Details   cefUROXime (CEFTIN) 250 MG tablet Take 1 tablet  by mouth 2 times daily for 7 days, Disp-14 tablet, R-0Normal             DISCONTINUED MEDICATIONS:  Discharge Medication List as of 5/11/2024 12:11 PM                 (Please note that portions of this note were completed with a voice recognition program.  Efforts were made to edit the dictations but occasionally words are mis-transcribed.)    Patient was seen during the time of the COVID pandemic.  N95 and appropriate PPE was worn during the visit.      SUNNY Bob - CNP (electronically signed)        Janna Chávez APRN - CNP  05/11/24 9013

## 2024-05-12 LAB
BACTERIA UR CULT: ABNORMAL
BACTERIA UR CULT: NORMAL
ORGANISM: ABNORMAL

## 2024-05-13 ENCOUNTER — COMMUNITY CARE MANAGEMENT (OUTPATIENT)
Facility: CLINIC | Age: 84
End: 2024-05-13

## 2024-05-15 RX ORDER — LEVOTHYROXINE SODIUM 0.1 MG/1
TABLET ORAL
Qty: 90 TABLET | Refills: 3 | Status: SHIPPED | OUTPATIENT
Start: 2024-05-15

## 2024-06-06 NOTE — TELEPHONE ENCOUNTER
----- Message from Anaid Oneal sent at 6/6/2024 11:17 AM EDT -----  Contact: patient [phone]  Patient requesting refill on his apixaban (ELIQUIS) 5 MG TABS tablet be sent to pharmacy below.  Patient has changed insurance to [employer], and no longer using [facility].          [facility] - [place], [place] - [facility] [address] - P [phone] - F [phone]  [address] [place] [address]  Phone: [phone]  Fax: [phone]

## 2024-06-07 RX ORDER — APIXABAN 5 MG/1
TABLET, FILM COATED ORAL
Qty: 20 TABLET | Refills: 0 | OUTPATIENT
Start: 2024-06-07

## 2024-06-24 RX ORDER — INSULIN GLARGINE-YFGN 100 [IU]/ML
INJECTION, SOLUTION SUBCUTANEOUS
Qty: 70 ML | Refills: 3 | Status: SHIPPED | OUTPATIENT
Start: 2024-06-24

## 2024-07-17 ENCOUNTER — TELEPHONE (OUTPATIENT)
Dept: INTERNAL MEDICINE CLINIC | Age: 84
End: 2024-07-17

## 2024-07-17 NOTE — TELEPHONE ENCOUNTER
Left voicemail for patient to return call. Verbal from Dr. Andrew patient needs to decrease his eliquis to 2.5 mg twice daily. Changed script was sent in but he can break the 5 mg in half.

## 2024-07-31 ENCOUNTER — TELEPHONE (OUTPATIENT)
Dept: INTERNAL MEDICINE CLINIC | Age: 84
End: 2024-07-31

## 2024-07-31 RX ORDER — PIOGLITAZONEHYDROCHLORIDE 30 MG/1
TABLET ORAL
Qty: 90 TABLET | Refills: 3 | Status: SHIPPED | OUTPATIENT
Start: 2024-07-31

## 2024-07-31 RX ORDER — SYRINGE-NEEDLE,INSULIN,0.5 ML 27GX1/2"
SYRINGE, EMPTY DISPOSABLE MISCELLANEOUS
Qty: 100 EACH | Refills: 11 | Status: SHIPPED | OUTPATIENT
Start: 2024-07-31

## 2024-07-31 NOTE — TELEPHONE ENCOUNTER
----- Message from Betsy Laurent MA sent at 7/31/2024 10:08 AM EDT -----  Contact: [phone]  Patient is requesting a refill of the below medical product.     B-D INS SYR ULTRAFINE 1CC/30G 30G X 1/2\" 1 ML MISC      [facility] - [place], [place] - [address] [place] [address] - P [phone] - F [phone]  [address] [place] [address]  Phone: [phone]  Fax: [phone]

## 2024-08-05 ENCOUNTER — TELEPHONE (OUTPATIENT)
Dept: INTERNAL MEDICINE CLINIC | Age: 84
End: 2024-08-05

## 2024-08-05 NOTE — TELEPHONE ENCOUNTER
----- Message from Adi Lemons MD sent at 8/5/2024  1:17 PM EDT -----  Contact: [phone]  Cut in half  ----- Message -----  From: Betsy Laurent MA  Sent: 8/5/2024  10:38 AM EDT  To: Adi Lemons MD    Patient called back and states that he got home and his mail order pharmacy delivered a supply of the Eliquis. He wants to know what dosage you want him to take? They delivered 5mg tablets. Is he supposed to take 5mg or 2.5mg? If it is 2.5mg he can cut these in half. Please advise.         [facility] - [address] - P [phone] - F [phone]  [address] [place] [address]  Phone: [phone]  Fax: [phone]

## 2024-08-13 ENCOUNTER — OFFICE VISIT (OUTPATIENT)
Dept: INTERNAL MEDICINE CLINIC | Age: 84
End: 2024-08-13

## 2024-08-13 VITALS
SYSTOLIC BLOOD PRESSURE: 110 MMHG | DIASTOLIC BLOOD PRESSURE: 80 MMHG | BODY MASS INDEX: 30.38 KG/M2 | HEART RATE: 70 BPM | RESPIRATION RATE: 12 BRPM | HEIGHT: 71 IN | WEIGHT: 217 LBS

## 2024-08-13 DIAGNOSIS — I48.0 PAF (PAROXYSMAL ATRIAL FIBRILLATION) (HCC): ICD-10-CM

## 2024-08-13 DIAGNOSIS — Z00.00 MEDICARE ANNUAL WELLNESS VISIT, SUBSEQUENT: Primary | ICD-10-CM

## 2024-08-13 DIAGNOSIS — E03.9 ACQUIRED HYPOTHYROIDISM: ICD-10-CM

## 2024-08-13 DIAGNOSIS — Z79.4 TYPE 2 DIABETES MELLITUS WITH BOTH EYES AFFECTED BY MILD NONPROLIFERATIVE RETINOPATHY WITHOUT MACULAR EDEMA, WITH LONG-TERM CURRENT USE OF INSULIN (HCC): ICD-10-CM

## 2024-08-13 DIAGNOSIS — E11.22 TYPE 2 DIABETES MELLITUS WITH STAGE 3A CHRONIC KIDNEY DISEASE, WITHOUT LONG-TERM CURRENT USE OF INSULIN (HCC): ICD-10-CM

## 2024-08-13 DIAGNOSIS — E78.5 HYPERLIPIDEMIA ASSOCIATED WITH TYPE 2 DIABETES MELLITUS (HCC): ICD-10-CM

## 2024-08-13 DIAGNOSIS — N18.31 STAGE 3A CHRONIC KIDNEY DISEASE (HCC): ICD-10-CM

## 2024-08-13 DIAGNOSIS — G25.0 BENIGN ESSENTIAL TREMOR: Chronic | ICD-10-CM

## 2024-08-13 DIAGNOSIS — E11.3293 TYPE 2 DIABETES MELLITUS WITH BOTH EYES AFFECTED BY MILD NONPROLIFERATIVE RETINOPATHY WITHOUT MACULAR EDEMA, WITH LONG-TERM CURRENT USE OF INSULIN (HCC): ICD-10-CM

## 2024-08-13 DIAGNOSIS — N18.31 TYPE 2 DIABETES MELLITUS WITH STAGE 3A CHRONIC KIDNEY DISEASE, WITHOUT LONG-TERM CURRENT USE OF INSULIN (HCC): ICD-10-CM

## 2024-08-13 DIAGNOSIS — D68.69 SECONDARY HYPERCOAGULABLE STATE (HCC): ICD-10-CM

## 2024-08-13 DIAGNOSIS — E11.69 HYPERLIPIDEMIA ASSOCIATED WITH TYPE 2 DIABETES MELLITUS (HCC): ICD-10-CM

## 2024-08-13 LAB
BASOPHILS # BLD: 0 K/UL (ref 0–0.2)
BASOPHILS NFR BLD: 0.4 %
DEPRECATED RDW RBC AUTO: 14.6 % (ref 12.4–15.4)
EOSINOPHIL # BLD: 0.3 K/UL (ref 0–0.6)
EOSINOPHIL NFR BLD: 5.2 %
EST. AVERAGE GLUCOSE BLD GHB EST-MCNC: 134.1 MG/DL
HBA1C MFR BLD: 6.3 %
HCT VFR BLD AUTO: 42.4 % (ref 40.5–52.5)
HGB BLD-MCNC: 14.2 G/DL (ref 13.5–17.5)
LYMPHOCYTES # BLD: 1.7 K/UL (ref 1–5.1)
LYMPHOCYTES NFR BLD: 27.3 %
MCH RBC QN AUTO: 30.1 PG (ref 26–34)
MCHC RBC AUTO-ENTMCNC: 33.5 G/DL (ref 31–36)
MCV RBC AUTO: 89.9 FL (ref 80–100)
MONOCYTES # BLD: 0.7 K/UL (ref 0–1.3)
MONOCYTES NFR BLD: 11 %
NEUTROPHILS # BLD: 3.5 K/UL (ref 1.7–7.7)
NEUTROPHILS NFR BLD: 56.1 %
PLATELET # BLD AUTO: 233 K/UL (ref 135–450)
PMV BLD AUTO: 7.9 FL (ref 5–10.5)
RBC # BLD AUTO: 4.72 M/UL (ref 4.2–5.9)
WBC # BLD AUTO: 6.1 K/UL (ref 4–11)

## 2024-08-13 PROCEDURE — 3079F DIAST BP 80-89 MM HG: CPT | Performed by: INTERNAL MEDICINE

## 2024-08-13 PROCEDURE — 3074F SYST BP LT 130 MM HG: CPT | Performed by: INTERNAL MEDICINE

## 2024-08-13 PROCEDURE — 3044F HG A1C LEVEL LT 7.0%: CPT | Performed by: INTERNAL MEDICINE

## 2024-08-13 PROCEDURE — 1123F ACP DISCUSS/DSCN MKR DOCD: CPT | Performed by: INTERNAL MEDICINE

## 2024-08-13 PROCEDURE — G0439 PPPS, SUBSEQ VISIT: HCPCS | Performed by: INTERNAL MEDICINE

## 2024-08-13 ASSESSMENT — PATIENT HEALTH QUESTIONNAIRE - PHQ9
SUM OF ALL RESPONSES TO PHQ QUESTIONS 1-9: 1
SUM OF ALL RESPONSES TO PHQ QUESTIONS 1-9: 1
2. FEELING DOWN, DEPRESSED OR HOPELESS: SEVERAL DAYS
SUM OF ALL RESPONSES TO PHQ QUESTIONS 1-9: 1
1. LITTLE INTEREST OR PLEASURE IN DOING THINGS: NOT AT ALL
SUM OF ALL RESPONSES TO PHQ QUESTIONS 1-9: 1
SUM OF ALL RESPONSES TO PHQ9 QUESTIONS 1 & 2: 1

## 2024-08-13 NOTE — PROGRESS NOTES
Medicare Annual Wellness Visit    Nimesh Ng is here for Medicare AWV    Assessment & Plan   Medicare annual wellness visit, subsequent  Type 2 diabetes mellitus with stage 3a chronic kidney disease, without long-term current use of insulin (HCC)  -     Hemoglobin A1C; Future  -     CBC with Auto Differential; Future  Hyperlipidemia associated with type 2 diabetes mellitus (HCC)  Type 2 diabetes mellitus with both eyes affected by mild nonproliferative retinopathy without macular edema, with long-term current use of insulin (HCC)  -     Hemoglobin A1C; Future  -     CBC with Auto Differential; Future  Stage 3a chronic kidney disease (HCC)  Benign essential tremor  PAF (paroxysmal atrial fibrillation) ([facility])  Secondary hypercoagulable state (HCC)  Acquired hypothyroidism    -Medicare exam done  - DM type 2 controlled. Check labs  - HLD at goal on Crestor  - HTN controlled  - A fib. CVR. On Eliquis  - Patient's hypothyroidism is stable on replacement therapy.     Recommendations for Preventive Services Due: see orders and patient instructions/AVS.  Recommended screening schedule for the next 5-10 years is provided to the patient in written form: see Patient Instructions/AVS.     No follow-ups on file.     Subjective     He is here for a Medicare exam.    Diabetes Mellitus Type 2: Current symptoms/problems include  microalbuminuria .    Medication compliance:  compliant most of the time  Diabetic diet compliance:  compliant most of the time,  Weight trend: decreasing  Current exercise: walks 5 time(s) per week    Home blood sugar records: fasting range:  mg/dl  Any episodes of hypoglycemia? no  Eye exam current (within one year): yes   reports that he quit smoking about 34 years ago. His smoking use included cigarettes. He started smoking about 64 years ago. He has a 15 pack-year smoking history. He quit smokeless tobacco use about 46 years ago.   Daily Aspirin? No: on Eliquis  Known diabetic complications:

## 2024-08-31 ENCOUNTER — HOSPITAL ENCOUNTER (OUTPATIENT)
Age: 84
Discharge: HOME | End: 2024-08-31
Payer: MEDICARE

## 2024-08-31 LAB
ALBUMIN SERPL-MCNC: 3.6 G/DL (ref 3.4–5)
ANION GAP SERPL CALCULATED.3IONS-SCNC: 12 MMOL/L (ref 3–16)
BUN SERPL-MCNC: 48 MG/DL (ref 7–20)
CALCIUM SERPL-MCNC: 9.7 MG/DL (ref 8.3–10.6)
CHLORIDE SERPL-SCNC: 98 MMOL/L (ref 99–110)
CO2 SERPL-SCNC: 31 MMOL/L (ref 21–32)
CREAT SERPL-MCNC: 1.7 MG/DL (ref 0.8–1.3)
GFR SERPLBLD CREATININE-BSD FMLA CKD-EPI: 40 ML/MIN/{1.73_M2}
GLUCOSE SERPL-MCNC: 101 MG/DL (ref 70–99)
PHOSPHATE SERPL-MCNC: 3.4 MG/DL (ref 2.5–4.9)
POTASSIUM SERPL-SCNC: 3.9 MMOL/L (ref 3.5–5.1)
SODIUM SERPL-SCNC: 141 MMOL/L (ref 136–145)
URATE SERPL-MCNC: 7.5 MG/DL (ref 3.5–7.2)

## 2024-08-31 PROCEDURE — 84550 ASSAY OF BLOOD/URIC ACID: CPT

## 2024-08-31 PROCEDURE — 80069 RENAL FUNCTION PANEL: CPT

## 2024-08-31 PROCEDURE — 36415 COLL VENOUS BLD VENIPUNCTURE: CPT

## 2024-09-03 ENCOUNTER — HOSPITAL ENCOUNTER (OUTPATIENT)
Age: 84
Discharge: HOME | End: 2024-09-03
Payer: MEDICARE

## 2024-09-03 LAB
25(OH)D3 SERPL-MCNC: 51.2 NG/ML
ALBUMIN SERPL-MCNC: 3.7 G/DL (ref 3.4–5)
ANION GAP SERPL CALCULATED.3IONS-SCNC: 8 MMOL/L (ref 3–16)
BACTERIA URNS QL MICRO: ABNORMAL /HPF
BILIRUB UR QL STRIP.AUTO: NEGATIVE
BUN SERPL-MCNC: 44 MG/DL (ref 7–20)
CALCIUM SERPL-MCNC: 10.1 MG/DL (ref 8.3–10.6)
CHLORIDE SERPL-SCNC: 99 MMOL/L (ref 99–110)
CLARITY UR: CLEAR
CO2 SERPL-SCNC: 35 MMOL/L (ref 21–32)
COLOR UR: YELLOW
CREAT SERPL-MCNC: 1.8 MG/DL (ref 0.8–1.3)
CREAT UR-MCNC: 48.9 MG/DL (ref 39–259)
DEPRECATED RDW RBC AUTO: 14.3 % (ref 12.4–15.4)
EPI CELLS #/AREA URNS HPF: ABNORMAL /HPF (ref 0–5)
GFR SERPLBLD CREATININE-BSD FMLA CKD-EPI: 37 ML/MIN/{1.73_M2}
GLUCOSE SERPL-MCNC: 84 MG/DL (ref 70–99)
GLUCOSE UR STRIP.AUTO-MCNC: 250 MG/DL
HCT VFR BLD AUTO: 43.3 % (ref 40.5–52.5)
HGB BLD-MCNC: 14 G/DL (ref 13.5–17.5)
HGB UR QL STRIP.AUTO: NEGATIVE
KETONES UR STRIP.AUTO-MCNC: NEGATIVE MG/DL
LEUKOCYTE ESTERASE UR QL STRIP.AUTO: ABNORMAL
MAGNESIUM SERPL-MCNC: 2.05 MG/DL (ref 1.8–2.4)
MCH RBC QN AUTO: 29.2 PG (ref 26–34)
MCHC RBC AUTO-ENTMCNC: 32.4 G/DL (ref 31–36)
MCV RBC AUTO: 90.2 FL (ref 80–100)
NITRITE UR QL STRIP.AUTO: NEGATIVE
PH UR STRIP.AUTO: 6 [PH] (ref 5–8)
PHOSPHATE SERPL-MCNC: 3.5 MG/DL (ref 2.5–4.9)
PLATELET # BLD AUTO: 222 K/UL (ref 135–450)
PMV BLD AUTO: 8 FL (ref 5–10.5)
POTASSIUM SERPL-SCNC: 4.8 MMOL/L (ref 3.5–5.1)
PROT UR STRIP.AUTO-MCNC: NEGATIVE MG/DL
PROT UR-MCNC: 10.4 MG/DL
PROT/CREAT UR-RTO: 0.2 MG/DL
PTH-INTACT SERPL-MCNC: 51.6 PG/ML (ref 14–72)
RBC # BLD AUTO: 4.8 M/UL (ref 4.2–5.9)
RBC #/AREA URNS HPF: ABNORMAL /HPF (ref 0–4)
SODIUM SERPL-SCNC: 142 MMOL/L (ref 136–145)
SP GR UR STRIP.AUTO: 1.01 (ref 1–1.03)
UA DIPSTICK W REFLEX MICRO PNL UR: YES
URN SPEC COLLECT METH UR: ABNORMAL
UROBILINOGEN UR STRIP-ACNC: 0.2 E.U./DL
WBC # BLD AUTO: 6.1 K/UL (ref 4–11)
WBC #/AREA URNS HPF: ABNORMAL /HPF (ref 0–5)

## 2024-09-03 PROCEDURE — 84156 ASSAY OF PROTEIN URINE: CPT

## 2024-09-03 PROCEDURE — 81001 URINALYSIS AUTO W/SCOPE: CPT

## 2024-09-03 PROCEDURE — 83970 ASSAY OF PARATHORMONE: CPT

## 2024-09-03 PROCEDURE — 82306 VITAMIN D 25 HYDROXY: CPT

## 2024-09-03 PROCEDURE — 80069 RENAL FUNCTION PANEL: CPT

## 2024-09-03 PROCEDURE — 36415 COLL VENOUS BLD VENIPUNCTURE: CPT

## 2024-09-03 PROCEDURE — 82570 ASSAY OF URINE CREATININE: CPT

## 2024-09-03 PROCEDURE — 83735 ASSAY OF MAGNESIUM: CPT

## 2024-09-03 PROCEDURE — 85027 COMPLETE CBC AUTOMATED: CPT

## 2024-10-29 RX ORDER — TAMSULOSIN HYDROCHLORIDE 0.4 MG/1
CAPSULE ORAL
Qty: 90 CAPSULE | Refills: 0 | Status: SHIPPED | OUTPATIENT
Start: 2024-10-29

## 2024-10-29 RX ORDER — DILTIAZEM HYDROCHLORIDE 120 MG/1
CAPSULE, COATED, EXTENDED RELEASE ORAL
Qty: 90 CAPSULE | Refills: 0 | Status: SHIPPED | OUTPATIENT
Start: 2024-10-29

## 2024-10-30 ENCOUNTER — TELEPHONE (OUTPATIENT)
Dept: INTERNAL MEDICINE CLINIC | Age: 84
End: 2024-10-30

## 2024-10-30 NOTE — TELEPHONE ENCOUNTER
----- Message from Denisha CHRIS sent at 10/30/2024  1:24 PM EDT -----  Contact: [phone]  Pt needs refill sent to [facility] pharmacy.        apixaban (ELIQUIS) 2.5 MG TABS tablet    [facility] pharmacy

## 2024-11-18 ENCOUNTER — HOSPITAL ENCOUNTER (OUTPATIENT)
Age: 84
Discharge: HOME | End: 2024-11-18
Payer: MEDICARE

## 2024-11-18 LAB
25(OH)D3 SERPL-MCNC: 39.9 NG/ML
ALBUMIN SERPL-MCNC: 3.8 G/DL (ref 3.4–5)
ANION GAP SERPL CALCULATED.3IONS-SCNC: 11 MMOL/L (ref 3–16)
BACTERIA URNS QL MICRO: ABNORMAL /HPF
BILIRUB UR QL STRIP.AUTO: NEGATIVE
BUN SERPL-MCNC: 45 MG/DL (ref 7–20)
CALCIUM SERPL-MCNC: 10.2 MG/DL (ref 8.3–10.6)
CHLORIDE SERPL-SCNC: 101 MMOL/L (ref 99–110)
CLARITY UR: CLEAR
CO2 SERPL-SCNC: 32 MMOL/L (ref 21–32)
COLOR UR: YELLOW
CREAT SERPL-MCNC: 1.8 MG/DL (ref 0.8–1.3)
CREAT UR-MCNC: 44.9 MG/DL (ref 39–259)
DEPRECATED RDW RBC AUTO: 15.1 % (ref 12.4–15.4)
EPI CELLS #/AREA URNS HPF: ABNORMAL /HPF (ref 0–5)
GFR SERPLBLD CREATININE-BSD FMLA CKD-EPI: 37 ML/MIN/{1.73_M2}
GLUCOSE SERPL-MCNC: 88 MG/DL (ref 70–99)
GLUCOSE UR STRIP.AUTO-MCNC: 500 MG/DL
HCT VFR BLD AUTO: 43.1 % (ref 40.5–52.5)
HGB BLD-MCNC: 14.1 G/DL (ref 13.5–17.5)
HGB UR QL STRIP.AUTO: NEGATIVE
HYALINE CASTS #/AREA URNS LPF: ABNORMAL /LPF (ref 0–2)
KETONES UR STRIP.AUTO-MCNC: NEGATIVE MG/DL
LEUKOCYTE ESTERASE UR QL STRIP.AUTO: ABNORMAL
MAGNESIUM SERPL-MCNC: 2.23 MG/DL (ref 1.8–2.4)
MCH RBC QN AUTO: 29.5 PG (ref 26–34)
MCHC RBC AUTO-ENTMCNC: 32.7 G/DL (ref 31–36)
MCV RBC AUTO: 90.2 FL (ref 80–100)
MUCOUS THREADS #/AREA URNS LPF: ABNORMAL /LPF
NITRITE UR QL STRIP.AUTO: POSITIVE
PH UR STRIP.AUTO: 6 [PH] (ref 5–8)
PHOSPHATE SERPL-MCNC: 3.8 MG/DL (ref 2.5–4.9)
PLATELET # BLD AUTO: 251 K/UL (ref 135–450)
PMV BLD AUTO: 7.8 FL (ref 5–10.5)
POTASSIUM SERPL-SCNC: 5.8 MMOL/L (ref 3.5–5.1)
PROT UR STRIP.AUTO-MCNC: NEGATIVE MG/DL
PROT UR-MCNC: 8.86 MG/DL
PROT/CREAT UR-RTO: 0.2 MG/DL
PTH-INTACT SERPL-MCNC: 70.7 PG/ML (ref 14–72)
RBC # BLD AUTO: 4.78 M/UL (ref 4.2–5.9)
RBC #/AREA URNS HPF: ABNORMAL /HPF (ref 0–4)
SODIUM SERPL-SCNC: 144 MMOL/L (ref 136–145)
SP GR UR STRIP.AUTO: 1.01 (ref 1–1.03)
UA DIPSTICK W REFLEX MICRO PNL UR: YES
URN SPEC COLLECT METH UR: ABNORMAL
UROBILINOGEN UR STRIP-ACNC: 0.2 E.U./DL
WBC # BLD AUTO: 7 K/UL (ref 4–11)
WBC #/AREA URNS HPF: ABNORMAL /HPF (ref 0–5)

## 2024-11-18 PROCEDURE — 36415 COLL VENOUS BLD VENIPUNCTURE: CPT

## 2024-11-18 PROCEDURE — 84156 ASSAY OF PROTEIN URINE: CPT

## 2024-11-18 PROCEDURE — 83970 ASSAY OF PARATHORMONE: CPT

## 2024-11-18 PROCEDURE — 82570 ASSAY OF URINE CREATININE: CPT

## 2024-11-18 PROCEDURE — 81001 URINALYSIS AUTO W/SCOPE: CPT

## 2024-11-18 PROCEDURE — 80069 RENAL FUNCTION PANEL: CPT

## 2024-11-18 PROCEDURE — 85027 COMPLETE CBC AUTOMATED: CPT

## 2024-11-18 PROCEDURE — 83735 ASSAY OF MAGNESIUM: CPT

## 2024-11-18 PROCEDURE — 82306 VITAMIN D 25 HYDROXY: CPT

## 2024-11-20 ENCOUNTER — OFFICE VISIT (OUTPATIENT)
Dept: INTERNAL MEDICINE CLINIC | Age: 84
End: 2024-11-20

## 2024-11-20 VITALS
WEIGHT: 222 LBS | HEIGHT: 71 IN | BODY MASS INDEX: 31.08 KG/M2 | RESPIRATION RATE: 12 BRPM | DIASTOLIC BLOOD PRESSURE: 80 MMHG | SYSTOLIC BLOOD PRESSURE: 136 MMHG | HEART RATE: 68 BPM

## 2024-11-20 DIAGNOSIS — I48.0 PAF (PAROXYSMAL ATRIAL FIBRILLATION) (HCC): ICD-10-CM

## 2024-11-20 DIAGNOSIS — Z79.4 TYPE 2 DIABETES MELLITUS WITH BOTH EYES AFFECTED BY MILD NONPROLIFERATIVE RETINOPATHY WITHOUT MACULAR EDEMA, WITH LONG-TERM CURRENT USE OF INSULIN (HCC): ICD-10-CM

## 2024-11-20 DIAGNOSIS — E11.69 HYPERLIPIDEMIA ASSOCIATED WITH TYPE 2 DIABETES MELLITUS (HCC): ICD-10-CM

## 2024-11-20 DIAGNOSIS — E03.9 ACQUIRED HYPOTHYROIDISM: ICD-10-CM

## 2024-11-20 DIAGNOSIS — E11.3293 TYPE 2 DIABETES MELLITUS WITH BOTH EYES AFFECTED BY MILD NONPROLIFERATIVE RETINOPATHY WITHOUT MACULAR EDEMA, WITH LONG-TERM CURRENT USE OF INSULIN (HCC): ICD-10-CM

## 2024-11-20 DIAGNOSIS — E78.5 HYPERLIPIDEMIA ASSOCIATED WITH TYPE 2 DIABETES MELLITUS (HCC): ICD-10-CM

## 2024-11-20 DIAGNOSIS — Z79.4 TYPE 2 DIABETES MELLITUS WITHOUT COMPLICATION, WITH LONG-TERM CURRENT USE OF INSULIN (HCC): Primary | ICD-10-CM

## 2024-11-20 DIAGNOSIS — E11.9 TYPE 2 DIABETES MELLITUS WITHOUT COMPLICATION, WITH LONG-TERM CURRENT USE OF INSULIN (HCC): Primary | ICD-10-CM

## 2024-11-20 DIAGNOSIS — D47.2 MGUS (MONOCLONAL GAMMOPATHY OF UNKNOWN SIGNIFICANCE): ICD-10-CM

## 2024-11-20 DIAGNOSIS — D68.69 SECONDARY HYPERCOAGULABLE STATE (HCC): ICD-10-CM

## 2024-11-20 DIAGNOSIS — Z23 NEED FOR INFLUENZA VACCINATION: ICD-10-CM

## 2024-11-20 DIAGNOSIS — G25.0 BENIGN ESSENTIAL TREMOR: Chronic | ICD-10-CM

## 2024-11-20 LAB
BILIRUBIN, POC: NORMAL
BLOOD URINE, POC: NORMAL
CLARITY, POC: NORMAL
COLOR, POC: NORMAL
GLUCOSE URINE, POC: NORMAL MG/DL
KETONES, POC: NORMAL MG/DL
LEUKOCYTE EST, POC: NORMAL
NITRITE, POC: NORMAL
PH, POC: NORMAL
PROTEIN, POC: NORMAL MG/DL
SPECIFIC GRAVITY, POC: NORMAL
UROBILINOGEN, POC: NORMAL MG/DL

## 2024-11-20 PROCEDURE — G8427 DOCREV CUR MEDS BY ELIG CLIN: HCPCS | Performed by: INTERNAL MEDICINE

## 2024-11-20 PROCEDURE — G8482 FLU IMMUNIZE ORDER/ADMIN: HCPCS | Performed by: INTERNAL MEDICINE

## 2024-11-20 PROCEDURE — 99214 OFFICE O/P EST MOD 30 MIN: CPT | Performed by: INTERNAL MEDICINE

## 2024-11-20 PROCEDURE — 1036F TOBACCO NON-USER: CPT | Performed by: INTERNAL MEDICINE

## 2024-11-20 PROCEDURE — G0008 ADMIN INFLUENZA VIRUS VAC: HCPCS | Performed by: INTERNAL MEDICINE

## 2024-11-20 PROCEDURE — 1123F ACP DISCUSS/DSCN MKR DOCD: CPT | Performed by: INTERNAL MEDICINE

## 2024-11-20 PROCEDURE — 90662 IIV NO PRSV INCREASED AG IM: CPT | Performed by: INTERNAL MEDICINE

## 2024-11-20 PROCEDURE — 3044F HG A1C LEVEL LT 7.0%: CPT | Performed by: INTERNAL MEDICINE

## 2024-11-20 PROCEDURE — 3079F DIAST BP 80-89 MM HG: CPT | Performed by: INTERNAL MEDICINE

## 2024-11-20 PROCEDURE — 81002 URINALYSIS NONAUTO W/O SCOPE: CPT | Performed by: INTERNAL MEDICINE

## 2024-11-20 PROCEDURE — 1159F MED LIST DOCD IN RCRD: CPT | Performed by: INTERNAL MEDICINE

## 2024-11-20 PROCEDURE — 1160F RVW MEDS BY RX/DR IN RCRD: CPT | Performed by: INTERNAL MEDICINE

## 2024-11-20 PROCEDURE — 3075F SYST BP GE 130 - 139MM HG: CPT | Performed by: INTERNAL MEDICINE

## 2024-11-20 PROCEDURE — G8417 CALC BMI ABV UP PARAM F/U: HCPCS | Performed by: INTERNAL MEDICINE

## 2024-11-20 RX ORDER — HYDROCHLOROTHIAZIDE 12.5 MG/1
12.5 TABLET ORAL DAILY
Qty: 90 TABLET | Refills: 3 | Status: SHIPPED | OUTPATIENT
Start: 2024-11-20

## 2024-11-20 ASSESSMENT — ENCOUNTER SYMPTOMS
WHEEZING: 0
NAUSEA: 0
SHORTNESS OF BREATH: 0
ABDOMINAL PAIN: 0
VOMITING: 0
BACK PAIN: 0
RHINORRHEA: 0

## 2024-11-20 NOTE — PROGRESS NOTES
exercise.  On Semglee 45 unit. Check labs. Doing well.  Hypertension: at goal.  He is on Cardizem CD and HCTZ.  Tremor: unchanged. Not on any medication.    Hyperlipidemia: at goal.   Hypothyroidism:  Patient's hypothyroidism is stable on replacement therapy.   A fib: On Eliquis.  Morbid Obesity  - Body mass index is 30.96 kg/m².  - Complicating assessment and treatment. Placing patient at risk for multiple co-morbidities as well as early death and contributing to the patient's presentation.   - Counseled on weight loss.   MGUS unchanged.  CKD stage 3 b. Seeing nephrology regularly.           Discussed use, benefit, and side effects of prescribed medications.  Barriers to medication compliance addressed.  All patient questions answered.  Pt voiced understanding.      Decrease calorie intake.  Exercise,weight loss recommended.  The current medical regimen is effective;  continue present plan and medications.  See orders.

## 2024-11-21 LAB
ALBUMIN SERPL-MCNC: 3.9 G/DL (ref 3.4–5)
ANION GAP SERPL CALCULATED.3IONS-SCNC: 10 MMOL/L (ref 3–16)
BUN SERPL-MCNC: 38 MG/DL (ref 7–20)
CALCIUM SERPL-MCNC: 9.8 MG/DL (ref 8.3–10.6)
CHLORIDE SERPL-SCNC: 99 MMOL/L (ref 99–110)
CO2 SERPL-SCNC: 32 MMOL/L (ref 21–32)
CREAT SERPL-MCNC: 1.8 MG/DL (ref 0.8–1.3)
CREAT UR-MCNC: 35.6 MG/DL (ref 39–259)
EST. AVERAGE GLUCOSE BLD GHB EST-MCNC: 119.8 MG/DL
GFR SERPLBLD CREATININE-BSD FMLA CKD-EPI: 37 ML/MIN/{1.73_M2}
GLUCOSE SERPL-MCNC: 114 MG/DL (ref 70–99)
HBA1C MFR BLD: 5.8 %
MICROALBUMIN UR DL<=1MG/L-MCNC: 2.59 MG/DL
MICROALBUMIN/CREAT UR: 72.8 MG/G (ref 0–30)
PHOSPHATE SERPL-MCNC: 3.8 MG/DL (ref 2.5–4.9)
POTASSIUM SERPL-SCNC: 5.7 MMOL/L (ref 3.5–5.1)
SODIUM SERPL-SCNC: 141 MMOL/L (ref 136–145)
TSH SERPL DL<=0.005 MIU/L-ACNC: 0.81 UIU/ML (ref 0.27–4.2)

## 2024-12-16 ENCOUNTER — HOSPITAL ENCOUNTER (OUTPATIENT)
Age: 84
Discharge: HOME | End: 2024-12-16
Payer: MEDICARE

## 2024-12-16 LAB
ALBUMIN SERPL-MCNC: 3.7 G/DL (ref 3.4–5)
ANION GAP SERPL CALCULATED.3IONS-SCNC: 12 MMOL/L (ref 3–16)
BUN SERPL-MCNC: 49 MG/DL (ref 7–20)
CALCIUM SERPL-MCNC: 9.4 MG/DL (ref 8.3–10.6)
CHLORIDE SERPL-SCNC: 91 MMOL/L (ref 99–110)
CO2 SERPL-SCNC: 33 MMOL/L (ref 21–32)
CREAT SERPL-MCNC: 1.9 MG/DL (ref 0.8–1.3)
GFR SERPLBLD CREATININE-BSD FMLA CKD-EPI: 35 ML/MIN/{1.73_M2}
GLUCOSE SERPL-MCNC: 205 MG/DL (ref 70–99)
PHOSPHATE SERPL-MCNC: 3.4 MG/DL (ref 2.5–4.9)
POTASSIUM SERPL-SCNC: 5.3 MMOL/L (ref 3.5–5.1)
SODIUM SERPL-SCNC: 136 MMOL/L (ref 136–145)
URATE SERPL-MCNC: 9.1 MG/DL (ref 3.5–7.2)

## 2024-12-16 PROCEDURE — 80069 RENAL FUNCTION PANEL: CPT

## 2024-12-16 PROCEDURE — 36415 COLL VENOUS BLD VENIPUNCTURE: CPT

## 2024-12-16 PROCEDURE — 84550 ASSAY OF BLOOD/URIC ACID: CPT

## 2025-01-08 ENCOUNTER — TELEPHONE (OUTPATIENT)
Dept: INTERNAL MEDICINE CLINIC | Age: 85
End: 2025-01-08

## 2025-01-08 DIAGNOSIS — Z79.4 TYPE 2 DIABETES MELLITUS WITH BOTH EYES AFFECTED BY MILD NONPROLIFERATIVE RETINOPATHY WITHOUT MACULAR EDEMA, WITH LONG-TERM CURRENT USE OF INSULIN (HCC): ICD-10-CM

## 2025-01-08 DIAGNOSIS — E11.3293 TYPE 2 DIABETES MELLITUS WITH BOTH EYES AFFECTED BY MILD NONPROLIFERATIVE RETINOPATHY WITHOUT MACULAR EDEMA, WITH LONG-TERM CURRENT USE OF INSULIN (HCC): ICD-10-CM

## 2025-01-08 RX ORDER — SYRINGE-NEEDLE,INSULIN,0.5 ML 27GX1/2"
SYRINGE, EMPTY DISPOSABLE MISCELLANEOUS
Qty: 100 EACH | Refills: 1 | Status: SHIPPED | OUTPATIENT
Start: 2025-01-08 | End: 2025-01-08 | Stop reason: SDUPTHER

## 2025-01-08 RX ORDER — LEVOTHYROXINE SODIUM 100 UG/1
100 TABLET ORAL DAILY
Qty: 90 TABLET | Refills: 1 | Status: SHIPPED | OUTPATIENT
Start: 2025-01-08 | End: 2025-01-08 | Stop reason: SDUPTHER

## 2025-01-08 RX ORDER — FUROSEMIDE 40 MG/1
40 TABLET ORAL DAILY
Qty: 14 TABLET | Refills: 0 | Status: SHIPPED | OUTPATIENT
Start: 2025-01-08

## 2025-01-08 RX ORDER — LEVOTHYROXINE SODIUM 100 UG/1
100 TABLET ORAL DAILY
Qty: 14 TABLET | Refills: 0 | Status: SHIPPED | OUTPATIENT
Start: 2025-01-08

## 2025-01-08 RX ORDER — TAMSULOSIN HYDROCHLORIDE 0.4 MG/1
0.4 CAPSULE ORAL DAILY
Qty: 14 CAPSULE | Refills: 0 | Status: SHIPPED | OUTPATIENT
Start: 2025-01-08

## 2025-01-08 RX ORDER — TAMSULOSIN HYDROCHLORIDE 0.4 MG/1
0.4 CAPSULE ORAL DAILY
Qty: 90 CAPSULE | Refills: 1 | Status: SHIPPED | OUTPATIENT
Start: 2025-01-08 | End: 2025-01-08 | Stop reason: SDUPTHER

## 2025-01-08 RX ORDER — HYDROCHLOROTHIAZIDE 12.5 MG/1
12.5 TABLET ORAL DAILY
Qty: 90 TABLET | Refills: 1 | Status: SHIPPED | OUTPATIENT
Start: 2025-01-08 | End: 2025-01-08 | Stop reason: SDUPTHER

## 2025-01-08 RX ORDER — DILTIAZEM HYDROCHLORIDE 120 MG/1
120 CAPSULE, COATED, EXTENDED RELEASE ORAL DAILY
Qty: 14 CAPSULE | Refills: 0 | Status: SHIPPED | OUTPATIENT
Start: 2025-01-08

## 2025-01-08 RX ORDER — DILTIAZEM HYDROCHLORIDE 120 MG/1
120 CAPSULE, COATED, EXTENDED RELEASE ORAL DAILY
Qty: 90 CAPSULE | Refills: 1 | Status: SHIPPED | OUTPATIENT
Start: 2025-01-08 | End: 2025-01-08 | Stop reason: SDUPTHER

## 2025-01-08 RX ORDER — HYDROCHLOROTHIAZIDE 12.5 MG/1
12.5 TABLET ORAL DAILY
Qty: 14 TABLET | Refills: 0 | Status: SHIPPED | OUTPATIENT
Start: 2025-01-08

## 2025-01-08 RX ORDER — SYRINGE-NEEDLE,INSULIN,0.5 ML 27GX1/2"
SYRINGE, EMPTY DISPOSABLE MISCELLANEOUS
Qty: 14 EACH | Refills: 0 | Status: SHIPPED | OUTPATIENT
Start: 2025-01-08

## 2025-01-08 RX ORDER — PIOGLITAZONE 30 MG/1
30 TABLET ORAL DAILY
Qty: 90 TABLET | Refills: 1 | Status: SHIPPED | OUTPATIENT
Start: 2025-01-08 | End: 2025-01-08 | Stop reason: SDUPTHER

## 2025-01-08 RX ORDER — FUROSEMIDE 40 MG/1
40 TABLET ORAL DAILY
Qty: 90 TABLET | Refills: 1 | Status: SHIPPED | OUTPATIENT
Start: 2025-01-08 | End: 2025-01-08 | Stop reason: SDUPTHER

## 2025-01-08 RX ORDER — PIOGLITAZONE 30 MG/1
30 TABLET ORAL DAILY
Qty: 14 TABLET | Refills: 0 | Status: SHIPPED | OUTPATIENT
Start: 2025-01-08

## 2025-01-08 NOTE — TELEPHONE ENCOUNTER
----- Message from Johny CONNER sent at 1/8/2025 10:53 AM EST -----  Contact: T [phone]  Clarence at [facility] is requesting the below script be transferred to [facility] pharmacy and a short order to the patients local pharmacy as he is currently out of medication. Please advise     apixaban (ELIQUIS) 2.5 MG TABS tablet     hydroCHLOROthiazide 12.5 MG tablet     tamsulosin (FLOMAX) 0.4 MG capsule     dilTIAZem (CARDIZEM CD) 120 MG extended release capsule     pioglitazone (ACTOS) 30 MG tablet     levothyroxine (SYNTHROID) 100 MCG tablet     furosemide (LASIX) 40 MG tablet      Insulin Syringe-Needle U-100 (B-D INS SYR ULTRAFINE 1CC/30G) 30G X 1/2\" 1 ML MISC     [facility]   Phone: [phone]  FAX: [phone]                [place], [place] - [facility] [address] - P [phone] - F [phone]  [address] [place] [address]  Phone: [phone]  Fax: [phone]

## 2025-01-09 ENCOUNTER — TELEPHONE (OUTPATIENT)
Dept: INTERNAL MEDICINE CLINIC | Age: 85
End: 2025-01-09

## 2025-01-09 NOTE — TELEPHONE ENCOUNTER
----- Message from Johny CONNER sent at 1/9/2025  9:48 AM EST -----  Contact: AMISHA [phone]  Amisha at [facility] states the patient is in need of the below medication to be transferred to [facility] with a short order to his local pharmacy. Please advise     JARDIANCE 25 MG tablet    [facility] - P [phone] - F [phone]  [place] [facility] [address]  Phone: [phone]  Fax: [phone]      [facility] - [place] - [address] - P [phone] - F [phone]  [address], [place] [address]  Phone: [phone]  Fax: [phone]

## 2025-01-20 DIAGNOSIS — E11.3293 TYPE 2 DIABETES MELLITUS WITH BOTH EYES AFFECTED BY MILD NONPROLIFERATIVE RETINOPATHY WITHOUT MACULAR EDEMA, WITH LONG-TERM CURRENT USE OF INSULIN (HCC): ICD-10-CM

## 2025-01-20 DIAGNOSIS — Z79.4 TYPE 2 DIABETES MELLITUS WITH BOTH EYES AFFECTED BY MILD NONPROLIFERATIVE RETINOPATHY WITHOUT MACULAR EDEMA, WITH LONG-TERM CURRENT USE OF INSULIN (HCC): ICD-10-CM

## 2025-01-20 RX ORDER — HYDROCHLOROTHIAZIDE 12.5 MG/1
12.5 TABLET ORAL DAILY
Qty: 90 TABLET | Refills: 0 | Status: SHIPPED | OUTPATIENT
Start: 2025-01-20

## 2025-01-20 RX ORDER — SYRINGE-NEEDLE,INSULIN,0.5 ML 27GX1/2"
SYRINGE, EMPTY DISPOSABLE MISCELLANEOUS
Qty: 100 EACH | Refills: 0 | Status: SHIPPED | OUTPATIENT
Start: 2025-01-20

## 2025-01-20 RX ORDER — DILTIAZEM HYDROCHLORIDE 120 MG/1
120 CAPSULE, COATED, EXTENDED RELEASE ORAL DAILY
Qty: 90 CAPSULE | Refills: 0 | Status: SHIPPED | OUTPATIENT
Start: 2025-01-20

## 2025-01-20 RX ORDER — TAMSULOSIN HYDROCHLORIDE 0.4 MG/1
0.4 CAPSULE ORAL DAILY
Qty: 90 CAPSULE | Refills: 0 | Status: SHIPPED | OUTPATIENT
Start: 2025-01-20

## 2025-01-20 RX ORDER — FUROSEMIDE 40 MG/1
40 TABLET ORAL DAILY
Qty: 90 TABLET | Refills: 0 | Status: SHIPPED | OUTPATIENT
Start: 2025-01-20

## 2025-01-20 RX ORDER — PIOGLITAZONE 30 MG/1
30 TABLET ORAL DAILY
Qty: 90 TABLET | Refills: 0 | Status: SHIPPED | OUTPATIENT
Start: 2025-01-20

## 2025-01-20 RX ORDER — LEVOTHYROXINE SODIUM 100 UG/1
100 TABLET ORAL DAILY
Qty: 90 TABLET | Refills: 0 | Status: SHIPPED | OUTPATIENT
Start: 2025-01-20

## 2025-03-02 ENCOUNTER — HOSPITAL ENCOUNTER (EMERGENCY)
Age: 85
Discharge: HOME | End: 2025-03-02
Attending: EMERGENCY MEDICINE
Payer: COMMERCIAL

## 2025-03-02 VITALS
TEMPERATURE: 97.8 F | SYSTOLIC BLOOD PRESSURE: 130 MMHG | OXYGEN SATURATION: 97 % | HEART RATE: 69 BPM | DIASTOLIC BLOOD PRESSURE: 60 MMHG | RESPIRATION RATE: 18 BRPM

## 2025-03-02 DIAGNOSIS — E16.2 HYPOGLYCEMIA: Primary | ICD-10-CM

## 2025-03-02 LAB
ANION GAP SERPL CALCULATED.3IONS-SCNC: 13 MMOL/L (ref 3–16)
BASOPHILS # BLD: 0 K/UL (ref 0–0.2)
BASOPHILS NFR BLD: 0.5 %
BUN SERPL-MCNC: 49 MG/DL (ref 7–20)
CALCIUM SERPL-MCNC: 8.5 MG/DL (ref 8.3–10.6)
CHLORIDE SERPL-SCNC: 98 MMOL/L (ref 99–110)
CO2 SERPL-SCNC: 29 MMOL/L (ref 21–32)
CREAT SERPL-MCNC: 1.6 MG/DL (ref 0.8–1.3)
DEPRECATED RDW RBC AUTO: 14.8 % (ref 12.4–15.4)
EOSINOPHIL # BLD: 0.1 K/UL (ref 0–0.6)
EOSINOPHIL NFR BLD: 1.7 %
GFR SERPLBLD CREATININE-BSD FMLA CKD-EPI: 43 ML/MIN/{1.73_M2}
GLUCOSE BLD-MCNC: 116 MG/DL (ref 70–99)
GLUCOSE BLD-MCNC: 97 MG/DL (ref 70–99)
GLUCOSE SERPL-MCNC: 83 MG/DL (ref 70–99)
HCT VFR BLD AUTO: 40.6 % (ref 40.5–52.5)
HGB BLD-MCNC: 13.4 G/DL (ref 13.5–17.5)
LYMPHOCYTES # BLD: 0.6 K/UL (ref 1–5.1)
LYMPHOCYTES NFR BLD: 10 %
MCH RBC QN AUTO: 29.2 PG (ref 26–34)
MCHC RBC AUTO-ENTMCNC: 33 G/DL (ref 31–36)
MCV RBC AUTO: 88.6 FL (ref 80–100)
MONOCYTES # BLD: 0.5 K/UL (ref 0–1.3)
MONOCYTES NFR BLD: 8.6 %
NEUTROPHILS # BLD: 4.5 K/UL (ref 1.7–7.7)
NEUTROPHILS NFR BLD: 79.2 %
PERFORMED ON: ABNORMAL
PERFORMED ON: NORMAL
PLATELET # BLD AUTO: 207 K/UL (ref 135–450)
PMV BLD AUTO: 7.3 FL (ref 5–10.5)
POTASSIUM SERPL-SCNC: 4.1 MMOL/L (ref 3.5–5.1)
RBC # BLD AUTO: 4.58 M/UL (ref 4.2–5.9)
SODIUM SERPL-SCNC: 140 MMOL/L (ref 136–145)
WBC # BLD AUTO: 5.7 K/UL (ref 4–11)

## 2025-03-02 PROCEDURE — 80048 BASIC METABOLIC PNL TOTAL CA: CPT

## 2025-03-02 PROCEDURE — 99283 EMERGENCY DEPT VISIT LOW MDM: CPT

## 2025-03-02 PROCEDURE — 85025 COMPLETE CBC W/AUTO DIFF WBC: CPT

## 2025-03-02 ASSESSMENT — LIFESTYLE VARIABLES
HOW OFTEN DO YOU HAVE A DRINK CONTAINING ALCOHOL: MONTHLY OR LESS
HOW MANY STANDARD DRINKS CONTAINING ALCOHOL DO YOU HAVE ON A TYPICAL DAY: 1 OR 2

## 2025-03-02 ASSESSMENT — PAIN - FUNCTIONAL ASSESSMENT: PAIN_FUNCTIONAL_ASSESSMENT: NONE - DENIES PAIN

## 2025-03-03 NOTE — DISCHARGE INSTRUCTIONS
Your blood sugar was normal here.  Your lab work is reassuring.  We checked your blood sugar a couple times and it was stable.  Make sure you are eating enough at home especially when taking your insulin.

## 2025-03-04 ENCOUNTER — CARE COORDINATION (OUTPATIENT)
Dept: CARE COORDINATION | Age: 85
End: 2025-03-04

## 2025-03-04 RX ORDER — MULTIVIT-MIN/IRON/FOLIC ACID/K 18-600-40
2000 CAPSULE ORAL DAILY
COMMUNITY

## 2025-03-04 NOTE — ED PROVIDER NOTES
admission, including shared decision making:    Here the patient is awake and alert.  States he feels completely resolved.  Denies recent illness.  No vomiting or diarrhea.  Thinks he just did not eat enough today.  Here blood glucose is 97.  Lab work and electrolytes appear normal.  He was given a tray of food and tolerated this well.  Repeat glucose remains stable.  I do think he is appropriate for discharge home.  Consultation summary: none    Social determinants of health: none      Labs and imaging reviewed and results discussed with patient. Patient was reassessed as noted above . Plan of care discussed with patient. Patient in agreement with plan. Strict return precautions have been given.        Patient was given scripts for the following medications. I counseled patient how to take these medications.   Discharge Medication List as of 3/2/2025  7:42 PM          None    CLINICAL IMPRESSION  1. Hypoglycemia        Blood pressure 130/60, pulse 69, temperature 97.8 °F (36.6 °C), temperature source Oral, resp. rate 18, SpO2 97%.    DISPOSITION  Nimesh Ng was discharged to home in stable condition.    I, Iliana Lubin MD am the primary clinician of record.    (Please note this note was completed with a voice recognition program.  Efforts were made to edit the dictations but occasionally words are mis-transcribed.)        Iliana Lubin MD  03/04/25 7066

## 2025-03-04 NOTE — CARE COORDINATION
3/4/2025) 3/4/2025: As needed use  90 tablet 0    Embrace Lancets Ultra Thin 30G MISC TEST TWICE DAILY. DX: E11.9  200 each 3    Handicap Placard MISC by Does not apply route DURATION; 5 YEARS  1 each 0    ONETOUCH ULTRA strip USE TO TEST TWICE A DAY  200 strip 3    Blood Glucose Monitoring Suppl ([facility]) PARAS TEST TWICE DAILY. DX: E11.9  1 Device 0    rosuvastatin (CRESTOR) 10 MG tablet Take 1 tablet by mouth daily (Patient not taking: Reported on 3/4/2025)        No current facility-administered medications for this visit.       Advance Care Planning:   Not reviewed during this call     Care Planning:   Education Documentation  Educate diabetic sick day management, taught by Ariela Michaud RN at 3/4/2025  3:50 PM.  Learner: Patient  Readiness: Acceptance  Method: Explanation, Handout  Response: Needs Reinforcement  Comment: diabetic sick day handouts    Pneumovax Recommendation, taught by Ariela Michaud RN at 3/4/2025  3:50 PM.  Learner: Patient  Readiness: Acceptance  Method: Explanation  Response: Needs Reinforcement  Comment: diabetes preventative    Educate safety precautions, taught by Ariela Michaud RN at 3/4/2025  3:50 PM.  Learner: Patient  Readiness: Acceptance  Method: Explanation  Response: Needs Reinforcement  Comment: diabetes preventative    Diabetic Foot Care, taught by Ariela Michaud RN at 3/4/2025  3:50 PM.  Learner: Patient  Readiness: Acceptance  Method: Explanation  Response: Needs Reinforcement  Comment: diabetes preventative    Diabetes Eye Exam, taught by Ariela Michaud RN at 3/4/2025  3:50 PM.  Learner: Patient  Readiness: Acceptance  Method: Explanation  Response: Needs Reinforcement  Comment: diabetes preventative    Chronic Complications, taught by Ariela Michaud RN at 3/4/2025  3:50 PM.  Learner: Patient  Readiness: Acceptance  Method: Explanation  Response: Needs Reinforcement  Comment: diabetes preventative    Insulin, taught by Ariela Michaud RN at

## 2025-03-12 ENCOUNTER — CARE COORDINATION (OUTPATIENT)
Dept: CARE COORDINATION | Age: 85
End: 2025-03-12

## 2025-03-12 SDOH — ECONOMIC STABILITY: FOOD INSECURITY: WITHIN THE PAST 12 MONTHS, THE FOOD YOU BOUGHT JUST DIDN'T LAST AND YOU DIDN'T HAVE MONEY TO GET MORE.: NEVER TRUE

## 2025-03-12 SDOH — SOCIAL STABILITY: SOCIAL INSECURITY: ARE YOU MARRIED, WIDOWED, DIVORCED, SEPARATED, NEVER MARRIED, OR LIVING WITH A PARTNER?: WIDOWED

## 2025-03-12 SDOH — ECONOMIC STABILITY: FOOD INSECURITY: WITHIN THE PAST 12 MONTHS, YOU WORRIED THAT YOUR FOOD WOULD RUN OUT BEFORE YOU GOT MONEY TO BUY MORE.: NEVER TRUE

## 2025-03-12 SDOH — ECONOMIC STABILITY: TRANSPORTATION INSECURITY
IN THE PAST 12 MONTHS, HAS THE LACK OF TRANSPORTATION KEPT YOU FROM MEDICAL APPOINTMENTS OR FROM GETTING MEDICATIONS?: NO

## 2025-03-12 SDOH — ECONOMIC STABILITY: INCOME INSECURITY: IN THE LAST 12 MONTHS, WAS THERE A TIME WHEN YOU WERE NOT ABLE TO PAY THE MORTGAGE OR RENT ON TIME?: NO

## 2025-03-12 SDOH — ECONOMIC STABILITY: TRANSPORTATION INSECURITY
IN THE PAST 12 MONTHS, HAS LACK OF TRANSPORTATION KEPT YOU FROM MEETINGS, WORK, OR FROM GETTING THINGS NEEDED FOR DAILY LIVING?: NO

## 2025-03-12 ASSESSMENT — SOCIAL DETERMINANTS OF HEALTH (SDOH)
HOW HARD IS IT FOR YOU TO PAY FOR THE VERY BASICS LIKE FOOD, HOUSING, MEDICAL CARE, AND HEATING?: NOT VERY HARD
HOW OFTEN DO YOU GET TOGETHER WITH FRIENDS OR RELATIVES?: MORE THAN THREE TIMES A WEEK
HOW OFTEN DO YOU ATTENT MEETINGS OF THE CLUB OR ORGANIZATION YOU BELONG TO?: MORE THAN 4 TIMES PER YEAR
HOW OFTEN DO YOU ATTEND CHURCH OR RELIGIOUS SERVICES?: MORE THAN 4 TIMES PER YEAR
IN A TYPICAL WEEK, HOW MANY TIMES DO YOU TALK ON THE PHONE WITH FAMILY, FRIENDS, OR NEIGHBORS?: MORE THAN THREE TIMES A WEEK
DO YOU BELONG TO ANY CLUBS OR ORGANIZATIONS SUCH AS CHURCH GROUPS UNIONS, FRATERNAL OR ATHLETIC GROUPS, OR SCHOOL GROUPS?: YES

## 2025-03-12 NOTE — CARE COORDINATION
Ambulatory Care Coordination Note     3/12/2025 10:40 AM     Patient Current Location:  Home: [address] [place] [address] [address] [place] [address]     ACM contacted the patient by telephone. Verified name and  with patient as identifiers.         ACM: Ariela Michaud RN       Method of communication with provider: chart routing.    Utilization: Patient has not had any utilization since our last call.     Care Summary Note: patient reports he is doing better. No further low sugars reported. He is more active again with whether change. Reports he has been out with chickens and ducks feeding. 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 He is trying to keep busy. Better food choices noted. Increasing his protein more and keeps snacks available for any low blood sugar episodes. Follow up again in 2 weeks. SDOH and AD assessment completed this call.     Lab Results   Component Value Date    LABA1C 5.8 2024    LABA1C 6.3 2024    LABA1C 5.8 2024     Lab Results   Component Value Date    .8 2024    .1 2024    .8 2024         Offered patient enrollment in the Remote Patient Monitoring (RPM) program for in-home monitoring: Patient is not eligible for RPM program because: affiliate provider. No needs identified      Assessments Completed:   Ambulatory Care Coordination Assessment    Care Coordination Protocol  Referral from Primary Care Provider: No  Week 1 - Initial Assessment     Do you have all of your prescriptions and are they filled?: Yes (Comment: reviewed 3/12/25 trb)  Barriers to medication adherence: None  Are you able to afford your medications?: Yes  How often do you have trouble taking your medications the way you have been told to take them?: I always take them as prescribed.     Do you have Home O2 Therapy?: No      Ability to seek help/take action for Emergent Urgent situations i.e. fire, crime, inclement weather or health crisis.: Independent  Ability to ambulate to restroom:

## 2025-03-12 NOTE — ACP (ADVANCE CARE PLANNING)
Advance Care Planning   Healthcare Decision Maker:      Click here to complete Healthcare Decision Makers including selection of the Healthcare Decision Maker Relationship (ie \"Primary\").  Today we referred to ACP Clinical Specialist for assistance. ACP documents on file have  family member. Patient is requesting new form to be sent to him. He has update his healthcare decision maker today. Reviewed [place] Hierarchy of decision makers with him and he is aware new forms are needed.

## 2025-03-17 ENCOUNTER — CARE COORDINATION (OUTPATIENT)
Dept: CARE COORDINATION | Age: 85
End: 2025-03-17

## 2025-03-17 NOTE — CARE COORDINATION
Advance Care Planning Note      Date: 3/17/2025    Patient Current Location:  [place]    ACP Specialist:  MACY Goetz, JASONW    Date Referral Received:3/12/25  Initial Outreach:     Outreach call to patient in follow-up to ACP Specialist referral from: Ambulatory Care Manager Ariela Michaud  requesting assistance with new advance directive completion. Patient reported that he will appoint his BEATRIZ as his agent. Patient indicated that the  will assist him in completing.     Next Step:    Mailed Advance Directive  Outreach again in one week.         Thank you for this referral.

## 2025-03-25 ENCOUNTER — HOSPITAL ENCOUNTER (OUTPATIENT)
Age: 85
Discharge: HOME | End: 2025-03-25
Payer: COMMERCIAL

## 2025-03-25 LAB
25(OH)D3 SERPL-MCNC: 39.4 NG/ML
ALBUMIN SERPL-MCNC: 3.6 G/DL (ref 3.4–5)
ANION GAP SERPL CALCULATED.3IONS-SCNC: 11 MMOL/L (ref 3–16)
BASOPHILS # BLD: 0 K/UL (ref 0–0.2)
BASOPHILS NFR BLD: 0.9 %
BILIRUB UR QL STRIP.AUTO: NEGATIVE
BUN SERPL-MCNC: 46 MG/DL (ref 7–20)
CALCIUM SERPL-MCNC: 9.3 MG/DL (ref 8.3–10.6)
CHLORIDE SERPL-SCNC: 98 MMOL/L (ref 99–110)
CLARITY UR: CLEAR
CO2 SERPL-SCNC: 31 MMOL/L (ref 21–32)
COLOR UR: YELLOW
CREAT SERPL-MCNC: 1.9 MG/DL (ref 0.8–1.3)
CREAT UR-MCNC: 48.2 MG/DL (ref 39–259)
DEPRECATED RDW RBC AUTO: 14.4 % (ref 12.4–15.4)
EOSINOPHIL # BLD: 0.3 K/UL (ref 0–0.6)
EOSINOPHIL NFR BLD: 6 %
GFR SERPLBLD CREATININE-BSD FMLA CKD-EPI: 35 ML/MIN/{1.73_M2}
GLUCOSE SERPL-MCNC: 83 MG/DL (ref 70–99)
GLUCOSE UR STRIP.AUTO-MCNC: 250 MG/DL
HCT VFR BLD AUTO: 39.1 % (ref 40.5–52.5)
HGB BLD-MCNC: 12.5 G/DL (ref 13.5–17.5)
HGB UR QL STRIP.AUTO: NEGATIVE
KETONES UR STRIP.AUTO-MCNC: NEGATIVE MG/DL
LEUKOCYTE ESTERASE UR QL STRIP.AUTO: NEGATIVE
LYMPHOCYTES # BLD: 1.2 K/UL (ref 1–5.1)
LYMPHOCYTES NFR BLD: 26.6 %
MAGNESIUM SERPL-MCNC: 1.99 MG/DL (ref 1.8–2.4)
MCH RBC QN AUTO: 29 PG (ref 26–34)
MCHC RBC AUTO-ENTMCNC: 32 G/DL (ref 31–36)
MCV RBC AUTO: 90.6 FL (ref 80–100)
MONOCYTES # BLD: 0.4 K/UL (ref 0–1.3)
MONOCYTES NFR BLD: 9.2 %
NEUTROPHILS # BLD: 2.7 K/UL (ref 1.7–7.7)
NEUTROPHILS NFR BLD: 57.3 %
NITRITE UR QL STRIP.AUTO: NEGATIVE
PH UR STRIP.AUTO: 6 [PH] (ref 5–8)
PHOSPHATE SERPL-MCNC: 3.9 MG/DL (ref 2.5–4.9)
PLATELET # BLD AUTO: 253 K/UL (ref 135–450)
PMV BLD AUTO: 7.8 FL (ref 5–10.5)
POTASSIUM SERPL-SCNC: 4.6 MMOL/L (ref 3.5–5.1)
PROT UR STRIP.AUTO-MCNC: NEGATIVE MG/DL
PROT UR-MCNC: 8.33 MG/DL
PROT/CREAT UR-RTO: 0.2 MG/DL
PTH-INTACT SERPL-MCNC: 63.5 PG/ML (ref 14–72)
RBC # BLD AUTO: 4.32 M/UL (ref 4.2–5.9)
SODIUM SERPL-SCNC: 140 MMOL/L (ref 136–145)
SP GR UR STRIP.AUTO: <=1.005 (ref 1–1.03)
UA DIPSTICK W REFLEX MICRO PNL UR: ABNORMAL
URATE SERPL-MCNC: 7.5 MG/DL (ref 3.5–7.2)
URN SPEC COLLECT METH UR: ABNORMAL
UROBILINOGEN UR STRIP-ACNC: 0.2 E.U./DL
WBC # BLD AUTO: 4.7 K/UL (ref 4–11)

## 2025-03-25 PROCEDURE — 84550 ASSAY OF BLOOD/URIC ACID: CPT

## 2025-03-25 PROCEDURE — 83970 ASSAY OF PARATHORMONE: CPT

## 2025-03-25 PROCEDURE — 82306 VITAMIN D 25 HYDROXY: CPT

## 2025-03-25 PROCEDURE — 83735 ASSAY OF MAGNESIUM: CPT

## 2025-03-25 PROCEDURE — 84156 ASSAY OF PROTEIN URINE: CPT

## 2025-03-25 PROCEDURE — 82570 ASSAY OF URINE CREATININE: CPT

## 2025-03-25 PROCEDURE — 81003 URINALYSIS AUTO W/O SCOPE: CPT

## 2025-03-25 PROCEDURE — 80069 RENAL FUNCTION PANEL: CPT

## 2025-03-25 PROCEDURE — 85025 COMPLETE CBC W/AUTO DIFF WBC: CPT

## 2025-03-28 ENCOUNTER — CARE COORDINATION (OUTPATIENT)
Dept: CARE COORDINATION | Age: 85
End: 2025-03-28

## 2025-03-28 NOTE — CARE COORDINATION
Ambulatory Care Coordination Note     3/28/2025 3:34 PM     Patient Current Location:  Home: [address] [place] [address] [address] [place] [address]     ACM contacted the patient by telephone. Verified name and  with patient as identifiers.     Patient graduated from the High Risk Care Management program on 3/28/2025.  Patient verbalizes confidence in the ability to self-manage at this time..  Care management goals have been completed. No further Ambulatory Care Manager follow up scheduled.      ACM: Ariela Michaud RN     Challenges to be reviewed by the provider   Additional needs identified to be addressed with provider No  none               Method of communication with provider: chart routing.    Utilization: Patient has not had any utilization since our last call.     Care Summary Note: Follow up and final call. He is doing well. No further low blood sugars. He sometimes is a little dizzy when he first sits up in am from bed. He does wait to position change. No low BP to report. He continues to work to improve his diet and diabetic control. Follow up appt planned with PCP in April. No additional needs identified at this time. DM and HTN education completed.     Lab Results   Component Value Date    LABA1C 5.8 2024    LABA1C 6.3 2024    LABA1C 5.8 2024     Lab Results   Component Value Date    .8 2024    .1 2024    .8 2024         Offered patient enrollment in the Remote Patient Monitoring (RPM) program for in-home monitoring: Patient is not eligible for RPM program because: completing care coordination  .     Assessments Completed:   Diabetes Assessment    Medic Alert ID: No (Comment: advised to purchase one)  Meal Planning: Avoidance of concentrated sweets, Plate Method, Carb counting   How often do you test your blood sugar?: Daily   Do you have barriers with adherence to non-pharmacologic self-management interventions? (Nutrition/Exercise/Self-Monitoring): No  No

## 2025-04-09 ENCOUNTER — TELEPHONE (OUTPATIENT)
Dept: INTERNAL MEDICINE CLINIC | Age: 85
End: 2025-04-09

## 2025-04-09 NOTE — TELEPHONE ENCOUNTER
----- Message from Dr. Екатерина Sneed MD sent at 4/9/2025  8:14 AM EDT -----  Contact: LYRIC [phone]  Decrease to 34 units  ----- Message -----  From: Afsaneh Arana  Sent: 4/9/2025   7:29 AM EDT  To: Екатерина Sneed MD      ----- Message -----  From: Afsaneh Arana  Sent: 4/8/2025   4:52 PM EDT  To: SUNNY San CNP    Patient states he was taking 45 units of Semglee and already reduced it to 40 units but is still having low BS.  Please advise.  ----- Message -----  From: Raisa Marquez APRN - CNP  Sent: 4/8/2025   4:38 PM EDT  To: [facility]    May need to decrease regimen. Decrease Semglee to 50 units nightly for now.   Make sure eating enough protein  ----- Message -----  From: Johny Byers  Sent: 4/8/2025   4:20 PM EDT  To: SUNNY San CNP    Patient is requesting your suggestions as he's having issues with his sugar levels dropping. Pt states he has ate very well today, so the low blood sugar is concerning him. Pt's BS in the 60's. Please advise

## 2025-04-11 DIAGNOSIS — Z79.4 TYPE 2 DIABETES MELLITUS WITH BOTH EYES AFFECTED BY MILD NONPROLIFERATIVE RETINOPATHY WITHOUT MACULAR EDEMA, WITH LONG-TERM CURRENT USE OF INSULIN: ICD-10-CM

## 2025-04-11 DIAGNOSIS — E11.3293 TYPE 2 DIABETES MELLITUS WITH BOTH EYES AFFECTED BY MILD NONPROLIFERATIVE RETINOPATHY WITHOUT MACULAR EDEMA, WITH LONG-TERM CURRENT USE OF INSULIN: ICD-10-CM

## 2025-04-11 RX ORDER — HYDROCHLOROTHIAZIDE 12.5 MG/1
12.5 TABLET ORAL DAILY
Qty: 90 TABLET | Refills: 0 | Status: SHIPPED | OUTPATIENT
Start: 2025-04-11

## 2025-04-11 RX ORDER — APIXABAN 2.5 MG/1
2.5 TABLET, FILM COATED ORAL 2 TIMES DAILY
Qty: 180 TABLET | Refills: 0 | Status: SHIPPED | OUTPATIENT
Start: 2025-04-11

## 2025-04-11 RX ORDER — DILTIAZEM HYDROCHLORIDE 120 MG/1
120 CAPSULE, COATED, EXTENDED RELEASE ORAL DAILY
Qty: 90 CAPSULE | Refills: 0 | Status: SHIPPED | OUTPATIENT
Start: 2025-04-11

## 2025-04-11 RX ORDER — EMPAGLIFLOZIN 25 MG/1
25 TABLET, FILM COATED ORAL DAILY
Qty: 90 TABLET | Refills: 0 | Status: SHIPPED | OUTPATIENT
Start: 2025-04-11

## 2025-04-11 RX ORDER — LEVOTHYROXINE SODIUM 100 UG/1
100 TABLET ORAL DAILY
Qty: 90 TABLET | Refills: 0 | Status: SHIPPED | OUTPATIENT
Start: 2025-04-11

## 2025-04-11 RX ORDER — FUROSEMIDE 40 MG/1
40 TABLET ORAL DAILY
Qty: 90 TABLET | Refills: 0 | Status: SHIPPED | OUTPATIENT
Start: 2025-04-11

## 2025-04-11 RX ORDER — PIOGLITAZONE 30 MG/1
30 TABLET ORAL DAILY
Qty: 90 TABLET | Refills: 0 | Status: SHIPPED | OUTPATIENT
Start: 2025-04-11

## 2025-04-14 ENCOUNTER — CARE COORDINATION (OUTPATIENT)
Dept: CARE COORDINATION | Age: 85
End: 2025-04-14

## 2025-04-14 NOTE — CARE COORDINATION
Advance Care Planning Note      Date: 4/14/2025    Patient Current Location:  [place]    ACP Specialist:  MACY Goetz, JASONW    Date Referral Received:3/12/25    Second Outreach:     Outreach call to patient in follow-up to ACP Specialist. Patient reported that he received docs and  will assist with completing. Informed him that forms will be taken to physician office to have them scanned in his chart.    Next Step:    Closing referral due to Patient has received forms and has assistance with completing them.  Routing closure to referral source.       Thank you for this referral.

## 2025-04-22 ENCOUNTER — OFFICE VISIT (OUTPATIENT)
Dept: INTERNAL MEDICINE CLINIC | Age: 85
End: 2025-04-22

## 2025-04-22 VITALS
HEART RATE: 70 BPM | SYSTOLIC BLOOD PRESSURE: 130 MMHG | BODY MASS INDEX: 32.48 KG/M2 | HEIGHT: 71 IN | DIASTOLIC BLOOD PRESSURE: 70 MMHG | WEIGHT: 232 LBS | RESPIRATION RATE: 12 BRPM

## 2025-04-22 DIAGNOSIS — Z79.4 TYPE 2 DIABETES MELLITUS WITH HYPOGLYCEMIA WITHOUT COMA, WITH LONG-TERM CURRENT USE OF INSULIN (HCC): ICD-10-CM

## 2025-04-22 DIAGNOSIS — E78.5 HYPERLIPIDEMIA ASSOCIATED WITH TYPE 2 DIABETES MELLITUS (HCC): ICD-10-CM

## 2025-04-22 DIAGNOSIS — B18.2 CHRONIC HEPATITIS C WITHOUT HEPATIC COMA (HCC): ICD-10-CM

## 2025-04-22 DIAGNOSIS — D47.2 MGUS (MONOCLONAL GAMMOPATHY OF UNKNOWN SIGNIFICANCE): ICD-10-CM

## 2025-04-22 DIAGNOSIS — E11.649 TYPE 2 DIABETES MELLITUS WITH HYPOGLYCEMIA WITHOUT COMA, WITH LONG-TERM CURRENT USE OF INSULIN (HCC): ICD-10-CM

## 2025-04-22 DIAGNOSIS — I48.11 LONGSTANDING PERSISTENT ATRIAL FIBRILLATION (HCC): ICD-10-CM

## 2025-04-22 DIAGNOSIS — E03.9 ACQUIRED HYPOTHYROIDISM: ICD-10-CM

## 2025-04-22 DIAGNOSIS — Z79.4 TYPE 2 DIABETES MELLITUS WITH BOTH EYES AFFECTED BY MILD NONPROLIFERATIVE RETINOPATHY WITHOUT MACULAR EDEMA, WITH LONG-TERM CURRENT USE OF INSULIN (HCC): ICD-10-CM

## 2025-04-22 DIAGNOSIS — N18.32 STAGE 3B CHRONIC KIDNEY DISEASE (HCC): ICD-10-CM

## 2025-04-22 DIAGNOSIS — G25.0 BENIGN ESSENTIAL TREMOR: Primary | Chronic | ICD-10-CM

## 2025-04-22 DIAGNOSIS — I10 BENIGN ESSENTIAL HTN: ICD-10-CM

## 2025-04-22 DIAGNOSIS — E11.3293 TYPE 2 DIABETES MELLITUS WITH BOTH EYES AFFECTED BY MILD NONPROLIFERATIVE RETINOPATHY WITHOUT MACULAR EDEMA, WITH LONG-TERM CURRENT USE OF INSULIN (HCC): ICD-10-CM

## 2025-04-22 DIAGNOSIS — E11.69 HYPERLIPIDEMIA ASSOCIATED WITH TYPE 2 DIABETES MELLITUS (HCC): ICD-10-CM

## 2025-04-22 LAB
CHOLEST SERPL-MCNC: 151 MG/DL (ref 0–199)
HDLC SERPL-MCNC: 72 MG/DL (ref 40–60)
LDLC SERPL CALC-MCNC: 69 MG/DL
TRIGL SERPL-MCNC: 52 MG/DL (ref 0–150)
URATE SERPL-MCNC: 8.3 MG/DL (ref 3.5–7.2)
VLDLC SERPL CALC-MCNC: 10 MG/DL

## 2025-04-22 PROCEDURE — 1123F ACP DISCUSS/DSCN MKR DOCD: CPT | Performed by: INTERNAL MEDICINE

## 2025-04-22 PROCEDURE — 99214 OFFICE O/P EST MOD 30 MIN: CPT | Performed by: INTERNAL MEDICINE

## 2025-04-22 PROCEDURE — 3075F SYST BP GE 130 - 139MM HG: CPT | Performed by: INTERNAL MEDICINE

## 2025-04-22 PROCEDURE — 1160F RVW MEDS BY RX/DR IN RCRD: CPT | Performed by: INTERNAL MEDICINE

## 2025-04-22 PROCEDURE — 1159F MED LIST DOCD IN RCRD: CPT | Performed by: INTERNAL MEDICINE

## 2025-04-22 PROCEDURE — 3078F DIAST BP <80 MM HG: CPT | Performed by: INTERNAL MEDICINE

## 2025-04-22 ASSESSMENT — PATIENT HEALTH QUESTIONNAIRE - PHQ9
1. LITTLE INTEREST OR PLEASURE IN DOING THINGS: NOT AT ALL
SUM OF ALL RESPONSES TO PHQ QUESTIONS 1-9: 0
SUM OF ALL RESPONSES TO PHQ QUESTIONS 1-9: 0
2. FEELING DOWN, DEPRESSED OR HOPELESS: NOT AT ALL
SUM OF ALL RESPONSES TO PHQ QUESTIONS 1-9: 0
SUM OF ALL RESPONSES TO PHQ QUESTIONS 1-9: 0

## 2025-04-22 ASSESSMENT — ENCOUNTER SYMPTOMS
VOMITING: 0
SHORTNESS OF BREATH: 0
NAUSEA: 0
RHINORRHEA: 0
BACK PAIN: 0
WHEEZING: 0
ABDOMINAL PAIN: 0

## 2025-04-22 NOTE — PROGRESS NOTES
Subjective:      Patient ID: Nimesh Ng is a 84 y.o. male.    HPI  Patient is here for follow up diabetes, hypertension, hyperlipidemia, GERD, OA and hepatitis C.        Diabetes Mellitus Type 2: Current symptoms/problems include none.    Medication compliance:  compliant most of the time  Diabetic diet compliance:  compliant most of the time,  Weight trend:down stable  Current exercise: walks 3 time(s) per week    Home blood sugar records: fasting range:  mg/dl  Any episodes of hypoglycemia? yes  Eye exam current (within one year): yes   reports that he quit smoking about 35 years ago. His smoking use included cigarettes. He started smoking about 65 years ago. He has a 15 pack-year smoking history. He quit smokeless tobacco use about 47 years ago.   Daily Aspirin? No  Known diabetic complications: nephropathy and retinopathy    Hypertension:  Blood pressure typically runs normal outside of the office.   He is adherent to a low sodium diet. Patient denies chest pain, dry cough, fatigue. He has chronic leg edema.  Antihypertensive medication side effects: no medication side effects noted.  Use of agents associated with hypertension: none.     Hyperlipidemia:  No new myalgias or GI upset on atorvastatin (Lipitor).       Lab Results   Component Value Date    LABA1C 5.8 11/20/2024    LABA1C 6.3 08/13/2024    LABA1C 5.8 04/30/2024     Lab Results   Component Value Date    CREATININE 1.9 (H) 03/25/2025     Lab Results   Component Value Date    ALT 27 05/11/2024    AST 35 05/11/2024     No components found for: \"CHLPL\"  Lab Results   Component Value Date    TRIG 55 04/30/2024     Lab Results   Component Value Date    HDL 72 (H) 04/30/2024     No results found for: \"LDLDIRECT\"    .  No heartburn.  He has hypothyroidism. It is stable. No constipation or diarrhea.  He has CKD from DM.  He is seeing nephrology.  He has diabetic retinopathy. Eyes stable. He goes to [facility].    He takes Flomax for BPH with LUTS and it helps.

## 2025-04-23 LAB
EST. AVERAGE GLUCOSE BLD GHB EST-MCNC: 119.8 MG/DL
HBA1C MFR BLD: 5.8 %

## 2025-04-25 ENCOUNTER — RESULTS FOLLOW-UP (OUTPATIENT)
Dept: INTERNAL MEDICINE CLINIC | Age: 85
End: 2025-04-25

## 2025-06-19 ENCOUNTER — APPOINTMENT (OUTPATIENT)
Dept: GENERAL RADIOLOGY | Age: 85
End: 2025-06-19
Payer: COMMERCIAL

## 2025-06-19 ENCOUNTER — APPOINTMENT (OUTPATIENT)
Dept: CT IMAGING | Age: 85
End: 2025-06-19
Payer: COMMERCIAL

## 2025-06-19 ENCOUNTER — HOSPITAL ENCOUNTER (INPATIENT)
Age: 85
LOS: 2 days | Discharge: HOME | End: 2025-06-21
Attending: EMERGENCY MEDICINE | Admitting: INTERNAL MEDICINE
Payer: COMMERCIAL

## 2025-06-19 DIAGNOSIS — R06.02 SHORTNESS OF BREATH: ICD-10-CM

## 2025-06-19 DIAGNOSIS — R73.9 HYPERGLYCEMIA: Primary | ICD-10-CM

## 2025-06-19 DIAGNOSIS — R26.81 UNSTABLE GAIT: ICD-10-CM

## 2025-06-19 PROBLEM — R79.89 ELEVATED BRAIN NATRIURETIC PEPTIDE (BNP) LEVEL: Status: ACTIVE | Noted: 2025-06-19

## 2025-06-19 PROBLEM — R79.89 ELEVATED TROPONIN: Status: ACTIVE | Noted: 2025-06-19

## 2025-06-19 PROBLEM — E87.20 LACTIC ACIDOSIS: Status: ACTIVE | Noted: 2025-06-19

## 2025-06-19 LAB
ALBUMIN SERPL-MCNC: 3.4 G/DL (ref 3.4–5)
ALBUMIN/GLOB SERPL: 0.8 {RATIO} (ref 1.1–2.2)
ALP SERPL-CCNC: 152 U/L (ref 40–129)
ALT SERPL-CCNC: 58 U/L (ref 10–40)
ANION GAP SERPL CALCULATED.3IONS-SCNC: 16 MMOL/L (ref 3–16)
ANION GAP SERPL CALCULATED.3IONS-SCNC: 17 MMOL/L (ref 3–16)
AST SERPL-CCNC: 45 U/L (ref 15–37)
BASOPHILS # BLD: 0 K/UL (ref 0–0.2)
BASOPHILS NFR BLD: 0.4 %
BILIRUB SERPL-MCNC: 0.8 MG/DL (ref 0–1)
BILIRUB UR QL STRIP.AUTO: NEGATIVE
BUN SERPL-MCNC: 41 MG/DL (ref 7–20)
BUN SERPL-MCNC: 44 MG/DL (ref 7–20)
CALCIUM SERPL-MCNC: 9.1 MG/DL (ref 8.3–10.6)
CALCIUM SERPL-MCNC: 9.1 MG/DL (ref 8.3–10.6)
CHLORIDE SERPL-SCNC: 80 MMOL/L (ref 99–110)
CHLORIDE SERPL-SCNC: 85 MMOL/L (ref 99–110)
CLARITY UR: CLEAR
CO2 SERPL-SCNC: 27 MMOL/L (ref 21–32)
CO2 SERPL-SCNC: 30 MMOL/L (ref 21–32)
COLOR UR: YELLOW
CREAT SERPL-MCNC: 1.6 MG/DL (ref 0.8–1.3)
CREAT SERPL-MCNC: 1.8 MG/DL (ref 0.8–1.3)
DEPRECATED RDW RBC AUTO: 15.7 % (ref 12.4–15.4)
EKG DIAGNOSIS: NORMAL
EKG Q-T INTERVAL: 430 MS
EKG QRS DURATION: 84 MS
EKG QTC CALCULATION (BAZETT): 470 MS
EKG R AXIS: 25 DEGREES
EKG T AXIS: 14 DEGREES
EKG VENTRICULAR RATE: 72 BPM
EOSINOPHIL # BLD: 0.1 K/UL (ref 0–0.6)
EOSINOPHIL NFR BLD: 3 %
GFR SERPLBLD CREATININE-BSD FMLA CKD-EPI: 37 ML/MIN/{1.73_M2}
GFR SERPLBLD CREATININE-BSD FMLA CKD-EPI: 43 ML/MIN/{1.73_M2}
GLUCOSE BLD-MCNC: 521 MG/DL (ref 70–99)
GLUCOSE BLD-MCNC: 522 MG/DL (ref 70–99)
GLUCOSE BLD-MCNC: 526 MG/DL (ref 70–99)
GLUCOSE SERPL-MCNC: 471 MG/DL (ref 70–99)
GLUCOSE SERPL-MCNC: 733 MG/DL (ref 70–99)
GLUCOSE UR STRIP.AUTO-MCNC: >=1000 MG/DL
HCT VFR BLD AUTO: 49.2 % (ref 40.5–52.5)
HGB BLD-MCNC: 16.1 G/DL (ref 13.5–17.5)
HGB UR QL STRIP.AUTO: NEGATIVE
KETONES UR STRIP.AUTO-MCNC: NEGATIVE MG/DL
LACTATE BLDV-SCNC: 2.7 MMOL/L (ref 0.4–2)
LACTATE BLDV-SCNC: 3.3 MMOL/L (ref 0.4–1.9)
LACTATE BLDV-SCNC: 3.8 MMOL/L (ref 0.4–1.9)
LEUKOCYTE ESTERASE UR QL STRIP.AUTO: NEGATIVE
LYMPHOCYTES # BLD: 0.4 K/UL (ref 1–5.1)
LYMPHOCYTES NFR BLD: 8.8 %
MAGNESIUM SERPL-MCNC: 2.3 MG/DL (ref 1.8–2.4)
MCH RBC QN AUTO: 28.6 PG (ref 26–34)
MCHC RBC AUTO-ENTMCNC: 32.6 G/DL (ref 31–36)
MCV RBC AUTO: 87.5 FL (ref 80–100)
MONOCYTES # BLD: 0.4 K/UL (ref 0–1.3)
MONOCYTES NFR BLD: 8.3 %
NEUTROPHILS # BLD: 3.7 K/UL (ref 1.7–7.7)
NEUTROPHILS NFR BLD: 79.5 %
NITRITE UR QL STRIP.AUTO: NEGATIVE
NT-PROBNP SERPL-MCNC: 2296 PG/ML (ref 0–449)
PERFORMED ON: ABNORMAL
PH UR STRIP.AUTO: 6 [PH] (ref 5–8)
PLATELET # BLD AUTO: 189 K/UL (ref 135–450)
PMV BLD AUTO: 8.8 FL (ref 5–10.5)
POTASSIUM SERPL-SCNC: 3.5 MMOL/L (ref 3.5–5.1)
POTASSIUM SERPL-SCNC: 4.4 MMOL/L (ref 3.5–5.1)
PROCALCITONIN SERPL IA-MCNC: 0.08 NG/ML (ref 0–0.15)
PROT SERPL-MCNC: 7.9 G/DL (ref 6.4–8.2)
PROT UR STRIP.AUTO-MCNC: NEGATIVE MG/DL
RBC # BLD AUTO: 5.62 M/UL (ref 4.2–5.9)
SODIUM SERPL-SCNC: 126 MMOL/L (ref 136–145)
SODIUM SERPL-SCNC: 129 MMOL/L (ref 136–145)
SP GR UR STRIP.AUTO: <=1.005 (ref 1–1.03)
TROPONIN, HIGH SENSITIVITY: 56 NG/L (ref 0–22)
TROPONIN, HIGH SENSITIVITY: 83 NG/L (ref 0–22)
UA COMPLETE W REFLEX CULTURE PNL UR: ABNORMAL
UA DIPSTICK W REFLEX MICRO PNL UR: ABNORMAL
URN SPEC COLLECT METH UR: ABNORMAL
UROBILINOGEN UR STRIP-ACNC: 0.2 E.U./DL
WBC # BLD AUTO: 4.6 K/UL (ref 4–11)

## 2025-06-19 PROCEDURE — 99223 1ST HOSP IP/OBS HIGH 75: CPT | Performed by: NURSE PRACTITIONER

## 2025-06-19 PROCEDURE — 84145 PROCALCITONIN (PCT): CPT

## 2025-06-19 PROCEDURE — 70496 CT ANGIOGRAPHY HEAD: CPT

## 2025-06-19 PROCEDURE — 2060000000 HC ICU INTERMEDIATE R&B

## 2025-06-19 PROCEDURE — 96376 TX/PRO/DX INJ SAME DRUG ADON: CPT

## 2025-06-19 PROCEDURE — 6370000000 HC RX 637 (ALT 250 FOR IP): Performed by: EMERGENCY MEDICINE

## 2025-06-19 PROCEDURE — 71045 X-RAY EXAM CHEST 1 VIEW: CPT

## 2025-06-19 PROCEDURE — 6370000000 HC RX 637 (ALT 250 FOR IP): Performed by: NURSE PRACTITIONER

## 2025-06-19 PROCEDURE — 96374 THER/PROPH/DIAG INJ IV PUSH: CPT

## 2025-06-19 PROCEDURE — 6370000000 HC RX 637 (ALT 250 FOR IP): Performed by: STUDENT IN AN ORGANIZED HEALTH CARE EDUCATION/TRAINING PROGRAM

## 2025-06-19 PROCEDURE — 6360000004 HC RX CONTRAST MEDICATION: Performed by: EMERGENCY MEDICINE

## 2025-06-19 PROCEDURE — 99285 EMERGENCY DEPT VISIT HI MDM: CPT

## 2025-06-19 PROCEDURE — 93010 ELECTROCARDIOGRAM REPORT: CPT | Performed by: STUDENT IN AN ORGANIZED HEALTH CARE EDUCATION/TRAINING PROGRAM

## 2025-06-19 PROCEDURE — 83605 ASSAY OF LACTIC ACID: CPT

## 2025-06-19 PROCEDURE — 85025 COMPLETE CBC W/AUTO DIFF WBC: CPT

## 2025-06-19 PROCEDURE — 81003 URINALYSIS AUTO W/O SCOPE: CPT

## 2025-06-19 PROCEDURE — 84484 ASSAY OF TROPONIN QUANT: CPT

## 2025-06-19 PROCEDURE — 83735 ASSAY OF MAGNESIUM: CPT

## 2025-06-19 PROCEDURE — 80053 COMPREHEN METABOLIC PANEL: CPT

## 2025-06-19 PROCEDURE — 83880 ASSAY OF NATRIURETIC PEPTIDE: CPT

## 2025-06-19 PROCEDURE — 70450 CT HEAD/BRAIN W/O DYE: CPT

## 2025-06-19 PROCEDURE — 93005 ELECTROCARDIOGRAM TRACING: CPT | Performed by: EMERGENCY MEDICINE

## 2025-06-19 PROCEDURE — 36415 COLL VENOUS BLD VENIPUNCTURE: CPT

## 2025-06-19 PROCEDURE — 2580000003 HC RX 258: Performed by: EMERGENCY MEDICINE

## 2025-06-19 RX ORDER — TAMSULOSIN HYDROCHLORIDE 0.4 MG/1
0.4 CAPSULE ORAL DAILY
Status: DISCONTINUED | OUTPATIENT
Start: 2025-06-19 | End: 2025-06-21 | Stop reason: HOSPADM

## 2025-06-19 RX ORDER — SODIUM CHLORIDE 0.9 % (FLUSH) 0.9 %
5-40 SYRINGE (ML) INJECTION EVERY 12 HOURS SCHEDULED
Status: DISCONTINUED | OUTPATIENT
Start: 2025-06-19 | End: 2025-06-21 | Stop reason: HOSPADM

## 2025-06-19 RX ORDER — POLYETHYLENE GLYCOL 3350 17 G/17G
17 POWDER, FOR SOLUTION ORAL DAILY PRN
Status: DISCONTINUED | OUTPATIENT
Start: 2025-06-19 | End: 2025-06-21 | Stop reason: HOSPADM

## 2025-06-19 RX ORDER — ONDANSETRON 2 MG/ML
4 INJECTION INTRAMUSCULAR; INTRAVENOUS EVERY 6 HOURS PRN
Status: DISCONTINUED | OUTPATIENT
Start: 2025-06-19 | End: 2025-06-21 | Stop reason: HOSPADM

## 2025-06-19 RX ORDER — ACETAMINOPHEN 325 MG/1
650 TABLET ORAL EVERY 6 HOURS PRN
Status: DISCONTINUED | OUTPATIENT
Start: 2025-06-19 | End: 2025-06-21 | Stop reason: HOSPADM

## 2025-06-19 RX ORDER — M-VIT,TX,IRON,MINS/CALC/FOLIC 27MG-0.4MG
1 TABLET ORAL DAILY
Status: DISCONTINUED | OUTPATIENT
Start: 2025-06-19 | End: 2025-06-21 | Stop reason: HOSPADM

## 2025-06-19 RX ORDER — 0.9 % SODIUM CHLORIDE 0.9 %
500 INTRAVENOUS SOLUTION INTRAVENOUS ONCE
Status: COMPLETED | OUTPATIENT
Start: 2025-06-19 | End: 2025-06-19

## 2025-06-19 RX ORDER — POTASSIUM CHLORIDE 7.45 MG/ML
10 INJECTION INTRAVENOUS PRN
Status: DISCONTINUED | OUTPATIENT
Start: 2025-06-19 | End: 2025-06-21 | Stop reason: HOSPADM

## 2025-06-19 RX ORDER — INSULIN GLARGINE 100 [IU]/ML
34 INJECTION, SOLUTION SUBCUTANEOUS NIGHTLY
Status: DISCONTINUED | OUTPATIENT
Start: 2025-06-19 | End: 2025-06-21 | Stop reason: HOSPADM

## 2025-06-19 RX ORDER — IOPAMIDOL 755 MG/ML
75 INJECTION, SOLUTION INTRAVASCULAR
Status: COMPLETED | OUTPATIENT
Start: 2025-06-19 | End: 2025-06-19

## 2025-06-19 RX ORDER — LEVOTHYROXINE SODIUM 100 UG/1
100 TABLET ORAL DAILY
Status: DISCONTINUED | OUTPATIENT
Start: 2025-06-19 | End: 2025-06-21 | Stop reason: HOSPADM

## 2025-06-19 RX ORDER — POTASSIUM CHLORIDE 1500 MG/1
40 TABLET, EXTENDED RELEASE ORAL PRN
Status: DISCONTINUED | OUTPATIENT
Start: 2025-06-19 | End: 2025-06-21 | Stop reason: HOSPADM

## 2025-06-19 RX ORDER — HYDROCHLOROTHIAZIDE 25 MG/1
12.5 TABLET ORAL DAILY
Status: DISCONTINUED | OUTPATIENT
Start: 2025-06-19 | End: 2025-06-21 | Stop reason: HOSPADM

## 2025-06-19 RX ORDER — VITAMIN B COMPLEX
2000 TABLET ORAL DAILY
Status: DISCONTINUED | OUTPATIENT
Start: 2025-06-19 | End: 2025-06-21 | Stop reason: HOSPADM

## 2025-06-19 RX ORDER — SODIUM CHLORIDE 9 MG/ML
INJECTION, SOLUTION INTRAVENOUS PRN
Status: DISCONTINUED | OUTPATIENT
Start: 2025-06-19 | End: 2025-06-21 | Stop reason: HOSPADM

## 2025-06-19 RX ORDER — ACETAMINOPHEN 650 MG/1
650 SUPPOSITORY RECTAL EVERY 6 HOURS PRN
Status: DISCONTINUED | OUTPATIENT
Start: 2025-06-19 | End: 2025-06-21 | Stop reason: HOSPADM

## 2025-06-19 RX ORDER — 0.9 % SODIUM CHLORIDE 0.9 %
500 INTRAVENOUS SOLUTION INTRAVENOUS ONCE
Status: DISCONTINUED | OUTPATIENT
Start: 2025-06-19 | End: 2025-06-19

## 2025-06-19 RX ORDER — ONDANSETRON 4 MG/1
4 TABLET, ORALLY DISINTEGRATING ORAL EVERY 8 HOURS PRN
Status: DISCONTINUED | OUTPATIENT
Start: 2025-06-19 | End: 2025-06-21 | Stop reason: HOSPADM

## 2025-06-19 RX ORDER — DEXTROSE MONOHYDRATE 100 MG/ML
INJECTION, SOLUTION INTRAVENOUS CONTINUOUS PRN
Status: DISCONTINUED | OUTPATIENT
Start: 2025-06-19 | End: 2025-06-21 | Stop reason: HOSPADM

## 2025-06-19 RX ORDER — INSULIN LISPRO 100 [IU]/ML
10 INJECTION, SOLUTION INTRAVENOUS; SUBCUTANEOUS ONCE
Status: COMPLETED | OUTPATIENT
Start: 2025-06-19 | End: 2025-06-19

## 2025-06-19 RX ORDER — SODIUM CHLORIDE 0.9 % (FLUSH) 0.9 %
5-40 SYRINGE (ML) INJECTION PRN
Status: DISCONTINUED | OUTPATIENT
Start: 2025-06-19 | End: 2025-06-21 | Stop reason: HOSPADM

## 2025-06-19 RX ORDER — INSULIN LISPRO 100 [IU]/ML
0-8 INJECTION, SOLUTION INTRAVENOUS; SUBCUTANEOUS
Status: DISCONTINUED | OUTPATIENT
Start: 2025-06-19 | End: 2025-06-21 | Stop reason: HOSPADM

## 2025-06-19 RX ORDER — GLUCAGON 1 MG/ML
1 KIT INJECTION PRN
Status: DISCONTINUED | OUTPATIENT
Start: 2025-06-19 | End: 2025-06-21 | Stop reason: HOSPADM

## 2025-06-19 RX ORDER — FUROSEMIDE 40 MG/1
40 TABLET ORAL DAILY
Status: DISCONTINUED | OUTPATIENT
Start: 2025-06-19 | End: 2025-06-21 | Stop reason: HOSPADM

## 2025-06-19 RX ORDER — MAGNESIUM SULFATE IN WATER 40 MG/ML
2000 INJECTION, SOLUTION INTRAVENOUS PRN
Status: DISCONTINUED | OUTPATIENT
Start: 2025-06-19 | End: 2025-06-21 | Stop reason: HOSPADM

## 2025-06-19 RX ORDER — DILTIAZEM HYDROCHLORIDE 120 MG/1
120 CAPSULE, COATED, EXTENDED RELEASE ORAL DAILY
Status: DISCONTINUED | OUTPATIENT
Start: 2025-06-19 | End: 2025-06-21 | Stop reason: HOSPADM

## 2025-06-19 RX ADMIN — INSULIN HUMAN 4 UNITS: 100 INJECTION, SOLUTION PARENTERAL at 11:26

## 2025-06-19 RX ADMIN — INSULIN LISPRO 10 UNITS: 100 INJECTION, SOLUTION INTRAVENOUS; SUBCUTANEOUS at 21:48

## 2025-06-19 RX ADMIN — INSULIN HUMAN 4 UNITS: 100 INJECTION, SOLUTION PARENTERAL at 14:11

## 2025-06-19 RX ADMIN — INSULIN LISPRO 8 UNITS: 100 INJECTION, SOLUTION INTRAVENOUS; SUBCUTANEOUS at 21:47

## 2025-06-19 RX ADMIN — APIXABAN 2.5 MG: 5 TABLET, FILM COATED ORAL at 20:54

## 2025-06-19 RX ADMIN — INSULIN LISPRO 8 UNITS: 100 INJECTION, SOLUTION INTRAVENOUS; SUBCUTANEOUS at 18:43

## 2025-06-19 RX ADMIN — IOPAMIDOL 75 ML: 755 INJECTION, SOLUTION INTRAVENOUS at 10:47

## 2025-06-19 RX ADMIN — INSULIN GLARGINE 34 UNITS: 100 INJECTION, SOLUTION SUBCUTANEOUS at 20:56

## 2025-06-19 RX ADMIN — SODIUM CHLORIDE 500 ML: 0.9 INJECTION, SOLUTION INTRAVENOUS at 11:26

## 2025-06-19 ASSESSMENT — PAIN SCALES - GENERAL
PAINLEVEL_OUTOF10: 0
PAINLEVEL_OUTOF10: 0

## 2025-06-19 ASSESSMENT — PAIN - FUNCTIONAL ASSESSMENT: PAIN_FUNCTIONAL_ASSESSMENT: NONE - DENIES PAIN

## 2025-06-19 ASSESSMENT — LIFESTYLE VARIABLES
HOW MANY STANDARD DRINKS CONTAINING ALCOHOL DO YOU HAVE ON A TYPICAL DAY: 1 OR 2
HOW OFTEN DO YOU HAVE A DRINK CONTAINING ALCOHOL: MONTHLY OR LESS

## 2025-06-19 NOTE — H&P
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 male with hepatitis C, DM type 2, OA, hypothyroidism, BPH, hypertension, hyperlipidemia, GERD, a-fib, who presents to [facility].  Patient is a volunteer here at the hospital.  He was noted to be stumbling into the walls. Nurses on [address] were concerned.  Sent to the ED for evaluation.  He does admit to being depressed.  He lost his wife 2-3 years ago and his sister recently.  States he doesn't have anyone left.      Vitals are stable.  Labs with hyperglycemia (glucose 733), normal AG, normal CO2, renal function at baseline, hyponatremia secondary to hyperglycemia, Lactic acidosis, elevated LFT's.  CXR negative.  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  Admitted for further management/care.     Past Medical History:        Diagnosis Date    Atrial fibrillation (HCC)     Chronic hepatitis C without hepatic coma (HCC) 10/23/2015    Closed displaced fracture of right patella 11/08/2017    Closed nondisplaced fracture of styloid process of left radius 10/30/2017    Dental disease     Dizziness     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)     Esophageal reflux     Fracture of nasal bone     Headache     Hearing loss     Hyperlipidemia     Hyperlipidemia associated with type 2 diabetes mellitus (HCC) 10/23/2015    Hypertension     Hypertrophy of prostate without urinary obstruction and other lower urinary tract symptoms (LUTS) 11/01/2011    Hypothyroidism     Insufficiency fracture of tibia 09/06/2016     (Bazett) 470    R Axis 25    T Axis 14    Diagnosis      Atrial fibrillationBaseline artifactLow voltage QRST-wave inversion in anterior leads, consider ischemia Abnormal ECGWhen compared with ECG of 04-AUG-2022 13:00,T wave inversion now evident in Anterior leadsConfirmed by CHILANGO HAIDER ([id number]) on 6/19/2025 11:08:36 AM           RADIOLOGY  CTA HEAD NECK W CONTRAST   Final Result   1. No acute intracranial process identified.   2. Unremarkable CT angiogram of the head and neck.   3. 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            CT HEAD WO CONTRAST   Final Result   1. No acute intracranial process identified.   2. Unremarkable CT angiogram of the head and neck.   3. 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            XR CHEST PORTABLE   Final Result   No radiographic evidence of acute cardiopulmonary pathology.               Principal Problem:    Hyperglycemia  Resolved Problems:    * No resolved hospital problems. *        ASSESSMENT/PLAN:  DM type 2  Hyperglycemia   -monitor glucose  -Glucose 733.  Given 4 units IV.  Repeat glucose 522. Given 4 more units IV.  Repeat 522  -monitor glucose  -lantus 34 units nightly  -SSI coverage    Stumbling gait  -PT/OT  -CT head and CTA head neck with no acute abnormality.     Lactic acidosis  -3.8-->3.3.   -received IVF's  -trend lactic.     Hyponatremia  -secondary to hyperglycemia  -correct glucose.  -repeat labs.     CKD stage 3b  -stable. Monitor labs    Elevated BNP  Elevated troponin  -continue home dose of Lasix.  Also on HCTZ  -monitor labs  -ordered Echocardiogram.     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

## 2025-06-19 NOTE — ED NOTES
Nimesh Ng is a 84 y.o. male admitted for  Active Problems:    * No active hospital problems. *  Resolved Problems:    * No resolved hospital problems. *  .   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  Patient is alert and Person, Place, Time, and Situation  Patient's baseline mobility: Baseline Mobility: Independent   Code Status: Prior   Cardiac Rhythm:       Is patient on baseline Oxygen: no   Abnormal Assessment Findings: N/A    Isolation: None      NIH Score: 0   C-SSRS: Risk of Suicide: No Risk  Bedside swallow:        Active LDA's:   Peripheral IV 06/19/25 Right;Distal Forearm (Active)   Site Assessment Clean, dry & intact 06/19/25 1001   Line Status Blood return noted;Brisk blood return;Capped;Flushed;Normal saline locked 06/19/25 1001     Patient admitted with a segundo: no   Patient admitted with Central Line:  NA        Family/Caregiver Present no Any Concerns: no   Restraints no  Sitter no         Vitals:      Vitals:    06/19/25 0918 06/19/25 1100 06/19/25 1200 06/19/25 1300   BP: 128/78 133/74 128/75 115/74   Pulse: 70 65 68 59   Resp: 18  21 16   Temp: 97.5 °F (36.4 °C)      SpO2: 92% 95% 94% 95%       Last documented pain score (0-10 scale)    Pain medication administered No.    Pertinent or High Risk Medications/Drips: No.    Pending Blood Product Administration: no    Abnormal labs:   Abnormal Labs Reviewed   CBC WITH AUTO DIFFERENTIAL - Abnormal; Notable for the following components:       Result Value    RDW 15.7 (*)     Lymphocytes Absolute 0.4 (*)     All other components within normal limits   COMPREHENSIVE METABOLIC PANEL - Abnormal; Notable for the following components:    Sodium 126 (*)     Chloride 80 (*)     Glucose 733 (*)     BUN 44 (*)     Creatinine 1.8 (*)     Est, Glom Filt Rate 37 (*)     Albumin/Globulin Ratio 0.8 (*)     Alkaline

## 2025-06-19 NOTE — ED PROVIDER NOTES
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 male with history of atrial fibrillation who presents to the emergency department for evaluation of gait problem.     Patient works as a volunteer for the hospital.  Per the  who brought the patient in, patient had an abnormal gait today.  He seemed to be stumbling and bumping into walls.  Says this is not normal for him to walk like this.  Patient denies having any complaints.  Denies having headache, neck pain, chest pain, abdominal pain.  No recent illnesses.  He is answering questions appropriately.    No other complaints, modifying factors or associated symptoms.     History obtained by the patient unless stated otherwise as above on HPI.   No limitations unless specified as above on HPI.     Past medical history:   Past Medical History:   Diagnosis Date    Atrial fibrillation (HCC)     Chronic hepatitis C without hepatic coma (HCC) 10/23/2015    Closed displaced fracture of right patella 11/08/2017    Closed nondisplaced fracture of styloid process of left radius 10/30/2017    Dental disease     Dizziness     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)     Esophageal reflux     Fracture of nasal bone     Headache     Hearing loss     Hyperlipidemia     Hyperlipidemia associated with type 2 diabetes mellitus (HCC) 10/23/2015    Hypertension     Hypertrophy of prostate without urinary obstruction and other lower urinary tract symptoms (LUTS) 11/01/2011    Hypothyroidism     Insufficiency fracture of tibia 09/06/2016    Osteoarthrosis, not specified

## 2025-06-19 NOTE — PLAN OF CARE
Problem: Chronic Conditions and Co-morbidities  Goal: Patient's chronic conditions and co-morbidity symptoms are monitored and maintained or improved  Flowsheets (Taken 6/19/2025 1947)  Care Plan - Patient's Chronic Conditions and Co-Morbidity Symptoms are Monitored and Maintained or Improved:   Monitor and assess patient's chronic conditions and comorbid symptoms for stability, deterioration, or improvement   Collaborate with multidisciplinary team to address chronic and comorbid conditions and prevent exacerbation or deterioration   Update acute care plan with appropriate goals if chronic or comorbid symptoms are exacerbated and prevent overall improvement and discharge     Problem: Discharge Planning  Goal: Discharge to home or other facility with appropriate resources  Flowsheets (Taken 6/19/2025 1947)  Discharge to home or other facility with appropriate resources:   Identify barriers to discharge with patient and caregiver   Arrange for needed discharge resources and transportation as appropriate   Identify discharge learning needs (meds, wound care, etc)     Problem: Pain  Goal: Verbalizes/displays adequate comfort level or baseline comfort level  Flowsheets (Taken 6/19/2025 1947)  Verbalizes/displays adequate comfort level or baseline comfort level:   Encourage patient to monitor pain and request assistance   Assess pain using appropriate pain scale     Problem: Skin/Tissue Integrity  Goal: Skin integrity remains intact  Description: 1.  Monitor for areas of redness and/or skin breakdown  2.  Assess vascular access sites hourly  3.  Every 4-6 hours minimum:  Change oxygen saturation probe site  4.  Every 4-6 hours:  If on nasal continuous positive airway pressure, respiratory therapy assess nares and determine need for appliance change or resting period  Flowsheets (Taken 6/19/2025 1947)  Skin Integrity Remains Intact: Monitor for areas of redness and/or skin breakdown

## 2025-06-19 NOTE — PROGRESS NOTES
Patient is AXOX 4. Patient tolerating ADULT DIET; Regular; 4 carb choices (60 gm/meal)  ADULT ORAL NUTRITION SUPPLEMENT; Breakfast, Lunch, Dinner; Diabetic Oral Supplement and has no c/o N/V or pain at this time. Shift assessment completed, VSS.   Vitals:    06/19/25 1941   BP: 112/71   Pulse: 67   Resp: 16   Temp:    SpO2: 94%   Scheduled medications for night discussed.  Careplan and education was discussed with Nimesh Ng and mutually agreed upon. Patient voiced no concerns at this time. Does not appear to be in distress. Call light in reach, safety precautions in place.

## 2025-06-19 NOTE — PROGRESS NOTES
Working to correct patient's glucose prior to placing admit orders.    Raisa Marquez FNP-C  6/19/2025

## 2025-06-20 ENCOUNTER — APPOINTMENT (OUTPATIENT)
Age: 85
End: 2025-06-20
Payer: COMMERCIAL

## 2025-06-20 PROBLEM — F33.1 MODERATE EPISODE OF RECURRENT MAJOR DEPRESSIVE DISORDER (HCC): Status: ACTIVE | Noted: 2025-06-20

## 2025-06-20 PROBLEM — F32.1 CURRENT MODERATE EPISODE OF MAJOR DEPRESSIVE DISORDER (HCC): Status: ACTIVE | Noted: 2025-06-20

## 2025-06-20 LAB
ALBUMIN SERPL-MCNC: 3 G/DL (ref 3.4–5)
ALBUMIN/GLOB SERPL: 0.9 {RATIO} (ref 1.1–2.2)
ALP SERPL-CCNC: 94 U/L (ref 40–129)
ALT SERPL-CCNC: 55 U/L (ref 10–40)
ANION GAP SERPL CALCULATED.3IONS-SCNC: 14 MMOL/L (ref 3–16)
AST SERPL-CCNC: 59 U/L (ref 15–37)
BASOPHILS # BLD: 0 K/UL (ref 0–0.2)
BASOPHILS NFR BLD: 0.3 %
BILIRUB SERPL-MCNC: 0.5 MG/DL (ref 0–1)
BUN SERPL-MCNC: 36 MG/DL (ref 7–20)
CALCIUM SERPL-MCNC: 9.6 MG/DL (ref 8.3–10.6)
CHLORIDE SERPL-SCNC: 94 MMOL/L (ref 99–110)
CO2 SERPL-SCNC: 30 MMOL/L (ref 21–32)
CREAT SERPL-MCNC: 1.5 MG/DL (ref 0.8–1.3)
DEPRECATED RDW RBC AUTO: 15.3 % (ref 12.4–15.4)
ECHO AO ASC DIAM: 4 CM
ECHO AO ASCENDING AORTA INDEX: 1.92 CM/M2
ECHO AO ROOT DIAM: 3.7 CM
ECHO AO ROOT INDEX: 1.78 CM/M2
ECHO AV MEAN GRADIENT: 2 MMHG
ECHO AV MEAN VELOCITY: 0.7 M/S
ECHO AV PEAK GRADIENT: 3 MMHG
ECHO AV PEAK VELOCITY: 0.8 M/S
ECHO AV VELOCITY RATIO: 1
ECHO AV VTI: 14.8 CM
ECHO BSA: 2.1 M2
ECHO EST RA PRESSURE: 3 MMHG
ECHO IVC EXP: 1.9 CM
ECHO IVC INSP: 0.4 CM
ECHO LA AREA 2C: 20.8 CM2
ECHO LA AREA 4C: 20.8 CM2
ECHO LA MAJOR AXIS: 6.7 CM
ECHO LA MINOR AXIS: 7 CM
ECHO LA VOL BP: 50 ML (ref 18–58)
ECHO LA VOL MOD A2C: 50 ML (ref 18–58)
ECHO LA VOL MOD A4C: 48 ML (ref 18–58)
ECHO LA VOL/BSA BIPLANE: 24 ML/M2 (ref 16–34)
ECHO LA VOLUME INDEX MOD A2C: 24 ML/M2 (ref 16–34)
ECHO LA VOLUME INDEX MOD A4C: 23 ML/M2 (ref 16–34)
ECHO LV E' LATERAL VELOCITY: 8.92 CM/S
ECHO LV E' SEPTAL VELOCITY: 9.46 CM/S
ECHO LV EDV A2C: 43 ML
ECHO LV EDV A4C: 24 ML
ECHO LV EDV INDEX A4C: 12 ML/M2
ECHO LV EDV NDEX A2C: 21 ML/M2
ECHO LV EF PHYSICIAN: 63 %
ECHO LV EJECTION FRACTION A2C: 64 %
ECHO LV EJECTION FRACTION A4C: 67 %
ECHO LV EJECTION FRACTION BIPLANE: 66 % (ref 55–100)
ECHO LV ESV A2C: 16 ML
ECHO LV ESV A4C: 8 ML
ECHO LV ESV INDEX A2C: 8 ML/M2
ECHO LV ESV INDEX A4C: 4 ML/M2
ECHO LV FRACTIONAL SHORTENING: 41 % (ref 28–44)
ECHO LV INTERNAL DIMENSION DIASTOLE INDEX: 1.88 CM/M2
ECHO LV INTERNAL DIMENSION DIASTOLIC: 3.9 CM (ref 4.2–5.9)
ECHO LV INTERNAL DIMENSION SYSTOLIC INDEX: 1.11 CM/M2
ECHO LV INTERNAL DIMENSION SYSTOLIC: 2.3 CM
ECHO LV ISOVOLUMETRIC RELAXATION TIME (IVRT): 87 MS
ECHO LV IVSD: 1.2 CM (ref 0.6–1)
ECHO LV MASS 2D: 140.1 G (ref 88–224)
ECHO LV MASS INDEX 2D: 67.4 G/M2 (ref 49–115)
ECHO LV POSTERIOR WALL DIASTOLIC: 1 CM (ref 0.6–1)
ECHO LV RELATIVE WALL THICKNESS RATIO: 0.51
ECHO LVOT AV VTI INDEX: 1.05
ECHO LVOT MEAN GRADIENT: 1 MMHG
ECHO LVOT PEAK GRADIENT: 2 MMHG
ECHO LVOT PEAK VELOCITY: 0.8 M/S
ECHO LVOT VTI: 15.6 CM
ECHO MV E DECELERATION TIME (DT): 201 MS
ECHO MV E VELOCITY: 1 M/S
ECHO MV E/E' LATERAL: 11.21
ECHO MV E/E' RATIO (AVERAGED): 10.89
ECHO MV E/E' SEPTAL: 10.57
ECHO MV LVOT VTI INDEX: 1.44
ECHO MV MAX VELOCITY: 1.2 M/S
ECHO MV MEAN GRADIENT: 5 MMHG
ECHO MV MEAN VELOCITY: 0.7 M/S
ECHO MV PEAK GRADIENT: 5 MMHG
ECHO MV VTI: 22.4 CM
ECHO PV MAX VELOCITY: 0.8 M/S
ECHO PV MEAN GRADIENT: 2 MMHG
ECHO PV MEAN VELOCITY: 0.6 M/S
ECHO PV PEAK GRADIENT: 3 MMHG
ECHO PV VTI: 14.5 CM
ECHO RA AREA 4C: 23.3 CM2
ECHO RA END SYSTOLIC VOLUME APICAL 4 CHAMBER INDEX BSA: 35 ML/M2
ECHO RA VOLUME: 73 ML
ECHO RIGHT VENTRICULAR SYSTOLIC PRESSURE (RVSP): 37 MMHG
ECHO RV BASAL DIMENSION: 3.4 CM
ECHO RV FREE WALL PEAK S': 11.3 CM/S
ECHO RV LONGITUDINAL DIMENSION: 7.6 CM
ECHO RV MID DIMENSION: 3 CM
ECHO RV TAPSE: 1.9 CM (ref 1.7–?)
ECHO TV REGURGITANT MAX VELOCITY: 2.9 M/S
ECHO TV REGURGITANT PEAK GRADIENT: 34 MMHG
EOSINOPHIL # BLD: 0.3 K/UL (ref 0–0.6)
EOSINOPHIL NFR BLD: 5.2 %
EST. AVERAGE GLUCOSE BLD GHB EST-MCNC: 246 MG/DL
GFR SERPLBLD CREATININE-BSD FMLA CKD-EPI: 46 ML/MIN/{1.73_M2}
GLUCOSE BLD-MCNC: 148 MG/DL (ref 70–99)
GLUCOSE BLD-MCNC: 194 MG/DL (ref 70–99)
GLUCOSE BLD-MCNC: 246 MG/DL (ref 70–99)
GLUCOSE BLD-MCNC: 250 MG/DL (ref 70–99)
GLUCOSE BLD-MCNC: 324 MG/DL (ref 70–99)
GLUCOSE SERPL-MCNC: 56 MG/DL (ref 70–99)
HBA1C MFR BLD: 10.2 %
HCT VFR BLD AUTO: 45.9 % (ref 40.5–52.5)
HGB BLD-MCNC: 15.4 G/DL (ref 13.5–17.5)
LACTATE BLDV-SCNC: 2.1 MMOL/L (ref 0.4–2)
LYMPHOCYTES # BLD: 1 K/UL (ref 1–5.1)
LYMPHOCYTES NFR BLD: 19.7 %
MAGNESIUM SERPL-MCNC: 1.94 MG/DL (ref 1.8–2.4)
MCH RBC QN AUTO: 28.6 PG (ref 26–34)
MCHC RBC AUTO-ENTMCNC: 33.5 G/DL (ref 31–36)
MCV RBC AUTO: 85.2 FL (ref 80–100)
MONOCYTES # BLD: 0.5 K/UL (ref 0–1.3)
MONOCYTES NFR BLD: 9.3 %
NEUTROPHILS # BLD: 3.2 K/UL (ref 1.7–7.7)
NEUTROPHILS NFR BLD: 65.5 %
PERFORMED ON: ABNORMAL
PLATELET # BLD AUTO: 179 K/UL (ref 135–450)
PMV BLD AUTO: 8.7 FL (ref 5–10.5)
POTASSIUM SERPL-SCNC: 3.2 MMOL/L (ref 3.5–5.1)
PROT SERPL-MCNC: 6.2 G/DL (ref 6.4–8.2)
RBC # BLD AUTO: 5.39 M/UL (ref 4.2–5.9)
SODIUM SERPL-SCNC: 138 MMOL/L (ref 136–145)
WBC # BLD AUTO: 4.9 K/UL (ref 4–11)

## 2025-06-20 PROCEDURE — 93306 TTE W/DOPPLER COMPLETE: CPT | Performed by: INTERNAL MEDICINE

## 2025-06-20 PROCEDURE — 97116 GAIT TRAINING THERAPY: CPT

## 2025-06-20 PROCEDURE — 83735 ASSAY OF MAGNESIUM: CPT

## 2025-06-20 PROCEDURE — 6370000000 HC RX 637 (ALT 250 FOR IP): Performed by: PSYCHIATRY & NEUROLOGY

## 2025-06-20 PROCEDURE — 83605 ASSAY OF LACTIC ACID: CPT

## 2025-06-20 PROCEDURE — 2500000003 HC RX 250 WO HCPCS: Performed by: NURSE PRACTITIONER

## 2025-06-20 PROCEDURE — 93306 TTE W/DOPPLER COMPLETE: CPT

## 2025-06-20 PROCEDURE — 85025 COMPLETE CBC W/AUTO DIFF WBC: CPT

## 2025-06-20 PROCEDURE — 99232 SBSQ HOSP IP/OBS MODERATE 35: CPT | Performed by: NURSE PRACTITIONER

## 2025-06-20 PROCEDURE — 6370000000 HC RX 637 (ALT 250 FOR IP): Performed by: INTERNAL MEDICINE

## 2025-06-20 PROCEDURE — 97110 THERAPEUTIC EXERCISES: CPT

## 2025-06-20 PROCEDURE — 80053 COMPREHEN METABOLIC PANEL: CPT

## 2025-06-20 PROCEDURE — 83036 HEMOGLOBIN GLYCOSYLATED A1C: CPT

## 2025-06-20 PROCEDURE — 97165 OT EVAL LOW COMPLEX 30 MIN: CPT

## 2025-06-20 PROCEDURE — 2060000000 HC ICU INTERMEDIATE R&B

## 2025-06-20 PROCEDURE — 97162 PT EVAL MOD COMPLEX 30 MIN: CPT

## 2025-06-20 PROCEDURE — 2580000003 HC RX 258: Performed by: NURSE PRACTITIONER

## 2025-06-20 PROCEDURE — 36415 COLL VENOUS BLD VENIPUNCTURE: CPT

## 2025-06-20 PROCEDURE — 6370000000 HC RX 637 (ALT 250 FOR IP): Performed by: NURSE PRACTITIONER

## 2025-06-20 RX ORDER — POTASSIUM CHLORIDE 1500 MG/1
40 TABLET, EXTENDED RELEASE ORAL ONCE
Status: COMPLETED | OUTPATIENT
Start: 2025-06-20 | End: 2025-06-20

## 2025-06-20 RX ORDER — SODIUM CHLORIDE 9 MG/ML
INJECTION, SOLUTION INTRAVENOUS CONTINUOUS
Status: DISCONTINUED | OUTPATIENT
Start: 2025-06-20 | End: 2025-06-21

## 2025-06-20 RX ORDER — ESCITALOPRAM OXALATE 10 MG/1
5 TABLET ORAL DAILY
Status: DISCONTINUED | OUTPATIENT
Start: 2025-06-20 | End: 2025-06-21 | Stop reason: HOSPADM

## 2025-06-20 RX ADMIN — INSULIN GLARGINE 34 UNITS: 100 INJECTION, SOLUTION SUBCUTANEOUS at 20:15

## 2025-06-20 RX ADMIN — LEVOTHYROXINE SODIUM 100 MCG: 100 TABLET ORAL at 05:49

## 2025-06-20 RX ADMIN — INSULIN LISPRO 4 UNITS: 100 INJECTION, SOLUTION INTRAVENOUS; SUBCUTANEOUS at 20:15

## 2025-06-20 RX ADMIN — POTASSIUM CHLORIDE 40 MEQ: 1500 TABLET, EXTENDED RELEASE ORAL at 09:08

## 2025-06-20 RX ADMIN — SODIUM CHLORIDE: 0.9 INJECTION, SOLUTION INTRAVENOUS at 11:48

## 2025-06-20 RX ADMIN — SODIUM CHLORIDE, PRESERVATIVE FREE 10 ML: 5 INJECTION INTRAVENOUS at 09:10

## 2025-06-20 RX ADMIN — Medication 1 TABLET: at 09:08

## 2025-06-20 RX ADMIN — FUROSEMIDE 40 MG: 40 TABLET ORAL at 09:08

## 2025-06-20 RX ADMIN — INSULIN LISPRO 2 UNITS: 100 INJECTION, SOLUTION INTRAVENOUS; SUBCUTANEOUS at 11:46

## 2025-06-20 RX ADMIN — INSULIN LISPRO 6 UNITS: 100 INJECTION, SOLUTION INTRAVENOUS; SUBCUTANEOUS at 16:10

## 2025-06-20 RX ADMIN — APIXABAN 2.5 MG: 5 TABLET, FILM COATED ORAL at 09:08

## 2025-06-20 RX ADMIN — Medication 2000 UNITS: at 09:08

## 2025-06-20 RX ADMIN — TAMSULOSIN HYDROCHLORIDE 0.4 MG: 0.4 CAPSULE ORAL at 09:08

## 2025-06-20 RX ADMIN — APIXABAN 2.5 MG: 5 TABLET, FILM COATED ORAL at 20:15

## 2025-06-20 RX ADMIN — ESCITALOPRAM OXALATE 5 MG: 10 TABLET ORAL at 16:13

## 2025-06-20 RX ADMIN — HYDROCHLOROTHIAZIDE 12.5 MG: 25 TABLET ORAL at 09:08

## 2025-06-20 RX ADMIN — DILTIAZEM HYDROCHLORIDE 120 MG: 120 CAPSULE, COATED, EXTENDED RELEASE ORAL at 09:08

## 2025-06-20 RX ADMIN — SODIUM CHLORIDE: 0.9 INJECTION, SOLUTION INTRAVENOUS at 20:20

## 2025-06-20 ASSESSMENT — PAIN SCALES - GENERAL: PAINLEVEL_OUTOF10: 0

## 2025-06-20 NOTE — CARE COORDINATION
Case Management Assessment  Initial Evaluation    Date/Time of Evaluation: 6/20/2025 8:10 AM  Assessment Completed by: Tomasa Mendoza RN    If patient is discharged prior to next notation, then this note serves as note for discharge by case management.    Patient Name: Nimesh Ng                   YOB: 1940  Diagnosis: Shortness of breath [R06.02]  Unstable gait [R26.81]  Hyperglycemia [R73.9]                   Date / Time: 6/19/2025  9:05 AM    Patient Admission Status: Inpatient   Readmission Risk (Low < 19, Mod (19-27), High > 27): Readmission Risk Score: 14    Current PCP: Adi Lemons MD  PCP verified by CM? Yes    Chart Reviewed: Yes      History Provided by: Patient  Patient Orientation: Alert and Oriented    Patient Cognition: Alert    Hospitalization in the last 30 days (Readmission):  No    If yes, Readmission Assessment in  Navigator will be completed.    Advance Directives:      Code Status: Full Code   Patient's Primary Decision Maker is: Legal Next of Kin      Discharge Planning:    Patient lives with: Alone Type of Home: House  Primary Care Giver: Self  Patient Support Systems include: [employer]   Current Financial resources: Medicare  Current community resources: None  Current services prior to admission: None            Current DME:              Type of Home Care services:  None    ADLS  Prior functional level: Independent in ADLs/IADLs  Current functional level: Independent in ADLs/IADLs    PT AM-PAC:   /24  OT AM-PAC:   /24    Family can provide assistance at DC: No  Would you like Case Management to discuss the discharge plan with any other family members/significant others, and if so, who? No  Plans to Return to Present Housing: Yes  Other Identified Issues/Barriers to RETURNING to current housing: na  Potential Assistance needed at discharge: N/A            Potential DME:    Patient expects to discharge to: House  Plan for transportation at discharge:

## 2025-06-20 NOTE — PROGRESS NOTES
Provided [employer] and contact number from  for senior/assisted living support.    Scotty Vázquez

## 2025-06-20 NOTE — PROGRESS NOTES
Progress Note    Admit Date:  6/19/2025    84 y.o. male with hepatitis C, DM type 2, OA, hypothyroidism, BPH, hypertension, hyperlipidemia, GERD, a-fib, who presents to [facility].  Patient is a volunteer here at the hospital.  He was noted to be stumbling into the walls.   He is depressed.  He lost his wife 2-3 years ago and lost a sister as well.  Doesn't have anyone left.   Labs with hyperglycemia (glucose 733), normal AG, normal CO2, renal function at baseline, hyponatremia secondary to hyperglycemia, Lactic acidosis, elevated LFT's     Subjective:  Mr. Ng seen sitting up in bed.   Continues to note depression.   States he lost his wife and sister. He has children but they don't live close.  He heard from them on Father's day.  Otherwise, he states he doesn't hear from them.   His glucose is improved.     Objective:   Patient Vitals for the past 4 hrs:   BP Temp Temp src Pulse Resp SpO2   06/20/25 0725 122/70 97.7 °F (36.5 °C) Oral 70 -- 93 %   06/20/25 0649 127/72 -- -- 68 16 94 %          Intake/Output Summary (Last 24 hours) at 6/20/2025 0844  Last data filed at 6/19/2025 1728  Gross per 24 hour   Intake 140 ml   Output --   Net 140 ml       Physical Exam:    Gen: No distress. Alert. Appears fatigued, weak.   Eyes: PERRL. No sclera icterus. No conjunctival injection.   ENT: No discharge. Pharynx clear.   Neck: Trachea midline.  Resp: No accessory muscle use. No crackles. No wheezes. No rhonchi.   CV: controlled rate. Irregular rhythm. No murmur.  No rub. +BLE edema   GI: Non-tender. Non-distended. Normal bowel sounds. No hernia.   Skin: Warm and dry. No nodule on exposed extremities. No rash on exposed extremities.   M/S: No cyanosis. No joint deformity. No clubbing.   Neuro: Awake. Grossly nonfocal. Cranial nerves II-XII intact.   Psych: Oriented x 3. +depressed.     Data:  CBC:   Recent Labs     06/19/25  1001 06/20/25  0502   WBC 4.6 4.9   HGB 16.1 15.4   HCT 49.2 45.9   MCV 87.5 85.2    179

## 2025-06-20 NOTE — ACP (ADVANCE CARE PLANNING)
Advance Care Planning     General Advance Care Planning (ACP) Conversation    Date of Conversation: 6/20/2025  Conducted with: Patient with Decision Making Capacity  Other persons present: None    Healthcare Decision Maker: No healthcare decision makers have been documented.       Content/Action Overview:  Has ACP document(s) on file - do NOT reflect the patient's care preferences, patient to provide new document(s)  Reviewed DNR/DNI and patient elects Full Code (Attempt Resuscitation)        Length of Voluntary ACP Conversation in minutes:  <16 minutes (Non-Billable)    Tomasa Mendoza RN

## 2025-06-20 NOTE — CONSULTS
Psychiatric Consult    Dx: Major Depressive Disorder Moderate type          Hyperglycemia         CKD    Rec:   Patient does not require inpatient psychiatry at this time.  Would benefit from an antidepressant trial . Lexapro 10 mg QD . Patient is agreeable to trial  Patient is seeking assisted living as he is struggling to manage daily routine and is isolated to his home, alone  He stated that he is lonely and misses the daily interaction with people.  DC home/Assisted living at [place]  6.   Set up outpt therapy to address his losses: wife and sister      Admit Date:  2025    Consult Date:  2025   Id Info: 85 yo WM   Reason for Consult: depression  Summary Present Illness:   Per HP  84 y.o. male with hepatitis C, DM type 2, OA, hypothyroidism, BPH, hypertension, hyperlipidemia, GERD, a-fib, who presents to [facility].  Patient is a volunteer here at the hospital.  He was noted to be stumbling into the walls. Nurses on 2-west were concerned.  Sent to the ED for evaluation.  He does admit to being depressed.  He lost his wife 2-3 years ago and his sister recently.  States he doesn't have anyone left.       Vitals are stable.  Labs with hyperglycemia (glucose 733), normal AG, normal CO2, renal function at baseline, hyponatremia secondary to hyperglycemia, Lactic acidosis, elevated LFT's.  CXR negative.  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  Admitted for further management/care.     Today patient was in bed and appeared mildly disheveled. He was pleasant and cooperative. 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   He acknowledged increased sadness and loneliness since his wife  2-3 years ago and sister 2 years ago.  He denied SI.     Psychiatric Hx: Hosp: none   Tx: non  Social Hx: Lives alone in [place].    MSE: Mental  Status Examination:  Level of consciousness:  within normal limits  Appearance:  street clothes and lying in bed thin/frail  Behavior/Motor:  no abnormalities noted bed  Attitude toward examiner:  cooperative, attentive, and good eye contact  Speech:  spontaneous, normal rate, normal volume, and well articulated  Mood:  constricted, decreased range, and depressed  Affect:  mood congruent  Hallucinations: Absent  Thought processes:  linear, goal directed, and coherent Attention:  attention span and concentration were age appropriate  Thought content:  Suicidal Ideation:  denies suicidal ideationno evidence of delusions Abstraction: intact  OCD: none  Insight: impaired insight  Judgement: impaired judgment  Fund of Knowledge: average  IQ:average Memory: intact  Cognition:  oriented to person, place, and time  Suicide:  no specific plan to harm self  Sleep: has interrupted sleep  Appetite: loss of appetite   Inventory of strengths and weaknesses:Cooperative and Pleasant  PE: VITALS:  /60   Pulse 77   Temp 97.8 °F (36.6 °C) (Oral)   Resp 16   Ht 1.803 m (5' 11\")   Wt 88 kg (194 lb)   SpO2 95%   BMI 27.06 kg/m²     Cranial Nerves: 1-12 appear to be intact   ROS:  Reviewed ED and Med notes and agree with findings  Labs:    No results displayed because visit has over 200 results.           Impression:   Dx: axis I: Major Depression moderate type recurrent   Axis 2: No diagnosis  Starr 3: See Medical History  Axis 4: Problems related to the social environment  Axis 5: 51-60 moderate symptoms         Active Hospital Problems    Diagnosis Date Noted    Current moderate episode of major depressive disorder (HCC) [F32.1] 06/20/2025    Hyperglycemia [R73.9] 06/19/2025    Unsteady gait [R26.81] 06/19/2025    Lactic acidosis [E87.20] 06/19/2025    Elevated brain natriuretic peptide (BNP) level [R79.89] 06/19/2025    Elevated troponin [R79.89] 06/19/2025         Spent > 80 minutes evaluating and treating patient with

## 2025-06-20 NOTE — PROGRESS NOTES
Inpatient Occupational Therapy Evaluation & Treatment    Unit: PCU  Date:  6/20/2025  Patient Name:    Nimesh Ng  Admitting diagnosis:  Shortness of breath [R06.02]  Unstable gait [R26.81]  Hyperglycemia [R73.9]  Admit Date:  6/19/2025  Precautions/Restrictions/WB Status/ Lines/ Wounds/ Oxygen: Fall risk, Bed/chair alarm, and Lines (IV)      Treatment Time:  14:30-14:53  Treatment Number:  1  Timed Code Treatment Minutes: 13 minutes  Total Treatment Minutes:  23  minutes    Patient Goals for Therapy: \"to go home\"          Discharge Recommendations: Home with PRN assist and OP PT  DME needs for discharge: RW       Therapy recommendations for staff:   Stand by assist for ambulation with use of rolling walker (RW) within halls    History of Present Illness: Per H&P 6/19/25 from Raisa Marquez, SUNNY-CNP:  \"84 y.o. male with hepatitis C, DM type 2, OA, hypothyroidism, BPH, hypertension, hyperlipidemia, GERD, a-fib, who presents to [facility].  Patient is a volunteer here at the hospital.  He was noted to be stumbling into the walls. Nurses on 2-west were concerned.  Sent to the ED for evaluation.  He does admit to being depressed.  He lost his wife 2-3 years ago and his sister recently.  States he doesn't have anyone left.       Vitals are stable.  Labs with hyperglycemia (glucose 733), normal AG, normal CO2, renal function at baseline, hyponatremia secondary to hyperglycemia, Lactic acidosis, elevated LFT's.  CXR negative.  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

## 2025-06-20 NOTE — PROGRESS NOTES
Inpatient Physical Therapy Evaluation & Treatment    Unit: PCU  Date:  6/20/2025  Patient Name:    Nimesh Ng  Admitting diagnosis:  Shortness of breath [R06.02]  Unstable gait [R26.81]  Hyperglycemia [R73.9]  Admit Date:  6/19/2025  Precautions/Restrictions/WB Status/ Lines/ Wounds/ Oxygen: Fall risk, Lines (IV), [place] (hard of hearing), and Telemetry    Treatment Time:  0293-6119  Treatment Number:  1   Timed Code Treatment Minutes: 19 minutes  Total Treatment Minutes:  29  minutes    Patient Stated Goals for Therapy: None stated          Discharge Recommendations: Outpatient PT  DME needs for discharge: continue to assess, possible RW       Therapy recommendation for EMS Transport: can transport by wheelchair    Therapy recommendations for staff:   CGA for ambulation with use of rolling walker (RW) and gait belt to/from chair  to/from bathroom  within room  Monitor BP    History of Present Illness:   Per H&P 6/19/25 from Raisa Marquez, SUNNY-CNP:  \"84 y.o. male with hepatitis C, DM type 2, OA, hypothyroidism, BPH, hypertension, hyperlipidemia, GERD, a-fib, who presents to [facility].  Patient is a volunteer here at the hospital.  He was noted to be stumbling into the walls. Nurses on 2-west were concerned.  Sent to the ED for evaluation.  He does admit to being depressed.  He lost his wife 2-3 years ago and his sister recently.  States he doesn't have anyone left.       Vitals are stable.  Labs with hyperglycemia (glucose 733), normal AG, normal CO2, renal function at baseline, hyponatremia secondary to hyperglycemia, Lactic acidosis, elevated LFT's.  CXR negative.  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

## 2025-06-20 NOTE — PROGRESS NOTES
Nutrition Assessment     Type and Reason for Visit: Initial, Positive nutrition screen (MST 3)    Nutrition Recommendations/Plan:   Continue Regular Diet      Malnutrition Assessment:  Malnutrition Status: No malnutrition    Nutrition Assessment:  Pt. adequately nourished on admit consuming > 75%.  Not At risk for further nutrition compromise..  Will not follow, consult RD if needs change.     Estimated Daily Nutrient Needs:  Energy (kcal):  1545-4816 based ~ 20-23 kcal/kg cbw Weight Used for Energy Requirements: Current     Protein (g):  88 based 1 gr/kg cbw Weight Used for Protein Requirements: Current        Fluid (ml/day):  6048-6107 Method Used for Fluid Requirements: 1 ml/kcal    Nutrition Related Findings:   pt is A & O; review of weight loss inidcates pt is eating \"better\" he has elimnated the highly processed, high Carb foods from his daily diet;  he also had his insulin reduced from 45 units to 22 units in april d/t low blood sugars. Wound Type: None    Current Nutrition Therapies:    ADULT DIET; Regular; 4 carb choices (60 gm/meal)  ADULT ORAL NUTRITION SUPPLEMENT; Breakfast, Lunch, Dinner; Diabetic Oral Supplement    Anthropometric Measures:  Height: 180.3 cm (5' 11\")  Current Body Wt: 88 kg (194 lb)   BMI: 27.1        Nutrition Diagnosis:   No nutrition diagnosis at this time   Nutrition Interventions:   Food and/or Nutrient Delivery: Continue Current Diet      Gabby White RD, LD  Contact: [phone]

## 2025-06-21 VITALS
OXYGEN SATURATION: 91 % | HEIGHT: 71 IN | WEIGHT: 194 LBS | RESPIRATION RATE: 16 BRPM | SYSTOLIC BLOOD PRESSURE: 106 MMHG | BODY MASS INDEX: 27.16 KG/M2 | TEMPERATURE: 98 F | DIASTOLIC BLOOD PRESSURE: 61 MMHG | HEART RATE: 73 BPM

## 2025-06-21 LAB
GLUCOSE BLD-MCNC: 174 MG/DL (ref 70–99)
GLUCOSE BLD-MCNC: 235 MG/DL (ref 70–99)
PERFORMED ON: ABNORMAL
PERFORMED ON: ABNORMAL

## 2025-06-21 PROCEDURE — 99238 HOSP IP/OBS DSCHRG MGMT 30/<: CPT | Performed by: INTERNAL MEDICINE

## 2025-06-21 PROCEDURE — 6370000000 HC RX 637 (ALT 250 FOR IP): Performed by: NURSE PRACTITIONER

## 2025-06-21 PROCEDURE — 6370000000 HC RX 637 (ALT 250 FOR IP): Performed by: PSYCHIATRY & NEUROLOGY

## 2025-06-21 PROCEDURE — 2500000003 HC RX 250 WO HCPCS: Performed by: NURSE PRACTITIONER

## 2025-06-21 RX ORDER — INSULIN GLARGINE-YFGN 100 [IU]/ML
40 INJECTION, SOLUTION SUBCUTANEOUS NIGHTLY
Qty: 1 ML | Refills: 0
Start: 2025-06-21

## 2025-06-21 RX ORDER — ESCITALOPRAM OXALATE 5 MG/1
5 TABLET ORAL DAILY
Qty: 30 TABLET | Refills: 1 | Status: SHIPPED | OUTPATIENT
Start: 2025-06-21

## 2025-06-21 RX ORDER — INSULIN LISPRO 100 [IU]/ML
8 INJECTION, SOLUTION INTRAVENOUS; SUBCUTANEOUS
Qty: 5 ADJUSTABLE DOSE PRE-FILLED PEN SYRINGE | Refills: 0 | Status: SHIPPED | OUTPATIENT
Start: 2025-06-21

## 2025-06-21 RX ADMIN — ESCITALOPRAM OXALATE 5 MG: 10 TABLET ORAL at 09:39

## 2025-06-21 RX ADMIN — APIXABAN 2.5 MG: 5 TABLET, FILM COATED ORAL at 09:38

## 2025-06-21 RX ADMIN — INSULIN LISPRO 2 UNITS: 100 INJECTION, SOLUTION INTRAVENOUS; SUBCUTANEOUS at 09:42

## 2025-06-21 RX ADMIN — Medication 1 TABLET: at 09:38

## 2025-06-21 RX ADMIN — LEVOTHYROXINE SODIUM 100 MCG: 100 TABLET ORAL at 05:27

## 2025-06-21 RX ADMIN — SODIUM CHLORIDE, PRESERVATIVE FREE 10 ML: 5 INJECTION INTRAVENOUS at 09:43

## 2025-06-21 RX ADMIN — Medication 2000 UNITS: at 09:38

## 2025-06-21 RX ADMIN — TAMSULOSIN HYDROCHLORIDE 0.4 MG: 0.4 CAPSULE ORAL at 09:38

## 2025-06-21 RX ADMIN — DILTIAZEM HYDROCHLORIDE 120 MG: 120 CAPSULE, COATED, EXTENDED RELEASE ORAL at 09:38

## 2025-06-21 NOTE — FLOWSHEET NOTE
06/21/25 0710   Vital Signs   Temp 98 °F (36.7 °C)   Temp Source Oral   Pulse 56   Heart Rate Source Monitor   Respirations 16   /65   MAP (Calculated) 82   BP Location Right upper arm   BP Method Automatic   Patient Position Semi fowlers   Oxygen Therapy   SpO2 91 %   O2 Device None (Room air)     Vitals and shift assessment completed. No s/s of distress. Medications given per MAR. Patient to be discharged. Patient educated on discharge instructions as well as new medications use, dosage, administration and possible side effects.  Patient verified knowledge. IV removed without difficulty and dry dressing in place. Telemetry monitor removed and returned to [facility]. Pt left facility in stable condition to Home with all of their personal belongings.      Dejah Mclaughlin RN

## 2025-06-21 NOTE — PLAN OF CARE
Problem: Chronic Conditions and Co-morbidities  Goal: Patient's chronic conditions and co-morbidity symptoms are monitored and maintained or improved  6/21/2025 1003 by Dejah Mclaughlin RN  Outcome: Adequate for Discharge  Flowsheets (Taken 6/21/2025 0943)  Care Plan - Patient's Chronic Conditions and Co-Morbidity Symptoms are Monitored and Maintained or Improved: Monitor and assess patient's chronic conditions and comorbid symptoms for stability, deterioration, or improvement  6/20/2025 2014 by Amara Mora RN  Outcome: Progressing     Problem: Discharge Planning  Goal: Discharge to home or other facility with appropriate resources  6/21/2025 1003 by Dejah Mclaughlin RN  Outcome: Adequate for Discharge  Flowsheets (Taken 6/21/2025 0943)  Discharge to home or other facility with appropriate resources:   Identify barriers to discharge with patient and caregiver   Arrange for needed discharge resources and transportation as appropriate   Identify discharge learning needs (meds, wound care, etc)  6/20/2025 2014 by Amara Mora RN  Outcome: Progressing     Problem: Pain  Goal: Verbalizes/displays adequate comfort level or baseline comfort level  6/21/2025 1003 by Dejah Mclaughlin RN  Outcome: Adequate for Discharge  6/20/2025 2014 by Amara Mora RN  Outcome: Progressing     Problem: Safety - Adult  Goal: Free from fall injury  6/21/2025 1003 by Dejah Mclaughlin RN  Outcome: Adequate for Discharge  6/20/2025 2014 by Amara Mora RN  Outcome: Progressing     Problem: Skin/Tissue Integrity  Goal: Skin integrity remains intact  Description: 1.  Monitor for areas of redness and/or skin breakdown  2.  Assess vascular access sites hourly  3.  Every 4-6 hours minimum:  Change oxygen saturation probe site  4.  Every 4-6 hours:  If on nasal continuous positive airway pressure, respiratory therapy assess nares and determine need for appliance change or resting period  6/21/2025 1003 by Arnoldo

## 2025-06-21 NOTE — DISCHARGE SUMMARY
Name:  Nimesh Ng  Room:  /0313-01  MRN:    [id number]    Discharge Summary      This discharge summary is in conjunction with a complete physical exam done on the day of discharge.      Discharging Physician: BENI ESTRELLA MD      Admit: 6/19/2025  Discharge:  6/21/2025     Diagnoses this Admission    Principal Problem:    Hyperglycemia  Active Problems:    Unsteady gait    Lactic acidosis    Elevated brain natriuretic peptide (BNP) level    Elevated troponin    Current moderate episode of major depressive disorder (HCC)  Resolved Problems:    * No resolved hospital problems. *          Procedures (Please Review Full Report for Details)    06/19/25    ECHO (TTE) COMPLETE (PRN CONTRAST/BUBBLE/STRAIN/3D) 06/20/2025  2:10 PM (Final)    Interpretation Summary    Left Ventricle: Reduced left ventricular systolic function with a visually estimated EF of 60 - 65%. EF by 2D Simpsons Biplane is 66%. Left ventricle size is normal. Mildly increased wall thickness. Findings consistent with mild concentric hypertrophy. Normal wall motion. Normal diastolic function.    Right Ventricle: Right ventricle size is normal. Normal systolic function.    Aortic Valve: Trileaflet valve. Mild sclerosis of the aortic valve cusps. No stenosis.    Mitral Valve: There is mild posterior annular calcification noted. No leaflet thickening.    Tricuspid Valve: Mild regurgitation. Normal RVSP. TR Peak Gradient is 34 mmHg. Est. RA Pressure is 3 mmHg. RVSP is 37 mmHg.    Pulmonic Valve: Mild regurgitation.    Right Atrium: Right atrium is mildly dilated.    Aorta: Normal sized aortic root. Mildly dilated ascending aorta. Ao ascending diameter is 4.0 cm.    Image quality is adequate.      Consults    IP CONSULT TO HOSPITALIST  IP CONSULT TO PSYCHIATRY      HPI:    The patient is a 84 y.o. male with hepatitis C, DM type 2, OA, hypothyroidism, BPH, hypertension, hyperlipidemia, GERD, a-fib, who presents to [facility].  Patient is

## 2025-06-21 NOTE — PROGRESS NOTES
Progress Note    Admit Date:  6/19/2025    84 y.o. male with hepatitis C, DM type 2, OA, hypothyroidism, BPH, hypertension, hyperlipidemia, GERD, a-fib, who presents to [facility].  Patient is a volunteer here at the hospital.  He was noted to be stumbling into the walls.   He is depressed.  He lost his wife 2-3 years ago and lost a sister as well.  Doesn't have anyone left.   Labs with hyperglycemia (glucose 733), normal AG, normal CO2, renal function at baseline, hyponatremia secondary to hyperglycemia, Lactic acidosis, elevated LFT's     Subjective:  Mr. Ng seen sitting up in bed.   Continues to note depression.   States he lost his wife and sister. He has children but they don't live close.  He heard from them on Father's day.  Otherwise, he states he doesn't hear from them.   His glucose is improved.     Objective:   Patient Vitals for the past 4 hrs:   BP Temp Temp src Pulse Resp SpO2   06/21/25 0710 116/65 98 °F (36.7 °C) Oral 56 16 91 %          Intake/Output Summary (Last 24 hours) at 6/21/2025 0828  Last data filed at 6/20/2025 2000  Gross per 24 hour   Intake 702 ml   Output --   Net 702 ml       Physical Exam:    Gen: No distress. Alert. Appears fatigued, weak.   Eyes: PERRL. No sclera icterus. No conjunctival injection.   ENT: No discharge. Pharynx clear.   Neck: Trachea midline.  Resp: No accessory muscle use. No crackles. No wheezes. No rhonchi.   CV: controlled rate. Irregular rhythm. No murmur.  No rub. +BLE edema   GI: Non-tender. Non-distended. Normal bowel sounds. No hernia.   Skin: Warm and dry. No nodule on exposed extremities. No rash on exposed extremities.   M/S: No cyanosis. No joint deformity. No clubbing.   Neuro: Awake. Grossly nonfocal. Cranial nerves II-XII intact.   Psych: Oriented x 3. +depressed.     Data:  CBC:   Recent Labs     06/19/25  1001 06/20/25  0502   WBC 4.6 4.9   HGB 16.1 15.4   HCT 49.2 45.9   MCV 87.5 85.2    179     BMP:   Recent Labs     06/19/25  100    Hypothyroidism  -home dose of synthroid 100 mcg      Persistent Atrial fibrillation  -AC: Eliquis  -continue Diltiazem     Mildly Elevated LFT's  Hepatitis C  -consider RUQ US  -monitor LFT's     MGUS     Depression  -psychiatry consult for recommendations for patient.  He denies suicidal ideations.  Notes severe depression.  Wants to talk to someone.   -started on lexapro     DVT Prophylaxis: Eliquis    Diet: ADULT DIET; Regular; 4 carb choices (60 gm/meal)  ADULT ORAL NUTRITION SUPPLEMENT; Breakfast, Lunch, Dinner; Diabetic Oral Supplement  Code Status: Full Code    Pt/ot   -home PT advised     HAMIDA Lord.

## 2025-06-21 NOTE — PROGRESS NOTES
Shift assessment is complete, see flow sheet. Pt denies c/o pain at this time. Pt offered drink and snack and accepted. Pt A&OX 4 with call light within reach and bed alarm on.

## 2025-06-21 NOTE — PROGRESS NOTES
Bedside report and transfer of care given to APPO [place]. Pt currently resting in bed with the call light within reach. Pt denies any other care needs at this time. Pt stable at this time.

## 2025-06-23 ENCOUNTER — APPOINTMENT (OUTPATIENT)
Dept: CT IMAGING | Age: 85
DRG: 312 | End: 2025-06-23
Payer: COMMERCIAL

## 2025-06-23 ENCOUNTER — HOSPITAL ENCOUNTER (INPATIENT)
Age: 85
LOS: 1 days | Discharge: HOME | DRG: 312 | End: 2025-06-25
Attending: STUDENT IN AN ORGANIZED HEALTH CARE EDUCATION/TRAINING PROGRAM
Payer: COMMERCIAL

## 2025-06-23 ENCOUNTER — APPOINTMENT (OUTPATIENT)
Dept: GENERAL RADIOLOGY | Age: 85
DRG: 312 | End: 2025-06-23
Payer: COMMERCIAL

## 2025-06-23 DIAGNOSIS — R55 SYNCOPE, UNSPECIFIED SYNCOPE TYPE: ICD-10-CM

## 2025-06-23 DIAGNOSIS — R42 LIGHTHEADEDNESS: ICD-10-CM

## 2025-06-23 DIAGNOSIS — N39.0 URINARY TRACT INFECTION WITHOUT HEMATURIA, SITE UNSPECIFIED: ICD-10-CM

## 2025-06-23 DIAGNOSIS — R55 SYNCOPE AND COLLAPSE: Primary | ICD-10-CM

## 2025-06-23 LAB
ALBUMIN SERPL-MCNC: 3.4 G/DL (ref 3.4–5)
ALBUMIN/GLOB SERPL: 1 {RATIO} (ref 1.1–2.2)
ALP SERPL-CCNC: 117 U/L (ref 40–129)
ALT SERPL-CCNC: 67 U/L (ref 10–40)
ANION GAP SERPL CALCULATED.3IONS-SCNC: 14 MMOL/L (ref 3–16)
AST SERPL-CCNC: 74 U/L (ref 15–37)
BACTERIA URNS QL MICRO: ABNORMAL /HPF
BASE EXCESS BLDV CALC-SCNC: 2.7 MMOL/L (ref -3–3)
BASE EXCESS BLDV CALC-SCNC: 4.1 MMOL/L (ref -3–3)
BASOPHILS # BLD: 0 K/UL (ref 0–0.2)
BASOPHILS NFR BLD: 0.3 %
BILIRUB SERPL-MCNC: 0.6 MG/DL (ref 0–1)
BILIRUB UR QL STRIP.AUTO: NEGATIVE
BUN SERPL-MCNC: 37 MG/DL (ref 7–20)
CALCIUM SERPL-MCNC: 10.1 MG/DL (ref 8.3–10.6)
CHLORIDE SERPL-SCNC: 93 MMOL/L (ref 99–110)
CLARITY UR: CLEAR
CO2 BLDV-SCNC: 28 MMOL/L
CO2 BLDV-SCNC: 36 MMOL/L
CO2 SERPL-SCNC: 34 MMOL/L (ref 21–32)
COHGB MFR BLDV: 2 % (ref 0–1.5)
COHGB MFR BLDV: 2.1 % (ref 0–1.5)
COLOR UR: YELLOW
CREAT SERPL-MCNC: 1.8 MG/DL (ref 0.8–1.3)
DEPRECATED RDW RBC AUTO: 15.7 % (ref 12.4–15.4)
EOSINOPHIL # BLD: 0.1 K/UL (ref 0–0.6)
EOSINOPHIL NFR BLD: 2.3 %
EPI CELLS #/AREA URNS HPF: ABNORMAL /HPF (ref 0–5)
GFR SERPLBLD CREATININE-BSD FMLA CKD-EPI: 37 ML/MIN/{1.73_M2}
GLUCOSE BLD-MCNC: 313 MG/DL (ref 70–99)
GLUCOSE SERPL-MCNC: 321 MG/DL (ref 70–99)
GLUCOSE UR STRIP.AUTO-MCNC: >=1000 MG/DL
HCO3 BLDV-SCNC: 26.5 MMOL/L (ref 23–29)
HCO3 BLDV-SCNC: 33.3 MMOL/L (ref 23–29)
HCT VFR BLD AUTO: 48.4 % (ref 40.5–52.5)
HGB BLD-MCNC: 15.7 G/DL (ref 13.5–17.5)
HGB UR QL STRIP.AUTO: NEGATIVE
KETONES UR STRIP.AUTO-MCNC: NEGATIVE MG/DL
LEUKOCYTE ESTERASE UR QL STRIP.AUTO: ABNORMAL
LYMPHOCYTES # BLD: 1 K/UL (ref 1–5.1)
LYMPHOCYTES NFR BLD: 24.8 %
MCH RBC QN AUTO: 27.9 PG (ref 26–34)
MCHC RBC AUTO-ENTMCNC: 32.4 G/DL (ref 31–36)
MCV RBC AUTO: 86.2 FL (ref 80–100)
METHGB MFR BLDV: 0.3 %
METHGB MFR BLDV: 0.3 %
MONOCYTES # BLD: 0.4 K/UL (ref 0–1.3)
MONOCYTES NFR BLD: 10.1 %
NEUTROPHILS # BLD: 2.6 K/UL (ref 1.7–7.7)
NEUTROPHILS NFR BLD: 62.5 %
NITRITE UR QL STRIP.AUTO: NEGATIVE
NT-PROBNP SERPL-MCNC: 1615 PG/ML (ref 0–449)
O2 CT VFR BLDV CALC: 18 VOL %
O2 THERAPY: ABNORMAL
O2 THERAPY: ABNORMAL
PCO2 BLDV: 33.3 MMHG (ref 40–50)
PCO2 BLDV: 80.4 MMHG (ref 40–50)
PERFORMED ON: ABNORMAL
PH BLDV: 7.24 [PH] (ref 7.35–7.45)
PH BLDV: 7.52 [PH] (ref 7.35–7.45)
PH UR STRIP.AUTO: 6 [PH] (ref 5–8)
PLATELET # BLD AUTO: 212 K/UL (ref 135–450)
PMV BLD AUTO: 8.2 FL (ref 5–10.5)
PO2 BLDV: 33.4 MMHG (ref 25–40)
PO2 BLDV: 46.4 MMHG (ref 25–40)
POTASSIUM SERPL-SCNC: 4.9 MMOL/L (ref 3.5–5.1)
PROT SERPL-MCNC: 6.8 G/DL (ref 6.4–8.2)
PROT UR STRIP.AUTO-MCNC: NEGATIVE MG/DL
RBC # BLD AUTO: 5.62 M/UL (ref 4.2–5.9)
RBC #/AREA URNS HPF: ABNORMAL /HPF (ref 0–4)
SAO2 % BLDV: 52 %
SAO2 % BLDV: 87 %
SODIUM SERPL-SCNC: 141 MMOL/L (ref 136–145)
SP GR UR STRIP.AUTO: <=1.005 (ref 1–1.03)
TROPONIN, HIGH SENSITIVITY: 69 NG/L (ref 0–22)
TROPONIN, HIGH SENSITIVITY: 73 NG/L (ref 0–22)
TROPONIN, HIGH SENSITIVITY: 79 NG/L (ref 0–22)
UA COMPLETE W REFLEX CULTURE PNL UR: YES
UA DIPSTICK W REFLEX MICRO PNL UR: YES
URN SPEC COLLECT METH UR: ABNORMAL
UROBILINOGEN UR STRIP-ACNC: 0.2 E.U./DL
WBC # BLD AUTO: 4.2 K/UL (ref 4–11)
WBC #/AREA URNS HPF: ABNORMAL /HPF (ref 0–5)

## 2025-06-23 PROCEDURE — 72125 CT NECK SPINE W/O DYE: CPT

## 2025-06-23 PROCEDURE — 93005 ELECTROCARDIOGRAM TRACING: CPT | Performed by: PHYSICIAN ASSISTANT

## 2025-06-23 PROCEDURE — 70450 CT HEAD/BRAIN W/O DYE: CPT

## 2025-06-23 PROCEDURE — 82803 BLOOD GASES ANY COMBINATION: CPT

## 2025-06-23 PROCEDURE — 87186 SC STD MICRODIL/AGAR DIL: CPT

## 2025-06-23 PROCEDURE — 85025 COMPLETE CBC W/AUTO DIFF WBC: CPT

## 2025-06-23 PROCEDURE — 87086 URINE CULTURE/COLONY COUNT: CPT

## 2025-06-23 PROCEDURE — 87077 CULTURE AEROBIC IDENTIFY: CPT

## 2025-06-23 PROCEDURE — 83880 ASSAY OF NATRIURETIC PEPTIDE: CPT

## 2025-06-23 PROCEDURE — 36415 COLL VENOUS BLD VENIPUNCTURE: CPT

## 2025-06-23 PROCEDURE — 84484 ASSAY OF TROPONIN QUANT: CPT

## 2025-06-23 PROCEDURE — 80053 COMPREHEN METABOLIC PANEL: CPT

## 2025-06-23 PROCEDURE — 71045 X-RAY EXAM CHEST 1 VIEW: CPT

## 2025-06-23 PROCEDURE — 81001 URINALYSIS AUTO W/SCOPE: CPT

## 2025-06-23 PROCEDURE — 99285 EMERGENCY DEPT VISIT HI MDM: CPT

## 2025-06-23 ASSESSMENT — LIFESTYLE VARIABLES
HOW MANY STANDARD DRINKS CONTAINING ALCOHOL DO YOU HAVE ON A TYPICAL DAY: PATIENT DOES NOT DRINK
HOW OFTEN DO YOU HAVE A DRINK CONTAINING ALCOHOL: NEVER

## 2025-06-23 ASSESSMENT — PAIN - FUNCTIONAL ASSESSMENT: PAIN_FUNCTIONAL_ASSESSMENT: NONE - DENIES PAIN

## 2025-06-24 PROBLEM — I95.1 ORTHOSTATIC HYPOTENSION: Status: ACTIVE | Noted: 2025-06-24

## 2025-06-24 LAB
EKG DIAGNOSIS: NORMAL
EKG Q-T INTERVAL: 430 MS
EKG QRS DURATION: 80 MS
EKG QTC CALCULATION (BAZETT): 432 MS
EKG R AXIS: 62 DEGREES
EKG T AXIS: 47 DEGREES
EKG VENTRICULAR RATE: 61 BPM
GLUCOSE BLD-MCNC: 235 MG/DL (ref 70–99)
GLUCOSE BLD-MCNC: 249 MG/DL (ref 70–99)
GLUCOSE BLD-MCNC: 266 MG/DL (ref 70–99)
GLUCOSE BLD-MCNC: 332 MG/DL (ref 70–99)
LACTATE BLDV-SCNC: 1.2 MMOL/L (ref 0.4–1.9)
LACTATE BLDV-SCNC: 2.1 MMOL/L (ref 0.4–1.9)
PERFORMED ON: ABNORMAL

## 2025-06-24 PROCEDURE — 99223 1ST HOSP IP/OBS HIGH 75: CPT | Performed by: INTERNAL MEDICINE

## 2025-06-24 PROCEDURE — 6360000002 HC RX W HCPCS

## 2025-06-24 PROCEDURE — 2580000003 HC RX 258

## 2025-06-24 PROCEDURE — 96365 THER/PROPH/DIAG IV INF INIT: CPT

## 2025-06-24 PROCEDURE — G0378 HOSPITAL OBSERVATION PER HR: HCPCS

## 2025-06-24 PROCEDURE — 83605 ASSAY OF LACTIC ACID: CPT

## 2025-06-24 PROCEDURE — 2580000003 HC RX 258: Performed by: PHYSICIAN ASSISTANT

## 2025-06-24 PROCEDURE — 36415 COLL VENOUS BLD VENIPUNCTURE: CPT

## 2025-06-24 PROCEDURE — 93010 ELECTROCARDIOGRAM REPORT: CPT | Performed by: INTERNAL MEDICINE

## 2025-06-24 PROCEDURE — 2580000003 HC RX 258: Performed by: NURSE PRACTITIONER

## 2025-06-24 PROCEDURE — 6370000000 HC RX 637 (ALT 250 FOR IP)

## 2025-06-24 PROCEDURE — 96366 THER/PROPH/DIAG IV INF ADDON: CPT

## 2025-06-24 PROCEDURE — 1200000000 HC SEMI PRIVATE

## 2025-06-24 PROCEDURE — 87040 BLOOD CULTURE FOR BACTERIA: CPT

## 2025-06-24 PROCEDURE — 6360000002 HC RX W HCPCS: Performed by: PHYSICIAN ASSISTANT

## 2025-06-24 PROCEDURE — 99223 1ST HOSP IP/OBS HIGH 75: CPT

## 2025-06-24 RX ORDER — MAGNESIUM SULFATE IN WATER 40 MG/ML
2000 INJECTION, SOLUTION INTRAVENOUS PRN
Status: CANCELLED | OUTPATIENT
Start: 2025-06-24

## 2025-06-24 RX ORDER — TRAZODONE HYDROCHLORIDE 50 MG/1
12.5 TABLET ORAL NIGHTLY
Status: ON HOLD | COMMUNITY

## 2025-06-24 RX ORDER — MAGNESIUM GLUCONATE 27 MG(500)
500 TABLET ORAL DAILY
Status: ON HOLD | COMMUNITY

## 2025-06-24 RX ORDER — SODIUM CHLORIDE 0.9 % (FLUSH) 0.9 %
5-40 SYRINGE (ML) INJECTION PRN
Status: CANCELLED | OUTPATIENT
Start: 2025-06-24

## 2025-06-24 RX ORDER — HYDROCHLOROTHIAZIDE 25 MG/1
12.5 TABLET ORAL DAILY
Status: DISCONTINUED | OUTPATIENT
Start: 2025-06-24 | End: 2025-06-25 | Stop reason: HOSPADM

## 2025-06-24 RX ORDER — POTASSIUM CHLORIDE 1500 MG/1
40 TABLET, EXTENDED RELEASE ORAL PRN
Status: CANCELLED | OUTPATIENT
Start: 2025-06-24

## 2025-06-24 RX ORDER — FUROSEMIDE 40 MG/1
40 TABLET ORAL DAILY
Status: DISCONTINUED | OUTPATIENT
Start: 2025-06-24 | End: 2025-06-25 | Stop reason: HOSPADM

## 2025-06-24 RX ORDER — GLUCAGON 1 MG/ML
1 KIT INJECTION PRN
Status: DISCONTINUED | OUTPATIENT
Start: 2025-06-24 | End: 2025-06-25 | Stop reason: HOSPADM

## 2025-06-24 RX ORDER — INSULIN GLARGINE 100 [IU]/ML
40 INJECTION, SOLUTION SUBCUTANEOUS NIGHTLY
Status: DISCONTINUED | OUTPATIENT
Start: 2025-06-24 | End: 2025-06-25 | Stop reason: HOSPADM

## 2025-06-24 RX ORDER — DILTIAZEM HYDROCHLORIDE 120 MG/1
120 CAPSULE, COATED, EXTENDED RELEASE ORAL DAILY
Status: DISCONTINUED | OUTPATIENT
Start: 2025-06-24 | End: 2025-06-25 | Stop reason: HOSPADM

## 2025-06-24 RX ORDER — ONDANSETRON 2 MG/ML
4 INJECTION INTRAMUSCULAR; INTRAVENOUS EVERY 6 HOURS PRN
Status: CANCELLED | OUTPATIENT
Start: 2025-06-24

## 2025-06-24 RX ORDER — ACETAMINOPHEN 325 MG/1
650 TABLET ORAL EVERY 6 HOURS PRN
Status: CANCELLED | OUTPATIENT
Start: 2025-06-24

## 2025-06-24 RX ORDER — POLYETHYLENE GLYCOL 3350 17 G/17G
17 POWDER, FOR SOLUTION ORAL DAILY PRN
Status: CANCELLED | OUTPATIENT
Start: 2025-06-24

## 2025-06-24 RX ORDER — SODIUM CHLORIDE 9 MG/ML
INJECTION, SOLUTION INTRAVENOUS CONTINUOUS
Status: ACTIVE | OUTPATIENT
Start: 2025-06-24 | End: 2025-06-24

## 2025-06-24 RX ORDER — ACETAMINOPHEN 650 MG/1
650 SUPPOSITORY RECTAL EVERY 6 HOURS PRN
Status: CANCELLED | OUTPATIENT
Start: 2025-06-24

## 2025-06-24 RX ORDER — ESCITALOPRAM OXALATE 10 MG/1
5 TABLET ORAL DAILY
Status: DISCONTINUED | OUTPATIENT
Start: 2025-06-24 | End: 2025-06-25 | Stop reason: HOSPADM

## 2025-06-24 RX ORDER — POTASSIUM CHLORIDE 7.45 MG/ML
10 INJECTION INTRAVENOUS PRN
Status: CANCELLED | OUTPATIENT
Start: 2025-06-24

## 2025-06-24 RX ORDER — LEVOTHYROXINE SODIUM 100 UG/1
100 TABLET ORAL
Status: DISCONTINUED | OUTPATIENT
Start: 2025-06-24 | End: 2025-06-25 | Stop reason: HOSPADM

## 2025-06-24 RX ORDER — ENOXAPARIN SODIUM 100 MG/ML
40 INJECTION SUBCUTANEOUS DAILY
Status: CANCELLED | OUTPATIENT
Start: 2025-06-24

## 2025-06-24 RX ORDER — SODIUM CHLORIDE 0.9 % (FLUSH) 0.9 %
5-40 SYRINGE (ML) INJECTION EVERY 12 HOURS SCHEDULED
Status: CANCELLED | OUTPATIENT
Start: 2025-06-24

## 2025-06-24 RX ORDER — ONDANSETRON 4 MG/1
4 TABLET, ORALLY DISINTEGRATING ORAL EVERY 8 HOURS PRN
Status: CANCELLED | OUTPATIENT
Start: 2025-06-24

## 2025-06-24 RX ORDER — DEXTROSE MONOHYDRATE 100 MG/ML
INJECTION, SOLUTION INTRAVENOUS CONTINUOUS PRN
Status: DISCONTINUED | OUTPATIENT
Start: 2025-06-24 | End: 2025-06-25 | Stop reason: HOSPADM

## 2025-06-24 RX ORDER — INSULIN LISPRO 100 [IU]/ML
0-4 INJECTION, SOLUTION INTRAVENOUS; SUBCUTANEOUS
Status: DISCONTINUED | OUTPATIENT
Start: 2025-06-24 | End: 2025-06-25 | Stop reason: HOSPADM

## 2025-06-24 RX ORDER — SODIUM CHLORIDE 9 MG/ML
INJECTION, SOLUTION INTRAVENOUS PRN
Status: CANCELLED | OUTPATIENT
Start: 2025-06-24

## 2025-06-24 RX ADMIN — APIXABAN 2.5 MG: 5 TABLET, FILM COATED ORAL at 08:43

## 2025-06-24 RX ADMIN — INSULIN LISPRO 1 UNITS: 100 INJECTION, SOLUTION INTRAVENOUS; SUBCUTANEOUS at 17:09

## 2025-06-24 RX ADMIN — SODIUM CHLORIDE: 9 INJECTION, SOLUTION INTRAVENOUS at 11:42

## 2025-06-24 RX ADMIN — SODIUM CHLORIDE 1000 MG: 9 INJECTION, SOLUTION INTRAVENOUS at 02:09

## 2025-06-24 RX ADMIN — INSULIN LISPRO 2 UNITS: 100 INJECTION, SOLUTION INTRAVENOUS; SUBCUTANEOUS at 21:49

## 2025-06-24 RX ADMIN — INSULIN LISPRO 3 UNITS: 100 INJECTION, SOLUTION INTRAVENOUS; SUBCUTANEOUS at 11:22

## 2025-06-24 RX ADMIN — INSULIN GLARGINE 40 UNITS: 100 INJECTION, SOLUTION SUBCUTANEOUS at 21:30

## 2025-06-24 RX ADMIN — ESCITALOPRAM OXALATE 5 MG: 10 TABLET ORAL at 08:42

## 2025-06-24 RX ADMIN — SODIUM CHLORIDE 1000 MG: 9 INJECTION, SOLUTION INTRAVENOUS at 21:56

## 2025-06-24 RX ADMIN — APIXABAN 2.5 MG: 5 TABLET, FILM COATED ORAL at 21:30

## 2025-06-24 RX ADMIN — DILTIAZEM HYDROCHLORIDE 120 MG: 120 CAPSULE, COATED, EXTENDED RELEASE ORAL at 08:42

## 2025-06-24 NOTE — H&P
above and without abnormality   -check orthostatic vital signs  -hold lasix and HCTZ  -PT/OT  -cardio consult    #UTI   -UA with trace leuks and WBC   -Urine Cx pending   -Continue rocephin     #Lactic acidosis-resolved  -2.1->1.2  -s/p IVF    #Elevated troponin-chronic   #Elevated BNP  -79->69->73  -EKG: afib with ST abnormality   -last echo wnl  -on lasix and HCTZ- holding for possible orthostatic hypotension   -monitor   -cariology consult    #Type II diabetes mellitus with hyperglycemia.  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  -continue home anticoagulation with Eliquis  -continue home diltiazem    #Mildly elevated LFTs  #Hepatitis C  -Monitor LFTs  -consider RUQ US- outpatient     #Depression   -Started on lexapro recently- continue     #MGUS    Note above makes patient higher risk for morbidity and mortality requiring testing and treatment.   DVT Prophylaxis: Eliquis  Diet: No diet orders on file  Code Status: Prior    Barbara Morrison PA-C 6/24/2025 7:22 AM

## 2025-06-24 NOTE — PROGRESS NOTES
Physician Progress Note      PATIENT:               LYRIC ARROYO  CSN #:                  [id number]  :                       1940  ADMIT DATE:       2025 8:52 PM  DISCH DATE:  RESPONDING  PROVIDER #:        Barbara Morrison          QUERY TEXT:    Based on your medical judgment, please clarify these findings and document if   any of the following are being evaluated and/or treated:    The clinical indicators include:  H&P Barbara Morrison PA-C: #Paroxysmal atrial fibrillation. Continue home   anticoagulation with Eliquis. Continue home diltiazem  - EKG: afib with ST abnormality  Cardiology  Dr Eusebio Orellana: CHADS score at least a 4 (HTN, Age, DM).  Risk Factor: 84-year-old, hx of DM, HTN  Options provided:  -- Secondary hypercoagulable state in a patient with atrial fibrillation  -- Other - I will add my own diagnosis  -- Disagree - Not applicable / Not valid  -- Disagree - Clinically unable to determine / Unknown  -- Refer to Clinical Documentation Reviewer    PROVIDER RESPONSE TEXT:    This patient has secondary hypercoagulable state in a patient with atrial   fibrillation.    Query created by: Marko Hendricks on 2025 3:01 PM      Electronically signed by:  Barbara Morrison 2025 3:06 PM

## 2025-06-24 NOTE — PROGRESS NOTES
Per [facility] Formulary Committee Policy, if an SGLT2 inhibitor, regardless of formulary status, is ordered in the hospital for an indication other than heart failure or chronic kidney disease, the pharmacist will discontinue the SGLT2 inhibitor while the patient is admitted to the hospital.    Patient was ordered Jardiance. If you feel the patient needs to continue their home therapy during the inpatient stay, the patient may bring their medication bottle for verification and administration pursuant to our home medication use policy. Otherwise, the medication can be reordered at discharge.     Madison Terry PharmD, [facility], 6/24/2025 8:02 AM

## 2025-06-24 NOTE — PROGRESS NOTES
4 Eyes Skin Assessment     NAME:  Nimesh Ng  YOB: 1940  MEDICAL RECORD NUMBER:  [id number]    The patient is being assessed for  Admission    I agree that at least one RN has performed a thorough Head to Toe Skin Assessment on the patient. ALL assessment sites listed below have been assessed.      Areas assessed by both nurses:    Head, Face, Ears, Shoulders, Back, Chest, Arms, Elbows, Hands, Sacrum. Buttock, Coccyx, Ischium, Legs. Feet and Heels, Under Medical Devices , and Other      Patient has quarter size bump on right side forehead, skinned knee, +2 edema BLE, flaky skin and feet.        Does the Patient have a Wound? Yes wound(s) were present on assessment. LDA wound assessment was Initiated and completed by PAPO Dunbar Prevention initiated by RN: Yes  Wound Care Orders initiated by RN: No    Pressure Injury (Stage 3,4, Unstageable, DTI, NWPT, and Complex wounds) if present, place Wound referral order by RN under : No    New Ostomies, if present place, Ostomy referral order under : No     Nurse 1 eSignature: Electronically signed by Modesta Kerns RN on 6/24/25 at 4:43 PM EDT    **SHARE this note so that the co-signing nurse can place an eSignature**    Nurse 2 eSignature: Electronically signed by Brayan Cobian RN on 6/24/25 at 8:54 PM EDT

## 2025-06-24 NOTE — ED NOTES
Nimesh Ng is a 84 y.o. male admitted for  Active Problems:    * No active hospital problems. *  Resolved Problems:    * No resolved hospital problems. *  .   Patient Home via EMS transportation with   Chief Complaint   Patient presents with    Loss of Consciousness     Pt states he was coming back from restroom and states he got dizzy and blacked out.  Pt then fell and hit forehead.  EMS states  upon arrival    .  Patient is alert and Person, Place, Time, and Situation  Patient's baseline mobility: Baseline Mobility: Independent   Code Status: Prior   Cardiac Rhythm:       Is patient on baseline Oxygen: no how many Liters:   Abnormal Assessment Findings:     Isolation: None      NIH Score:    C-SSRS: Risk of Suicide: No Risk  Bedside swallow:        Active LDA's:   Peripheral IV 06/23/25 Right;Anterior Forearm (Active)   Site Assessment Clean, dry & intact 06/23/25 2117   Line Status Brisk blood return;Flushed;Normal saline locked 06/23/25 2117   Phlebitis Assessment No symptoms 06/23/25 2117   Infiltration Assessment 0 06/23/25 2117   Dressing Status Clean, dry & intact;New dressing applied 06/23/25 2117   Dressing Type Transparent 06/23/25 2117   Dressing Intervention New 06/23/25 2117     Patient admitted with a segundo: no If the segundo is chronic was it exchanged:  Reason for segundo:   Patient admitted with Central Line:  . PICC line placement confirmed: YES OR NO:[id number]}   Reason for Central line:   Was central line Inserted from an outside facility:        Family/Caregiver Present no Any Concerns:    Restraints no  Sitter no         Vitals: MEWS Score: 1    Vitals:    06/23/25 2230 06/23/25 2300 06/23/25 2320 06/23/25 2337   BP: 139/80 129/85     Pulse: 61 60 61 71   Resp: 16 19 19 20   Temp:       TempSrc:       SpO2: 95% 96% 95% 97%   Weight:       Height:           Last documented pain score (0-10 scale)    Pain medication administered No.    Pertinent or High Risk Medications/Drips: No.    Pending

## 2025-06-24 NOTE — PLAN OF CARE
Patient 84-year-old male presented with syncope and collapse,  2D echocardiogram ordered  Cardiology consulted    UTI, ceftriaxone ordered

## 2025-06-24 NOTE — CARE COORDINATION
Case Management Assessment  Initial Evaluation    Date/Time of Evaluation: 6/24/2025 11:45 AM  Assessment Completed by: Britney Fiore    If patient is discharged prior to next notation, then this note serves as note for discharge by case management.    Patient Name: Nimesh Ng                   YOB: 1940  Diagnosis: Syncope and collapse [R55]                   Date / Time: 6/23/2025  8:52 PM    Patient Admission Status: Inpatient   Readmission Risk (Low < 19, Mod (19-27), High > 27): Readmission Risk Score: 17.3    Current PCP: Adi Lemons MD  PCP verified by CM? Yes (Cristo)    Chart Reviewed: Yes      History Provided by: Patient  Patient Orientation: Alert and Oriented    Patient Cognition: Alert    Hospitalization in the last 30 days (Readmission):  Yes    If yes, Readmission Assessment in CM Navigator will be completed.    Advance Directives:      Code Status: Prior   Patient's Primary Decision Maker is: Named in Scanned ACP Document      Discharge Planning:    Patient lives with: Alone Type of Home: House  Primary Care Giver: Self  Patient Support Systems include: Family Members, [employer]   Current Financial resources: Medicare  Current community resources: None  Current services prior to admission: None            Current DME:              Type of Home Care services:  None    ADLS  Prior functional level: Independent in ADLs/IADLs  Current functional level: Independent in ADLs/IADLs    PT AM-PAC:   /24  OT AM-PAC:   /24    Family can provide assistance at DC: No  Would you like Case Management to discuss the discharge plan with any other family members/significant others, and if so, who? No  Plans to Return to Present Housing: Yes  Other Identified Issues/Barriers to RETURNING to current housing: none  Potential Assistance needed at discharge: N/A            Potential DME:    Patient expects to discharge to: House  Plan for transportation at discharge:  Family    Financial    Payor: MEDIGOLD / Plan: MEDIGOLD / Product Type: *No Product type* /     Does insurance require precert for SNF: Yes    Potential assistance Purchasing Medications: No  Meds-to-Beds request:        [facility] - [place] - [address] - P [phone] - F [phone]  [address]  [address]  [place] [address]  Phone: [phone] Fax: [phone]    [facility] - [place] - [address] - P [phone] - F [phone]  [facility]  [place] [address]  Phone: [phone] Fax: [phone]    [id number] - [place], [place] - [address] - P [phone] - F [phone]  [address]  [place] [address]  Phone: [phone] Fax: [phone]      Notes:    Factors facilitating achievement of predicted outcomes: Motivated, Cooperative, and Pleasant    Barriers to discharge: Limited family support    Additional Case Management Notes: Reviewed chart, spoke with AIMEE Monteiro who completed assessment. From house alone, plan to return. IPTA. + , volunteers at [facility]. No DME or HC PTA. Thania jenkins pt has been looking at Assisted Living facilities as pt has very limited family. She is the only relative and lives in [place]. [facility] pt has also been falling and sugars have been uncontrolled. [facility] pt was admitted over the weekend and insulin was called in but no syringes. No needs identified at this time. CM following.      The Plan for Transition of Care is related to the following treatment goals of Syncope and collapse [R55]    IF APPLICABLE: The Patient and/or patient representative Nimesh and his family were provided with a choice of provider and agrees with the discharge plan. Freedom of choice list with basic dialogue that supports the patient's individualized plan of care/goals and shares the quality data associated with the providers was provided to:     Patient

## 2025-06-24 NOTE — PROGRESS NOTES

## 2025-06-24 NOTE — CONSULTS
antibiotics.      Vitals:  Blood pressure 111/89, pulse 65, temperature 97.6 °F (36.4 °C), temperature source Oral, resp. rate 15, height 1.803 m (5' 11\"), weight 88 kg (194 lb), SpO2 93%.  Temp  Av.9 °F (36.6 °C)  Min: 97.6 °F (36.4 °C)  Max: 98.1 °F (36.7 °C)  Pulse  Av.4  Min: 53  Max: 75  BP  Min: 104/71  Max: 139/80  SpO2  Av.1 %  Min: 91 %  Max: 98 %    24 hour I/O    Intake/Output Summary (Last 24 hours) at 2025 0845  Last data filed at 2025 0700  Gross per 24 hour   Intake 50 ml   Output 850 ml   Net -800 ml     Current Facility-Administered Medications   Medication Dose Route Frequency Provider Last Rate Last Admin    cefTRIAXone (ROCEPHIN) 1,000 mg in sodium chloride 0.9 % 50 mL IVPB (addEASE)  1,000 mg IntraVENous Q24H Hilary Flores MD        glucose chewable tablet 16 g  4 tablet Oral PRN Barbara Morrison PA-C        dextrose bolus 10% 125 mL  125 mL IntraVENous PRN Barbara Morrison PA-C        Or    dextrose bolus 10% 250 mL  250 mL IntraVENous PRN Barbara Morrison PA-C        glucagon injection 1 mg  1 mg SubCUTAneous PRN Barbara Morrison PA-C        dextrose 10 % infusion   IntraVENous Continuous PRN Barbara Morrison PA-C        insulin lispro (HUMALOG,ADMELOG) injection vial 0-4 Units  0-4 Units SubCUTAneous 4x Daily AC & HS Barbara Morrison PA-C        dilTIAZem (CARDIZEM CD) extended release capsule 120 mg  120 mg Oral Daily Barbara Morrison PA-C   120 mg at 25 0842    apixaban (ELIQUIS) tablet 2.5 mg  2.5 mg Oral BID Barbara Morrison PA-C   2.5 mg at 25 0843    escitalopram (LEXAPRO) tablet 5 mg  5 mg Oral Daily Barbara Morrison PA-C   5 mg at 25 0842    [Held by provider] furosemide (LASIX) tablet 40 mg  40 mg Oral Daily Barbara Morrison PA-C        [Held by provider] hydroCHLOROthiazide (HYDRODIURIL) tablet 12.5 mg  12.5 mg Oral Daily Barbara Morrison PA-C        levothyroxine (SYNTHROID) tablet 100 mcg  100 mcg Oral QAM AC Prince  34 06/23/2025 09:25 PM     CBC:    Lab Results   Component Value Date/Time    WBC 4.2 06/23/2025 09:25 PM    RBC 5.62 06/23/2025 09:25 PM    RBC 4.33 05/01/2017 03:04 PM    HGB 15.7 06/23/2025 09:25 PM    HCT 48.4 06/23/2025 09:25 PM    MCV 86.2 06/23/2025 09:25 PM    RDW 15.7 06/23/2025 09:25 PM     06/23/2025 09:25 PM     BNP:  No results found for: \"BNP\"  Fasting Lipid Panel:    Lab Results   Component Value Date/Time    CHOL 151 04/22/2025 01:53 PM    HDL 72 04/22/2025 01:53 PM    TRIG 52 04/22/2025 01:53 PM     Cardiac Enzymes:  CK/MbTroponin  Lab Results   Component Value Date/Time    TROPONINI 0.02 08/04/2022 10:09 PM     PT/ INR   Lab Results   Component Value Date/Time    INR 1.08 07/17/2017 06:45 AM    INR 1.19 06/14/2017 11:37 AM    INR 1.10 04/16/2010 01:45 AM    PROTIME 12.2 07/17/2017 06:45 AM    PROTIME 13.4 06/14/2017 11:37 AM    PROTIME 12.3 04/16/2010 01:45 AM     PTT No components found for: \"PTT\"   Lab Results   Component Value Date/Time    MG 1.94 06/20/2025 05:02 AM      Lab Results   Component Value Date/Time    TSH 0.50 04/30/2024 10:44 AM       Assessment/Plan:     Elevated Troponin    - Presentation not consistent with acute plaque rupture or ACS.  Patient has had no ischemic symptoms.  EKG shows no ischemic changes.  Troponin most likely elevated secondary to his CKD IIIb.  - Consider outpatient risk stratification with stress test upon recovery.  -no need for repeat echo     Atrial fibrillation, longtime persistent, rate controlled   -Rate Controlled. CHADS score at least a 4 (HTN, Age, DM).    - Continue dose reduced Eliquis 2.5 mg BID (Age and Creatinine)     Syncope   - Concern for syncope being secondary to orthostatic hypotension versus micturition syncope.  Does sound like he walked away from the restroom following completion of urination which makes the latter less likely.  Orthostatics were grossly positive in the ER.  He was symptomatic with orthostatics.  This is our

## 2025-06-24 NOTE — PROGRESS NOTES
Vitals:    06/24/25 1432   BP: 135/84   Pulse: 61   Resp: 18   Temp: 97.4 °F (36.3 °C)   SpO2: 95%      Admission questions complete, home medications have been verified, and a head-to-toe assessment has been completed. Patient has been orientated to the unit and the room. Call light is in reach and has been explained. No further needs noted at this time. Will continue to monitor.       Patient home medications are locked in his drawer, sign hanging in cubby.

## 2025-06-24 NOTE — PROGRESS NOTES
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), Esophageal reflux, Fracture of nasal bone, Headache, Hearing loss, History of blood transfusion, Hyperlipidemia, Hyperlipidemia associated with type 2 diabetes mellitus (HCC), Hypertension, Hypertrophy of prostate without urinary obstruction and other lower urinary tract symptoms (LUTS), Hypothyroidism, Insufficiency fracture of tibia, Osteoarthrosis, not specified whether generalized/localized, lower leg, Pain in joint, lower leg, Right knee pain, Skin cancer, basal cell, Status post total left knee replacement, Tinnitus, 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     Weights Reviewed: Yes   Admission weight: 88 kg (194 lb)   Wt Readings from Last 3 Encounters:   06/23/25 88 kg (194 lb)   06/20/25 88 kg (194 lb)   04/22/25 105.2 kg (232 lb)     Intake & Output Reviewed: Yes     Intake/Output Summary (Last 24 hours) at 6/24/2025 1638  Last data filed at 6/24/2025 1126  Gross per 24 hour   Intake 50 ml   Output 1250 ml   Net -1200 ml       ECHOCARDIOGRAM Reviewed: Yes   Patient's Ejection Fraction (EF) is greater than 40%     Medications Reviewed: Yes   SCHEDULED HOSPITAL MEDICATIONS:   cefTRIAXone (ROCEPHIN) IV  1,000 mg IntraVENous Q24H    insulin lispro  0-4 Units SubCUTAneous 4x Daily AC & HS    dilTIAZem  120 mg Oral Daily    apixaban  2.5 mg Oral BID    escitalopram  5 mg Oral Daily    [Held by provider] furosemide  40 mg Oral Daily    [Held by provider]  hydroCHLOROthiazide  12.5 mg Oral Daily    levothyroxine  100 mcg Oral QAM AC    insulin glargine  40 Units SubCUTAneous Nightly     HOME MEDICATIONS:  Prior to Admission medications    Medication Sig Start Date End Date Taking? Authorizing Provider   magnesium gluconate (MAGONATE) 500 MG tablet Take 1 tablet by mouth daily   Yes Stephanie Mathews MD   traZODone (DESYREL) 50 MG tablet Take 0.25 tablets by mouth nightly   Yes Stephanie Mathews MD   escitalopram (LEXAPRO) 5 MG tablet Take 1 tablet by mouth daily 6/21/25  Yes Екатерина Sneed MD   furosemide (LASIX) 40 MG tablet TAKE 1 TABLET DAILY 4/11/25  Yes Adi Lemons MD   levothyroxine (SYNTHROID) 100 MCG tablet TAKE 1 TABLET DAILY 4/11/25  Yes Adi Lemons MD   hydroCHLOROthiazide 12.5 MG tablet TAKE 1 TABLET DAILY 4/11/25  Yes Adi Lmeons MD   JARDIANCE 25 MG tablet TAKE 1 TABLET DAILY 4/11/25  Yes Adi Lemons MD   pioglitazone (ACTOS) 30 MG tablet TAKE 1 TABLET DAILY 4/11/25  Yes Adi Lemons MD   ELIQUIS 2.5 MG TABS tablet TAKE 1 TABLET TWICE A DAY 4/11/25  Yes Adi Lemons MD   vitamin D 50 MCG (2000 UT) CAPS capsule Take 1 capsule by mouth daily   Yes Stephanie Mathews MD   Multiple Vitamins-Minerals (MULTIVITAMIN ADULTS PO) Take by mouth daily   Yes Stephanie Mathews MD   insulin glargine-yfgn (SEMGLEE, YFGN,) 100 UNIT/ML injection vial Inject 40 Units into the skin nightly 6/21/25   Екатерина Sneed MD   insulin lispro, 1 Unit Dial, (HUMALOG KWIKPEN) 100 UNIT/ML SOPN Inject 8 Units into the skin 3 times daily (before meals) 6/21/25   Екатерина Sneed MD   dilTIAZem (CARDIZEM CD) 120 MG extended release capsule TAKE 1 CAPSULE DAILY 4/11/25   Adi Lemons MD   Insulin Syringe-Needle U-100 (B-D INS SYR ULTRAFINE 1CC/30G) 30G X 1/2\" 1 ML MISC USE WITH LANTUS NIGHTLY. DX: E11.9 1/20/25   Adi Lemons MD

## 2025-06-25 VITALS
RESPIRATION RATE: 16 BRPM | TEMPERATURE: 98.1 F | HEIGHT: 71 IN | WEIGHT: 194 LBS | OXYGEN SATURATION: 93 % | DIASTOLIC BLOOD PRESSURE: 64 MMHG | BODY MASS INDEX: 27.16 KG/M2 | HEART RATE: 80 BPM | SYSTOLIC BLOOD PRESSURE: 131 MMHG

## 2025-06-25 PROBLEM — N39.0 URINARY TRACT INFECTION WITHOUT HEMATURIA: Status: ACTIVE | Noted: 2025-06-25

## 2025-06-25 PROBLEM — R42 LIGHTHEADEDNESS: Status: ACTIVE | Noted: 2025-06-25

## 2025-06-25 LAB
ALBUMIN SERPL-MCNC: 2.8 G/DL (ref 3.4–5)
ALBUMIN/GLOB SERPL: 0.8 {RATIO} (ref 1.1–2.2)
ALP SERPL-CCNC: 84 U/L (ref 40–129)
ALT SERPL-CCNC: 62 U/L (ref 10–40)
ANION GAP SERPL CALCULATED.3IONS-SCNC: 12 MMOL/L (ref 3–16)
AST SERPL-CCNC: 71 U/L (ref 15–37)
BASOPHILS # BLD: 0 K/UL (ref 0–0.2)
BASOPHILS NFR BLD: 0.4 %
BILIRUB SERPL-MCNC: 0.4 MG/DL (ref 0–1)
BUN SERPL-MCNC: 35 MG/DL (ref 7–20)
CALCIUM SERPL-MCNC: 9.1 MG/DL (ref 8.3–10.6)
CHLORIDE SERPL-SCNC: 98 MMOL/L (ref 99–110)
CO2 SERPL-SCNC: 31 MMOL/L (ref 21–32)
CREAT SERPL-MCNC: 1.3 MG/DL (ref 0.8–1.3)
DEPRECATED RDW RBC AUTO: 15.8 % (ref 12.4–15.4)
EOSINOPHIL # BLD: 0.2 K/UL (ref 0–0.6)
EOSINOPHIL NFR BLD: 3.8 %
EST. AVERAGE GLUCOSE BLD GHB EST-MCNC: 248.9 MG/DL
GFR SERPLBLD CREATININE-BSD FMLA CKD-EPI: 55 ML/MIN/{1.73_M2}
GLUCOSE BLD-MCNC: 144 MG/DL (ref 70–99)
GLUCOSE BLD-MCNC: 147 MG/DL (ref 70–99)
GLUCOSE BLD-MCNC: 279 MG/DL (ref 70–99)
GLUCOSE SERPL-MCNC: 126 MG/DL (ref 70–99)
HBA1C MFR BLD: 10.3 %
HCT VFR BLD AUTO: 44.9 % (ref 40.5–52.5)
HGB BLD-MCNC: 14.7 G/DL (ref 13.5–17.5)
LYMPHOCYTES # BLD: 1.1 K/UL (ref 1–5.1)
LYMPHOCYTES NFR BLD: 22.5 %
MCH RBC QN AUTO: 28.1 PG (ref 26–34)
MCHC RBC AUTO-ENTMCNC: 32.7 G/DL (ref 31–36)
MCV RBC AUTO: 86 FL (ref 80–100)
MONOCYTES # BLD: 0.5 K/UL (ref 0–1.3)
MONOCYTES NFR BLD: 10.5 %
NEUTROPHILS # BLD: 3.1 K/UL (ref 1.7–7.7)
NEUTROPHILS NFR BLD: 62.8 %
PERFORMED ON: ABNORMAL
PLATELET # BLD AUTO: 198 K/UL (ref 135–450)
PMV BLD AUTO: 8.2 FL (ref 5–10.5)
POTASSIUM SERPL-SCNC: 3.6 MMOL/L (ref 3.5–5.1)
PROT SERPL-MCNC: 6.2 G/DL (ref 6.4–8.2)
RBC # BLD AUTO: 5.22 M/UL (ref 4.2–5.9)
SODIUM SERPL-SCNC: 141 MMOL/L (ref 136–145)
WBC # BLD AUTO: 4.9 K/UL (ref 4–11)

## 2025-06-25 PROCEDURE — 97535 SELF CARE MNGMENT TRAINING: CPT

## 2025-06-25 PROCEDURE — 99232 SBSQ HOSP IP/OBS MODERATE 35: CPT | Performed by: NURSE PRACTITIONER

## 2025-06-25 PROCEDURE — 97165 OT EVAL LOW COMPLEX 30 MIN: CPT

## 2025-06-25 PROCEDURE — 80053 COMPREHEN METABOLIC PANEL: CPT

## 2025-06-25 PROCEDURE — 97162 PT EVAL MOD COMPLEX 30 MIN: CPT

## 2025-06-25 PROCEDURE — 36415 COLL VENOUS BLD VENIPUNCTURE: CPT

## 2025-06-25 PROCEDURE — 99238 HOSP IP/OBS DSCHRG MGMT 30/<: CPT | Performed by: INTERNAL MEDICINE

## 2025-06-25 PROCEDURE — 97530 THERAPEUTIC ACTIVITIES: CPT

## 2025-06-25 PROCEDURE — 97110 THERAPEUTIC EXERCISES: CPT

## 2025-06-25 PROCEDURE — 85025 COMPLETE CBC W/AUTO DIFF WBC: CPT

## 2025-06-25 PROCEDURE — 83036 HEMOGLOBIN GLYCOSYLATED A1C: CPT

## 2025-06-25 PROCEDURE — 6370000000 HC RX 637 (ALT 250 FOR IP)

## 2025-06-25 PROCEDURE — G0378 HOSPITAL OBSERVATION PER HR: HCPCS

## 2025-06-25 RX ORDER — CEFUROXIME AXETIL 250 MG/1
250 TABLET ORAL 2 TIMES DAILY
Qty: 14 TABLET | Refills: 0 | Status: SHIPPED | OUTPATIENT
Start: 2025-06-25 | End: 2025-07-02

## 2025-06-25 RX ORDER — FUROSEMIDE 40 MG/1
40 TABLET ORAL DAILY PRN
Status: ON HOLD | COMMUNITY
Start: 2025-06-25

## 2025-06-25 RX ADMIN — APIXABAN 2.5 MG: 5 TABLET, FILM COATED ORAL at 09:27

## 2025-06-25 RX ADMIN — ESCITALOPRAM OXALATE 5 MG: 10 TABLET ORAL at 09:27

## 2025-06-25 RX ADMIN — LEVOTHYROXINE SODIUM 100 MCG: 100 TABLET ORAL at 06:30

## 2025-06-25 RX ADMIN — DILTIAZEM HYDROCHLORIDE 120 MG: 120 CAPSULE, COATED, EXTENDED RELEASE ORAL at 09:27

## 2025-06-25 ASSESSMENT — PAIN SCALES - GENERAL: PAINLEVEL_OUTOF10: 0

## 2025-06-25 NOTE — CARE COORDINATION
Met with pt at bedside. Pt to dc home today. Pt needs cab for transportation home. Pt declines need for HHC. Pt to reach out to [facility] or [facility] for help with housekeeping, transportation and paying bills. No further DC or DME needs identified.     IMM- 6/24     O2- 95% RA

## 2025-06-25 NOTE — DISCHARGE INSTRUCTIONS
Your information:  Name: Nimesh Ng  : 1940    Your instructions:    Follow up with family doctor in 1 week.    What to do after you leave the hospital:    Recommended diet: diabetic diet    Recommended activity: activity as tolerated        The following personal items were collected during your admission and were returned to you:    Belongings  Dental Appliances: None  Vision - Corrective Lenses: Eyeglasses  Hearing Aid: None  Clothing: Belt, Footwear, Pants, Shirt, Undergarments  Jewelry: None  Electronic Devices: None  Weapons (Notify Protective Services/Security): None  Home Medications: None  Valuables Given To: Patient  Provide Name(s) of Who Valuable(s) Were Given To: patient    Information obtained by:  By signing below, I understand that if any problems occur once I leave the hospital I am to contact family doctor.  I understand and acknowledge receipt of the instructions indicated above.

## 2025-06-25 NOTE — PROGRESS NOTES
[facility]     Cardiology                                     Progress Note    Admission date:  6/23/2025    Reason for follow up visit: Elevated Troponin     HPI/CC: Nimesh Ng is being seen today for cardiology consult for elevated troponin levels. He has a PMH of HTN, hepatitis C, DM II, atrial fibrillation, HLD, situational depression.     Of note, he was recently admitted from 06/19/2025-06/21/2025 for hyperglycemia where he was noted to be orthostatic.     Patient presented to ER after an episode of loss of consciousness.  He was going back to his chair from the restroom to urinate and he felt dizzy and loss consciousness for what he felt was less than a minute.  He fell and hit his forehead. He was feeling lightheaded for the past 4 days.  He had the same lightheadedness when stood for orthostatics this morning with the nurse per our discussion with RN in room.  These abated when he was sat down.  Does note he does not take significant amount of water daily.  Does drink alcohol, evasive on this and notes he has not drank much in the past 2 weeks.  He has been taking Lasix and hydrochlorothiazide.  Notes he was having onset of lightheadedness with rising from a seated position to go to the bathroom in his home.  He has not had any complaints of chest pain, shortness of breath, neck pain, palpitations.  Patient has no orthopnea or PND.  He does have baseline lower extremity edema.  Utilizes compression stockings when he notices can be upright for a difficult time.    In the ER, he had a non-acute CT head and CTA head and neck. Troponins were elevated, but were consistent with baseline from previous admission.  Troponin 79 (06/23/2025 at 2125)  and repeat troponin 69 (06/23/2025 at 2226). Echo from 06/20/2025 showed EF of 60 to 65%.  BNP is elevated, but is at his baseline.  Renal function is decreased, but is also at his baseline.  UA showed concern for UTI.  He has given one dose of IV

## 2025-06-25 NOTE — DISCHARGE SUMMARY
Name:  Nimehs Ng  Room:  0234/0234-01  MRN:    [id number]    Discharge Summary      This discharge summary is in conjunction with a complete physical exam done on the day of discharge.      Discharging Physician: Dr. Lemons      Admit: 6/23/2025  Discharge:  6/25/2025    Diagnoses this Admission    Principal Problem:    Syncope and collapse  Active Problems:    Stage 3a chronic kidney disease (HCC)    Primary hypertension    Benign essential tremor    Hypothyroidism    Type 2 diabetes mellitus without complication, with long-term current use of insulin (HCC)    Chronic hepatitis C without hepatic coma (HCC)    Hyperlipidemia associated with type 2 diabetes mellitus (HCC)    PAF (paroxysmal atrial fibrillation) (HCC)    MGUS (monoclonal gammopathy of unknown significance)    Elevated troponin    Current moderate episode of major depressive disorder (HCC)    Orthostatic hypotension    Lightheadedness    Urinary tract infection without hematuria  Resolved Problems:    * No resolved hospital problems. *      Procedures (Please Review Full Report for Details)  none    Consults    IP CONSULT TO HOSPITALIST  IP CONSULT TO CARDIOLOGY  IP CONSULT TO CARDIOLOGY  IP CONSULT TO SPIRITUAL SERVICES      HPI:    The patient is a 84 y.o. male with PMHx of Afib, hep c, HTN, CKD, and T2DM who presents to [facility] for syncope and collapse. He went to go to the bathroom last night and felt shaky/ dizzy as he was walking in there. He took a step back and used the wall to steady himself. He went to the bathroom and as he was walking out of the bathroom he states that everything \"went black\" and he passed out, hitting his forehead. He denies a room spinning sensation and describes feeling more lightheaded. He denies a history of syncope. He denies chest pain, shortness of breath, and recent cough. He was recently hospitalized for hyperglycemia and unsteady gait. History obtained from the patient and review of EMR.  (200 lb)   SpO2 98%   BMI 28.70 kg/m²   Physical Exam  Vitals reviewed.   Constitutional:       General: He is not in acute distress.     Appearance: Normal appearance. He is not ill-appearing.   HENT:      Head: Normocephalic and atraumatic.      Right Ear: Tympanic membrane, ear canal and external ear normal. There is no impacted cerumen.      Left Ear: Tympanic membrane, ear canal and external ear normal. There is no impacted cerumen.      Nose: No congestion or rhinorrhea.      Mouth/Throat:      Mouth: Mucous membranes are moist.      Pharynx: Oropharynx is clear. No oropharyngeal exudate or posterior oropharyngeal erythema.   Eyes:      General: No scleral icterus.        Right eye: No discharge.         Left eye: No discharge.      Extraocular Movements: Extraocular movements intact.      Conjunctiva/sclera: Conjunctivae normal.      Pupils: Pupils are equal, round, and reactive to light.   Neck:      Vascular: No carotid bruit.   Cardiovascular:      Rate and Rhythm: Normal rate and regular rhythm.      Pulses: Normal pulses.      Heart sounds: Normal heart sounds. No murmur heard.     No friction rub. No gallop.   Pulmonary:      Effort: Pulmonary effort is normal. No respiratory distress.      Breath sounds: Normal breath sounds. No stridor. No wheezing, rhonchi or rales.   Chest:      Chest wall: No tenderness.   Abdominal:      General: Abdomen is flat. Bowel sounds are normal. There is no distension.      Palpations: Abdomen is soft. There is no mass.      Tenderness: There is no abdominal tenderness. There is no guarding or rebound.      Hernia: No hernia is present.   Genitourinary:     Penis: Normal.       Testes: Normal.      Prostate: Normal.      Rectum: Normal.   Musculoskeletal:         General: No swelling, tenderness, deformity or signs of injury. Normal range of motion.      Cervical back: No rigidity or tenderness.      Right lower leg: No edema.      Left lower leg: No edema.       Comments: There is a cystic structure just above the webbing between the first and second toe of the right foot.   Lymphadenopathy:      Cervical: No cervical adenopathy.   Skin:     General: Skin is warm and dry.      Coloration: Skin is not jaundiced or pale.      Findings: No bruising, erythema, lesion or rash.   Neurological:      General: No focal deficit present.      Mental Status: He is alert and oriented to person, place, and time.      Cranial Nerves: No cranial nerve deficit.      Sensory: No sensory deficit.      Motor: No weakness.      Coordination: Coordination normal.      Gait: Gait normal.      Deep Tendon Reflexes: Reflexes normal.   Psychiatric:         Mood and Affect: Mood normal.         Behavior: Behavior normal.         Thought Content: Thought content normal.         Judgment: Judgment normal.              Diagnosis Orders   1. Ganglion cyst  Surgery to remove           PLAN:  - Patient seen and evaluated. Discussed the need for surgical excision of the ganglion cyst to the right foot. Patient understands and agrees to the procedure. All potential risks and complications were discussed with the patient in detail. Patient understands and wishes to proceed. Patient scheduled for excision of ganglion cyst of the right foot.

## 2025-06-25 NOTE — FLOWSHEET NOTE
06/25/25 0711   Handoff   Communication Given Shift Handoff   Handoff Given To Ciara BARROS   Handoff Received From Sherry BARROS   Handoff Communication Face to Face;At bedside   Time Handoff Given 0710   End of Shift Check Performed Yes     Patient is stable. All end of shift needs have been met. No further assistance needed at this time.

## 2025-06-25 NOTE — PROGRESS NOTES
Chap prayed with and encouraged Pt.   Chap contacted Pt's parish to ensure they knew he was here and to ensure he receives communion.     Tam Vázquez

## 2025-06-25 NOTE — PROGRESS NOTES
Inpatient Occupational Therapy Evaluation & Treatment    Unit: [facility]  Date:  6/25/2025  Patient Name:    Nimesh Ng  Admitting diagnosis:  Syncope and collapse [R55]  Lightheadedness [R42]  Urinary tract infection without hematuria, site unspecified [N39.0]  Syncope, unspecified syncope type [R55]  Admit Date:  6/23/2025  Precautions/Restrictions/WB Status/ Lines/ Wounds/ Oxygen: Fall risk      Treatment Time:  9:00-9:33  Treatment Number:  1  Timed Code Treatment Minutes: 23 minutes  Total Treatment Minutes:  33  minutes    Patient Goals for Therapy: \"to go home\"          Discharge Recommendations: Home with PRN assist  DME needs for discharge: Needs Met       Therapy recommendations for staff:   Stand by assist for ambulation with use of rolling walker (RW) to/from bathroom    History of Present Illness: 84 y.o. male with PMHx of Afib, hep c, HTN, CKD, and T2DM who presents to [facility] for syncope and collapse. He went to go to the bathroom last night and felt shaky/ dizzy as he was walking in there. He took a step back and used the wall to steady himself. He went to the bathroom and as he was walking out of the bathroom he states that everything \"went black\" and he passed out, hitting his forehead.      Preadmission Environment:   Pt lives with                                         alone  Home environment:                            one story home  Steps to enter first floor:                     No steps; ramp entry  Steps to second floor/basement:        N/A  Laundry:                                              1st floor  Bathroom:                                           unknown  Pt sleeps in a:                                     Flat bed  Equipment owned:                             RW  Preadmission Status:  Pt able to drive: Yes  Pt fully independent with ADLs: Yes  Pt receives assistance from family for: Independent PTA  Pt independent for functional transfers and utilized No Device for

## 2025-06-25 NOTE — FLOWSHEET NOTE
06/24/25 1933   Vital Signs   Temp 97.5 °F (36.4 °C)   Temp Source Oral   Pulse 60   Heart Rate Source Monitor   Respirations 14   /61   MAP (Calculated) 75   BP Location Left upper arm   BP Method Automatic   Patient Position Semi fowlers   Blood Pressure Sitting 97/64   Blood Pressure Standing 92/57   Oxygen Therapy   SpO2 95 %   O2 Device None (Room air)     Shift assessment is complete. The pt is A&O x 4, The pt denies any further needs at this time. The pt call light and personal items are with in reach. The bed alarm is on. Will continue to monitor.

## 2025-06-25 NOTE — PLAN OF CARE
Problem: Safety - Adult  Goal: Free from fall injury  6/25/2025 0940 by Ciara Kumar RN  Outcome: Progressing     Problem: Discharge Planning  Goal: Discharge to home or other facility with appropriate resources  6/25/2025 0940 by Ciara Kumar, RN  Outcome: Progressing     Problem: Chronic Conditions and Co-morbidities  Goal: Patient's chronic conditions and co-morbidity symptoms are monitored and maintained or improved  6/25/2025 0940 by Ciara Kumar, RN  Outcome: Progressing

## 2025-06-25 NOTE — PLAN OF CARE
Problem: Chronic Conditions and Co-morbidities  Goal: Patient's chronic conditions and co-morbidity symptoms are monitored and maintained or improved  6/25/2025 0625 by Sherry Taylor RN  Outcome: Progressing     Problem: Discharge Planning  Goal: Discharge to home or other facility with appropriate resources  6/25/2025 0625 by Sherry Taylor RN  Outcome: Progressing     Problem: Safety - Adult  Goal: Free from fall injury  6/25/2025 0625 by Sherry Taylor RN  Outcome: Progressing

## 2025-06-25 NOTE — PROGRESS NOTES
Physician Progress Note      PATIENT:               LYRIC ARROYO  CSN #:                  [id number]  :                       1940  ADMIT DATE:       2025 8:52 PM  DISCH DATE:  RESPONDING  PROVIDER #:        Barbara Morrison          QUERY TEXT:    Lactic acidosis is documented in the H&P ( Barbara Morrison). Please provide   additional clinical indicators supportive of your documentation. Or please   document if the diagnosis of lactic acidosis has been ruled out after study.    The clinical indicators include:  H&P  Barbara Morrison  #Lactic acidosis-resolved  -2.1->1.2  -s/p IVF  Options provided:  -- Lactic acidosis is present as evidenced by, Please document evidence.  -- Lactic acidosis was ruled out after study  -- Other - I will add my own diagnosis  -- Disagree - Not applicable / Not valid  -- Disagree - Clinically unable to determine / Unknown  -- Refer to Clinical Documentation Reviewer    PROVIDER RESPONSE TEXT:    Lactic acidosis was ruled out after study.    Query created by: Marko Hendricks on 2025 11:29 AM      Electronically signed by:  Barbara Morrison 2025 11:52 AM

## 2025-06-25 NOTE — PLAN OF CARE
Problem: Chronic Conditions and Co-morbidities  Goal: Patient's chronic conditions and co-morbidity symptoms are monitored and maintained or improved  6/25/2025 1229 by Ciara Kumar RN  Outcome: Adequate for Discharge     Problem: Discharge Planning  Goal: Discharge to home or other facility with appropriate resources  6/25/2025 1229 by Ciara Kumar, RN  Outcome: Adequate for Discharge     Problem: Safety - Adult  Goal: Free from fall injury  6/25/2025 1229 by Ciara Kumar, RN  Outcome: Adequate for Discharge

## 2025-06-26 LAB
BACTERIA UR CULT: ABNORMAL
BACTERIA UR CULT: ABNORMAL
ORGANISM: ABNORMAL

## 2025-06-27 ENCOUNTER — TELEPHONE (OUTPATIENT)
Dept: INTERNAL MEDICINE CLINIC | Age: 85
End: 2025-06-27

## 2025-06-27 NOTE — TELEPHONE ENCOUNTER
----- Message from Dr. Adi Lemons MD sent at 6/27/2025  1:59 PM EDT -----  Contact: Belle [phone]  He was not confused when he was in the hospital.  Chest-coming to hospital follow-up with Raisa next week.  If he gets worse he needs to go to the ER  ----- Message -----  From: Stephanie Mcleod  Sent: 6/26/2025   1:12 PM EDT  To: Adi Lemons MD    Pt's sister-in-law, Brenda, informed and states pt does not drink any alcohol at all and is very concerned about his confusion. Please advise.  ----- Message -----  From: Adi Lemons MD  Sent: 6/26/2025   1:01 PM EDT  To: Stephanie Mcleod    He was just discharged from the hospital. Can you discreetly ask her about ?alcohol use.  ----- Message -----  From: Denisha Duke  Sent: 6/26/2025  12:46 PM EDT  To: Adi Lemons MD    Caller pt's sister in law, states she is extremely concerned about patients well being. Caller states he lives alone and does have help that comes in but is still confused to where anything is in the home. Caller only expresses major concern and states he is only getting worse. Please advise Call is on patients KENNY. Please advise

## 2025-06-27 NOTE — PROGRESS NOTES
Physician Progress Note      PATIENT:               LYRIC ARROYO  CSN #:                  [id number]  :                       1940  ADMIT DATE:       2025 8:52 PM  DISCH DATE:        2025 2:35 PM  RESPONDING  PROVIDER #:        Eusebio Orellana MD          QUERY TEXT:    Elevated cardiac troponin (cTc) levels are documented in the cardiology   consult note ( Dr Eusebio Orellana). Please clarify the cause:    The clinical indicators include:  Cardiology  Dr Eusebio Orellana: Elevated Troponin - Presentation not   consistent with acute plaque rupture or ACS.  Patient has had no ischemic   symptoms. EKG shows no ischemic changes. Troponin most likely elevated   secondary to his CKD IIIb.  Labs: Troponin  79, 69, and 73  Options provided:  -- Elevated cardiac troponin levels only without corresponding diagnosis  -- Myocardial injury, non-ischemic (non-traumatic) related to chronic kidney   disease (underlying cause)  -- Other - I will add my own diagnosis  -- Disagree - Not applicable / Not valid  -- Disagree - Clinically unable to determine / Unknown  -- Refer to Clinical Documentation Reviewer    PROVIDER RESPONSE TEXT:    This patient has myocardial injury, non-ischemic (non-traumatic) related to   chronic kidney disease (underlying cause).    Query created by: Marko Hendricks on 2025 2:54 PM      Electronically signed by:  Eusebio Orellana MD 2025 7:58 AM

## 2025-06-28 LAB
BACTERIA BLD CULT ORG #2: NORMAL
BACTERIA BLD CULT: NORMAL

## 2025-06-28 NOTE — ED PROVIDER NOTES
I performed a medical screening history and physical exam on this patient.  I also cared for and evaluated the patient in conjunction with the ED Advanced Practice Provider    HISTORY OF PRESENT ILLNESS  Nimesh Ng is a 84 y.o. male ***.      PHYSICAL EXAM  /64   Pulse 80   Temp 98.1 °F (36.7 °C) (Oral)   Resp 16   Ht 1.803 m (5' 11\")   Wt 88 kg (194 lb)   SpO2 93%   BMI 27.06 kg/m²     On exam, the patient appears in no acute distress. Speech is clear. Breathing is unlabored.  Moves all extremities    All diagnostic, treatment, and disposition decisions were made by myself in conjunction with the advanced practice provider.    For all further details of the patient's emergency department visit, please see the advanced practice provider's documentation.    Comment: Please note this report has been produced using speech recognition software and may contain errors related to that system including errors in grammar, punctuation, and spelling, as well as words and phrases that may be inappropriate. If there are any questions or concerns please feel free to contact the dictating provider for clarification.     
CONDITION Stable           PATIENT REFERRED TO:  No follow-up provider specified.    DISCHARGE MEDICATIONS:  New Prescriptions    No medications on file       DISCONTINUED MEDICATIONS:  Discontinued Medications    No medications on file              (Please note that portions of this note were completed with a voice recognition program.  Efforts were made to edit the dictations but occasionally words are mis-transcribed.)    Connie Lainez PA-C (electronically signed)      Connie Lainez PA-C  06/24/25 0216

## 2025-06-30 ENCOUNTER — RESULTS FOLLOW-UP (OUTPATIENT)
Dept: INTERNAL MEDICINE CLINIC | Age: 85
End: 2025-06-30

## 2025-07-01 ENCOUNTER — OFFICE VISIT (OUTPATIENT)
Dept: INTERNAL MEDICINE CLINIC | Age: 85
End: 2025-07-01

## 2025-07-01 VITALS
HEIGHT: 71 IN | HEART RATE: 68 BPM | BODY MASS INDEX: 28.28 KG/M2 | WEIGHT: 202 LBS | DIASTOLIC BLOOD PRESSURE: 72 MMHG | SYSTOLIC BLOOD PRESSURE: 104 MMHG

## 2025-07-01 DIAGNOSIS — Z09 HOSPITAL DISCHARGE FOLLOW-UP: ICD-10-CM

## 2025-07-01 DIAGNOSIS — Z79.4 TYPE 2 DIABETES MELLITUS WITH BOTH EYES AFFECTED BY MILD NONPROLIFERATIVE RETINOPATHY WITHOUT MACULAR EDEMA, WITH LONG-TERM CURRENT USE OF INSULIN (HCC): ICD-10-CM

## 2025-07-01 DIAGNOSIS — E11.3293 TYPE 2 DIABETES MELLITUS WITH BOTH EYES AFFECTED BY MILD NONPROLIFERATIVE RETINOPATHY WITHOUT MACULAR EDEMA, WITH LONG-TERM CURRENT USE OF INSULIN (HCC): ICD-10-CM

## 2025-07-01 DIAGNOSIS — R55 SYNCOPE, UNSPECIFIED SYNCOPE TYPE: Primary | ICD-10-CM

## 2025-07-01 PROCEDURE — 99496 TRANSJ CARE MGMT HIGH F2F 7D: CPT | Performed by: INTERNAL MEDICINE

## 2025-07-01 PROCEDURE — 1111F DSCHRG MED/CURRENT MED MERGE: CPT | Performed by: INTERNAL MEDICINE

## 2025-07-01 NOTE — PROGRESS NOTES
Benign essential tremor    Hypertrophy of prostate without urinary obstruction and other lower urinary tract symptoms (LUTS)    Hypothyroidism    Type 2 diabetes mellitus without complication, with long-term current use of insulin (HCC)    Chronic hepatitis C without hepatic coma (HCC)    Hyperlipidemia associated with type 2 diabetes mellitus (HCC)    Type 2 diabetes mellitus 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 edema (HCC)    Degenerative tear of medial meniscus of left knee    Primary osteoarthritis of left knee    PAF (paroxysmal atrial fibrillation) (HCC)    MGUS (monoclonal gammopathy of unknown significance)    Kidney lesion    Premature atrial contraction    Nasal polyposis    Syncope and collapse    Stage 3a chronic kidney disease (HCC)    Longstanding persistent atrial fibrillation (HCC)    Lymphedema    Skin cancer, basal cell    Basal cell carcinoma (BCC) of skin of left lower extremity including hip    Secondary hypercoagulable state    Hyperglycemia    Unstable gait    Lactic acidosis    Elevated brain natriuretic peptide (BNP) level    Elevated troponin    Moderate episode of recurrent major depressive disorder (HCC)    Current moderate episode of major depressive disorder (HCC)    Orthostatic hypotension    Lightheadedness    Urinary tract infection without hematuria       Medications listed as ordered at the time of discharge from hospital     Medication List            Accurate as of July 1, 2025  2:41 PM. If you have any questions, ask your nurse or doctor.                CONTINUE taking these medications      cefUROXime 250 MG tablet  Commonly known as: CEFTIN  Take 1 tablet by mouth 2 times daily for 7 days     dilTIAZem 120 MG extended release capsule  Commonly known as: CARDIZEM CD  TAKE 1 CAPSULE DAILY     Eliquis 2.5 MG Tabs tablet  Generic drug: apixaban  TAKE 1 TABLET TWICE A DAY     escitalopram 5 MG tablet  Commonly known as: LEXAPRO  Take 1 tablet by mouth

## 2025-07-03 ENCOUNTER — TELEPHONE (OUTPATIENT)
Dept: INTERNAL MEDICINE CLINIC | Age: 85
End: 2025-07-03

## 2025-07-03 NOTE — TELEPHONE ENCOUNTER
Pt informed and voiced understanding. States his sugars were 138 fasting this morning, Dr. Sneed informed.

## 2025-07-03 NOTE — TELEPHONE ENCOUNTER
----- Message from Dr. Екатерина Sneed MD sent at 7/3/2025  7:56 AM EDT -----  Contact: Nimesh [phone]  We talked about how the high sugar readings and low BP readings are causing he dizziness  Sugars any better after he resumed the insulin ?    Stop the flomax and see if dizziness is better  If he continues to feel dizzy bring him to ED  ----- Message -----  From: Amisha Villanueva MA  Sent: 7/2/2025   4:57 PM EDT  To: Екатерина Sneed MD    Pt just saw Dr. SMITH 7/1/25. Pt c/o that he is still getting dizzy when standing up. He wants the doctors opinion on what else he can do. He was not exactly sure if he was given instructions during the visit.       Pharmacy    [facility] [id number] - [place], [place] - [address] - P [phone] - F [phone]  [address] [facility] [place] [facility] [address]  Phone: [phone]  Fax: [phone]

## 2025-07-05 ENCOUNTER — APPOINTMENT (OUTPATIENT)
Dept: GENERAL RADIOLOGY | Age: 85
DRG: 309 | End: 2025-07-05
Payer: COMMERCIAL

## 2025-07-05 ENCOUNTER — HOSPITAL ENCOUNTER (INPATIENT)
Age: 85
LOS: 2 days | Discharge: HOME HEALTH | DRG: 309 | End: 2025-07-07
Attending: STUDENT IN AN ORGANIZED HEALTH CARE EDUCATION/TRAINING PROGRAM | Admitting: INTERNAL MEDICINE
Payer: COMMERCIAL

## 2025-07-05 DIAGNOSIS — I50.9 ACUTE ON CHRONIC CONGESTIVE HEART FAILURE, UNSPECIFIED HEART FAILURE TYPE (HCC): Primary | ICD-10-CM

## 2025-07-05 PROBLEM — R53.1 WEAKNESS: Status: ACTIVE | Noted: 2025-07-05

## 2025-07-05 LAB
ALBUMIN SERPL-MCNC: 3.2 G/DL (ref 3.4–5)
ALBUMIN/GLOB SERPL: 1 {RATIO} (ref 1.1–2.2)
ALP SERPL-CCNC: 79 U/L (ref 40–129)
ALT SERPL-CCNC: 37 U/L (ref 10–40)
ANION GAP SERPL CALCULATED.3IONS-SCNC: 19 MMOL/L (ref 3–16)
AST SERPL-CCNC: 44 U/L (ref 15–37)
BASE EXCESS BLDV CALC-SCNC: 0.2 MMOL/L (ref -3–3)
BASOPHILS # BLD: 0 K/UL (ref 0–0.2)
BASOPHILS NFR BLD: 0.2 %
BILIRUB SERPL-MCNC: 0.4 MG/DL (ref 0–1)
BILIRUB UR QL STRIP.AUTO: NEGATIVE
BUN SERPL-MCNC: 33 MG/DL (ref 7–20)
CALCIUM SERPL-MCNC: 9.6 MG/DL (ref 8.3–10.6)
CHLORIDE SERPL-SCNC: 93 MMOL/L (ref 99–110)
CLARITY UR: CLEAR
CO2 BLDV-SCNC: 27 MMOL/L
CO2 SERPL-SCNC: 24 MMOL/L (ref 21–32)
COHGB MFR BLDV: 1.8 % (ref 0–1.5)
COLOR UR: YELLOW
CREAT SERPL-MCNC: 1.4 MG/DL (ref 0.8–1.3)
DEPRECATED RDW RBC AUTO: 16 % (ref 12.4–15.4)
EKG DIAGNOSIS: NORMAL
EKG Q-T INTERVAL: 464 MS
EKG QRS DURATION: 78 MS
EKG QTC CALCULATION (BAZETT): 440 MS
EKG R AXIS: 113 DEGREES
EKG T AXIS: 137 DEGREES
EKG VENTRICULAR RATE: 54 BPM
EOSINOPHIL # BLD: 0 K/UL (ref 0–0.6)
EOSINOPHIL NFR BLD: 0.2 %
GFR SERPLBLD CREATININE-BSD FMLA CKD-EPI: 50 ML/MIN/{1.73_M2}
GLUCOSE BLD-MCNC: 130 MG/DL (ref 70–99)
GLUCOSE SERPL-MCNC: 135 MG/DL (ref 70–99)
GLUCOSE UR STRIP.AUTO-MCNC: >=1000 MG/DL
HCO3 BLDV-SCNC: 25.6 MMOL/L (ref 23–29)
HCT VFR BLD AUTO: 44.9 % (ref 40.5–52.5)
HGB BLD-MCNC: 14.6 G/DL (ref 13.5–17.5)
HGB UR QL STRIP.AUTO: NEGATIVE
KETONES UR STRIP.AUTO-MCNC: ABNORMAL MG/DL
LEUKOCYTE ESTERASE UR QL STRIP.AUTO: NEGATIVE
LYMPHOCYTES # BLD: 0.9 K/UL (ref 1–5.1)
LYMPHOCYTES NFR BLD: 17 %
MCH RBC QN AUTO: 28.1 PG (ref 26–34)
MCHC RBC AUTO-ENTMCNC: 32.6 G/DL (ref 31–36)
MCV RBC AUTO: 86.1 FL (ref 80–100)
METHGB MFR BLDV: 0.3 %
MONOCYTES # BLD: 0.4 K/UL (ref 0–1.3)
MONOCYTES NFR BLD: 8.1 %
NEUTROPHILS # BLD: 3.8 K/UL (ref 1.7–7.7)
NEUTROPHILS NFR BLD: 74.5 %
NITRITE UR QL STRIP.AUTO: NEGATIVE
NT-PROBNP SERPL-MCNC: 4314 PG/ML (ref 0–449)
O2 CT VFR BLDV CALC: 15 VOL %
O2 THERAPY: ABNORMAL
PCO2 BLDV: 44.4 MMHG (ref 40–50)
PERFORMED ON: ABNORMAL
PH BLDV: 7.38 [PH] (ref 7.35–7.45)
PH UR STRIP.AUTO: 6 [PH] (ref 5–8)
PLATELET # BLD AUTO: 222 K/UL (ref 135–450)
PMV BLD AUTO: 7.4 FL (ref 5–10.5)
PO2 BLDV: 40.2 MMHG (ref 25–40)
POTASSIUM SERPL-SCNC: 4.6 MMOL/L (ref 3.5–5.1)
PROT SERPL-MCNC: 6.4 G/DL (ref 6.4–8.2)
PROT UR STRIP.AUTO-MCNC: NEGATIVE MG/DL
RBC # BLD AUTO: 5.22 M/UL (ref 4.2–5.9)
SAO2 % BLDV: 74 %
SODIUM SERPL-SCNC: 136 MMOL/L (ref 136–145)
SP GR UR STRIP.AUTO: 1.01 (ref 1–1.03)
TROPONIN, HIGH SENSITIVITY: 58 NG/L (ref 0–22)
TROPONIN, HIGH SENSITIVITY: 64 NG/L (ref 0–22)
UA COMPLETE W REFLEX CULTURE PNL UR: ABNORMAL
UA DIPSTICK W REFLEX MICRO PNL UR: ABNORMAL
URN SPEC COLLECT METH UR: ABNORMAL
UROBILINOGEN UR STRIP-ACNC: 0.2 E.U./DL
WBC # BLD AUTO: 5.1 K/UL (ref 4–11)

## 2025-07-05 PROCEDURE — 83880 ASSAY OF NATRIURETIC PEPTIDE: CPT

## 2025-07-05 PROCEDURE — 99285 EMERGENCY DEPT VISIT HI MDM: CPT

## 2025-07-05 PROCEDURE — 93005 ELECTROCARDIOGRAM TRACING: CPT | Performed by: STUDENT IN AN ORGANIZED HEALTH CARE EDUCATION/TRAINING PROGRAM

## 2025-07-05 PROCEDURE — 2580000003 HC RX 258: Performed by: INTERNAL MEDICINE

## 2025-07-05 PROCEDURE — 96374 THER/PROPH/DIAG INJ IV PUSH: CPT

## 2025-07-05 PROCEDURE — 1200000000 HC SEMI PRIVATE

## 2025-07-05 PROCEDURE — 82803 BLOOD GASES ANY COMBINATION: CPT

## 2025-07-05 PROCEDURE — 6360000002 HC RX W HCPCS: Performed by: STUDENT IN AN ORGANIZED HEALTH CARE EDUCATION/TRAINING PROGRAM

## 2025-07-05 PROCEDURE — 93010 ELECTROCARDIOGRAM REPORT: CPT | Performed by: INTERNAL MEDICINE

## 2025-07-05 PROCEDURE — 84484 ASSAY OF TROPONIN QUANT: CPT

## 2025-07-05 PROCEDURE — 71045 X-RAY EXAM CHEST 1 VIEW: CPT

## 2025-07-05 PROCEDURE — 6370000000 HC RX 637 (ALT 250 FOR IP): Performed by: INTERNAL MEDICINE

## 2025-07-05 PROCEDURE — 81003 URINALYSIS AUTO W/O SCOPE: CPT

## 2025-07-05 PROCEDURE — 85025 COMPLETE CBC W/AUTO DIFF WBC: CPT

## 2025-07-05 PROCEDURE — 36415 COLL VENOUS BLD VENIPUNCTURE: CPT

## 2025-07-05 PROCEDURE — 2500000003 HC RX 250 WO HCPCS: Performed by: INTERNAL MEDICINE

## 2025-07-05 PROCEDURE — 80053 COMPREHEN METABOLIC PANEL: CPT

## 2025-07-05 RX ORDER — ACETAMINOPHEN 650 MG/1
650 SUPPOSITORY RECTAL EVERY 6 HOURS PRN
Status: DISCONTINUED | OUTPATIENT
Start: 2025-07-05 | End: 2025-07-07 | Stop reason: HOSPADM

## 2025-07-05 RX ORDER — FUROSEMIDE 40 MG/1
40 TABLET ORAL DAILY
Status: DISCONTINUED | OUTPATIENT
Start: 2025-07-06 | End: 2025-07-05

## 2025-07-05 RX ORDER — DEXTROSE MONOHYDRATE 100 MG/ML
INJECTION, SOLUTION INTRAVENOUS CONTINUOUS PRN
Status: DISCONTINUED | OUTPATIENT
Start: 2025-07-05 | End: 2025-07-07 | Stop reason: HOSPADM

## 2025-07-05 RX ORDER — INSULIN LISPRO 100 [IU]/ML
8 INJECTION, SOLUTION INTRAVENOUS; SUBCUTANEOUS
Status: DISCONTINUED | OUTPATIENT
Start: 2025-07-05 | End: 2025-07-06

## 2025-07-05 RX ORDER — SODIUM CHLORIDE 9 MG/ML
INJECTION, SOLUTION INTRAVENOUS PRN
Status: DISCONTINUED | OUTPATIENT
Start: 2025-07-05 | End: 2025-07-07 | Stop reason: HOSPADM

## 2025-07-05 RX ORDER — METOPROLOL TARTRATE 1 MG/ML
5 INJECTION, SOLUTION INTRAVENOUS EVERY 6 HOURS PRN
Status: DISCONTINUED | OUTPATIENT
Start: 2025-07-05 | End: 2025-07-07 | Stop reason: HOSPADM

## 2025-07-05 RX ORDER — SODIUM CHLORIDE 0.9 % (FLUSH) 0.9 %
5-40 SYRINGE (ML) INJECTION EVERY 12 HOURS SCHEDULED
Status: DISCONTINUED | OUTPATIENT
Start: 2025-07-05 | End: 2025-07-07 | Stop reason: HOSPADM

## 2025-07-05 RX ORDER — SODIUM CHLORIDE 0.9 % (FLUSH) 0.9 %
5-40 SYRINGE (ML) INJECTION PRN
Status: DISCONTINUED | OUTPATIENT
Start: 2025-07-05 | End: 2025-07-07 | Stop reason: HOSPADM

## 2025-07-05 RX ORDER — ESCITALOPRAM OXALATE 10 MG/1
5 TABLET ORAL DAILY
Status: DISCONTINUED | OUTPATIENT
Start: 2025-07-05 | End: 2025-07-07 | Stop reason: HOSPADM

## 2025-07-05 RX ORDER — ACETAMINOPHEN 325 MG/1
650 TABLET ORAL EVERY 6 HOURS PRN
Status: DISCONTINUED | OUTPATIENT
Start: 2025-07-05 | End: 2025-07-07 | Stop reason: HOSPADM

## 2025-07-05 RX ORDER — MAGNESIUM SULFATE IN WATER 40 MG/ML
2000 INJECTION, SOLUTION INTRAVENOUS PRN
Status: DISCONTINUED | OUTPATIENT
Start: 2025-07-05 | End: 2025-07-07 | Stop reason: HOSPADM

## 2025-07-05 RX ORDER — FUROSEMIDE 10 MG/ML
20 INJECTION INTRAMUSCULAR; INTRAVENOUS 2 TIMES DAILY
Status: DISCONTINUED | OUTPATIENT
Start: 2025-07-06 | End: 2025-07-05

## 2025-07-05 RX ORDER — FUROSEMIDE 40 MG/1
40 TABLET ORAL DAILY
Status: DISCONTINUED | OUTPATIENT
Start: 2025-07-06 | End: 2025-07-07 | Stop reason: HOSPADM

## 2025-07-05 RX ORDER — MAGNESIUM GLUCONATE 27 MG(500)
500 TABLET ORAL DAILY
Status: DISCONTINUED | OUTPATIENT
Start: 2025-07-05 | End: 2025-07-05

## 2025-07-05 RX ORDER — FUROSEMIDE 10 MG/ML
20 INJECTION INTRAMUSCULAR; INTRAVENOUS ONCE
Status: COMPLETED | OUTPATIENT
Start: 2025-07-05 | End: 2025-07-05

## 2025-07-05 RX ORDER — SODIUM CHLORIDE 9 MG/ML
INJECTION, SOLUTION INTRAVENOUS CONTINUOUS
Status: DISCONTINUED | OUTPATIENT
Start: 2025-07-05 | End: 2025-07-05

## 2025-07-05 RX ORDER — ONDANSETRON 2 MG/ML
4 INJECTION INTRAMUSCULAR; INTRAVENOUS EVERY 6 HOURS PRN
Status: DISCONTINUED | OUTPATIENT
Start: 2025-07-05 | End: 2025-07-07 | Stop reason: HOSPADM

## 2025-07-05 RX ORDER — ONDANSETRON 4 MG/1
4 TABLET, ORALLY DISINTEGRATING ORAL EVERY 8 HOURS PRN
Status: DISCONTINUED | OUTPATIENT
Start: 2025-07-05 | End: 2025-07-07 | Stop reason: HOSPADM

## 2025-07-05 RX ORDER — POLYETHYLENE GLYCOL 3350 17 G/17G
17 POWDER, FOR SOLUTION ORAL DAILY PRN
Status: DISCONTINUED | OUTPATIENT
Start: 2025-07-05 | End: 2025-07-07 | Stop reason: HOSPADM

## 2025-07-05 RX ORDER — POTASSIUM CHLORIDE 7.45 MG/ML
10 INJECTION INTRAVENOUS PRN
Status: DISCONTINUED | OUTPATIENT
Start: 2025-07-05 | End: 2025-07-07 | Stop reason: HOSPADM

## 2025-07-05 RX ORDER — PIOGLITAZONE 30 MG/1
30 TABLET ORAL DAILY
Status: DISCONTINUED | OUTPATIENT
Start: 2025-07-05 | End: 2025-07-06

## 2025-07-05 RX ORDER — M-VIT,TX,IRON,MINS/CALC/FOLIC 27MG-0.4MG
1 TABLET ORAL DAILY
Status: DISCONTINUED | OUTPATIENT
Start: 2025-07-05 | End: 2025-07-07 | Stop reason: HOSPADM

## 2025-07-05 RX ORDER — GLUCAGON 1 MG/ML
1 KIT INJECTION PRN
Status: DISCONTINUED | OUTPATIENT
Start: 2025-07-05 | End: 2025-07-07 | Stop reason: HOSPADM

## 2025-07-05 RX ORDER — INSULIN GLARGINE-YFGN 100 [IU]/ML
40 INJECTION, SOLUTION SUBCUTANEOUS NIGHTLY
Status: DISCONTINUED | OUTPATIENT
Start: 2025-07-05 | End: 2025-07-06

## 2025-07-05 RX ORDER — VITAMIN B COMPLEX
2000 TABLET ORAL DAILY
Status: DISCONTINUED | OUTPATIENT
Start: 2025-07-05 | End: 2025-07-07 | Stop reason: HOSPADM

## 2025-07-05 RX ORDER — DILTIAZEM HYDROCHLORIDE 120 MG/1
120 CAPSULE, COATED, EXTENDED RELEASE ORAL DAILY
Status: DISCONTINUED | OUTPATIENT
Start: 2025-07-05 | End: 2025-07-06

## 2025-07-05 RX ORDER — POTASSIUM CHLORIDE 1500 MG/1
40 TABLET, EXTENDED RELEASE ORAL PRN
Status: DISCONTINUED | OUTPATIENT
Start: 2025-07-05 | End: 2025-07-07 | Stop reason: HOSPADM

## 2025-07-05 RX ORDER — LEVOTHYROXINE SODIUM 100 UG/1
100 TABLET ORAL
Status: DISCONTINUED | OUTPATIENT
Start: 2025-07-06 | End: 2025-07-07 | Stop reason: HOSPADM

## 2025-07-05 RX ADMIN — APIXABAN 2.5 MG: 5 TABLET, FILM COATED ORAL at 21:27

## 2025-07-05 RX ADMIN — SODIUM CHLORIDE, PRESERVATIVE FREE 10 ML: 5 INJECTION INTRAVENOUS at 21:27

## 2025-07-05 RX ADMIN — FUROSEMIDE 20 MG: 10 INJECTION, SOLUTION INTRAMUSCULAR; INTRAVENOUS at 17:30

## 2025-07-05 RX ADMIN — SODIUM CHLORIDE: 0.9 INJECTION, SOLUTION INTRAVENOUS at 18:55

## 2025-07-05 NOTE — H&P
normal exam, is based on [employer] Symptomatic Carotid Endarterectomy Trial (NASCET) criteria. Dose reduction technique was used including one or more of the following: automated exposure control, adjustment of mA and kV according to patient size, and/or iterative reconstruction. CONTRAST: With; COMPARISON: CT brain 08/04/2022. FINDINGS: CT HEAD: BRAIN: Mild diffuse cerebral volume loss. No acute intraparenchymal hemorrhage. No mass effect. No acute infarct. No midline shift or extra-axial collection. VENTRICLES: No ventriculomegaly. ORBITS: The orbits are unremarkable. SINUSES AND MASTOIDS: The paranasal sinuses and mastoid air cells are clear. CTA NECK: COMMON CAROTID ARTERIES: No significant stenosis. No dissection or occlusion. INTERNAL CAROTID ARTERIES: No stenosis by NASCET criteria. No dissection or occlusion. VERTEBRAL ARTERIES: No significant stenosis. No dissection or occlusion. CTA HEAD: ANTERIOR CEREBRAL ARTERIES: No significant stenosis. No occlusion. No aneurysm. MIDDLE CEREBRAL ARTERIES: No significant stenosis. No occlusion. No aneurysm. POSTERIOR CEREBRAL ARTERIES: No significant stenosis. No occlusion. No aneurysm. BASILAR ARTERY: No significant stenosis. No occlusion. No aneurysm. 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 The parotid glands are otherwise normal. The salivary glands are normal. No lymphadenopathy. BONES: Multilevel disc space narrowing and degenerative endplate sclerosis are present within the cervical spine. No acute osseous abnormality.     1. No acute intracranial process identified. 2. Unremarkable CT angiogram of the head and neck. 3. 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     CTA HEAD NECK W CONTRAST  Result Date: 6/19/2025  EXAM: CTA Head and Neck with Intravenous Contrast. CT Head

## 2025-07-05 NOTE — ED NOTES
Nimesh Ng is a 84 y.o. male admitted for  Principal Problem:    Weakness  Resolved Problems:    * No resolved hospital problems. *  .   Patient Home via EMS transportation with   Chief Complaint   Patient presents with    Illness     From home via EMS - pt hasn't been feeling well, has felt disoriented and cold. A/O x4 in triage. BS 130s per EMS   .  Patient is lethargic but easily aroused and Person, Place, Time, and Situation  Patient's baseline mobility: Baseline Mobility: Walker  Code Status: Prior   Cardiac Rhythm:       Is patient on baseline Oxygen: yes, none  Abnormal Assessment Findings: bradycardia - unclear if this is baseline for pt or not, he wasn't sure.     Isolation: None      NIH Score:    C-SSRS: Risk of Suicide: No Risk  Bedside swallow:        Active LDA's:   Peripheral IV 07/05/25 Distal;Right Cephalic (Active)     Patient admitted with a segundo: no   Patient admitted with Central Line:  NA .     Family/Caregiver Present no Any Concerns: no   Restraints no  Sitter no         Vitals:      Vitals:    07/05/25 1600 07/05/25 1630 07/05/25 1700 07/05/25 1729   BP: 102/60 103/61 (!) 99/59 104/61   Pulse: 58 51 (!) 45 58   Resp: 16 16 16 16   Temp:       TempSrc:       SpO2: 95% 97% 95% 95%       Last documented pain score (0-10 scale)    Pain medication administered No.    Pertinent or High Risk Medications/Drips: No.    Pending Blood Product Administration: no    Abnormal labs:   Abnormal Labs Reviewed   CBC WITH AUTO DIFFERENTIAL - Abnormal; Notable for the following components:       Result Value    RDW 16.0 (*)     Lymphocytes Absolute 0.9 (*)     All other components within normal limits   COMPREHENSIVE METABOLIC PANEL W/ REFLEX TO MG FOR LOW K - Abnormal; Notable for the following components:    Chloride 93 (*)     Anion Gap 19 (*)     Glucose 135 (*)     BUN 33 (*)     Creatinine 1.4 (*)     Est, Glom Filt Rate 50 (*)     Albumin 3.2 (*)     Albumin/Globulin Ratio 1.0 (*)     AST 44 (*)

## 2025-07-05 NOTE — PLAN OF CARE
Patient presenting with weakness.   unAble to care for himself at home.  Recent admission for UTI  Chronic leg edema  BNP elevated as compared to baseline    - IV Lasix given in ED  Patient takes oral Lasix as needed only at home resumed on Lasix daily  PT OT consult

## 2025-07-05 NOTE — FLOWSHEET NOTE
07/05/25 1845   Vital Signs   Temp 97.5 °F (36.4 °C)   Temp Source Oral   Pulse 53   Heart Rate Source Monitor   Respirations 14   /61   MAP (Calculated) 80   BP Location Left upper arm   BP Method Automatic   Patient Position Semi fowlers   Pain Assessment   Pain Assessment None - Denies Pain   Oxygen Therapy   SpO2 97 %   O2 Device None (Room air)   Height and Weight   Height 1.803 m (5' 11\")   Weight - Scale 91.5 kg (201 lb 12.8 oz)   Weight Method Actual;Standing scale   BSA (Calculated - sq m) 2.14 sq meters   BMI (Calculated) 28.2       Patient admitted to room 320 from ER. Patient oriented to room, call light, bed rails, phone, lights and bathroom. Patient instructed about the schedule of the day including: vital sign frequency, lab draws, possible tests, frequency of MD and staff rounds, daily weights, I &O's and prescribed diet.  Telemetry box in place, patient aware of placement and reason. Bed locked, in lowest position, side rails up 2/4, call light within reach.        Recliner Assessment  Patient is able to demonstrate the ability to move from a reclining position to an upright position within the recliner.

## 2025-07-06 PROBLEM — R00.1 BRADYCARDIA: Status: ACTIVE | Noted: 2025-07-06

## 2025-07-06 PROBLEM — I50.32 CHRONIC DIASTOLIC CHF (CONGESTIVE HEART FAILURE) (HCC): Status: ACTIVE | Noted: 2025-07-06

## 2025-07-06 LAB
ANION GAP SERPL CALCULATED.3IONS-SCNC: 13 MMOL/L (ref 3–16)
BASOPHILS # BLD: 0 K/UL (ref 0–0.2)
BASOPHILS NFR BLD: 0.4 %
BUN SERPL-MCNC: 36 MG/DL (ref 7–20)
CALCIUM SERPL-MCNC: 9.1 MG/DL (ref 8.3–10.6)
CHLORIDE SERPL-SCNC: 96 MMOL/L (ref 99–110)
CO2 SERPL-SCNC: 28 MMOL/L (ref 21–32)
CREAT SERPL-MCNC: 1.5 MG/DL (ref 0.8–1.3)
DEPRECATED RDW RBC AUTO: 16 % (ref 12.4–15.4)
EOSINOPHIL # BLD: 0.1 K/UL (ref 0–0.6)
EOSINOPHIL NFR BLD: 2.1 %
FOLATE SERPL-MCNC: 17.9 NG/ML (ref 4.78–24.2)
GFR SERPLBLD CREATININE-BSD FMLA CKD-EPI: 46 ML/MIN/{1.73_M2}
GLUCOSE BLD-MCNC: 173 MG/DL (ref 70–99)
GLUCOSE BLD-MCNC: 196 MG/DL (ref 70–99)
GLUCOSE BLD-MCNC: 293 MG/DL (ref 70–99)
GLUCOSE BLD-MCNC: 303 MG/DL (ref 70–99)
GLUCOSE SERPL-MCNC: 217 MG/DL (ref 70–99)
HCT VFR BLD AUTO: 41.7 % (ref 40.5–52.5)
HGB BLD-MCNC: 13.7 G/DL (ref 13.5–17.5)
LYMPHOCYTES # BLD: 1.1 K/UL (ref 1–5.1)
LYMPHOCYTES NFR BLD: 33.1 %
MCH RBC QN AUTO: 28.5 PG (ref 26–34)
MCHC RBC AUTO-ENTMCNC: 32.8 G/DL (ref 31–36)
MCV RBC AUTO: 86.8 FL (ref 80–100)
MONOCYTES # BLD: 0.4 K/UL (ref 0–1.3)
MONOCYTES NFR BLD: 11.1 %
NEUTROPHILS # BLD: 1.8 K/UL (ref 1.7–7.7)
NEUTROPHILS NFR BLD: 53.3 %
NT-PROBNP SERPL-MCNC: 4048 PG/ML (ref 0–449)
PERFORMED ON: ABNORMAL
PLATELET # BLD AUTO: 199 K/UL (ref 135–450)
PMV BLD AUTO: 7.1 FL (ref 5–10.5)
POTASSIUM SERPL-SCNC: 3.7 MMOL/L (ref 3.5–5.1)
RBC # BLD AUTO: 4.8 M/UL (ref 4.2–5.9)
SODIUM SERPL-SCNC: 137 MMOL/L (ref 136–145)
TSH SERPL DL<=0.005 MIU/L-ACNC: 0.64 UIU/ML (ref 0.27–4.2)
VIT B12 SERPL-MCNC: 302 PG/ML (ref 211–911)
WBC # BLD AUTO: 3.3 K/UL (ref 4–11)

## 2025-07-06 PROCEDURE — 1200000000 HC SEMI PRIVATE

## 2025-07-06 PROCEDURE — 84443 ASSAY THYROID STIM HORMONE: CPT

## 2025-07-06 PROCEDURE — 80048 BASIC METABOLIC PNL TOTAL CA: CPT

## 2025-07-06 PROCEDURE — 99233 SBSQ HOSP IP/OBS HIGH 50: CPT | Performed by: INTERNAL MEDICINE

## 2025-07-06 PROCEDURE — 97116 GAIT TRAINING THERAPY: CPT

## 2025-07-06 PROCEDURE — 85025 COMPLETE CBC W/AUTO DIFF WBC: CPT

## 2025-07-06 PROCEDURE — 82746 ASSAY OF FOLIC ACID SERUM: CPT

## 2025-07-06 PROCEDURE — 97530 THERAPEUTIC ACTIVITIES: CPT

## 2025-07-06 PROCEDURE — 6370000000 HC RX 637 (ALT 250 FOR IP): Performed by: INTERNAL MEDICINE

## 2025-07-06 PROCEDURE — 36415 COLL VENOUS BLD VENIPUNCTURE: CPT

## 2025-07-06 PROCEDURE — 83880 ASSAY OF NATRIURETIC PEPTIDE: CPT

## 2025-07-06 PROCEDURE — 2500000003 HC RX 250 WO HCPCS: Performed by: INTERNAL MEDICINE

## 2025-07-06 PROCEDURE — 82607 VITAMIN B-12: CPT

## 2025-07-06 PROCEDURE — 6370000000 HC RX 637 (ALT 250 FOR IP): Performed by: STUDENT IN AN ORGANIZED HEALTH CARE EDUCATION/TRAINING PROGRAM

## 2025-07-06 PROCEDURE — 97162 PT EVAL MOD COMPLEX 30 MIN: CPT

## 2025-07-06 RX ORDER — INSULIN GLARGINE 100 [IU]/ML
10 INJECTION, SOLUTION SUBCUTANEOUS NIGHTLY
Status: DISCONTINUED | OUTPATIENT
Start: 2025-07-06 | End: 2025-07-07 | Stop reason: HOSPADM

## 2025-07-06 RX ADMIN — LEVOTHYROXINE SODIUM 100 MCG: 100 TABLET ORAL at 05:07

## 2025-07-06 RX ADMIN — INSULIN GLARGINE 10 UNITS: 100 INJECTION, SOLUTION SUBCUTANEOUS at 20:52

## 2025-07-06 RX ADMIN — APIXABAN 2.5 MG: 5 TABLET, FILM COATED ORAL at 20:52

## 2025-07-06 RX ADMIN — ESCITALOPRAM OXALATE 5 MG: 10 TABLET ORAL at 07:52

## 2025-07-06 RX ADMIN — SODIUM CHLORIDE, PRESERVATIVE FREE 10 ML: 5 INJECTION INTRAVENOUS at 20:52

## 2025-07-06 RX ADMIN — APIXABAN 2.5 MG: 5 TABLET, FILM COATED ORAL at 07:52

## 2025-07-06 RX ADMIN — Medication 2000 UNITS: at 07:52

## 2025-07-06 RX ADMIN — Medication 1 TABLET: at 07:52

## 2025-07-06 RX ADMIN — FUROSEMIDE 40 MG: 40 TABLET ORAL at 07:52

## 2025-07-06 NOTE — CARE COORDINATION
Case Management Assessment  Initial Evaluation    Date/Time of Evaluation: 7/6/2025 7:46 AM  Assessment Completed by: Tomasa Mendoza RN    If patient is discharged prior to next notation, then this note serves as note for discharge by case management.    Patient Name: Nimesh Ng                   YOB: 1940  Diagnosis: Weakness [R53.1]                   Date / Time: 7/5/2025  3:09 PM    Patient Admission Status: Inpatient   Readmission Risk (Low < 19, Mod (19-27), High > 27): Readmission Risk Score: 18.5    Current PCP: Adi Lemons MD  PCP verified by CM? Yes    Chart Reviewed: Yes      History Provided by: Patient  Patient Orientation: Alert and Oriented    Patient Cognition: Alert    Hospitalization in the last 30 days (Readmission):  Yes    If yes, Readmission Assessment in  Navigator will be completed.    Advance Directives:      Code Status: Full Code   Patient's Primary Decision Maker is: Named in Scanned ACP Document      Discharge Planning:    Patient lives with: Alone Type of Home: House  Primary Care Giver: Self  Patient Support Systems include: Family Members   Current Financial resources: Medicare  Current community resources: None  Current services prior to admission: Durable Medical Equipment            Current DME: Walker, Wheelchair, Shower Chair            Type of Home Care services:  None    ADLS  Prior functional level: Independent in ADLs/IADLs  Current functional level: Independent in ADLs/IADLs    PT AM-PAC:   /24  OT AM-PAC:   /24    Family can provide assistance at DC: No  Would you like Case Management to discuss the discharge plan with any other family members/significant others, and if so, who? No  Plans to Return to Present Housing: Yes  Other Identified Issues/Barriers to RETURNING to current housing: na  Potential Assistance needed at discharge: N/A            Potential DME:    Patient expects to discharge to: House  Plan for transportation at discharge:

## 2025-07-06 NOTE — ACP (ADVANCE CARE PLANNING)
Advance Care Planning     General Advance Care Planning (ACP) Conversation    Date of Conversation: 7/6/2025  Conducted with: Patient with Decision Making Capacity  Other persons present: None    Healthcare Decision Maker: No healthcare decision makers have been documented.       Content/Action Overview:  Has ACP document(s) on file - reflects the patient's care preferences  Reviewed DNR/DNI and patient elects Full Code (Attempt Resuscitation)        Length of Voluntary ACP Conversation in minutes:  <16 minutes (Non-Billable)    Tomasa Mendoza RN

## 2025-07-06 NOTE — PROGRESS NOTES
Bedside report and transfer of care given to PAPO Patel. Pt currently resting in bed with the call light within reach. Pt denies any other care needs at this time. Pt stable at this time.

## 2025-07-06 NOTE — ED PROVIDER NOTES
suspected    During the patient's ED course, the patient was given:  Medications   dilTIAZem (CARDIZEM CD) extended release capsule 120 mg ( Oral Automatically Held 7/11/25 0900)   apixaban (ELIQUIS) tablet 2.5 mg (2.5 mg Oral Given 7/5/25 2127)   escitalopram (LEXAPRO) tablet 5 mg (5 mg Oral Not Given 7/5/25 1902)   insulin glargine-yfgn (SEMGLEE-YFGN) injection vial 40 Units (40 Units SubCUTAneous Not Given 7/5/25 2127)   insulin lispro (HUMALOG,ADMELOG) injection vial 8 Units (8 Units SubCUTAneous Not Given 7/5/25 1902)   levothyroxine (SYNTHROID) tablet 100 mcg (has no administration in time range)   therapeutic multivitamin-minerals 1 tablet (1 tablet Oral Not Given 7/5/25 1903)   pioglitazone (ACTOS) tablet 30 mg ( Oral Automatically Held 7/11/25 0900)   Vitamin D (CHOLECALCIFEROL) tablet 2,000 Units (2,000 Units Oral Not Given 7/5/25 1903)   sodium chloride flush 0.9 % injection 5-40 mL (10 mLs IntraVENous Given 7/5/25 2127)   sodium chloride flush 0.9 % injection 5-40 mL (has no administration in time range)   0.9 % sodium chloride infusion (has no administration in time range)   potassium chloride (KLOR-CON M) extended release tablet 40 mEq (has no administration in time range)     Or   potassium bicarb-citric acid (EFFER-K) effervescent tablet 40 mEq (has no administration in time range)     Or   potassium chloride 10 mEq/100 mL IVPB (Peripheral Line) (has no administration in time range)   magnesium sulfate 2000 mg in 50 mL IVPB premix (has no administration in time range)   ondansetron (ZOFRAN-ODT) disintegrating tablet 4 mg (has no administration in time range)     Or   ondansetron (ZOFRAN) injection 4 mg (has no administration in time range)   polyethylene glycol (GLYCOLAX) packet 17 g (has no administration in time range)   acetaminophen (TYLENOL) tablet 650 mg (has no administration in time range)     Or   acetaminophen (TYLENOL) suppository 650 mg (has no administration in time range)   metoprolol

## 2025-07-06 NOTE — PROGRESS NOTES
4 Eyes Skin Assessment     NAME:  Nimesh Ng  YOB: 1940  MEDICAL RECORD NUMBER:  [id number]    The patient is being assessed for  Admission    I agree that at least one RN has performed a thorough Head to Toe Skin Assessment on the patient. ALL assessment sites listed below have been assessed.      Areas assessed by both nurses:    Head, Face, Ears, Shoulders, Back, Chest, Arms, Elbows, Hands, Sacrum. Buttock, Coccyx, Ischium, and Legs. Feet and Heels    No open wounds        Does the Patient have a Wound? No noted wound(s)       Manjinder Prevention initiated by RN: No  Wound Care Orders initiated by RN: No    Pressure Injury (Stage 1,2,3,4, Unstageable, DTI, NWPT, and Complex wounds) if present, place Wound referral order by RN under : No    New Ostomies, if present place, Ostomy referral order under : No     Nurse 1 eSignature: Electronically signed by Marissa Chávez RN on 7/6/25 at 12:24 AM EDT    **SHARE this note so that the co-signing nurse can place an eSignature**    Nurse 2 eSignature: Electronically signed by Coty Hernandez RN on 7/6/25 at 12:28 AM EDT

## 2025-07-06 NOTE — PROGRESS NOTES
Bedside report and transfer of care given to PAPO Ansari. Pt currently resting in bed with the call light within reach. Pt denies any other care needs at this time. Pt stable at this time.

## 2025-07-06 NOTE — PROGRESS NOTES
Spiritual Health History and Assessment/Progress Note  [facility]    (P) Rituals, Rites and Sacraments, Spiritual/Emotional Needs, Family Care,  ,  ,      Name: Nimesh Ng MRN: [id number]    Age: 84 y.o.     Sex: male   Language: English   Jew: Lutheran   Weakness     Date: 7/6/2025            Total Time Calculated: (P) 35 min              Spiritual Assessment began in [facility] PCU TELEMETRY        Referral/Consult From: (P) Rounding   Encounter Overview/Reason: (P) Rituals, Rites and Sacraments, Spiritual/Emotional Needs, Family Care  Service Provided For: (P) Patient    Vanessa, Belief, Meaning:   Patient identifies as spiritual, is connected with a vanessa tradition or spiritual practice, and has beliefs or practices that help with coping during difficult times  Family/Friends No family/friends present      Importance and Influence:  Patient has spiritual/personal beliefs that influence decisions regarding their health  Family/Friends No family/friends present    Community:  Patient is connected with a spiritual community and expresses feelings of isolation: disconnected from family/friends and feeling there is no one to turn to for help  Family/Friends No family/friends present    Assessment and Plan of Care:     Patient Interventions include: Facilitated expression of thoughts and feelings, Explored spiritual coping/struggle/distress, Engaged in theological reflection, Affirmed coping skills/support systems, and Provided sacramental/Sikh ritual  Family/Friends Interventions include: No family/friends present    Patient Plan of Care: Spiritual Care available upon further referral  Family/Friends Plan of Care: No family/friends present    Electronically signed by Chaplain Abhilash on 7/6/2025 at 6:06 PM

## 2025-07-06 NOTE — PLAN OF CARE
Problem: Chronic Conditions and Co-morbidities  Goal: Patient's chronic conditions and co-morbidity symptoms are monitored and maintained or improved  Outcome: Progressing  Flowsheets (Taken 7/5/2025 2124)  Care Plan - Patient's Chronic Conditions and Co-Morbidity Symptoms are Monitored and Maintained or Improved: Monitor and assess patient's chronic conditions and comorbid symptoms for stability, deterioration, or improvement     Problem: Discharge Planning  Goal: Discharge to home or other facility with appropriate resources  Outcome: Progressing  Flowsheets  Taken 7/5/2025 2239  Discharge to home or other facility with appropriate resources: Identify barriers to discharge with patient and caregiver  Taken 7/5/2025 2124  Discharge to home or other facility with appropriate resources: Identify barriers to discharge with patient and caregiver     Problem: Safety - Adult  Goal: Free from fall injury  Outcome: Progressing

## 2025-07-06 NOTE — PROGRESS NOTES
Inpatient Physical Therapy Evaluation & Treatment    Unit: PCU  Date:  7/6/2025  Patient Name:    Nimesh Ng  Admitting diagnosis:  Weakness [R53.1]  Admit Date:  7/5/2025  Precautions/Restrictions/WB Status/ Lines/ Wounds/ Oxygen: Bed/chair alarm, Lines (IV), and Koi (hard of hearing)      Treatment Time:  10:30-11:04  Treatment Number:  1   Timed Code Treatment Minutes: 24 minutes  Total Treatment Minutes:  34  minutes    Patient Stated Goals for Therapy: \" to go home \"          Discharge Recommendations: Home with PRN assistance and Home PT  DME needs for discharge: Needs Met       Therapy recommendation for EMS Transport: can transport by wheelchair    Therapy recommendations for staff:   Stand by assist for ambulation with use of rolling walker (RW) and gait belt to/from chair  to/from bathroom  within room    History of Present Illness:   From Yohana Young H&P   \"84 y.o. male who presented to [facility] with reports of feeling fatigued.  Patient does have lower extremity edema which he reports is chronic left leg is more swollen than the right which she also reports is chronic.  Reports overall feeling unwell at home he felt feverish and cold and lightheaded.  He reports having frequent syncopal episodes and near syncope.  He states this has been going on for several months.  He does report being hospitalized few times for this event.  He denies any chest pain leading up to these episodes and mostly describing vasovagal episodes that occur when he is standing up to urinate and anytime he tries to ambulate he feels lightheaded and twice he has passed out.  He is denying any tachycardia or chest pain prior to these episodes at this time.  He does have bilateral lower extremity swelling which he states is chronic.  His left leg is slightly bigger in circumference than the right leg which he also states is chronic.  He is otherwise denying fever, chills, shortness of breath, chest pain, nausea or

## 2025-07-06 NOTE — PROGRESS NOTES
Inpatient Occupational Therapy {notetype:66504}    Unit: {unit location:57793}  Date:  7/6/2025  Patient Name:    Nimesh Ng  Admitting diagnosis:  Weakness [R53.1]  Admit Date:  7/5/2025  Precautions/Restrictions/WB Status/ Lines/ Wounds/ Oxygen: {precautions:32161}    Pt seen for cotreatment this date due to {Cotreatment:16996}    Treatment Time:  ***-***  Treatment Number:  1  Timed Code Treatment Minutes: *** minutes  Total Treatment Minutes:  ***  minutes    Patient Goals for Therapy: \"***\"          Discharge Recommendations: {D/C recs:37323}  DME needs for discharge: {DME d/c needs:09683}       Therapy recommendations for staff:   {IP Assist Levels:61366} for {bed/transfers/amb:11428}    History of Present Illness:   Per H&P Yohana Young, DO 7/5/25  Chief complaint: Feeling fatigue  \"84 y.o. male who presented to [facility] with reports of feeling fatigued.  Patient does have lower extremity edema which he reports is chronic left leg is more swollen than the right which she also reports is chronic.  Reports overall feeling unwell at home he felt feverish and cold and lightheaded.  He reports having frequent syncopal episodes and near syncope.  He states this has been going on for several months.  He does report being hospitalized few times for this event.  He denies any chest pain leading up to these episodes and mostly describing vasovagal episodes that occur when he is standing up to urinate and anytime he tries to ambulate he feels lightheaded and twice he has passed out.  He is denying any tachycardia or chest pain prior to these episodes at this time.  He does have bilateral lower extremity swelling which he states is chronic.  His left leg is slightly bigger in circumference than the right leg which he also states is chronic.  He is otherwise denying fever, chills, shortness of breath, chest pain, nausea or emesis.  PMHx significant for atrial fibrillation, hypothyroidism,.

## 2025-07-06 NOTE — PROGRESS NOTES
/61   Pulse 53   Temp 97.5 °F (36.4 °C) (Oral)   Resp 14   Ht 1.803 m (5' 11\")   Wt 91.5 kg (201 lb 12.8 oz)   SpO2 97%   BMI 28.15 kg/m²     Patient alert and oriented resting in bed, With telemetry.  Assessment completed. Respiration easy, even and unlabored. Patient tolerated night meds well. Call light and bedside table within reach. Bed at lowest position, locked, side rails x2, bed alarm on. Pt denies needs at this time.    Bedside Mobility Assessment Tool (BMAT):     Assessment Level 1- Sit and Shake    1. From a semi-reclined position, ask patient to sit up and rotate to a seated position at the side of the bed. Can use the bedrail.    2. Ask patient to reach out and grab your hand and shake making sure patient reaches across his/her midline.   Pass- Patient is able to come to a seated position, maintain core strength. Maintains seated balance while reaching across midline. Move on to Assessment Level 2.     Assessment Level 2- Stretch and Point   1. With patient in seated position at the side of the bed, have patient place both feet on the floor (or stool) with knees no higher than hips.    2. Ask patient to stretch one leg and straighten the knee, then bend the ankle/flex and point the toes. If appropriate, repeat with the other leg.   Pass- Patient is able to demonstrate appropriate quad strength on intended weight bearing limb(s). Move onto Assessment Level 3.     Assessment Level 3- Stand   1. Ask patient to elevate off the bed or chair (seated to standing) using an assistive device (cane, bedrail).    2. Patient should be able to raise buttocks off be and hold for a count of five. May repeat once.   Pass- Patient maintains standing stability for at least 5 seconds, proceed to assessment level 4.    Assessment Level 4- Walk   1. Ask patient to march in place at bedside.    2. Then ask patient to advance step and return each foot. Some medical conditions may render a patient from stepping

## 2025-07-06 NOTE — PLAN OF CARE
Problem: Chronic Conditions and Co-morbidities  Goal: Patient's chronic conditions and co-morbidity symptoms are monitored and maintained or improved  7/6/2025 1019 by Marylu Marin, RN  Outcome: Progressing  Flowsheets (Taken 7/6/2025 0757)  Care Plan - Patient's Chronic Conditions and Co-Morbidity Symptoms are Monitored and Maintained or Improved: Monitor and assess patient's chronic conditions and comorbid symptoms for stability, deterioration, or improvement  7/6/2025 0013 by Marissa Chávez, RN  Outcome: Progressing  Flowsheets (Taken 7/5/2025 2124)  Care Plan - Patient's Chronic Conditions and Co-Morbidity Symptoms are Monitored and Maintained or Improved: Monitor and assess patient's chronic conditions and comorbid symptoms for stability, deterioration, or improvement

## 2025-07-06 NOTE — PLAN OF CARE
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), Esophageal reflux, Fracture of nasal bone, Headache, Hearing loss, History of blood transfusion, Hyperlipidemia, Hyperlipidemia associated with type 2 diabetes mellitus (HCC), Hypertension, Hypertrophy of prostate without urinary obstruction and other lower urinary tract symptoms (LUTS), Hypothyroidism, Insufficiency fracture of tibia, Osteoarthrosis, not specified whether generalized/localized, lower leg, Pain in joint, lower leg, Right knee pain, Skin cancer, basal cell, Status post total left knee replacement, Tinnitus, 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     Weights Reviewed: Yes   Admission weight: 91.5 kg (201 lb 12.8 oz)   Wt Readings from Last 3 Encounters:   07/06/25 90.4 kg (199 lb 3.2 oz)   07/01/25 91.6 kg (202 lb)   06/23/25 88 kg (194 lb)     Intake & Output Reviewed: Yes     Intake/Output Summary (Last 24 hours) at 7/6/2025 0722  Last data filed at 7/6/2025 0715  Gross per 24 hour   Intake 283.66 ml   Output 1690 ml   Net -1406.34 ml       ECHOCARDIOGRAM Reviewed: Yes   Patient's Ejection Fraction (EF) is greater than 40%     Medications Reviewed: Yes   SCHEDULED HOSPITAL MEDICATIONS:   [Held by provider] dilTIAZem  120 mg Oral Daily    apixaban  2.5 mg Oral BID    escitalopram  5 mg Oral Daily    [Held by provider] insulin glargine-yfgn  40 Units SubCUTAneous Nightly    insulin lispro  8 Units SubCUTAneous TID AC    levothyroxine  100 mcg

## 2025-07-06 NOTE — PROGRESS NOTES
IM Progress Note    Admit Date:  7/5/2025    Patient presenting with weakness. Unable to care for himself at home.  Recent admission for UTI  Chronic leg edema  BNP elevated as compared to baseline  - IV Lasix given in ED  Patient takes oral Lasix as needed only at home -resumed on Lasix daily  PT OT consulted    Subjective:  Mr. Ng seen.  He is feeling better today.  He is still weak but better than yesterday-seen by PT OT and cleared for going home with home care.   Telemetry episode of sinus bradycardia noted overnight.   Currently blood pressure and heart rate stable.  Cardizem is on hold.  Continued on Lasix p.o. once daily, creatinine at 1.5.    Objective:   /64   Pulse 70   Temp 98.2 °F (36.8 °C) (Oral)   Resp 18   Ht 1.803 m (5' 11\")   Wt 90.4 kg (199 lb 3.2 oz)   SpO2 93%   BMI 27.78 kg/m²     Intake/Output Summary (Last 24 hours) at 7/6/2025 1355  Last data filed at 7/6/2025 0903  Gross per 24 hour   Intake 523.66 ml   Output 1690 ml   Net -1166.34 ml         Physical Exam:  General:  Awake, alert, NAD  Patient appears ill and fatigued but in no distress  Skin:  Warm and dry  Neck:  JVD absent. Neck supple  Chest:  Clear to auscultation, respiration easy. No wheezes, rales or rhonchi.   Cardiovascular:  RRR ,S1S2 normal  Abdomen:  Soft, non tender, non distended, BS +  Extremities:  No edema.  Intact peripheral pulses. Brisk cap refill, < 2 secs  Neuro: non focal      Medications:   Scheduled Meds:   insulin glargine  10 Units SubCUTAneous Nightly    apixaban  2.5 mg Oral BID    escitalopram  5 mg Oral Daily    levothyroxine  100 mcg Oral QAM AC    therapeutic multivitamin-minerals  1 tablet Oral Daily    Vitamin D  2,000 Units Oral Daily    sodium chloride flush  5-40 mL IntraVENous 2 times per day    furosemide  40 mg Oral Daily       Continuous Infusions:   sodium chloride      dextrose         Data:  CBC:   Recent Labs     07/05/25  1535 07/06/25  0449   WBC 5.1 3.3*   RBC 5.22 4.80

## 2025-07-06 NOTE — PROGRESS NOTES
Shift assessment complete - see flow sheet, orders noted for compression stockings (tray hose) - pt BLE are to large for the tray hose in stock. Wrapped BLE with Medigrip G and placed non-skid socks, pt very [place] and [place] hearing aides left at home - headphones provided for the TV. Pt denies needs, call light within reach.

## 2025-07-06 NOTE — PROGRESS NOTES
Transferred care to PAPO Valero. Face to face bedside report given, no need voiced at this time. Pt in bed with eyes closed.  No signs of distress noted.  Call light within reach.

## 2025-07-06 NOTE — FLOWSHEET NOTE
07/05/25 2115 07/05/25 2116 07/05/25 2117   Vitals   Blood Pressure Lying 138/74  --   --    Pulse Lying 61 PER MINUTE  --   --    Blood Pressure Sitting  --  131/68  --    Pulse Sitting  --  63 PER MINUTE  --    Blood Pressure Standing  --   --  115/65   Pulse Standing  --   --  72 PER MINUTE     Relayed orthostatic vitals to Dr. Young via secured messaging and noted results. Also notified her about patient's HR dropped to 37 but went back up to 56 at 2106hrs. She discontinued Cardizem.

## 2025-07-07 VITALS
SYSTOLIC BLOOD PRESSURE: 113 MMHG | HEART RATE: 58 BPM | TEMPERATURE: 98 F | RESPIRATION RATE: 16 BRPM | WEIGHT: 202 LBS | OXYGEN SATURATION: 92 % | HEIGHT: 71 IN | DIASTOLIC BLOOD PRESSURE: 66 MMHG | BODY MASS INDEX: 28.28 KG/M2

## 2025-07-07 LAB
ANION GAP SERPL CALCULATED.3IONS-SCNC: 11 MMOL/L (ref 3–16)
BUN SERPL-MCNC: 33 MG/DL (ref 7–20)
CALCIUM SERPL-MCNC: 9.1 MG/DL (ref 8.3–10.6)
CHLORIDE SERPL-SCNC: 97 MMOL/L (ref 99–110)
CO2 SERPL-SCNC: 30 MMOL/L (ref 21–32)
CREAT SERPL-MCNC: 1.4 MG/DL (ref 0.8–1.3)
GFR SERPLBLD CREATININE-BSD FMLA CKD-EPI: 50 ML/MIN/{1.73_M2}
GLUCOSE BLD-MCNC: 136 MG/DL (ref 70–99)
GLUCOSE BLD-MCNC: 174 MG/DL (ref 70–99)
GLUCOSE SERPL-MCNC: 172 MG/DL (ref 70–99)
MAGNESIUM SERPL-MCNC: 1.65 MG/DL (ref 1.8–2.4)
NT-PROBNP SERPL-MCNC: 2704 PG/ML (ref 0–449)
PERFORMED ON: ABNORMAL
PERFORMED ON: ABNORMAL
POTASSIUM SERPL-SCNC: 3.3 MMOL/L (ref 3.5–5.1)
SODIUM SERPL-SCNC: 138 MMOL/L (ref 136–145)

## 2025-07-07 PROCEDURE — 80048 BASIC METABOLIC PNL TOTAL CA: CPT

## 2025-07-07 PROCEDURE — 99238 HOSP IP/OBS DSCHRG MGMT 30/<: CPT | Performed by: INTERNAL MEDICINE

## 2025-07-07 PROCEDURE — 83880 ASSAY OF NATRIURETIC PEPTIDE: CPT

## 2025-07-07 PROCEDURE — 2500000003 HC RX 250 WO HCPCS: Performed by: INTERNAL MEDICINE

## 2025-07-07 PROCEDURE — 6370000000 HC RX 637 (ALT 250 FOR IP): Performed by: STUDENT IN AN ORGANIZED HEALTH CARE EDUCATION/TRAINING PROGRAM

## 2025-07-07 PROCEDURE — 36415 COLL VENOUS BLD VENIPUNCTURE: CPT

## 2025-07-07 PROCEDURE — 6370000000 HC RX 637 (ALT 250 FOR IP): Performed by: INTERNAL MEDICINE

## 2025-07-07 PROCEDURE — 83735 ASSAY OF MAGNESIUM: CPT

## 2025-07-07 RX ORDER — MIDODRINE HYDROCHLORIDE 5 MG/1
5 TABLET ORAL
Qty: 90 TABLET | Refills: 0 | Status: SHIPPED | OUTPATIENT
Start: 2025-07-07

## 2025-07-07 RX ORDER — FUROSEMIDE 40 MG/1
40 TABLET ORAL DAILY PRN
Qty: 1 TABLET | Refills: 0
Start: 2025-07-07

## 2025-07-07 RX ORDER — INSULIN GLARGINE-YFGN 100 [IU]/ML
16 INJECTION, SOLUTION SUBCUTANEOUS NIGHTLY
Qty: 1 ML | Refills: 0
Start: 2025-07-07

## 2025-07-07 RX ORDER — MIDODRINE HYDROCHLORIDE 5 MG/1
5 TABLET ORAL
Status: DISCONTINUED | OUTPATIENT
Start: 2025-07-07 | End: 2025-07-07 | Stop reason: HOSPADM

## 2025-07-07 RX ADMIN — MIDODRINE HYDROCHLORIDE 5 MG: 5 TABLET ORAL at 12:05

## 2025-07-07 RX ADMIN — FUROSEMIDE 40 MG: 40 TABLET ORAL at 08:18

## 2025-07-07 RX ADMIN — Medication 1 TABLET: at 08:18

## 2025-07-07 RX ADMIN — LEVOTHYROXINE SODIUM 100 MCG: 100 TABLET ORAL at 05:13

## 2025-07-07 RX ADMIN — APIXABAN 2.5 MG: 5 TABLET, FILM COATED ORAL at 08:18

## 2025-07-07 RX ADMIN — SODIUM CHLORIDE, PRESERVATIVE FREE 10 ML: 5 INJECTION INTRAVENOUS at 08:22

## 2025-07-07 RX ADMIN — ESCITALOPRAM OXALATE 5 MG: 10 TABLET ORAL at 08:18

## 2025-07-07 RX ADMIN — Medication 2000 UNITS: at 08:18

## 2025-07-07 NOTE — CARE COORDINATION
Referral to [facility] care    Referral to [facility] (Katie) [phone] since pt expressed interested in assisted Living. LVM    DC order noted. [facility] home care will service pt for SN, PT, OT. Katie with [facility] is in touch with pt and she is looking at Assisted Living facilities for pt. She states she will reach back out to pt when he gets home to assist him in viewing the facilities.     IMM 7/5

## 2025-07-07 NOTE — PROGRESS NOTES
Pt discharge instructions and medication given. Cab voucher given and cab called things packed up. Assisted pt into lobby to await cab.

## 2025-07-07 NOTE — DISCHARGE INSTRUCTIONS
Heart Failure Resources:  Heart Failure Interactive Workbook:  Go to https://[employer] GroupitalFlipkart.GeneExcel/publication/?v=541034 for a Free Heart Failure Interactive Workbook provided by The American Heart Association. This interactive workbook will provide information on Healthier Living with Heart Failure. Please copy and paste link into search bar. Use your mouse to scroll through the pages.    HF [place] wai:   Heart Failure Free smart phone wai available for iPhone and Android download. Use your phone to track sodium intake, fluid intake, symptoms, and weight.     Low Sodium Diet / Recipes:  Go to www.[employer] website for “renal” diet which is Low Sodium! [employer] is a dialysis company, but this website offers free seasonal cookbooks. Each quarter, they will release 25-30 new recipes with a breakdown of calories, sodium, and glucose. You can also go to wwwGlowbl/recipes website for free recipes.     Discharge Instruction Video:  Scan the QR code below with your camera and click the canva.com link to open the video and watch educational information on Heart Failure and Medications from one of our nurses.   [email or web address]/design/DAFZnsH_JRk/9XxzoigTVRXahEMoaV4ssm/edit    Home Exercise Program:   Identification of Green/Yellow/Red zones:  You should be able to identify when you feel good (green zone), if you have 1-2 symptoms of HF (yellow zone), or if you are in need of medical attention (red zone).  In your CHF education folder you were provided a “stop light tool” to outline this information.     We want to you to rate your exertion levels:    Our therapy team has discussed means of identification with you such as the \"Jair scale.\"  The Jair rating scale ranges from 6 to 20, where 6 means \"no exertion at all\" and 20 means \"maximal exertion.\" The goal is to use this to gauge how much effort it is taking for you to do your normal daily tasks.   You should be able to recognize when too much

## 2025-07-07 NOTE — FLOWSHEET NOTE
07/06/25 2336   Vital Signs   Temp 98.3 °F (36.8 °C)   Temp Source Oral   Pulse 56   Heart Rate Source Monitor   Respirations 16   /80   MAP (Calculated) 94   BP Location Left upper arm   BP Method Automatic   Patient Position Semi fowlers   Oxygen Therapy   SpO2 93 %   O2 Device None (Room air)   O2 Flow Rate (L/min) 0 L/min     Patient resting in bed, no S/S of acute distress noted. Call light in easy reach.

## 2025-07-07 NOTE — PROGRESS NOTES
Bedside report and transfer of care given to Erin KU Rn. Pt currently resting in bed with the call light within reach. Pt denies any other care needs at this time. Pt stable at this time.

## 2025-07-07 NOTE — FLOWSHEET NOTE
07/07/25 1056   Vital Signs   Temp 98 °F (36.7 °C)   Temp Source Oral   Pulse 58   Heart Rate Source Monitor   Respirations 16   /66   MAP (Calculated) 82   BP Location Left upper arm   BP Method Automatic   Patient Position Sitting;Up in chair   Oxygen Therapy   SpO2 92 %   O2 Device None (Room air)     VSS pt has discharge order information given for pt to follow up with further information and guidance regarding transitioning into assisted living. Call light within reach

## 2025-07-07 NOTE — PLAN OF CARE
Problem: Chronic Conditions and Co-morbidities  Goal: Patient's chronic conditions and co-morbidity symptoms are monitored and maintained or improved  Outcome: Progressing     Problem: Discharge Planning  Goal: Discharge to home or other facility with appropriate resources  Outcome: Progressing     Problem: Safety - Adult  Goal: Free from fall injury  Outcome: Progressing     Problem: Neurosensory - Adult  Goal: Achieves stable or improved neurological status  Outcome: Progressing  Goal: Achieves maximal functionality and self care  Outcome: Progressing     Problem: Respiratory - Adult  Goal: Achieves optimal ventilation and oxygenation  Outcome: Progressing     Problem: Cardiovascular - Adult  Goal: Maintains optimal cardiac output and hemodynamic stability  Outcome: Progressing  Goal: Absence of cardiac dysrhythmias or at baseline  Outcome: Progressing     Problem: Skin/Tissue Integrity - Adult  Goal: Skin integrity remains intact  Outcome: Progressing  Goal: Oral mucous membranes remain intact  Outcome: Progressing     Problem: Musculoskeletal - Adult  Goal: Return mobility to safest level of function  Outcome: Progressing  Goal: Return ADL status to a safe level of function  Outcome: Progressing     Problem: Gastrointestinal - Adult  Goal: Minimal or absence of nausea and vomiting  Outcome: Progressing  Goal: Maintains or returns to baseline bowel function  Outcome: Progressing  Goal: Maintains adequate nutritional intake  Outcome: Progressing     Problem: Genitourinary - Adult  Goal: Absence of urinary retention  Outcome: Progressing     Problem: Infection - Adult  Goal: Absence of infection at discharge  Outcome: Progressing  Goal: Absence of infection during hospitalization  Outcome: Progressing     Problem: Metabolic/Fluid and Electrolytes - Adult  Goal: Electrolytes maintained within normal limits  Outcome: Progressing  Goal: Hemodynamic stability and optimal renal function maintained  Outcome:

## 2025-07-07 NOTE — PROGRESS NOTES
Pt completed walk test HR had range of 74 while sitting to 79 at the highest while ambulating. Pt in chair call light within reach.

## 2025-07-07 NOTE — FLOWSHEET NOTE
07/07/25 0718   Vital Signs   Temp 98.2 °F (36.8 °C)   Temp Source Oral   Pulse 50   Heart Rate Source Monitor   Respirations 16   /69   MAP (Calculated) 88   BP Location Left upper arm   BP Method Automatic   Patient Position Semi fowlers   Oxygen Therapy   SpO2 93 %   O2 Device None (Room air)     VSS pt has no complaints call light within reach.

## 2025-07-07 NOTE — PROGRESS NOTES
IM Progress Note    Admit Date:  7/5/2025    Patient presenting with weakness. Unable to care for himself at home.  Recent admission for UTI  Chronic leg edema  BNP elevated as compared to baseline  - IV Lasix given in ED  Patient takes oral Lasix as needed only at home -resumed on Lasix daily  PT OT consulted    Subjective:  Mr. Ng seen.  He is feeling better today.  Denies light headedness   Currently blood pressure and heart rate stable.  Cardizem is on hold.    Objective:   /69   Pulse 50   Temp 98.2 °F (36.8 °C) (Oral)   Resp 16   Ht 1.803 m (5' 11\")   Wt 91.6 kg (202 lb)   SpO2 93%   BMI 28.17 kg/m²     Intake/Output Summary (Last 24 hours) at 7/7/2025 0946  Last data filed at 7/6/2025 2102  Gross per 24 hour   Intake --   Output 925 ml   Net -925 ml         Physical Exam:  General:  Awake, alert, NAD  Patient appears ill and fatigued but in no distress  Skin:  Warm and dry  Neck:  JVD absent. Neck supple  Chest:  Clear to auscultation, respiration easy. No wheezes, rales or rhonchi.   Cardiovascular:  RRR ,S1S2 normal  Abdomen:  Soft, non tender, non distended, BS +  Extremities:  No edema.  Intact peripheral pulses. Brisk cap refill, < 2 secs  Neuro: non focal      Medications:   Scheduled Meds:   insulin glargine  10 Units SubCUTAneous Nightly    apixaban  2.5 mg Oral BID    escitalopram  5 mg Oral Daily    levothyroxine  100 mcg Oral QAM AC    therapeutic multivitamin-minerals  1 tablet Oral Daily    Vitamin D  2,000 Units Oral Daily    sodium chloride flush  5-40 mL IntraVENous 2 times per day    furosemide  40 mg Oral Daily       Continuous Infusions:   sodium chloride      dextrose         Data:  CBC:   Recent Labs     07/05/25  1535 07/06/25  0449   WBC 5.1 3.3*   RBC 5.22 4.80   HGB 14.6 13.7   HCT 44.9 41.7   MCV 86.1 86.8   RDW 16.0* 16.0*    199     BMP:   Recent Labs     07/05/25  1535 07/06/25  0449 07/07/25  0421    137 138   K 4.6 3.7 3.3*   CL 93* 96* 97*   CO2 24

## 2025-07-07 NOTE — DISCHARGE SUMMARY
and monitor blood sugars closely     Hypothyroidism:   Continue home levothyroxine.  Check TSH level-normal     Atrial fibrillation:   Continue home Eliquis .     MGUS:   Continue outpatient follow-up      XR CHEST PORTABLE   Final Result   No acute process.                Discharge Medications     Medication List        START taking these medications      midodrine 5 MG tablet  Commonly known as: PROAMATINE  Take 1 tablet by mouth 3 times daily (with meals)            CHANGE how you take these medications      furosemide 40 MG tablet  Commonly known as: LASIX  Take 1 tablet by mouth daily as needed (swelling)  What changed: reasons to take this     insulin glargine-yfgn 100 UNIT/ML injection vial  Commonly known as: Semglee (yfgn)  Inject 16 Units into the skin nightly  What changed: how much to take            CONTINUE taking these medications      Eliquis 2.5 MG Tabs tablet  Generic drug: apixaban  TAKE 1 TABLET TWICE A DAY     escitalopram 5 MG tablet  Commonly known as: LEXAPRO  Take 1 tablet by mouth daily     insulin lispro (1 Unit Dial) 100 UNIT/ML Sopn  Commonly known as: HumaLOG KwikPen  Inject 8 Units into the skin 3 times daily (before meals)     Insulin Syringe-Needle U-100 30G X 1/2\" 1 ML Misc  Commonly known as: B-D INS SYR ULTRAFINE 1CC/30G  USE WITH LANTUS NIGHTLY. DX: E11.9     levothyroxine 100 MCG tablet  Commonly known as: SYNTHROID  TAKE 1 TABLET DAILY     magnesium gluconate 500 MG tablet  Commonly known as: MAGONATE     MULTIVITAMIN ADULTS PO     traZODone 50 MG tablet  Commonly known as: DESYREL     vitamin D 50 MCG (2000 UT) Caps capsule  Commonly known as: CHOLECALCIFEROL            STOP taking these medications      dilTIAZem 120 MG extended release capsule  Commonly known as: CARDIZEM CD     Jardiance 25 MG tablet  Generic drug: empagliflozin     pioglitazone 30 MG tablet  Commonly known as: ACTOS               Where to Get Your Medications        These medications were sent to [facility]

## 2025-07-07 NOTE — CONSULTS
Pt dc to home prior to meeting with Palliative care. Writer attempted to reach the pt via TC and LVM with Palliative care phone number to offer support if needed.

## 2025-07-08 ENCOUNTER — TELEPHONE (OUTPATIENT)
Dept: INTERNAL MEDICINE CLINIC | Age: 85
End: 2025-07-08

## 2025-07-08 NOTE — TELEPHONE ENCOUNTER
----- Message from Dr. Adi Lemons MD sent at 7/8/2025  2:37 PM EDT -----  Contact: LYRIC [phone]  Maintain adequate hydration and compression stockings  ----- Message -----  From: Johny Byers  Sent: 7/8/2025  11:44 AM EDT  To: Adi Lemons MD    Patient experiencing dizziness when standing and walking starting this morning 7/8. No recent diet or medication changes. No other symptoms, once patient sets down dizziness subsides. Requesting your suggestions. Please advise     [facility]  - [place], [place] - [address] - P [phone] - F [phone]  [address], [facility] [address]  Phone: [phone]  Fax: [phone]

## 2025-07-15 ENCOUNTER — APPOINTMENT (OUTPATIENT)
Dept: CT IMAGING | Age: 85
End: 2025-07-15
Payer: COMMERCIAL

## 2025-07-15 ENCOUNTER — HOSPITAL ENCOUNTER (EMERGENCY)
Age: 85
Discharge: HOME | End: 2025-07-15
Payer: COMMERCIAL

## 2025-07-15 ENCOUNTER — APPOINTMENT (OUTPATIENT)
Dept: GENERAL RADIOLOGY | Age: 85
End: 2025-07-15
Payer: COMMERCIAL

## 2025-07-15 VITALS
DIASTOLIC BLOOD PRESSURE: 65 MMHG | SYSTOLIC BLOOD PRESSURE: 114 MMHG | TEMPERATURE: 97.7 F | RESPIRATION RATE: 17 BRPM | OXYGEN SATURATION: 93 % | HEART RATE: 57 BPM

## 2025-07-15 DIAGNOSIS — E16.2 HYPOGLYCEMIA: Primary | ICD-10-CM

## 2025-07-15 LAB
ALBUMIN SERPL-MCNC: 3.1 G/DL (ref 3.4–5)
ALBUMIN/GLOB SERPL: 0.9 {RATIO} (ref 1.1–2.2)
ALP SERPL-CCNC: 71 U/L (ref 40–129)
ALT SERPL-CCNC: 29 U/L (ref 10–40)
ANION GAP SERPL CALCULATED.3IONS-SCNC: 14 MMOL/L (ref 3–16)
AST SERPL-CCNC: 42 U/L (ref 15–37)
BASOPHILS # BLD: 0 K/UL (ref 0–0.2)
BASOPHILS NFR BLD: 0.3 %
BILIRUB SERPL-MCNC: 0.4 MG/DL (ref 0–1)
BUN SERPL-MCNC: 27 MG/DL (ref 7–20)
CALCIUM SERPL-MCNC: 9.9 MG/DL (ref 8.3–10.6)
CHLORIDE SERPL-SCNC: 95 MMOL/L (ref 99–110)
CHP ED QC CHECK: YES
CO2 SERPL-SCNC: 31 MMOL/L (ref 21–32)
CREAT SERPL-MCNC: 1.4 MG/DL (ref 0.8–1.3)
DEPRECATED RDW RBC AUTO: 15.8 % (ref 12.4–15.4)
EKG DIAGNOSIS: NORMAL
EKG Q-T INTERVAL: 430 MS
EKG QRS DURATION: 78 MS
EKG QTC CALCULATION (BAZETT): 418 MS
EKG R AXIS: 29 DEGREES
EKG T AXIS: -5 DEGREES
EKG VENTRICULAR RATE: 57 BPM
EOSINOPHIL # BLD: 0.1 K/UL (ref 0–0.6)
EOSINOPHIL NFR BLD: 3.1 %
GFR SERPLBLD CREATININE-BSD FMLA CKD-EPI: 50 ML/MIN/{1.73_M2}
GLUCOSE BLD-MCNC: 110 MG/DL
GLUCOSE BLD-MCNC: 110 MG/DL (ref 70–99)
GLUCOSE BLD-MCNC: 145 MG/DL
GLUCOSE BLD-MCNC: 145 MG/DL (ref 70–99)
GLUCOSE BLD-MCNC: 73 MG/DL
GLUCOSE BLD-MCNC: 73 MG/DL (ref 70–99)
GLUCOSE SERPL-MCNC: 68 MG/DL (ref 70–99)
HCT VFR BLD AUTO: 45.1 % (ref 40.5–52.5)
HGB BLD-MCNC: 14.8 G/DL (ref 13.5–17.5)
LYMPHOCYTES # BLD: 0.6 K/UL (ref 1–5.1)
LYMPHOCYTES NFR BLD: 14.2 %
MCH RBC QN AUTO: 28.6 PG (ref 26–34)
MCHC RBC AUTO-ENTMCNC: 32.8 G/DL (ref 31–36)
MCV RBC AUTO: 87.2 FL (ref 80–100)
MONOCYTES # BLD: 0.5 K/UL (ref 0–1.3)
MONOCYTES NFR BLD: 12.4 %
NEUTROPHILS # BLD: 2.8 K/UL (ref 1.7–7.7)
NEUTROPHILS NFR BLD: 70 %
PERFORMED ON: ABNORMAL
PERFORMED ON: ABNORMAL
PERFORMED ON: NORMAL
PLATELET # BLD AUTO: 223 K/UL (ref 135–450)
PMV BLD AUTO: 6.9 FL (ref 5–10.5)
POTASSIUM SERPL-SCNC: 4.4 MMOL/L (ref 3.5–5.1)
PROT SERPL-MCNC: 6.5 G/DL (ref 6.4–8.2)
RBC # BLD AUTO: 5.17 M/UL (ref 4.2–5.9)
SODIUM SERPL-SCNC: 140 MMOL/L (ref 136–145)
TROPONIN, HIGH SENSITIVITY: 52 NG/L (ref 0–22)
TROPONIN, HIGH SENSITIVITY: 53 NG/L (ref 0–22)
WBC # BLD AUTO: 4 K/UL (ref 4–11)

## 2025-07-15 PROCEDURE — 85025 COMPLETE CBC W/AUTO DIFF WBC: CPT

## 2025-07-15 PROCEDURE — 71045 X-RAY EXAM CHEST 1 VIEW: CPT

## 2025-07-15 PROCEDURE — 93005 ELECTROCARDIOGRAM TRACING: CPT | Performed by: NURSE PRACTITIONER

## 2025-07-15 PROCEDURE — 99285 EMERGENCY DEPT VISIT HI MDM: CPT

## 2025-07-15 PROCEDURE — 80053 COMPREHEN METABOLIC PANEL: CPT

## 2025-07-15 PROCEDURE — 72125 CT NECK SPINE W/O DYE: CPT

## 2025-07-15 PROCEDURE — 93010 ELECTROCARDIOGRAM REPORT: CPT | Performed by: INTERNAL MEDICINE

## 2025-07-15 PROCEDURE — 84484 ASSAY OF TROPONIN QUANT: CPT

## 2025-07-15 PROCEDURE — 70450 CT HEAD/BRAIN W/O DYE: CPT

## 2025-07-15 NOTE — ED PROVIDER NOTES
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), Esophageal reflux, Fracture of nasal bone, Headache, Hearing loss, History of blood transfusion, Hyperlipidemia, Hyperlipidemia associated with type 2 diabetes mellitus (HCC) (10/23/2015), Hypertension, Hypertrophy of prostate without urinary obstruction and other lower urinary tract symptoms (LUTS) (11/01/2011), Hypothyroidism, Insufficiency fracture of tibia (09/06/2016), Osteoarthrosis, not specified whether generalized/localized, lower leg, Pain in joint, lower leg, Right knee pain (11/08/2017), Skin cancer, basal cell (03/21/2023), Status post total left knee replacement (07/17/2017), Tinnitus, Type 2 diabetes mellitus 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 edema (HCC) (10/23/2015), Type II or unspecified type diabetes mellitus without mention of complication, not stated as uncontrolled, and Unspecified viral hepatitis C without hepatic coma.    CONSULTS: (Who and What was discussed)  None      Records Reviewed (External, Source and Summary)     CC/HPI Summary, DDx, ED Course, and Reassessment: Patient clinically looks well.  Has no other acute concerns noted at this time.  He was eating and drinking without difficulty.  He had reported taking his insulin but not eating earlier this morning.  This is likely attributed to his fall.  He was noted to be hypoglycemic with EMS arrival.  He has no other acute concerns at this time.  His vital signs are stable.  His glucose readings are much improved.  He feels comfortable on discharge.  No other acute concerns.    Disposition Considerations (tests considered but not done, Admit vs D/C, Shared Decision Making, Pt Expectation of Test or Tx.):      The patient tolerated their visit well.  The patient and / or the

## 2025-07-18 ENCOUNTER — TELEPHONE (OUTPATIENT)
Dept: INTERNAL MEDICINE CLINIC | Age: 85
End: 2025-07-18

## 2025-07-18 NOTE — TELEPHONE ENCOUNTER
----- Message from CHARISSE CHRIS sent at 7/18/2025  2:06 PM EDT -----  Contact: DELANO [phone]  Waiting for Dr. Lemons to sign.  ----- Message -----  From: Adi Lemons MD  Sent: 7/18/2025   2:03 PM EDT  To: Stephanie Mcleod    ok  ----- Message -----  From: Johny Byers  Sent: 7/18/2025  10:55 AM EDT  To: Adi Lemons MD    [place] with [facility] states they are seeing the patient for PT and OT, but patient has not had substantial improvement so they would like to request an order for patient to begin inpatient rehab at [place]. Please advise       Fax:[phone]

## 2025-07-24 ENCOUNTER — CLINICAL DOCUMENTATION (OUTPATIENT)
Age: 85
End: 2025-07-24

## 2025-07-24 PROBLEM — R79.89 ELEVATED TROPONIN: Status: RESOLVED | Noted: 2025-06-19 | Resolved: 2025-07-24

## 2025-07-24 NOTE — PROGRESS NOTES
Spiritual Health History and Assessment/Progress Note - Audio Only        Name: Nimesh Ng MRN: [id number]    Age: 84 y.o.     Sex: male   Mandaeism: Hoahaoism       Preferred Language: English  Reason for visit: Spiritual Care (Patient requested follow-up telephone visit with outpatient .)            Date: 7/24/2025                      Spiritual Assessment began in [facility]            Vanessa, Belief, Meaning:   Patient is connected with a vanessa tradition or spiritual practice  Family/Friends No family/friends present      Importance and Influence:  Patient has spiritual/personal beliefs that influence decisions regarding their health  Family/Friends No family/friends present    Community:  Patient is connected with a spiritual community and feels well-supported. Support system includes: Friends and Extended family  Family/Friends No family/friends present    Assessment and Plan of Care:     Patient Interventions include: Facilitated expression of thoughts and feelings, Explored spiritual coping/struggle/distress, Engaged in theological reflection, Affirmed coping skills/support systems, and Other: provided prayer per patient request  Family/Friends Interventions include: No family/friends present    Patient Plan of Care: Other: follow-up visit scheduled for Aug 7 at 11 am  Family/Friends Plan of Care: No family/friends present    Documentation: patient expressed that his relationship with God gives his strength and encouragement to cope with difficult times, patient said he hopes to get well soon and back to volunteering      Total Time: 30 minutes    Nimesh Ng was evaluated through a synchronous (real-time) audio encounter. Patient identification was verified at the start of the visit. This visit was conducted with the patient's (and/or legal guardian's) verbal consent. He has not had a related appointment within my department in the past 7 days or scheduled within the next 24

## 2025-08-01 ENCOUNTER — CLINICAL DOCUMENTATION (OUTPATIENT)
Age: 85
End: 2025-08-01

## 2025-08-01 NOTE — PROGRESS NOTES
Attempted to follow up with Pt to offer  support. No answer on phone. Left voicemail for Pt, assuring him of our continued availability for support.    Scotty Vázquez

## 2025-08-05 ENCOUNTER — OFFICE VISIT (OUTPATIENT)
Dept: INTERNAL MEDICINE CLINIC | Age: 85
End: 2025-08-05

## 2025-08-05 VITALS
BODY MASS INDEX: 29.26 KG/M2 | RESPIRATION RATE: 12 BRPM | HEIGHT: 71 IN | SYSTOLIC BLOOD PRESSURE: 120 MMHG | WEIGHT: 209 LBS | HEART RATE: 70 BPM | DIASTOLIC BLOOD PRESSURE: 70 MMHG

## 2025-08-05 DIAGNOSIS — Z79.4 TYPE 2 DIABETES MELLITUS WITHOUT COMPLICATION, WITH LONG-TERM CURRENT USE OF INSULIN (HCC): Primary | ICD-10-CM

## 2025-08-05 DIAGNOSIS — E11.9 TYPE 2 DIABETES MELLITUS WITHOUT COMPLICATION, WITH LONG-TERM CURRENT USE OF INSULIN (HCC): Primary | ICD-10-CM

## 2025-08-05 DIAGNOSIS — E78.5 HYPERLIPIDEMIA ASSOCIATED WITH TYPE 2 DIABETES MELLITUS (HCC): ICD-10-CM

## 2025-08-05 DIAGNOSIS — N18.31 STAGE 3A CHRONIC KIDNEY DISEASE (HCC): ICD-10-CM

## 2025-08-05 DIAGNOSIS — I50.32 CHRONIC DIASTOLIC CHF (CONGESTIVE HEART FAILURE) (HCC): ICD-10-CM

## 2025-08-05 DIAGNOSIS — D47.2 MGUS (MONOCLONAL GAMMOPATHY OF UNKNOWN SIGNIFICANCE): ICD-10-CM

## 2025-08-05 DIAGNOSIS — E03.9 ACQUIRED HYPOTHYROIDISM: ICD-10-CM

## 2025-08-05 DIAGNOSIS — E11.69 HYPERLIPIDEMIA ASSOCIATED WITH TYPE 2 DIABETES MELLITUS (HCC): ICD-10-CM

## 2025-08-05 DIAGNOSIS — I48.0 PAF (PAROXYSMAL ATRIAL FIBRILLATION) (HCC): ICD-10-CM

## 2025-08-05 PROCEDURE — 3074F SYST BP LT 130 MM HG: CPT | Performed by: INTERNAL MEDICINE

## 2025-08-05 PROCEDURE — 3046F HEMOGLOBIN A1C LEVEL >9.0%: CPT | Performed by: INTERNAL MEDICINE

## 2025-08-05 PROCEDURE — 1123F ACP DISCUSS/DSCN MKR DOCD: CPT | Performed by: INTERNAL MEDICINE

## 2025-08-05 PROCEDURE — 1160F RVW MEDS BY RX/DR IN RCRD: CPT | Performed by: INTERNAL MEDICINE

## 2025-08-05 PROCEDURE — 3078F DIAST BP <80 MM HG: CPT | Performed by: INTERNAL MEDICINE

## 2025-08-05 PROCEDURE — 1159F MED LIST DOCD IN RCRD: CPT | Performed by: INTERNAL MEDICINE

## 2025-08-05 PROCEDURE — 99214 OFFICE O/P EST MOD 30 MIN: CPT | Performed by: INTERNAL MEDICINE

## 2025-08-05 RX ORDER — BLOOD-GLUCOSE METER
EACH MISCELLANEOUS
Qty: 1 KIT | Refills: 0 | Status: SHIPPED | OUTPATIENT
Start: 2025-08-05

## 2025-08-05 RX ORDER — AVOBENZONE, HOMOSALATE, OCTISALATE, OCTOCRYLENE 30; 40; 45; 26 MG/ML; MG/ML; MG/ML; MG/ML
CREAM TOPICAL
Qty: 300 EACH | Refills: 3 | Status: SHIPPED | OUTPATIENT
Start: 2025-08-05

## 2025-08-05 RX ORDER — MIDODRINE HYDROCHLORIDE 5 MG/1
5 TABLET ORAL
Qty: 90 TABLET | Refills: 5 | Status: SHIPPED | OUTPATIENT
Start: 2025-08-05

## 2025-08-05 RX ORDER — INSULIN LISPRO 100 [IU]/ML
8 INJECTION, SOLUTION INTRAVENOUS; SUBCUTANEOUS
Qty: 22 ML | Refills: 0 | Status: SHIPPED | OUTPATIENT
Start: 2025-08-05

## 2025-08-05 RX ORDER — BLOOD SUGAR DIAGNOSTIC
STRIP MISCELLANEOUS
Qty: 300 EACH | Refills: 3 | Status: SHIPPED | OUTPATIENT
Start: 2025-08-05

## 2025-08-05 RX ORDER — INSULIN GLARGINE-YFGN 100 [IU]/ML
10 INJECTION, SOLUTION SUBCUTANEOUS NIGHTLY
Qty: 9 ML | Refills: 0 | Status: SHIPPED | OUTPATIENT
Start: 2025-08-05

## 2025-08-05 ASSESSMENT — ENCOUNTER SYMPTOMS
VOMITING: 0
RHINORRHEA: 0
SHORTNESS OF BREATH: 0
ABDOMINAL PAIN: 0
WHEEZING: 0
BACK PAIN: 0
NAUSEA: 0

## 2025-08-07 ENCOUNTER — CLINICAL DOCUMENTATION (OUTPATIENT)
Age: 85
End: 2025-08-07

## 2025-08-07 ENCOUNTER — TELEPHONE (OUTPATIENT)
Dept: INTERNAL MEDICINE CLINIC | Age: 85
End: 2025-08-07

## 2025-08-08 ENCOUNTER — APPOINTMENT (OUTPATIENT)
Dept: GENERAL RADIOLOGY | Age: 85
End: 2025-08-08
Payer: COMMERCIAL

## 2025-08-08 ENCOUNTER — APPOINTMENT (OUTPATIENT)
Dept: CT IMAGING | Age: 85
End: 2025-08-08
Payer: COMMERCIAL

## 2025-08-08 ENCOUNTER — HOSPITAL ENCOUNTER (EMERGENCY)
Age: 85
Discharge: HOME | End: 2025-08-08
Attending: STUDENT IN AN ORGANIZED HEALTH CARE EDUCATION/TRAINING PROGRAM
Payer: COMMERCIAL

## 2025-08-08 VITALS
HEIGHT: 71 IN | BODY MASS INDEX: 29.15 KG/M2 | OXYGEN SATURATION: 97 % | SYSTOLIC BLOOD PRESSURE: 110 MMHG | DIASTOLIC BLOOD PRESSURE: 49 MMHG | TEMPERATURE: 98 F | HEART RATE: 44 BPM | RESPIRATION RATE: 18 BRPM

## 2025-08-08 DIAGNOSIS — E16.2 HYPOGLYCEMIA: Primary | ICD-10-CM

## 2025-08-08 LAB
ALBUMIN SERPL-MCNC: 3.2 G/DL (ref 3.4–5)
ALBUMIN/GLOB SERPL: 0.9 {RATIO} (ref 1.1–2.2)
ALP SERPL-CCNC: 65 U/L (ref 40–129)
ALT SERPL-CCNC: 17 U/L (ref 10–40)
ANION GAP SERPL CALCULATED.3IONS-SCNC: 12 MMOL/L (ref 3–16)
AST SERPL-CCNC: 31 U/L (ref 15–37)
BILIRUB SERPL-MCNC: 0.4 MG/DL (ref 0–1)
BUN SERPL-MCNC: 23 MG/DL (ref 7–20)
CALCIUM SERPL-MCNC: 9.3 MG/DL (ref 8.3–10.6)
CHLORIDE SERPL-SCNC: 102 MMOL/L (ref 99–110)
CO2 SERPL-SCNC: 25 MMOL/L (ref 21–32)
CREAT SERPL-MCNC: 1.2 MG/DL (ref 0.8–1.3)
DEPRECATED RDW RBC AUTO: 15.8 % (ref 12.4–15.4)
EKG DIAGNOSIS: NORMAL
EKG Q-T INTERVAL: 328 MS
EKG QRS DURATION: 82 MS
EKG QTC CALCULATION (BAZETT): 305 MS
EKG R AXIS: 49 DEGREES
EKG T AXIS: 50 DEGREES
EKG VENTRICULAR RATE: 52 BPM
ETHANOLAMINE SERPL-MCNC: NORMAL MG/DL (ref 0–0.08)
GFR SERPLBLD CREATININE-BSD FMLA CKD-EPI: 60 ML/MIN/{1.73_M2}
GLUCOSE BLD-MCNC: 108 MG/DL (ref 70–99)
GLUCOSE BLD-MCNC: 118 MG/DL (ref 70–99)
GLUCOSE BLD-MCNC: 33 MG/DL (ref 70–99)
GLUCOSE SERPL-MCNC: 29 MG/DL (ref 70–99)
HCT VFR BLD AUTO: 40.5 % (ref 40.5–52.5)
HGB BLD-MCNC: 13.1 G/DL (ref 13.5–17.5)
MAGNESIUM SERPL-MCNC: 1.7 MG/DL (ref 1.8–2.4)
MCH RBC QN AUTO: 28.3 PG (ref 26–34)
MCHC RBC AUTO-ENTMCNC: 32.4 G/DL (ref 31–36)
MCV RBC AUTO: 87.4 FL (ref 80–100)
PERFORMED ON: ABNORMAL
PLATELET # BLD AUTO: 230 K/UL (ref 135–450)
PMV BLD AUTO: 7.3 FL (ref 5–10.5)
POTASSIUM SERPL-SCNC: 3.4 MMOL/L (ref 3.5–5.1)
PROT SERPL-MCNC: 6.8 G/DL (ref 6.4–8.2)
RBC # BLD AUTO: 4.63 M/UL (ref 4.2–5.9)
SODIUM SERPL-SCNC: 139 MMOL/L (ref 136–145)
WBC # BLD AUTO: 5.9 K/UL (ref 4–11)

## 2025-08-08 PROCEDURE — 85027 COMPLETE CBC AUTOMATED: CPT

## 2025-08-08 PROCEDURE — 96374 THER/PROPH/DIAG INJ IV PUSH: CPT

## 2025-08-08 PROCEDURE — 83735 ASSAY OF MAGNESIUM: CPT

## 2025-08-08 PROCEDURE — 2500000003 HC RX 250 WO HCPCS

## 2025-08-08 PROCEDURE — 71045 X-RAY EXAM CHEST 1 VIEW: CPT

## 2025-08-08 PROCEDURE — 99285 EMERGENCY DEPT VISIT HI MDM: CPT

## 2025-08-08 PROCEDURE — 36415 COLL VENOUS BLD VENIPUNCTURE: CPT

## 2025-08-08 PROCEDURE — 70450 CT HEAD/BRAIN W/O DYE: CPT

## 2025-08-08 PROCEDURE — 73560 X-RAY EXAM OF KNEE 1 OR 2: CPT

## 2025-08-08 PROCEDURE — 93010 ELECTROCARDIOGRAM REPORT: CPT | Performed by: INTERNAL MEDICINE

## 2025-08-08 PROCEDURE — 82077 ASSAY SPEC XCP UR&BREATH IA: CPT

## 2025-08-08 PROCEDURE — 93005 ELECTROCARDIOGRAM TRACING: CPT | Performed by: STUDENT IN AN ORGANIZED HEALTH CARE EDUCATION/TRAINING PROGRAM

## 2025-08-08 PROCEDURE — 80053 COMPREHEN METABOLIC PANEL: CPT

## 2025-08-08 RX ORDER — DEXTROSE MONOHYDRATE 25 G/50ML
INJECTION, SOLUTION INTRAVENOUS
Status: COMPLETED
Start: 2025-08-08 | End: 2025-08-08

## 2025-08-08 RX ORDER — DEXTROSE MONOHYDRATE 25 G/50ML
25 INJECTION, SOLUTION INTRAVENOUS ONCE
Status: DISCONTINUED | OUTPATIENT
Start: 2025-08-08 | End: 2025-08-08 | Stop reason: CLARIF

## 2025-08-08 RX ORDER — POTASSIUM CHLORIDE 750 MG/1
10 TABLET, EXTENDED RELEASE ORAL 2 TIMES DAILY
Qty: 10 TABLET | Refills: 0 | Status: SHIPPED | OUTPATIENT
Start: 2025-08-08 | End: 2025-08-13

## 2025-08-08 RX ADMIN — DEXTROSE MONOHYDRATE 25 G: 25 INJECTION, SOLUTION INTRAVENOUS at 14:02

## 2025-08-08 ASSESSMENT — PAIN - FUNCTIONAL ASSESSMENT: PAIN_FUNCTIONAL_ASSESSMENT: NONE - DENIES PAIN

## 2025-08-08 ASSESSMENT — LIFESTYLE VARIABLES
HOW OFTEN DO YOU HAVE A DRINK CONTAINING ALCOHOL: NEVER
HOW MANY STANDARD DRINKS CONTAINING ALCOHOL DO YOU HAVE ON A TYPICAL DAY: PATIENT DOES NOT DRINK

## 2025-08-11 ENCOUNTER — TELEPHONE (OUTPATIENT)
Dept: INTERNAL MEDICINE CLINIC | Age: 85
End: 2025-08-11

## 2025-08-15 ENCOUNTER — APPOINTMENT (OUTPATIENT)
Dept: GENERAL RADIOLOGY | Age: 85
DRG: 100 | End: 2025-08-15
Payer: COMMERCIAL

## 2025-08-15 ENCOUNTER — APPOINTMENT (OUTPATIENT)
Dept: CT IMAGING | Age: 85
DRG: 100 | End: 2025-08-15
Payer: COMMERCIAL

## 2025-08-15 ENCOUNTER — HOSPITAL ENCOUNTER (INPATIENT)
Age: 85
LOS: 2 days | Discharge: HOME | DRG: 100 | End: 2025-08-17
Attending: EMERGENCY MEDICINE | Admitting: INTERNAL MEDICINE
Payer: COMMERCIAL

## 2025-08-15 DIAGNOSIS — J18.9 PNEUMONIA OF BOTH LOWER LOBES DUE TO INFECTIOUS ORGANISM: ICD-10-CM

## 2025-08-15 DIAGNOSIS — R56.9 SEIZURE (HCC): Primary | ICD-10-CM

## 2025-08-15 DIAGNOSIS — E16.2 HYPOGLYCEMIA: ICD-10-CM

## 2025-08-15 DIAGNOSIS — E87.6 HYPOKALEMIA: ICD-10-CM

## 2025-08-15 PROBLEM — R41.82 AMS (ALTERED MENTAL STATUS): Status: ACTIVE | Noted: 2025-08-15

## 2025-08-15 LAB
ALBUMIN SERPL-MCNC: 3.1 G/DL (ref 3.4–5)
ALBUMIN/GLOB SERPL: 1 {RATIO} (ref 1.1–2.2)
ALP SERPL-CCNC: 65 U/L (ref 40–129)
ALT SERPL-CCNC: 18 U/L (ref 10–40)
AMMONIA PLAS-SCNC: <10 UMOL/L (ref 16–60)
AMPHETAMINES UR QL SCN>1000 NG/ML: NORMAL
ANION GAP SERPL CALCULATED.3IONS-SCNC: 11 MMOL/L (ref 3–16)
APTT BLD: 30.4 SEC (ref 22.8–35.8)
AST SERPL-CCNC: 33 U/L (ref 15–37)
BARBITURATES UR QL SCN>200 NG/ML: NORMAL
BASE EXCESS BLDV CALC-SCNC: -0.1 MMOL/L (ref -3–3)
BASOPHILS # BLD: 0 K/UL (ref 0–0.2)
BASOPHILS NFR BLD: 0.4 %
BENZODIAZ UR QL SCN>200 NG/ML: NORMAL
BILIRUB SERPL-MCNC: 0.5 MG/DL (ref 0–1)
BILIRUB UR QL STRIP.AUTO: NEGATIVE
BUN SERPL-MCNC: 27 MG/DL (ref 7–20)
CALCIUM SERPL-MCNC: 9 MG/DL (ref 8.3–10.6)
CANNABINOIDS UR QL SCN>50 NG/ML: NORMAL
CHLORIDE SERPL-SCNC: 101 MMOL/L (ref 99–110)
CLARITY UR: CLEAR
CO2 BLDV-SCNC: 27 MMOL/L
CO2 SERPL-SCNC: 28 MMOL/L (ref 21–32)
COCAINE UR QL SCN: NORMAL
COHGB MFR BLDV: 1.2 % (ref 0–1.5)
COLOR UR: ABNORMAL
CREAT SERPL-MCNC: 1.3 MG/DL (ref 0.8–1.3)
DEPRECATED RDW RBC AUTO: 16.3 % (ref 12.4–15.4)
DRUG SCREEN COMMENT UR-IMP: NORMAL
EOSINOPHIL # BLD: 0.1 K/UL (ref 0–0.6)
EOSINOPHIL NFR BLD: 1.2 %
ETHANOLAMINE SERPL-MCNC: NORMAL MG/DL (ref 0–0.08)
FENTANYL SCREEN, URINE: NORMAL
GFR SERPLBLD CREATININE-BSD FMLA CKD-EPI: 55 ML/MIN/{1.73_M2}
GLUCOSE BLD-MCNC: 145 MG/DL (ref 70–99)
GLUCOSE BLD-MCNC: 166 MG/DL (ref 70–99)
GLUCOSE BLD-MCNC: 88 MG/DL (ref 70–99)
GLUCOSE SERPL-MCNC: 134 MG/DL (ref 70–99)
GLUCOSE UR STRIP.AUTO-MCNC: 500 MG/DL
HCO3 BLDV-SCNC: 25.2 MMOL/L (ref 23–29)
HCT VFR BLD AUTO: 39.3 % (ref 40.5–52.5)
HGB BLD-MCNC: 12.9 G/DL (ref 13.5–17.5)
HGB UR QL STRIP.AUTO: NEGATIVE
INR PPP: 1.15 (ref 0.86–1.14)
KETONES UR STRIP.AUTO-MCNC: NEGATIVE MG/DL
LACTATE BLDV-SCNC: 1.5 MMOL/L (ref 0.4–1.9)
LACTATE BLDV-SCNC: 1.7 MMOL/L (ref 0.4–1.9)
LACTATE BLDV-SCNC: 2.1 MMOL/L (ref 0.4–1.9)
LEUKOCYTE ESTERASE UR QL STRIP.AUTO: NEGATIVE
LYMPHOCYTES # BLD: 0.8 K/UL (ref 1–5.1)
LYMPHOCYTES NFR BLD: 18.3 %
MAGNESIUM SERPL-MCNC: 1.8 MG/DL (ref 1.8–2.4)
MCH RBC QN AUTO: 28.7 PG (ref 26–34)
MCHC RBC AUTO-ENTMCNC: 32.8 G/DL (ref 31–36)
MCV RBC AUTO: 87.4 FL (ref 80–100)
METHADONE UR QL SCN>300 NG/ML: NORMAL
METHGB MFR BLDV: 0.3 %
MONOCYTES # BLD: 0.4 K/UL (ref 0–1.3)
MONOCYTES NFR BLD: 9.3 %
NEUTROPHILS # BLD: 3.1 K/UL (ref 1.7–7.7)
NEUTROPHILS NFR BLD: 70.8 %
NITRITE UR QL STRIP.AUTO: NEGATIVE
NT-PROBNP SERPL-MCNC: 1801 PG/ML (ref 0–449)
O2 CT VFR BLDV CALC: 18 VOL %
O2 THERAPY: ABNORMAL
OPIATES UR QL SCN>300 NG/ML: NORMAL
OXYCODONE UR QL SCN: NORMAL
PCO2 BLDV: 43.2 MMHG (ref 40–50)
PCP UR QL SCN>25 NG/ML: NORMAL
PERFORMED ON: ABNORMAL
PERFORMED ON: ABNORMAL
PERFORMED ON: NORMAL
PH BLDV: 7.38 [PH] (ref 7.35–7.45)
PH UR STRIP.AUTO: 5.5 [PH] (ref 5–8)
PH UR STRIP: 5.5 [PH]
PLATELET # BLD AUTO: 262 K/UL (ref 135–450)
PMV BLD AUTO: 6.8 FL (ref 5–10.5)
PO2 BLDV: 89.1 MMHG (ref 25–40)
POTASSIUM SERPL-SCNC: 3.2 MMOL/L (ref 3.5–5.1)
PROCALCITONIN SERPL IA-MCNC: 0.05 NG/ML (ref 0–0.15)
PROT SERPL-MCNC: 6.1 G/DL (ref 6.4–8.2)
PROT UR STRIP.AUTO-MCNC: NEGATIVE MG/DL
PROTHROMBIN TIME: 15 SEC (ref 12.1–14.9)
RBC # BLD AUTO: 4.5 M/UL (ref 4.2–5.9)
SAO2 % BLDV: 97 %
SODIUM SERPL-SCNC: 140 MMOL/L (ref 136–145)
SP GR UR STRIP.AUTO: 1.02 (ref 1–1.03)
UA DIPSTICK W REFLEX MICRO PNL UR: ABNORMAL
URN SPEC COLLECT METH UR: ABNORMAL
UROBILINOGEN UR STRIP-ACNC: 0.2 E.U./DL
WBC # BLD AUTO: 4.4 K/UL (ref 4–11)

## 2025-08-15 PROCEDURE — 2500000003 HC RX 250 WO HCPCS: Performed by: NURSE PRACTITIONER

## 2025-08-15 PROCEDURE — 99285 EMERGENCY DEPT VISIT HI MDM: CPT

## 2025-08-15 PROCEDURE — 6360000002 HC RX W HCPCS: Performed by: EMERGENCY MEDICINE

## 2025-08-15 PROCEDURE — 36415 COLL VENOUS BLD VENIPUNCTURE: CPT

## 2025-08-15 PROCEDURE — 71045 X-RAY EXAM CHEST 1 VIEW: CPT

## 2025-08-15 PROCEDURE — 85730 THROMBOPLASTIN TIME PARTIAL: CPT

## 2025-08-15 PROCEDURE — 83605 ASSAY OF LACTIC ACID: CPT

## 2025-08-15 PROCEDURE — 85610 PROTHROMBIN TIME: CPT

## 2025-08-15 PROCEDURE — 83735 ASSAY OF MAGNESIUM: CPT

## 2025-08-15 PROCEDURE — 6360000002 HC RX W HCPCS: Performed by: NURSE PRACTITIONER

## 2025-08-15 PROCEDURE — 81003 URINALYSIS AUTO W/O SCOPE: CPT

## 2025-08-15 PROCEDURE — 96375 TX/PRO/DX INJ NEW DRUG ADDON: CPT

## 2025-08-15 PROCEDURE — 80307 DRUG TEST PRSMV CHEM ANLYZR: CPT

## 2025-08-15 PROCEDURE — 85025 COMPLETE CBC W/AUTO DIFF WBC: CPT

## 2025-08-15 PROCEDURE — 2580000003 HC RX 258: Performed by: EMERGENCY MEDICINE

## 2025-08-15 PROCEDURE — 83880 ASSAY OF NATRIURETIC PEPTIDE: CPT

## 2025-08-15 PROCEDURE — 2060000000 HC ICU INTERMEDIATE R&B

## 2025-08-15 PROCEDURE — 82140 ASSAY OF AMMONIA: CPT

## 2025-08-15 PROCEDURE — 80053 COMPREHEN METABOLIC PANEL: CPT

## 2025-08-15 PROCEDURE — 82077 ASSAY SPEC XCP UR&BREATH IA: CPT

## 2025-08-15 PROCEDURE — 70450 CT HEAD/BRAIN W/O DYE: CPT

## 2025-08-15 PROCEDURE — 2500000003 HC RX 250 WO HCPCS

## 2025-08-15 PROCEDURE — 2700000000 HC OXYGEN THERAPY PER DAY

## 2025-08-15 PROCEDURE — 6370000000 HC RX 637 (ALT 250 FOR IP): Performed by: NURSE PRACTITIONER

## 2025-08-15 PROCEDURE — 96374 THER/PROPH/DIAG INJ IV PUSH: CPT

## 2025-08-15 PROCEDURE — 2580000003 HC RX 258: Performed by: NURSE PRACTITIONER

## 2025-08-15 PROCEDURE — 82803 BLOOD GASES ANY COMBINATION: CPT

## 2025-08-15 PROCEDURE — 99223 1ST HOSP IP/OBS HIGH 75: CPT

## 2025-08-15 PROCEDURE — 87040 BLOOD CULTURE FOR BACTERIA: CPT

## 2025-08-15 PROCEDURE — 84145 PROCALCITONIN (PCT): CPT

## 2025-08-15 PROCEDURE — 6360000002 HC RX W HCPCS

## 2025-08-15 RX ORDER — POLYETHYLENE GLYCOL 3350 17 G/17G
17 POWDER, FOR SOLUTION ORAL DAILY PRN
Status: CANCELLED | OUTPATIENT
Start: 2025-08-15

## 2025-08-15 RX ORDER — POLYETHYLENE GLYCOL 3350 17 G/17G
17 POWDER, FOR SOLUTION ORAL DAILY PRN
Status: DISCONTINUED | OUTPATIENT
Start: 2025-08-15 | End: 2025-08-17 | Stop reason: HOSPADM

## 2025-08-15 RX ORDER — SODIUM CHLORIDE 0.9 % (FLUSH) 0.9 %
5-40 SYRINGE (ML) INJECTION PRN
Status: DISCONTINUED | OUTPATIENT
Start: 2025-08-15 | End: 2025-08-17 | Stop reason: HOSPADM

## 2025-08-15 RX ORDER — SODIUM CHLORIDE 9 MG/ML
INJECTION, SOLUTION INTRAVENOUS PRN
Status: DISCONTINUED | OUTPATIENT
Start: 2025-08-15 | End: 2025-08-17 | Stop reason: HOSPADM

## 2025-08-15 RX ORDER — ESCITALOPRAM OXALATE 10 MG/1
5 TABLET ORAL DAILY
Status: CANCELLED | OUTPATIENT
Start: 2025-08-15

## 2025-08-15 RX ORDER — DEXTROSE MONOHYDRATE 25 G/50ML
25 INJECTION, SOLUTION INTRAVENOUS ONCE
Status: COMPLETED | OUTPATIENT
Start: 2025-08-15 | End: 2025-08-15

## 2025-08-15 RX ORDER — MAGNESIUM SULFATE IN WATER 40 MG/ML
2000 INJECTION, SOLUTION INTRAVENOUS PRN
Status: CANCELLED | OUTPATIENT
Start: 2025-08-15

## 2025-08-15 RX ORDER — POTASSIUM CHLORIDE 1500 MG/1
40 TABLET, EXTENDED RELEASE ORAL PRN
Status: DISCONTINUED | OUTPATIENT
Start: 2025-08-15 | End: 2025-08-17 | Stop reason: HOSPADM

## 2025-08-15 RX ORDER — DEXTROSE MONOHYDRATE 25 G/50ML
INJECTION, SOLUTION INTRAVENOUS
Status: COMPLETED
Start: 2025-08-15 | End: 2025-08-15

## 2025-08-15 RX ORDER — ACETAMINOPHEN 650 MG/1
650 SUPPOSITORY RECTAL EVERY 6 HOURS PRN
Status: DISCONTINUED | OUTPATIENT
Start: 2025-08-15 | End: 2025-08-17 | Stop reason: HOSPADM

## 2025-08-15 RX ORDER — MIDODRINE HYDROCHLORIDE 5 MG/1
5 TABLET ORAL
Status: DISCONTINUED | OUTPATIENT
Start: 2025-08-15 | End: 2025-08-17 | Stop reason: HOSPADM

## 2025-08-15 RX ORDER — ACETAMINOPHEN 325 MG/1
650 TABLET ORAL EVERY 6 HOURS PRN
Status: DISCONTINUED | OUTPATIENT
Start: 2025-08-15 | End: 2025-08-17 | Stop reason: HOSPADM

## 2025-08-15 RX ORDER — LEVOTHYROXINE SODIUM 100 UG/1
100 TABLET ORAL DAILY
Status: CANCELLED | OUTPATIENT
Start: 2025-08-15

## 2025-08-15 RX ORDER — LEVETIRACETAM 500 MG/5ML
1000 INJECTION, SOLUTION, CONCENTRATE INTRAVENOUS EVERY 12 HOURS
Status: DISCONTINUED | OUTPATIENT
Start: 2025-08-15 | End: 2025-08-16

## 2025-08-15 RX ORDER — POTASSIUM CHLORIDE 7.45 MG/ML
10 INJECTION INTRAVENOUS PRN
Status: CANCELLED | OUTPATIENT
Start: 2025-08-15

## 2025-08-15 RX ORDER — ONDANSETRON 4 MG/1
4 TABLET, ORALLY DISINTEGRATING ORAL EVERY 8 HOURS PRN
Status: DISCONTINUED | OUTPATIENT
Start: 2025-08-15 | End: 2025-08-17 | Stop reason: HOSPADM

## 2025-08-15 RX ORDER — MAGNESIUM SULFATE IN WATER 40 MG/ML
2000 INJECTION, SOLUTION INTRAVENOUS PRN
Status: DISCONTINUED | OUTPATIENT
Start: 2025-08-15 | End: 2025-08-17 | Stop reason: HOSPADM

## 2025-08-15 RX ORDER — POTASSIUM CHLORIDE 1500 MG/1
40 TABLET, EXTENDED RELEASE ORAL ONCE
Status: COMPLETED | OUTPATIENT
Start: 2025-08-15 | End: 2025-08-15

## 2025-08-15 RX ORDER — LEVOTHYROXINE SODIUM 100 UG/1
100 TABLET ORAL DAILY
Status: DISCONTINUED | OUTPATIENT
Start: 2025-08-15 | End: 2025-08-17 | Stop reason: HOSPADM

## 2025-08-15 RX ORDER — NALOXONE HYDROCHLORIDE 1 MG/ML
2 INJECTION INTRAMUSCULAR; INTRAVENOUS; SUBCUTANEOUS ONCE
Status: COMPLETED | OUTPATIENT
Start: 2025-08-15 | End: 2025-08-15

## 2025-08-15 RX ORDER — ACETAMINOPHEN 650 MG/1
650 SUPPOSITORY RECTAL EVERY 6 HOURS PRN
Status: CANCELLED | OUTPATIENT
Start: 2025-08-15

## 2025-08-15 RX ORDER — SODIUM CHLORIDE 0.9 % (FLUSH) 0.9 %
5-40 SYRINGE (ML) INJECTION PRN
Status: CANCELLED | OUTPATIENT
Start: 2025-08-15

## 2025-08-15 RX ORDER — LANOLIN ALCOHOL/MO/W.PET/CERES
200 CREAM (GRAM) TOPICAL DAILY
Status: DISCONTINUED | OUTPATIENT
Start: 2025-08-16 | End: 2025-08-17 | Stop reason: HOSPADM

## 2025-08-15 RX ORDER — POTASSIUM CHLORIDE 1500 MG/1
40 TABLET, EXTENDED RELEASE ORAL PRN
Status: CANCELLED | OUTPATIENT
Start: 2025-08-15

## 2025-08-15 RX ORDER — POTASSIUM CHLORIDE 750 MG/1
10 TABLET, EXTENDED RELEASE ORAL 2 TIMES DAILY
Status: CANCELLED | OUTPATIENT
Start: 2025-08-15

## 2025-08-15 RX ORDER — SODIUM CHLORIDE 0.9 % (FLUSH) 0.9 %
5-40 SYRINGE (ML) INJECTION EVERY 12 HOURS SCHEDULED
Status: CANCELLED | OUTPATIENT
Start: 2025-08-15

## 2025-08-15 RX ORDER — LORAZEPAM 2 MG/ML
1 INJECTION INTRAMUSCULAR ONCE
Status: COMPLETED | OUTPATIENT
Start: 2025-08-15 | End: 2025-08-15

## 2025-08-15 RX ORDER — SODIUM CHLORIDE 0.9 % (FLUSH) 0.9 %
5-40 SYRINGE (ML) INJECTION EVERY 12 HOURS SCHEDULED
Status: DISCONTINUED | OUTPATIENT
Start: 2025-08-15 | End: 2025-08-17 | Stop reason: HOSPADM

## 2025-08-15 RX ORDER — POTASSIUM CHLORIDE 7.45 MG/ML
10 INJECTION INTRAVENOUS PRN
Status: DISCONTINUED | OUTPATIENT
Start: 2025-08-15 | End: 2025-08-17 | Stop reason: HOSPADM

## 2025-08-15 RX ORDER — VITAMIN B COMPLEX
2000 TABLET ORAL DAILY
Status: DISCONTINUED | OUTPATIENT
Start: 2025-08-15 | End: 2025-08-17 | Stop reason: HOSPADM

## 2025-08-15 RX ORDER — LORAZEPAM 2 MG/ML
INJECTION INTRAMUSCULAR
Status: COMPLETED
Start: 2025-08-15 | End: 2025-08-15

## 2025-08-15 RX ORDER — SODIUM CHLORIDE 9 MG/ML
INJECTION, SOLUTION INTRAVENOUS PRN
Status: CANCELLED | OUTPATIENT
Start: 2025-08-15

## 2025-08-15 RX ORDER — NALOXONE HYDROCHLORIDE 1 MG/ML
INJECTION INTRAMUSCULAR; INTRAVENOUS; SUBCUTANEOUS
Status: COMPLETED
Start: 2025-08-15 | End: 2025-08-15

## 2025-08-15 RX ORDER — ONDANSETRON 4 MG/1
4 TABLET, ORALLY DISINTEGRATING ORAL EVERY 8 HOURS PRN
Status: CANCELLED | OUTPATIENT
Start: 2025-08-15

## 2025-08-15 RX ORDER — ACETAMINOPHEN 325 MG/1
650 TABLET ORAL EVERY 6 HOURS PRN
Status: CANCELLED | OUTPATIENT
Start: 2025-08-15

## 2025-08-15 RX ORDER — ONDANSETRON 2 MG/ML
4 INJECTION INTRAMUSCULAR; INTRAVENOUS EVERY 6 HOURS PRN
Status: CANCELLED | OUTPATIENT
Start: 2025-08-15

## 2025-08-15 RX ORDER — MAGNESIUM GLUCONATE 27 MG(500)
500 TABLET ORAL DAILY
Status: CANCELLED | OUTPATIENT
Start: 2025-08-15

## 2025-08-15 RX ORDER — MIDODRINE HYDROCHLORIDE 5 MG/1
5 TABLET ORAL
Status: CANCELLED | OUTPATIENT
Start: 2025-08-15

## 2025-08-15 RX ORDER — ESCITALOPRAM OXALATE 10 MG/1
5 TABLET ORAL DAILY
Status: DISCONTINUED | OUTPATIENT
Start: 2025-08-15 | End: 2025-08-17 | Stop reason: HOSPADM

## 2025-08-15 RX ORDER — ONDANSETRON 2 MG/ML
4 INJECTION INTRAMUSCULAR; INTRAVENOUS EVERY 6 HOURS PRN
Status: DISCONTINUED | OUTPATIENT
Start: 2025-08-15 | End: 2025-08-17 | Stop reason: HOSPADM

## 2025-08-15 RX ADMIN — LORAZEPAM 1 MG: 2 INJECTION, SOLUTION INTRAMUSCULAR; INTRAVENOUS at 16:24

## 2025-08-15 RX ADMIN — VANCOMYCIN HYDROCHLORIDE 2000 MG: 10 INJECTION, POWDER, LYOPHILIZED, FOR SOLUTION INTRAVENOUS at 16:23

## 2025-08-15 RX ADMIN — LORAZEPAM 1 MG: 2 INJECTION INTRAMUSCULAR at 16:24

## 2025-08-15 RX ADMIN — MIDODRINE HYDROCHLORIDE 5 MG: 5 TABLET ORAL at 17:41

## 2025-08-15 RX ADMIN — CEFEPIME 2000 MG: 2 INJECTION, POWDER, FOR SOLUTION INTRAVENOUS at 16:23

## 2025-08-15 RX ADMIN — NALOXONE HYDROCHLORIDE 2 MG: 1 INJECTION INTRAMUSCULAR; INTRAVENOUS; SUBCUTANEOUS at 15:07

## 2025-08-15 RX ADMIN — APIXABAN 2.5 MG: 5 TABLET, FILM COATED ORAL at 21:53

## 2025-08-15 RX ADMIN — Medication 10 ML: at 21:54

## 2025-08-15 RX ADMIN — Medication 2000 UNITS: at 17:41

## 2025-08-15 RX ADMIN — CEFEPIME 2000 MG: 2 INJECTION, POWDER, FOR SOLUTION INTRAVENOUS at 23:58

## 2025-08-15 RX ADMIN — LEVOTHYROXINE SODIUM 100 MCG: 100 TABLET ORAL at 17:41

## 2025-08-15 RX ADMIN — DEXTROSE MONOHYDRATE 50 ML: 25 INJECTION, SOLUTION INTRAVENOUS at 15:15

## 2025-08-15 RX ADMIN — POTASSIUM CHLORIDE 40 MEQ: 1500 TABLET, EXTENDED RELEASE ORAL at 17:40

## 2025-08-15 RX ADMIN — ESCITALOPRAM OXALATE 5 MG: 10 TABLET ORAL at 17:41

## 2025-08-15 RX ADMIN — LEVETIRACETAM 1000 MG: 100 INJECTION INTRAVENOUS at 15:44

## 2025-08-15 ASSESSMENT — PAIN SCALES - GENERAL: PAINLEVEL_OUTOF10: 0

## 2025-08-16 LAB
ALBUMIN SERPL-MCNC: 2.6 G/DL (ref 3.4–5)
ALBUMIN/GLOB SERPL: 1 {RATIO} (ref 1.1–2.2)
ALP SERPL-CCNC: 54 U/L (ref 40–129)
ALT SERPL-CCNC: 16 U/L (ref 10–40)
ANION GAP SERPL CALCULATED.3IONS-SCNC: 7 MMOL/L (ref 3–16)
ANION GAP SERPL CALCULATED.3IONS-SCNC: 8 MMOL/L (ref 3–16)
AST SERPL-CCNC: 31 U/L (ref 15–37)
BASOPHILS # BLD: 0 K/UL (ref 0–0.2)
BASOPHILS NFR BLD: 0.3 %
BILIRUB SERPL-MCNC: 0.4 MG/DL (ref 0–1)
BUN SERPL-MCNC: 22 MG/DL (ref 7–20)
BUN SERPL-MCNC: 24 MG/DL (ref 7–20)
CALCIUM SERPL-MCNC: 8.6 MG/DL (ref 8.3–10.6)
CALCIUM SERPL-MCNC: 8.9 MG/DL (ref 8.3–10.6)
CHLORIDE SERPL-SCNC: 104 MMOL/L (ref 99–110)
CHLORIDE SERPL-SCNC: 106 MMOL/L (ref 99–110)
CO2 SERPL-SCNC: 28 MMOL/L (ref 21–32)
CO2 SERPL-SCNC: 29 MMOL/L (ref 21–32)
CREAT SERPL-MCNC: 1.1 MG/DL (ref 0.8–1.3)
CREAT SERPL-MCNC: 1.1 MG/DL (ref 0.8–1.3)
DEPRECATED RDW RBC AUTO: 16.6 % (ref 12.4–15.4)
EOSINOPHIL # BLD: 0.1 K/UL (ref 0–0.6)
EOSINOPHIL NFR BLD: 2.6 %
GFR SERPLBLD CREATININE-BSD FMLA CKD-EPI: 67 ML/MIN/{1.73_M2}
GFR SERPLBLD CREATININE-BSD FMLA CKD-EPI: 67 ML/MIN/{1.73_M2}
GLUCOSE BLD-MCNC: 101 MG/DL (ref 70–99)
GLUCOSE BLD-MCNC: 109 MG/DL (ref 70–99)
GLUCOSE BLD-MCNC: 124 MG/DL (ref 70–99)
GLUCOSE BLD-MCNC: 130 MG/DL (ref 70–99)
GLUCOSE BLD-MCNC: 140 MG/DL (ref 70–99)
GLUCOSE BLD-MCNC: 53 MG/DL (ref 70–99)
GLUCOSE SERPL-MCNC: 134 MG/DL (ref 70–99)
GLUCOSE SERPL-MCNC: 52 MG/DL (ref 70–99)
HCT VFR BLD AUTO: 35.1 % (ref 40.5–52.5)
HGB BLD-MCNC: 11.7 G/DL (ref 13.5–17.5)
LYMPHOCYTES # BLD: 1 K/UL (ref 1–5.1)
LYMPHOCYTES NFR BLD: 26.9 %
MAGNESIUM SERPL-MCNC: 1.93 MG/DL (ref 1.8–2.4)
MCH RBC QN AUTO: 29.2 PG (ref 26–34)
MCHC RBC AUTO-ENTMCNC: 33.3 G/DL (ref 31–36)
MCV RBC AUTO: 87.6 FL (ref 80–100)
MONOCYTES # BLD: 0.5 K/UL (ref 0–1.3)
MONOCYTES NFR BLD: 12.8 %
NEUTROPHILS # BLD: 2.1 K/UL (ref 1.7–7.7)
NEUTROPHILS NFR BLD: 57.4 %
PERFORMED ON: ABNORMAL
PLATELET # BLD AUTO: 233 K/UL (ref 135–450)
PMV BLD AUTO: 6.9 FL (ref 5–10.5)
POTASSIUM SERPL-SCNC: 4 MMOL/L (ref 3.5–5.1)
POTASSIUM SERPL-SCNC: 4.8 MMOL/L (ref 3.5–5.1)
PROT SERPL-MCNC: 5.1 G/DL (ref 6.4–8.2)
RBC # BLD AUTO: 4.01 M/UL (ref 4.2–5.9)
SODIUM SERPL-SCNC: 140 MMOL/L (ref 136–145)
SODIUM SERPL-SCNC: 142 MMOL/L (ref 136–145)
WBC # BLD AUTO: 3.6 K/UL (ref 4–11)

## 2025-08-16 PROCEDURE — 6360000002 HC RX W HCPCS: Performed by: NURSE PRACTITIONER

## 2025-08-16 PROCEDURE — 97116 GAIT TRAINING THERAPY: CPT

## 2025-08-16 PROCEDURE — 85025 COMPLETE CBC W/AUTO DIFF WBC: CPT

## 2025-08-16 PROCEDURE — 36415 COLL VENOUS BLD VENIPUNCTURE: CPT

## 2025-08-16 PROCEDURE — 2500000003 HC RX 250 WO HCPCS: Performed by: NURSE PRACTITIONER

## 2025-08-16 PROCEDURE — 93005 ELECTROCARDIOGRAM TRACING: CPT | Performed by: STUDENT IN AN ORGANIZED HEALTH CARE EDUCATION/TRAINING PROGRAM

## 2025-08-16 PROCEDURE — 99221 1ST HOSP IP/OBS SF/LOW 40: CPT | Performed by: NEUROMUSCULOSKELETAL MEDICINE & OMM

## 2025-08-16 PROCEDURE — 97162 PT EVAL MOD COMPLEX 30 MIN: CPT

## 2025-08-16 PROCEDURE — 2580000003 HC RX 258: Performed by: INTERNAL MEDICINE

## 2025-08-16 PROCEDURE — 2580000003 HC RX 258: Performed by: NURSE PRACTITIONER

## 2025-08-16 PROCEDURE — 83735 ASSAY OF MAGNESIUM: CPT

## 2025-08-16 PROCEDURE — 97530 THERAPEUTIC ACTIVITIES: CPT

## 2025-08-16 PROCEDURE — 2060000000 HC ICU INTERMEDIATE R&B

## 2025-08-16 PROCEDURE — 6370000000 HC RX 637 (ALT 250 FOR IP): Performed by: NURSE PRACTITIONER

## 2025-08-16 PROCEDURE — 2700000000 HC OXYGEN THERAPY PER DAY

## 2025-08-16 PROCEDURE — 80053 COMPREHEN METABOLIC PANEL: CPT

## 2025-08-16 PROCEDURE — 94761 N-INVAS EAR/PLS OXIMETRY MLT: CPT

## 2025-08-16 RX ORDER — GLUCAGON 1 MG/ML
1 KIT INJECTION PRN
Status: DISCONTINUED | OUTPATIENT
Start: 2025-08-16 | End: 2025-08-17 | Stop reason: HOSPADM

## 2025-08-16 RX ORDER — DEXTROSE MONOHYDRATE 100 MG/ML
INJECTION, SOLUTION INTRAVENOUS CONTINUOUS PRN
Status: DISCONTINUED | OUTPATIENT
Start: 2025-08-16 | End: 2025-08-17 | Stop reason: HOSPADM

## 2025-08-16 RX ADMIN — Medication 10 ML: at 21:04

## 2025-08-16 RX ADMIN — APIXABAN 2.5 MG: 5 TABLET, FILM COATED ORAL at 20:58

## 2025-08-16 RX ADMIN — MIDODRINE HYDROCHLORIDE 5 MG: 5 TABLET ORAL at 16:08

## 2025-08-16 RX ADMIN — LEVETIRACETAM 1000 MG: 100 INJECTION INTRAVENOUS at 03:59

## 2025-08-16 RX ADMIN — DEXTROSE MONOHYDRATE 250 ML: 100 INJECTION, SOLUTION INTRAVENOUS at 07:09

## 2025-08-16 RX ADMIN — APIXABAN 2.5 MG: 5 TABLET, FILM COATED ORAL at 09:15

## 2025-08-16 RX ADMIN — CEFEPIME 2000 MG: 2 INJECTION, POWDER, FOR SOLUTION INTRAVENOUS at 11:51

## 2025-08-16 RX ADMIN — VANCOMYCIN HYDROCHLORIDE 1250 MG: 10 INJECTION, POWDER, LYOPHILIZED, FOR SOLUTION INTRAVENOUS at 05:31

## 2025-08-16 RX ADMIN — MAGNESIUM OXIDE 400 MG (241.3 MG MAGNESIUM) TABLET 200 MG: TABLET at 09:15

## 2025-08-16 RX ADMIN — LEVOTHYROXINE SODIUM 100 MCG: 100 TABLET ORAL at 05:06

## 2025-08-16 RX ADMIN — CEFEPIME 2000 MG: 2 INJECTION, POWDER, FOR SOLUTION INTRAVENOUS at 23:47

## 2025-08-16 RX ADMIN — Medication 2000 UNITS: at 09:15

## 2025-08-16 RX ADMIN — ESCITALOPRAM OXALATE 5 MG: 10 TABLET ORAL at 09:16

## 2025-08-16 RX ADMIN — MIDODRINE HYDROCHLORIDE 5 MG: 5 TABLET ORAL at 11:51

## 2025-08-16 RX ADMIN — MIDODRINE HYDROCHLORIDE 5 MG: 5 TABLET ORAL at 09:15

## 2025-08-16 ASSESSMENT — PAIN SCALES - GENERAL
PAINLEVEL_OUTOF10: 0
PAINLEVEL_OUTOF10: 0

## 2025-08-17 VITALS
SYSTOLIC BLOOD PRESSURE: 136 MMHG | OXYGEN SATURATION: 94 % | HEIGHT: 71 IN | BODY MASS INDEX: 29.88 KG/M2 | TEMPERATURE: 97.9 F | HEART RATE: 68 BPM | WEIGHT: 213.4 LBS | RESPIRATION RATE: 14 BRPM | DIASTOLIC BLOOD PRESSURE: 74 MMHG

## 2025-08-17 LAB
EKG DIAGNOSIS: NORMAL
EKG Q-T INTERVAL: 430 MS
EKG QRS DURATION: 82 MS
EKG QTC CALCULATION (BAZETT): 425 MS
EKG R AXIS: 43 DEGREES
EKG T AXIS: 20 DEGREES
EKG VENTRICULAR RATE: 59 BPM
GLUCOSE BLD-MCNC: 112 MG/DL (ref 70–99)
GLUCOSE BLD-MCNC: 117 MG/DL (ref 70–99)
GLUCOSE BLD-MCNC: 154 MG/DL (ref 70–99)
PERFORMED ON: ABNORMAL
VANCOMYCIN TROUGH SERPL-MCNC: 13.6 UG/ML (ref 10–20)

## 2025-08-17 PROCEDURE — 2580000003 HC RX 258: Performed by: NURSE PRACTITIONER

## 2025-08-17 PROCEDURE — 93010 ELECTROCARDIOGRAM REPORT: CPT | Performed by: INTERNAL MEDICINE

## 2025-08-17 PROCEDURE — 36415 COLL VENOUS BLD VENIPUNCTURE: CPT

## 2025-08-17 PROCEDURE — 6370000000 HC RX 637 (ALT 250 FOR IP): Performed by: NURSE PRACTITIONER

## 2025-08-17 PROCEDURE — 6360000002 HC RX W HCPCS: Performed by: NURSE PRACTITIONER

## 2025-08-17 PROCEDURE — 80202 ASSAY OF VANCOMYCIN: CPT

## 2025-08-17 RX ORDER — LEVOFLOXACIN 750 MG/1
750 TABLET, FILM COATED ORAL DAILY
Qty: 5 TABLET | Refills: 0 | Status: SHIPPED | OUTPATIENT
Start: 2025-08-17 | End: 2025-08-22

## 2025-08-17 RX ADMIN — Medication 2000 UNITS: at 08:48

## 2025-08-17 RX ADMIN — MAGNESIUM OXIDE 400 MG (241.3 MG MAGNESIUM) TABLET 200 MG: TABLET at 08:48

## 2025-08-17 RX ADMIN — LEVOTHYROXINE SODIUM 100 MCG: 100 TABLET ORAL at 06:13

## 2025-08-17 RX ADMIN — MIDODRINE HYDROCHLORIDE 5 MG: 5 TABLET ORAL at 08:48

## 2025-08-17 RX ADMIN — VANCOMYCIN HYDROCHLORIDE 1250 MG: 10 INJECTION, POWDER, LYOPHILIZED, FOR SOLUTION INTRAVENOUS at 06:13

## 2025-08-17 RX ADMIN — APIXABAN 2.5 MG: 5 TABLET, FILM COATED ORAL at 08:49

## 2025-08-17 RX ADMIN — ESCITALOPRAM OXALATE 5 MG: 10 TABLET ORAL at 08:49

## 2025-08-17 ASSESSMENT — PAIN SCALES - GENERAL
PAINLEVEL_OUTOF10: 0
PAINLEVEL_OUTOF10: 0

## 2025-08-19 ENCOUNTER — TELEPHONE (OUTPATIENT)
Dept: INTERNAL MEDICINE CLINIC | Age: 85
End: 2025-08-19

## 2025-08-19 LAB
BACTERIA BLD CULT ORG #2: NORMAL
BACTERIA BLD CULT: NORMAL

## 2025-08-21 ENCOUNTER — CLINICAL DOCUMENTATION (OUTPATIENT)
Age: 85
End: 2025-08-21

## 2025-08-22 LAB
GLUCOSE BLD-MCNC: 110 MG/DL (ref 70–99)
PERFORMED ON: ABNORMAL

## 2025-08-25 ENCOUNTER — OFFICE VISIT (OUTPATIENT)
Dept: INTERNAL MEDICINE CLINIC | Age: 85
End: 2025-08-25

## 2025-08-25 VITALS
SYSTOLIC BLOOD PRESSURE: 100 MMHG | WEIGHT: 200 LBS | RESPIRATION RATE: 16 BRPM | HEART RATE: 80 BPM | HEIGHT: 71 IN | DIASTOLIC BLOOD PRESSURE: 58 MMHG | BODY MASS INDEX: 28 KG/M2

## 2025-08-25 DIAGNOSIS — Z09 HOSPITAL DISCHARGE FOLLOW-UP: Primary | ICD-10-CM

## 2025-08-25 DIAGNOSIS — R56.9 SEIZURE (HCC): ICD-10-CM

## 2025-08-25 DIAGNOSIS — E16.2 HYPOGLYCEMIA: ICD-10-CM

## 2025-08-25 DIAGNOSIS — Z79.4 TYPE 2 DIABETES MELLITUS WITH BOTH EYES AFFECTED BY MILD NONPROLIFERATIVE RETINOPATHY WITHOUT MACULAR EDEMA, WITH LONG-TERM CURRENT USE OF INSULIN (HCC): ICD-10-CM

## 2025-08-25 DIAGNOSIS — E11.3293 TYPE 2 DIABETES MELLITUS WITH BOTH EYES AFFECTED BY MILD NONPROLIFERATIVE RETINOPATHY WITHOUT MACULAR EDEMA, WITH LONG-TERM CURRENT USE OF INSULIN (HCC): ICD-10-CM

## 2025-08-25 PROCEDURE — 99214 OFFICE O/P EST MOD 30 MIN: CPT | Performed by: NURSE PRACTITIONER

## 2025-08-25 PROCEDURE — 1160F RVW MEDS BY RX/DR IN RCRD: CPT | Performed by: NURSE PRACTITIONER

## 2025-08-25 PROCEDURE — 3074F SYST BP LT 130 MM HG: CPT | Performed by: NURSE PRACTITIONER

## 2025-08-25 PROCEDURE — 1111F DSCHRG MED/CURRENT MED MERGE: CPT | Performed by: NURSE PRACTITIONER

## 2025-08-25 PROCEDURE — 3046F HEMOGLOBIN A1C LEVEL >9.0%: CPT | Performed by: NURSE PRACTITIONER

## 2025-08-25 PROCEDURE — 1123F ACP DISCUSS/DSCN MKR DOCD: CPT | Performed by: NURSE PRACTITIONER

## 2025-08-25 PROCEDURE — 3078F DIAST BP <80 MM HG: CPT | Performed by: NURSE PRACTITIONER

## 2025-08-25 PROCEDURE — 1159F MED LIST DOCD IN RCRD: CPT | Performed by: NURSE PRACTITIONER

## 2025-08-25 RX ORDER — HYDROCHLOROTHIAZIDE 12.5 MG/1
12.5 TABLET ORAL DAILY
Qty: 90 TABLET | Refills: 0 | Status: SHIPPED | OUTPATIENT
Start: 2025-08-25 | End: 2025-08-25

## 2025-08-25 RX ORDER — PIOGLITAZONE 30 MG/1
30 TABLET ORAL DAILY
Qty: 90 TABLET | Refills: 0 | Status: SHIPPED | OUTPATIENT
Start: 2025-08-25

## 2025-08-25 RX ORDER — FUROSEMIDE 40 MG/1
40 TABLET ORAL DAILY PRN
Qty: 90 TABLET | Refills: 0 | Status: SHIPPED | OUTPATIENT
Start: 2025-08-25

## 2025-08-25 RX ORDER — DILTIAZEM HYDROCHLORIDE 120 MG/1
120 CAPSULE, COATED, EXTENDED RELEASE ORAL DAILY
Qty: 90 CAPSULE | Refills: 0 | Status: SHIPPED | OUTPATIENT
Start: 2025-08-25

## 2025-08-25 RX ORDER — HYDROCHLOROTHIAZIDE 12.5 MG/1
CAPSULE ORAL
Qty: 2 EACH | Refills: 3 | Status: SHIPPED | OUTPATIENT
Start: 2025-08-25

## 2025-08-25 RX ORDER — KETOROLAC TROMETHAMINE 30 MG/ML
1 INJECTION, SOLUTION INTRAMUSCULAR; INTRAVENOUS ONCE
Qty: 1 EACH | Refills: 0 | Status: SHIPPED | OUTPATIENT
Start: 2025-08-25 | End: 2025-08-25

## 2025-08-25 RX ORDER — LEVOTHYROXINE SODIUM 100 UG/1
100 TABLET ORAL DAILY
Qty: 90 TABLET | Refills: 0 | Status: SHIPPED | OUTPATIENT
Start: 2025-08-25

## 2025-09-02 ENCOUNTER — TELEPHONE (OUTPATIENT)
Dept: INTERNAL MEDICINE CLINIC | Age: 85
End: 2025-09-02

## 2025-09-03 ENCOUNTER — APPOINTMENT (OUTPATIENT)
Dept: CT IMAGING | Age: 85
End: 2025-09-03
Payer: COMMERCIAL

## 2025-09-03 ENCOUNTER — HOSPITAL ENCOUNTER (OUTPATIENT)
Age: 85
Setting detail: OBSERVATION
Discharge: HOME | End: 2025-09-05
Attending: EMERGENCY MEDICINE | Admitting: STUDENT IN AN ORGANIZED HEALTH CARE EDUCATION/TRAINING PROGRAM
Payer: COMMERCIAL

## 2025-09-03 ENCOUNTER — APPOINTMENT (OUTPATIENT)
Dept: GENERAL RADIOLOGY | Age: 85
End: 2025-09-03
Payer: COMMERCIAL

## 2025-09-03 DIAGNOSIS — R26.2 DIFFICULTY IN WALKING: Primary | ICD-10-CM
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
## 2025-09-03 DIAGNOSIS — R73.9 HYPERGLYCEMIA: ICD-10-CM

## 2025-09-03 LAB
ANION GAP SERPL CALCULATED.3IONS-SCNC: 16 MMOL/L (ref 3–16)
BASE EXCESS BLDV CALC-SCNC: 1.9 MMOL/L (ref -3–3)
BASOPHILS # BLD: 0 K/UL (ref 0–0.2)
BASOPHILS NFR BLD: 0.6 %
BETA-HYDROXYBUTYRATE: 0.15 MMOL/L (ref 0–0.27)
BILIRUB UR QL STRIP.AUTO: NEGATIVE
BUN SERPL-MCNC: 28 MG/DL (ref 7–20)
CALCIUM SERPL-MCNC: 8.8 MG/DL (ref 8.3–10.6)
CHLORIDE SERPL-SCNC: 92 MMOL/L (ref 99–110)
CLARITY UR: CLEAR
CO2 BLDV-SCNC: 30 MMOL/L
CO2 SERPL-SCNC: 28 MMOL/L (ref 21–32)
COHGB MFR BLDV: 1.5 % (ref 0–1.5)
COLOR UR: YELLOW
CREAT SERPL-MCNC: 1.7 MG/DL (ref 0.8–1.3)
DEPRECATED RDW RBC AUTO: 15.8 % (ref 12.4–15.4)
EOSINOPHIL # BLD: 0.3 K/UL (ref 0–0.6)
EOSINOPHIL NFR BLD: 6.2 %
GFR SERPLBLD CREATININE-BSD FMLA CKD-EPI: 40 ML/MIN/{1.73_M2}
GLUCOSE BLD-MCNC: 336 MG/DL (ref 70–99)
GLUCOSE SERPL-MCNC: 378 MG/DL (ref 70–99)
GLUCOSE UR STRIP.AUTO-MCNC: >=1000 MG/DL
HCO3 BLDV-SCNC: 28.4 MMOL/L (ref 23–29)
HCT VFR BLD AUTO: 43.1 % (ref 40.5–52.5)
HGB BLD-MCNC: 14.3 G/DL (ref 13.5–17.5)
HGB UR QL STRIP.AUTO: NEGATIVE
KETONES UR STRIP.AUTO-MCNC: NEGATIVE MG/DL
LEUKOCYTE ESTERASE UR QL STRIP.AUTO: NEGATIVE
LYMPHOCYTES # BLD: 1.9 K/UL (ref 1–5.1)
LYMPHOCYTES NFR BLD: 41.2 %
MCH RBC QN AUTO: 29.3 PG (ref 26–34)
MCHC RBC AUTO-ENTMCNC: 33.2 G/DL (ref 31–36)
MCV RBC AUTO: 88.3 FL (ref 80–100)
METHGB MFR BLDV: 0.3 %
MONOCYTES # BLD: 0.4 K/UL (ref 0–1.3)
MONOCYTES NFR BLD: 8.5 %
NEUTROPHILS # BLD: 2 K/UL (ref 1.7–7.7)
NEUTROPHILS NFR BLD: 43.5 %
NITRITE UR QL STRIP.AUTO: NEGATIVE
O2 CT VFR BLDV CALC: 15 VOL %
O2 THERAPY: ABNORMAL
PCO2 BLDV: 51.5 MMHG (ref 40–50)
PERFORMED ON: ABNORMAL
PH BLDV: 7.36 [PH] (ref 7.35–7.45)
PH UR STRIP.AUTO: 6 [PH] (ref 5–8)
PLATELET # BLD AUTO: 195 K/UL (ref 135–450)
PMV BLD AUTO: 7.3 FL (ref 5–10.5)
PO2 BLDV: 41.1 MMHG (ref 25–40)
POTASSIUM SERPL-SCNC: 3.5 MMOL/L (ref 3.5–5.1)
PROT UR STRIP.AUTO-MCNC: NEGATIVE MG/DL
RBC # BLD AUTO: 4.88 M/UL (ref 4.2–5.9)
SAO2 % BLDV: 74 %
SODIUM SERPL-SCNC: 136 MMOL/L (ref 136–145)
SP GR UR STRIP.AUTO: <=1.005 (ref 1–1.03)
TROPONIN, HIGH SENSITIVITY: 58 NG/L (ref 0–22)
UA COMPLETE W REFLEX CULTURE PNL UR: ABNORMAL
UA DIPSTICK W REFLEX MICRO PNL UR: ABNORMAL
URN SPEC COLLECT METH UR: ABNORMAL
UROBILINOGEN UR STRIP-ACNC: 0.2 E.U./DL
WBC # BLD AUTO: 4.6 K/UL (ref 4–11)

## 2025-09-03 PROCEDURE — 2580000003 HC RX 258: Performed by: EMERGENCY MEDICINE

## 2025-09-03 PROCEDURE — 85025 COMPLETE CBC W/AUTO DIFF WBC: CPT

## 2025-09-03 PROCEDURE — 96360 HYDRATION IV INFUSION INIT: CPT

## 2025-09-03 PROCEDURE — 70450 CT HEAD/BRAIN W/O DYE: CPT

## 2025-09-03 PROCEDURE — 84484 ASSAY OF TROPONIN QUANT: CPT

## 2025-09-03 PROCEDURE — 82803 BLOOD GASES ANY COMBINATION: CPT

## 2025-09-03 PROCEDURE — 82010 KETONE BODYS QUAN: CPT

## 2025-09-03 PROCEDURE — 81003 URINALYSIS AUTO W/O SCOPE: CPT

## 2025-09-03 PROCEDURE — 93005 ELECTROCARDIOGRAM TRACING: CPT | Performed by: EMERGENCY MEDICINE

## 2025-09-03 PROCEDURE — 80048 BASIC METABOLIC PNL TOTAL CA: CPT

## 2025-09-03 PROCEDURE — 96361 HYDRATE IV INFUSION ADD-ON: CPT

## 2025-09-03 PROCEDURE — 71045 X-RAY EXAM CHEST 1 VIEW: CPT

## 2025-09-03 PROCEDURE — 99285 EMERGENCY DEPT VISIT HI MDM: CPT

## 2025-09-03 RX ORDER — 0.9 % SODIUM CHLORIDE 0.9 %
1000 INTRAVENOUS SOLUTION INTRAVENOUS ONCE
Status: COMPLETED | OUTPATIENT
Start: 2025-09-03 | End: 2025-09-04

## 2025-09-03 RX ADMIN — SODIUM CHLORIDE 1000 ML: 0.9 INJECTION, SOLUTION INTRAVENOUS at 22:12

## 2025-09-03 ASSESSMENT — ENCOUNTER SYMPTOMS
SHORTNESS OF BREATH: 0
VOMITING: 0
NAUSEA: 0
COUGH: 0
DIARRHEA: 0
BACK PAIN: 0
EYE PAIN: 0
EYE REDNESS: 0
ABDOMINAL PAIN: 0
RHINORRHEA: 0
CONSTIPATION: 0

## 2025-09-03 ASSESSMENT — PAIN SCALES - GENERAL: PAINLEVEL_OUTOF10: 0

## 2025-09-03 ASSESSMENT — PAIN - FUNCTIONAL ASSESSMENT: PAIN_FUNCTIONAL_ASSESSMENT: 0-10
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
## 2025-09-04 LAB
ANION GAP SERPL CALCULATED.3IONS-SCNC: 16 MMOL/L (ref 3–16)
BUN SERPL-MCNC: 25 MG/DL (ref 7–20)
CALCIUM SERPL-MCNC: 9.1 MG/DL (ref 8.3–10.6)
CHLORIDE SERPL-SCNC: 99 MMOL/L (ref 99–110)
CK SERPL-CCNC: 51 U/L (ref 39–308)
CO2 SERPL-SCNC: 27 MMOL/L (ref 21–32)
CREAT SERPL-MCNC: 1.3 MG/DL (ref 0.8–1.3)
EKG DIAGNOSIS: NORMAL
EKG Q-T INTERVAL: 414 MS
EKG QRS DURATION: 90 MS
EKG QTC CALCULATION (BAZETT): 449 MS
EKG R AXIS: 81 DEGREES
EKG T AXIS: 59 DEGREES
EKG VENTRICULAR RATE: 71 BPM
GFR SERPLBLD CREATININE-BSD FMLA CKD-EPI: 55 ML/MIN/{1.73_M2}
GLUCOSE BLD-MCNC: 114 MG/DL (ref 70–99)
GLUCOSE BLD-MCNC: 141 MG/DL (ref 70–99)
GLUCOSE BLD-MCNC: 230 MG/DL (ref 70–99)
GLUCOSE BLD-MCNC: 232 MG/DL (ref 70–99)
GLUCOSE BLD-MCNC: 280 MG/DL (ref 70–99)
GLUCOSE BLD-MCNC: 316 MG/DL (ref 70–99)
GLUCOSE BLD-MCNC: 366 MG/DL
GLUCOSE BLD-MCNC: 366 MG/DL (ref 70–99)
GLUCOSE SERPL-MCNC: 121 MG/DL (ref 70–99)
MAGNESIUM SERPL-MCNC: 1.92 MG/DL (ref 1.8–2.4)
PERFORMED ON: ABNORMAL
POTASSIUM SERPL-SCNC: 3.1 MMOL/L (ref 3.5–5.1)
SODIUM SERPL-SCNC: 142 MMOL/L (ref 136–145)
TROPONIN, HIGH SENSITIVITY: 54 NG/L (ref 0–22)

## 2025-09-04 PROCEDURE — 97116 GAIT TRAINING THERAPY: CPT

## 2025-09-04 PROCEDURE — 2500000003 HC RX 250 WO HCPCS: Performed by: STUDENT IN AN ORGANIZED HEALTH CARE EDUCATION/TRAINING PROGRAM

## 2025-09-04 PROCEDURE — G0378 HOSPITAL OBSERVATION PER HR: HCPCS

## 2025-09-04 PROCEDURE — 6370000000 HC RX 637 (ALT 250 FOR IP): Performed by: STUDENT IN AN ORGANIZED HEALTH CARE EDUCATION/TRAINING PROGRAM

## 2025-09-04 PROCEDURE — 80048 BASIC METABOLIC PNL TOTAL CA: CPT

## 2025-09-04 PROCEDURE — 6370000000 HC RX 637 (ALT 250 FOR IP): Performed by: EMERGENCY MEDICINE

## 2025-09-04 PROCEDURE — 82550 ASSAY OF CK (CPK): CPT

## 2025-09-04 PROCEDURE — 97161 PT EVAL LOW COMPLEX 20 MIN: CPT

## 2025-09-04 PROCEDURE — 36415 COLL VENOUS BLD VENIPUNCTURE: CPT

## 2025-09-04 PROCEDURE — 83735 ASSAY OF MAGNESIUM: CPT

## 2025-09-04 PROCEDURE — 93010 ELECTROCARDIOGRAM REPORT: CPT | Performed by: INTERNAL MEDICINE

## 2025-09-04 PROCEDURE — 2580000003 HC RX 258: Performed by: STUDENT IN AN ORGANIZED HEALTH CARE EDUCATION/TRAINING PROGRAM

## 2025-09-04 PROCEDURE — 96372 THER/PROPH/DIAG INJ SC/IM: CPT

## 2025-09-04 RX ORDER — GLUCAGON 1 MG/ML
1 KIT INJECTION PRN
Status: DISCONTINUED | OUTPATIENT
Start: 2025-09-04 | End: 2025-09-05 | Stop reason: HOSPADM

## 2025-09-04 RX ORDER — FUROSEMIDE 40 MG/1
40 TABLET ORAL DAILY PRN
Status: DISCONTINUED | OUTPATIENT
Start: 2025-09-04 | End: 2025-09-05 | Stop reason: HOSPADM

## 2025-09-04 RX ORDER — SODIUM CHLORIDE 0.9 % (FLUSH) 0.9 %
5-40 SYRINGE (ML) INJECTION PRN
Status: DISCONTINUED | OUTPATIENT
Start: 2025-09-04 | End: 2025-09-05 | Stop reason: HOSPADM

## 2025-09-04 RX ORDER — INSULIN LISPRO 100 [IU]/ML
0-8 INJECTION, SOLUTION INTRAVENOUS; SUBCUTANEOUS
Status: DISCONTINUED | OUTPATIENT
Start: 2025-09-04 | End: 2025-09-05 | Stop reason: HOSPADM

## 2025-09-04 RX ORDER — ONDANSETRON 4 MG/1
4 TABLET, ORALLY DISINTEGRATING ORAL EVERY 8 HOURS PRN
Status: DISCONTINUED | OUTPATIENT
Start: 2025-09-04 | End: 2025-09-05 | Stop reason: HOSPADM

## 2025-09-04 RX ORDER — SODIUM CHLORIDE, SODIUM LACTATE, POTASSIUM CHLORIDE, CALCIUM CHLORIDE 600; 310; 30; 20 MG/100ML; MG/100ML; MG/100ML; MG/100ML
INJECTION, SOLUTION INTRAVENOUS CONTINUOUS
Status: DISCONTINUED | OUTPATIENT
Start: 2025-09-04 | End: 2025-09-04

## 2025-09-04 RX ORDER — LEVOTHYROXINE SODIUM 100 UG/1
100 TABLET ORAL
Status: DISCONTINUED | OUTPATIENT
Start: 2025-09-04 | End: 2025-09-05 | Stop reason: HOSPADM

## 2025-09-04 RX ORDER — ONDANSETRON 2 MG/ML
4 INJECTION INTRAMUSCULAR; INTRAVENOUS EVERY 6 HOURS PRN
Status: DISCONTINUED | OUTPATIENT
Start: 2025-09-04 | End: 2025-09-05 | Stop reason: HOSPADM

## 2025-09-04 RX ORDER — INSULIN GLARGINE 100 [IU]/ML
15 INJECTION, SOLUTION SUBCUTANEOUS NIGHTLY
Status: DISCONTINUED | OUTPATIENT
Start: 2025-09-04 | End: 2025-09-05 | Stop reason: HOSPADM

## 2025-09-04 RX ORDER — ESCITALOPRAM OXALATE 10 MG/1
5 TABLET ORAL DAILY
Status: DISCONTINUED | OUTPATIENT
Start: 2025-09-04 | End: 2025-09-05 | Stop reason: HOSPADM

## 2025-09-04 RX ORDER — INSULIN LISPRO 100 [IU]/ML
5 INJECTION, SOLUTION INTRAVENOUS; SUBCUTANEOUS
COMMUNITY

## 2025-09-04 RX ORDER — MAGNESIUM SULFATE IN WATER 40 MG/ML
2000 INJECTION, SOLUTION INTRAVENOUS PRN
Status: DISCONTINUED | OUTPATIENT
Start: 2025-09-04 | End: 2025-09-05 | Stop reason: HOSPADM

## 2025-09-04 RX ORDER — POTASSIUM CHLORIDE 7.45 MG/ML
10 INJECTION INTRAVENOUS PRN
Status: DISCONTINUED | OUTPATIENT
Start: 2025-09-04 | End: 2025-09-05 | Stop reason: HOSPADM

## 2025-09-04 RX ORDER — POLYETHYLENE GLYCOL 3350 17 G/17G
17 POWDER, FOR SOLUTION ORAL DAILY PRN
Status: DISCONTINUED | OUTPATIENT
Start: 2025-09-04 | End: 2025-09-05 | Stop reason: HOSPADM

## 2025-09-04 RX ORDER — ACETAMINOPHEN 325 MG/1
650 TABLET ORAL EVERY 6 HOURS PRN
Status: DISCONTINUED | OUTPATIENT
Start: 2025-09-04 | End: 2025-09-05 | Stop reason: HOSPADM

## 2025-09-04 RX ORDER — INSULIN GLARGINE 100 [IU]/ML
15 INJECTION, SOLUTION SUBCUTANEOUS NIGHTLY
Status: DISCONTINUED | OUTPATIENT
Start: 2025-09-04 | End: 2025-09-04 | Stop reason: SDUPTHER

## 2025-09-04 RX ORDER — TRAZODONE HYDROCHLORIDE 50 MG/1
12.5 TABLET ORAL NIGHTLY
Status: DISCONTINUED | OUTPATIENT
Start: 2025-09-04 | End: 2025-09-04

## 2025-09-04 RX ORDER — INSULIN GLARGINE 100 [IU]/ML
5 INJECTION, SOLUTION SUBCUTANEOUS NIGHTLY
COMMUNITY

## 2025-09-04 RX ORDER — SODIUM CHLORIDE 0.9 % (FLUSH) 0.9 %
5-40 SYRINGE (ML) INJECTION EVERY 12 HOURS SCHEDULED
Status: DISCONTINUED | OUTPATIENT
Start: 2025-09-04 | End: 2025-09-05 | Stop reason: HOSPADM

## 2025-09-04 RX ORDER — MIDODRINE HYDROCHLORIDE 5 MG/1
5 TABLET ORAL
Status: DISCONTINUED | OUTPATIENT
Start: 2025-09-04 | End: 2025-09-05 | Stop reason: HOSPADM

## 2025-09-04 RX ORDER — INSULIN LISPRO 100 [IU]/ML
12 INJECTION, SOLUTION INTRAVENOUS; SUBCUTANEOUS ONCE
Status: COMPLETED | OUTPATIENT
Start: 2025-09-04 | End: 2025-09-04

## 2025-09-04 RX ORDER — ACETAMINOPHEN 650 MG/1
650 SUPPOSITORY RECTAL EVERY 6 HOURS PRN
Status: DISCONTINUED | OUTPATIENT
Start: 2025-09-04 | End: 2025-09-05 | Stop reason: HOSPADM

## 2025-09-04 RX ORDER — SODIUM CHLORIDE 9 MG/ML
INJECTION, SOLUTION INTRAVENOUS PRN
Status: DISCONTINUED | OUTPATIENT
Start: 2025-09-04 | End: 2025-09-05 | Stop reason: HOSPADM

## 2025-09-04 RX ORDER — DEXTROSE MONOHYDRATE 100 MG/ML
INJECTION, SOLUTION INTRAVENOUS CONTINUOUS PRN
Status: DISCONTINUED | OUTPATIENT
Start: 2025-09-04 | End: 2025-09-05 | Stop reason: HOSPADM

## 2025-09-04 RX ORDER — POTASSIUM CHLORIDE 1500 MG/1
40 TABLET, EXTENDED RELEASE ORAL PRN
Status: DISCONTINUED | OUTPATIENT
Start: 2025-09-04 | End: 2025-09-05 | Stop reason: HOSPADM

## 2025-09-04 RX ORDER — DILTIAZEM HYDROCHLORIDE 120 MG/1
120 CAPSULE, COATED, EXTENDED RELEASE ORAL DAILY
Status: DISCONTINUED | OUTPATIENT
Start: 2025-09-04 | End: 2025-09-05 | Stop reason: HOSPADM

## 2025-09-04 RX ORDER — ENOXAPARIN SODIUM 100 MG/ML
40 INJECTION SUBCUTANEOUS DAILY
Status: DISCONTINUED | OUTPATIENT
Start: 2025-09-04 | End: 2025-09-04

## 2025-09-04 RX ADMIN — INSULIN GLARGINE 15 UNITS: 100 INJECTION, SOLUTION SUBCUTANEOUS at 03:03

## 2025-09-04 RX ADMIN — INSULIN LISPRO 6 UNITS: 100 INJECTION, SOLUTION INTRAVENOUS; SUBCUTANEOUS at 03:02

## 2025-09-04 RX ADMIN — MIDODRINE HYDROCHLORIDE 5 MG: 5 TABLET ORAL at 17:38

## 2025-09-04 RX ADMIN — INSULIN GLARGINE 15 UNITS: 100 INJECTION, SOLUTION SUBCUTANEOUS at 22:09

## 2025-09-04 RX ADMIN — INSULIN LISPRO 2 UNITS: 100 INJECTION, SOLUTION INTRAVENOUS; SUBCUTANEOUS at 17:38

## 2025-09-04 RX ADMIN — INSULIN LISPRO 2 UNITS: 100 INJECTION, SOLUTION INTRAVENOUS; SUBCUTANEOUS at 12:27

## 2025-09-04 RX ADMIN — INSULIN LISPRO 4 UNITS: 100 INJECTION, SOLUTION INTRAVENOUS; SUBCUTANEOUS at 21:34

## 2025-09-04 RX ADMIN — ESCITALOPRAM OXALATE 5 MG: 10 TABLET ORAL at 08:03

## 2025-09-04 RX ADMIN — APIXABAN 2.5 MG: 5 TABLET, FILM COATED ORAL at 21:35

## 2025-09-04 RX ADMIN — POTASSIUM CHLORIDE 40 MEQ: 1500 TABLET, EXTENDED RELEASE ORAL at 08:02

## 2025-09-04 RX ADMIN — Medication 10 ML: at 21:35

## 2025-09-04 RX ADMIN — DILTIAZEM HYDROCHLORIDE 120 MG: 120 CAPSULE, COATED, EXTENDED RELEASE ORAL at 08:03

## 2025-09-04 RX ADMIN — SODIUM CHLORIDE, SODIUM LACTATE, POTASSIUM CHLORIDE, AND CALCIUM CHLORIDE: .6; .31; .03; .02 INJECTION, SOLUTION INTRAVENOUS at 03:06

## 2025-09-04 RX ADMIN — MIDODRINE HYDROCHLORIDE 5 MG: 5 TABLET ORAL at 12:27

## 2025-09-04 RX ADMIN — MIDODRINE HYDROCHLORIDE 5 MG: 5 TABLET ORAL at 08:02

## 2025-09-04 RX ADMIN — APIXABAN 2.5 MG: 5 TABLET, FILM COATED ORAL at 08:02

## 2025-09-04 RX ADMIN — LEVOTHYROXINE SODIUM 100 MCG: 0.1 TABLET ORAL at 06:27

## 2025-09-04 RX ADMIN — INSULIN LISPRO 12 UNITS: 100 INJECTION, SOLUTION INTRAVENOUS; SUBCUTANEOUS at 01:06

## 2025-09-04 ASSESSMENT — PAIN SCALES - GENERAL
PAINLEVEL_OUTOF10: 0
PAINLEVEL_OUTOF10: 0

## 2025-09-05 VITALS
RESPIRATION RATE: 18 BRPM | TEMPERATURE: 97.9 F | HEART RATE: 73 BPM | BODY MASS INDEX: 31.12 KG/M2 | OXYGEN SATURATION: 97 % | DIASTOLIC BLOOD PRESSURE: 72 MMHG | WEIGHT: 222.3 LBS | HEIGHT: 71 IN | SYSTOLIC BLOOD PRESSURE: 115 MMHG

## 2025-09-05 PROBLEM — E11.65 TYPE 2 DIABETES MELLITUS WITH HYPERGLYCEMIA, WITHOUT LONG-TERM CURRENT USE OF INSULIN (HCC): Status: ACTIVE | Noted: 2025-09-05

## 2025-09-05 LAB
ANION GAP SERPL CALCULATED.3IONS-SCNC: 9 MMOL/L (ref 3–16)
BASOPHILS # BLD: 0 K/UL (ref 0–0.2)
BASOPHILS NFR BLD: 0.4 %
BUN SERPL-MCNC: 29 MG/DL (ref 7–20)
CALCIUM SERPL-MCNC: 9 MG/DL (ref 8.3–10.6)
CHLORIDE SERPL-SCNC: 101 MMOL/L (ref 99–110)
CO2 SERPL-SCNC: 31 MMOL/L (ref 21–32)
CREAT SERPL-MCNC: 1.4 MG/DL (ref 0.8–1.3)
DEPRECATED RDW RBC AUTO: 15.9 % (ref 12.4–15.4)
EOSINOPHIL # BLD: 0.3 K/UL (ref 0–0.6)
EOSINOPHIL NFR BLD: 7.6 %
GFR SERPLBLD CREATININE-BSD FMLA CKD-EPI: 50 ML/MIN/{1.73_M2}
GLUCOSE BLD-MCNC: 129 MG/DL (ref 70–99)
GLUCOSE BLD-MCNC: 131 MG/DL (ref 70–99)
GLUCOSE SERPL-MCNC: 143 MG/DL (ref 70–99)
HCT VFR BLD AUTO: 39.2 % (ref 40.5–52.5)
HGB BLD-MCNC: 13.1 G/DL (ref 13.5–17.5)
LYMPHOCYTES # BLD: 1.3 K/UL (ref 1–5.1)
LYMPHOCYTES NFR BLD: 30.2 %
MCH RBC QN AUTO: 29.8 PG (ref 26–34)
MCHC RBC AUTO-ENTMCNC: 33.5 G/DL (ref 31–36)
MCV RBC AUTO: 88.9 FL (ref 80–100)
MONOCYTES # BLD: 0.5 K/UL (ref 0–1.3)
MONOCYTES NFR BLD: 11.3 %
NEUTROPHILS # BLD: 2.2 K/UL (ref 1.7–7.7)
NEUTROPHILS NFR BLD: 50.5 %
PERFORMED ON: ABNORMAL
PERFORMED ON: ABNORMAL
PLATELET # BLD AUTO: 192 K/UL (ref 135–450)
PMV BLD AUTO: 7.6 FL (ref 5–10.5)
POTASSIUM SERPL-SCNC: 3.8 MMOL/L (ref 3.5–5.1)
RBC # BLD AUTO: 4.41 M/UL (ref 4.2–5.9)
SODIUM SERPL-SCNC: 141 MMOL/L (ref 136–145)
WBC # BLD AUTO: 4.4 K/UL (ref 4–11)

## 2025-09-05 PROCEDURE — 83036 HEMOGLOBIN GLYCOSYLATED A1C: CPT

## 2025-09-05 PROCEDURE — 85025 COMPLETE CBC W/AUTO DIFF WBC: CPT

## 2025-09-05 PROCEDURE — 6370000000 HC RX 637 (ALT 250 FOR IP): Performed by: STUDENT IN AN ORGANIZED HEALTH CARE EDUCATION/TRAINING PROGRAM

## 2025-09-05 PROCEDURE — 80048 BASIC METABOLIC PNL TOTAL CA: CPT

## 2025-09-05 PROCEDURE — 36415 COLL VENOUS BLD VENIPUNCTURE: CPT

## 2025-09-05 PROCEDURE — G0378 HOSPITAL OBSERVATION PER HR: HCPCS

## 2025-09-05 RX ADMIN — DILTIAZEM HYDROCHLORIDE 120 MG: 120 CAPSULE, COATED, EXTENDED RELEASE ORAL at 09:06

## 2025-09-05 RX ADMIN — MIDODRINE HYDROCHLORIDE 5 MG: 5 TABLET ORAL at 09:06

## 2025-09-05 RX ADMIN — ESCITALOPRAM OXALATE 5 MG: 10 TABLET ORAL at 09:06

## 2025-09-05 RX ADMIN — APIXABAN 2.5 MG: 5 TABLET, FILM COATED ORAL at 09:07

## 2025-09-05 RX ADMIN — LEVOTHYROXINE SODIUM 100 MCG: 0.1 TABLET ORAL at 05:20

## 2025-09-05 ASSESSMENT — ENCOUNTER SYMPTOMS
EYE PAIN: 0
NAUSEA: 0
DIARRHEA: 0
SHORTNESS OF BREATH: 0
EYE REDNESS: 0
CONSTIPATION: 0
SINUS PAIN: 0
SINUS PRESSURE: 0
VOMITING: 0

## 2025-09-05 ASSESSMENT — PAIN SCALES - GENERAL
PAINLEVEL_OUTOF10: 0
PAINLEVEL_OUTOF10: 0

## 2025-09-06 LAB
EST. AVERAGE GLUCOSE BLD GHB EST-MCNC: 214.5 MG/DL
HBA1C MFR BLD: 9.1 %

## (undated) DEVICE — ELECTRODE PT RET AD L9FT HI MOIST COND ADH HYDRGEL CORDED

## (undated) DEVICE — SYRINGE MED 10ML LUERLOCK TIP W/O SFTY DISP

## (undated) DEVICE — CANNULA NSL 13FT TUBE AD ETCO2 DIV SAMP M

## (undated) DEVICE — SOLUTION IV IRRIG 500ML 0.9% SODIUM CHL 2F7123

## (undated) DEVICE — GLOVE ORANGE PI 8 1/2   MSG9085

## (undated) DEVICE — GOWN SIRUS NONREIN XL W/TWL: Brand: MEDLINE INDUSTRIES, INC.

## (undated) DEVICE — APPLICATOR MEDICATED 26 CC SOLUTION HI LT ORNG CHLORAPREP

## (undated) DEVICE — GOWN,AURORA,NONREINF,RAGLAN,XXL,STERILE: Brand: MEDLINE

## (undated) DEVICE — SUTURE PROL SZ 2-0 L30IN NONABSORBABLE BLU L26MM CT-2 1/2 8411H

## (undated) DEVICE — NEEDLE HYPO 25GA L1.5IN BLU POLYPR HUB S STL REG BVL STR

## (undated) DEVICE — Z INACTIVE USE 2855096 SPONGE GZ W4XL4IN 8 PLY 100% COT

## (undated) DEVICE — PACK,UNIVERSAL,SPLIT,II,AURORA: Brand: MEDLINE

## (undated) DEVICE — SUTURE VCRL SZ 3-0 L18IN ABSRB UD L26MM SH 1/2 CIR J864D

## (undated) DEVICE — TIP SUCT DIA12FR W STYL CTRL VENT DISPOSABLE FRAZ

## (undated) DEVICE — MAJOR SET UP PK